# Patient Record
Sex: FEMALE | Race: WHITE | NOT HISPANIC OR LATINO | Employment: UNEMPLOYED | URBAN - METROPOLITAN AREA
[De-identification: names, ages, dates, MRNs, and addresses within clinical notes are randomized per-mention and may not be internally consistent; named-entity substitution may affect disease eponyms.]

---

## 2017-02-08 ENCOUNTER — HOSPITAL ENCOUNTER (EMERGENCY)
Facility: CLINIC | Age: 32
Discharge: HOME | End: 2017-02-08
Attending: EMERGENCY MEDICINE | Admitting: EMERGENCY MEDICINE
Payer: MEDICAID

## 2017-02-08 VITALS
TEMPERATURE: 98.5 F | OXYGEN SATURATION: 100 % | BODY MASS INDEX: 21.34 KG/M2 | HEIGHT: 64 IN | WEIGHT: 125 LBS | RESPIRATION RATE: 18 BRPM | DIASTOLIC BLOOD PRESSURE: 110 MMHG | SYSTOLIC BLOOD PRESSURE: 124 MMHG

## 2017-02-08 DIAGNOSIS — K08.89 PAIN, DENTAL: Primary | ICD-10-CM

## 2017-02-08 DIAGNOSIS — K02.9 DENTAL CARIES: ICD-10-CM

## 2017-02-08 PROCEDURE — 64400 NJX AA&/STRD TRIGEMINAL NRV: CPT | Performed by: EMERGENCY MEDICINE

## 2017-02-08 PROCEDURE — 99284 EMERGENCY DEPT VISIT MOD MDM: CPT | Mod: 25 | Performed by: EMERGENCY MEDICINE

## 2017-02-08 PROCEDURE — 25000132 ZZH RX MED GY IP 250 OP 250 PS 637: Performed by: EMERGENCY MEDICINE

## 2017-02-08 RX ORDER — HYDROCODONE BITARTRATE AND ACETAMINOPHEN 5; 325 MG/1; MG/1
1-2 TABLET ORAL EVERY 4 HOURS PRN
Status: DISCONTINUED | OUTPATIENT
Start: 2017-02-08 | End: 2017-02-08 | Stop reason: HOSPADM

## 2017-02-08 RX ORDER — HYDROCODONE BITARTRATE AND ACETAMINOPHEN 5; 325 MG/1; MG/1
1-2 TABLET ORAL EVERY 4 HOURS PRN
Qty: 10 TABLET | Refills: 0 | Status: SHIPPED | OUTPATIENT
Start: 2017-02-08 | End: 2017-06-30

## 2017-02-08 RX ORDER — PENICILLIN V POTASSIUM 500 MG/1
500 TABLET, FILM COATED ORAL 4 TIMES DAILY
Qty: 28 TABLET | Refills: 0 | Status: SHIPPED | OUTPATIENT
Start: 2017-02-08 | End: 2017-02-15

## 2017-02-08 RX ORDER — PENICILLIN V POTASSIUM 250 MG/1
500 TABLET, FILM COATED ORAL ONCE
Status: COMPLETED | OUTPATIENT
Start: 2017-02-08 | End: 2017-02-08

## 2017-02-08 RX ADMIN — PENICILLIN V POTASSIUM 500 MG: 250 TABLET, FILM COATED ORAL at 01:53

## 2017-02-08 ASSESSMENT — ENCOUNTER SYMPTOMS
ABDOMINAL PAIN: 0
LIGHT-HEADEDNESS: 0
BACK PAIN: 0
NAUSEA: 0
FEVER: 0
HEADACHES: 1
VOMITING: 0
SORE THROAT: 0
TROUBLE SWALLOWING: 0
CHILLS: 0
FACIAL SWELLING: 0
SHORTNESS OF BREATH: 0
NECK PAIN: 0
DIARRHEA: 0

## 2017-02-08 NOTE — ED AVS SNAPSHOT
[facility] Emergency Department    [address] [address]    Phone:  [phone]    Fax:  [phone]                                       Lluvia Bergman   MRN: [id number]    Department:  [facility] Emergency Department   Date of Visit:  2/8/2017           After Visit Summary Signature Page     I have received my discharge instructions, and my questions have been answered. I have discussed any challenges I see with this plan with the nurse or doctor.    ..........................................................................................................................................  Patient/Patient Representative Signature      ..........................................................................................................................................  Patient Representative Print Name and Relationship to Patient    ..................................................               ................................................  Date                                            Time    ..........................................................................................................................................  Reviewed by Signature/Title    ...................................................              ..............................................  Date                                                            Time

## 2017-02-08 NOTE — ED NOTES
Pt presents to ED with complaints of dental pain for 2 days now. Pt reports the pain is so intense she is unable to eat, sleep or do anything. Pt has been trying ibuprofen without any relief. Pt attempted to go to the dentist yesterday, but was unable to get an appointment.

## 2017-02-08 NOTE — DISCHARGE INSTRUCTIONS
"  Dental Cavity    A dental cavity is a pit or crater in the surface of a tooth. This exposes the sensitive inner layer of the tooth and causes pain. If the cavity isn t treated, it will get bigger. It may cause an infection or abscess in the root of the tooth. An infection in the tooth is a much more serious problem than a cavity. You might need a root canal or the entire tooth taken out.  The pain in your tooth may be made worse by drinking hot or cold beverages. It may spread from the tooth to your ear or the area of your jaw on the same side.  Home care  Follow these tips when caring for yourself at home:    Avoid hot and cold foods and drinks. Your tooth may be sensitive to changes in temperature.    If your tooth is chipped or cracked, or if there is a large open cavity, put oil of cloves directly on the tooth to relieve pain. You can buy oil of cloves at drugstores. Some pharmacies carry an over-the-counter \"toothache kit.\" This contains a paste that you can put on the exposed tooth to make it less sensitive.    Put a cold pack on your jaw over the sore area to help reduce pain.    You may use acetaminophen or ibuprofen to ease pain, unless another medicine was prescribed. Note: If you have chronic liver or kidney disease, talk with your health care provider before using these medicines. Also talk with your provider if you ve had a stomach ulcer or GI bleeding.    If you have signs of an infection, you will be given an antibiotic. Take it as directed.  Follow-up care  Follow up with your dentist as advised. Your pain may go away with the treatment given today. But only a dentist can fully look at and treat this problem to prevent further tooth damage.  When to seek medical advice  Call your health care provider right away if any of these occur:    Redness or swelling of the face    Pain gets worse or spreads to your neck    Fever over 100.5  F (38 C)    Unusual drowsiness    Headache or stiff neck    Weakness " or fainting    Pus drains from the tooth or gum    Difficulty swallowing or breathing       8568-3660 [employer]. [address] [place] [address], [place] [address]. All rights reserved. This information is not intended as a substitute for professional medical care. Always follow your healthcare professional's instructions.          Many of these clinics offer a sliding fee option for patients that qualify, and see patients on a walk-in or same day basis. Please call each clinic directly. As services, hours, fees and policies vary greatly.          [facility], \[facility] [place] [facility]\""  [phone]  Sees no insurance  [facility], [place]  [phone]  Preventive services only  [facility] (mult loc) [phone]  [facility]    ([facility]), \[facility] [place] [facility]\""  [phone]  [facility]       [phone]  Preventive services only  [facility]  [phone]  Accepts MA & sees no ins  [facility],      Accepts [place] & sees no ins   [place]   [phone]; [phone]  [facility]   Accepts MA & sees no ins       [phone]  [facility], \[facility] [place] [facility]\""  [phone]   Accepts MA emergencies  [facility] [phone]  [facility],     Accepts MA   [place]   [phone]    [facility] [phone]  Accepts MA & sees no ins   [facility]   Dental Clinic    [phone]  [facility], \[facility] [place] [facility]\""  [phone]   [facility] [phone]  [facility]  Preventive services only   [place]   [phone]  [facility]) [phone]  [facility], [place]  [phone]  Same day [facility] [phone]  Same day [facility],      Same day [facility]   [phone]    [facility] [phone]  Free clinic, walk-in only  [facility] (multiple locations) [phone]      [facility] [phone]    [facility] [phone]  Free clinic, walk-in only  [facility]  [phone]  [facility] [phone] (adults)       [phone] (children)  [facility] [phone]    Also, referral service for low cost dental and healthcare: [phone]  And [phone]

## 2017-02-08 NOTE — ED PROVIDER NOTES
"  History     Chief Complaint   Patient presents with     Dental Pain     HPI  Lluvia Bergman is a 31 year old female with history of chronic dental pain and dental caries presents for evaluation of worsening left upper dental pain tonight.  Patient reports worsening pain over the past roughly 3 days.  No swelling of the face or cheek.  No reported drainage in the mouth.  Patient does report associated headache and difficulty chewing.  Denies fever or chills.  Patient reports self treating with Orajel as well as frequent ibuprofen without adequate pain relief prompting ED visit tonight.    I have reviewed the Medications, Allergies, Past Medical and Surgical History, and Social History in the Epic system.    Review of Systems   Constitutional: Negative for fever and chills.   HENT: Positive for dental problem. Negative for congestion, drooling, facial swelling, sore throat and trouble swallowing.    Respiratory: Negative for shortness of breath.    Cardiovascular: Negative for chest pain.   Gastrointestinal: Negative for nausea, vomiting, abdominal pain and diarrhea.   Musculoskeletal: Negative for back pain and neck pain.   Skin: Negative for rash.   Neurological: Positive for headaches. Negative for light-headedness.   All other systems reviewed and are negative.      Physical Exam   BP: (!) 167/113 mmHg  Heart Rate: 121  Temp: 98.5  F (36.9  C)  Resp: 18  Height: 162.6 cm (5' 4\")  Weight: 56.7 kg (125 lb)  SpO2: 100 %  Physical Exam   Constitutional: She is oriented to person, place, and time. She appears well-developed and well-nourished.   Appears uncomfortable, holding left side of face   HENT:   Head: Normocephalic and atraumatic.   Mouth/Throat: No trismus in the jaw. Dental caries present. No dental abscesses. No oropharyngeal exudate.       No facial swelling, warmth, or erythema   Neck: Normal range of motion. Neck supple.   Cardiovascular: Regular rhythm.    Pulmonary/Chest: Effort normal.   Neurological: " She is alert and oriented to person, place, and time.   Skin: Skin is warm and dry.   Psychiatric: She has a normal mood and affect.   Nursing note and vitals reviewed.      ED Course   Procedures                 Labs Ordered and Resulted from Time of ED Arrival Up to the Time of Departure from the ED - No data to display     Dental block performed.  Local apical nerve block performed above the affected tooth using bupivacaine.    1:46 AM: PT re-assessed after dental block. Pain resolved.  Now 0/10.  Patient resting much more comfortably at this time in no distress.  On repeat exam, no evidence of clear abscess however will initiate a course with antibiotics due to the possibility of small apical abscess and in anticipation of dental extraction    Assessments & Plan (with Medical Decision Making)  31-year-old female with history of chronic dental pain and dental caries presenting for evaluation of worsening left upper dental pain over the past few days.  No fever or chills.  No facial swelling.  No visible abscess.  Local nerve block performed with complete resolution of her pain.  Patient also started on a prophylactic course of penicillin for treatment of possible periapical abscess in anticipation of need for dental extraction.  Patient given 10 Vicodin for home pain relief as needed.  Patient cautioned against high-dose ibuprofen which she has been taking due to its risk for gastrointestinal bleeding as well as renal failure.  Patient advised to take no more than 600-800 mg every 8 hours of ibuprofen.  She'll suffice she can alternate with acetaminophen however needs to not take more than 3 g of acetaminophen every 24 and patient advised that her Vicodin prescription does contain acetaminophen. patient provided resources for reduced cost dental clinics and advised to call tomorrow morning to schedule an appointment as soon as possible      I have reviewed the nursing notes.    I have reviewed the findings,  diagnosis, plan and need for follow up with the patient.    New Prescriptions    HYDROCODONE-ACETAMINOPHEN (NORCO) 5-325 MG PER TABLET    Take 1-2 tablets by mouth every 4 hours as needed for moderate to severe pain    PENICILLIN V POTASSIUM (VEETID) 500 MG TABLET    Take 1 tablet (500 mg) by mouth 4 times daily for 7 days       Final diagnoses:   Dental caries   Pain, dental       2/8/2017   [facility] EMERGENCY DEPARTMENT      Berrios, Lei Winn MD  02/08/17 9653

## 2017-02-08 NOTE — ED AVS SNAPSHOT
" [facility] Emergency Department    [address] [address]    Phone:  [phone]    Fax:  [phone]                                       Lluvia Bergman   MRN: [id number]    Department:  [facility] Emergency Department   Date of Visit:  2/8/2017           Patient Information     Date Of Birth          1985        Your diagnoses for this visit were:     Dental caries     Pain, dental        You were seen by Lei Berrios MD.      Follow-up Information     Call Dental clinic.    Why:  For dental extraction        Discharge Instructions         Dental Cavity    A dental cavity is a pit or crater in the surface of a tooth. This exposes the sensitive inner layer of the tooth and causes pain. If the cavity isn t treated, it will get bigger. It may cause an infection or abscess in the root of the tooth. An infection in the tooth is a much more serious problem than a cavity. You might need a root canal or the entire tooth taken out.  The pain in your tooth may be made worse by drinking hot or cold beverages. It may spread from the tooth to your ear or the area of your jaw on the same side.  Home care  Follow these tips when caring for yourself at home:    Avoid hot and cold foods and drinks. Your tooth may be sensitive to changes in temperature.    If your tooth is chipped or cracked, or if there is a large open cavity, put oil of cloves directly on the tooth to relieve pain. You can buy oil of cloves at drugstores. Some pharmacies carry an over-the-counter \"toothache kit.\" This contains a paste that you can put on the exposed tooth to make it less sensitive.    Put a cold pack on your jaw over the sore area to help reduce pain.    You may use acetaminophen or ibuprofen to ease pain, unless another medicine was prescribed. Note: If you have chronic liver or kidney disease, talk with your health care provider before using these medicines. Also talk with your provider if " you ve had a stomach ulcer or GI bleeding.    If you have signs of an infection, you will be given an antibiotic. Take it as directed.  Follow-up care  Follow up with your dentist as advised. Your pain may go away with the treatment given today. But only a dentist can fully look at and treat this problem to prevent further tooth damage.  When to seek medical advice  Call your health care provider right away if any of these occur:    Redness or swelling of the face    Pain gets worse or spreads to your neck    Fever over 100.5  F (38 C)    Unusual drowsiness    Headache or stiff neck    Weakness or fainting    Pus drains from the tooth or gum    Difficulty swallowing or breathing       8655-1444 [employer]. [address] [place] [address], [place] [address]. All rights reserved. This information is not intended as a substitute for professional medical care. Always follow your healthcare professional's instructions.          Many of these clinics offer a sliding fee option for patients that qualify, and see patients on a walk-in or same day basis. Please call each clinic directly. As services, hours, fees and policies vary greatly.          [facility], [facility]  [phone]  Sees no insurance  [facility], [place]  [phone]  Preventive services only  [facility] (mult loc) [phone]  [facility]    ([facility]  [phone]  [facility]       [phone]  Preventive services only  [facility]  [phone]  Accepts MA & sees no ins  [facility] Care,      Accepts [place] & sees no ins   [place]   [phone]; [phone]  [facility]   Accepts [place] & sees no ins       [phone]  [facility], [facility]  [phone]   Accepts [place] emergencies  Emergency [facility] [phone]  [facility],     Accepts State mental health facility   [phone]    Helping  [facility] [phone]  Accepts MA & sees no ins   [facility]   Dental Clinic    [phone]  [facility], [facility]  [phone]   [facility] [phone]  [facility]  Preventive services only   [place]   [phone]  [facility]) [phone]  [facility]  [phone]  Same day [facility] [phone]  Same day [facility],      Same day [facility]   [phone]    [facility], [facility] [phone]  Free clinic, walk-in only  [facility] (multiple locations) [phone]      [facility] [phone]    [facility] [phone]  Free [facility], walk-in only  [facility]  [phone]  [facility] [phone] (adults)       [phone] (children)  [place] [facility] [phone]    Also, referral service for low cost dental and healthcare: [phone]  And [phone]        24 Hour Appointment Hotline       To make an appointment at any [facility], call [phone]). If you don't have a family doctor or clinic, we will help you find one. [facility] are conveniently located to serve the needs of you and your family.             Review of your medicines      START taking        Dose / Directions Last dose taken    HYDROcodone-acetaminophen 5-325 MG per tablet   Commonly known as:  NORCO   Dose:  1-2 tablet   Quantity:  10 tablet        Take 1-2 tablets by mouth every 4 hours as needed for moderate to severe pain   Refills:  0        penicillin V potassium 500 MG tablet   Commonly known as:  VEETID   Dose:  500 mg   Quantity:  28 tablet        Take 1 tablet (500 mg) by mouth 4 times daily for 7 days   Refills:  0                Prescriptions were sent or printed at [facility]  "locations (2 Prescriptions)                   [facility] [place], [place] - [address]   [address] [address]    Telephone:  [phone]   Fax:  [phone]   Hours:                  E-Prescribed (1 of 2)         penicillin V potassium (VEETID) 500 MG tablet                 Printed at Department/Unit printer (1 of 2)         HYDROcodone-acetaminophen (NORCO) 5-325 MG per tablet                Orders Needing Specimen Collection     None      Pending Results     No orders found from 2017 to 2017.            Pending Culture Results     No orders found from 2017 to 2017.             Test Results from your hospital stay            Thank you for choosing [facility]       Thank you for choosing [facility] for your care. Our goal is always to provide you with excellent care. Hearing back from our patients is one way we can continue to improve our services. Please take a few minutes to complete the written survey that you may receive in the mail after you visit with us. Thank you!        [employer] Information     [employer] lets you send messages to your doctor, view your test results, renew your prescriptions, schedule appointments and more. To sign up, go to [email or web address] . Click on \"Log in\" on the left side of the screen, which will take you to the Welcome page. Then click on \"Sign up Now\" on the right side of the page.     You will be asked to enter the access code listed below, as well as some personal information. Please follow the directions to create your username and password.     Your access code is: [email or web address]  Expires: 3/28/2017  9:23 AM     Your access code will  in 90 days. If you need help or a new code, please call your [facility] clinic or [phone].        Care EveryWhere ID     This is your Care EveryWhere ID. This could be used by other organizations to access your [facility] medical records  [id number]        After Visit Summary       This " is your record. Keep this with you and show to your community pharmacist(s) and doctor(s) at your next visit.

## 2017-02-12 ENCOUNTER — HOSPITAL ENCOUNTER (EMERGENCY)
Facility: CLINIC | Age: 32
Discharge: LEFT BEFORE DISCHARGE | End: 2017-02-12
Attending: FAMILY MEDICINE | Admitting: FAMILY MEDICINE
Payer: MEDICAID

## 2017-02-12 VITALS — HEART RATE: 74 BPM | RESPIRATION RATE: 22 BRPM | OXYGEN SATURATION: 100 % | TEMPERATURE: 98.5 F

## 2017-02-12 DIAGNOSIS — K02.9 DENTAL CARIES: ICD-10-CM

## 2017-02-12 DIAGNOSIS — K08.89 PAIN, DENTAL: ICD-10-CM

## 2017-02-12 PROCEDURE — 99282 EMERGENCY DEPT VISIT SF MDM: CPT

## 2017-02-12 PROCEDURE — 99283 EMERGENCY DEPT VISIT LOW MDM: CPT | Performed by: FAMILY MEDICINE

## 2017-02-12 RX ORDER — BUPIVACAINE HYDROCHLORIDE AND EPINEPHRINE 5; 5 MG/ML; UG/ML
INJECTION, SOLUTION EPIDURAL; INTRACAUDAL; PERINEURAL
Status: DISCONTINUED
Start: 2017-02-12 | End: 2017-02-12 | Stop reason: HOSPADM

## 2017-02-12 NOTE — ED PROVIDER NOTES
History     Chief Complaint   Patient presents with     Dental Pain     Complaining of bilateral dental pain. States she has an appointment on Thursday.      HPI  Ms. Lluvia Bergman is a 31 year old female who presents to the ED today for evaluation of dental pain. The patient was recently seen in the ED on 2/8 for chronic dental pain secondary to methamphetamine abuse at which time she received a dental block and was discharged home with 10 tabs of Vicodin. She presents today with persisting dental pain now localized to her left lower jaw. She states the pain is severe limiting her ability to eat foods of any consistency. She reports she is out of Vicodin since 2/ visit and is now taking Tylenol for pain without relief. She was told last ED visit to limit her use of ibuprofen given she was using it 8-10x per day causing abdominal discomfort. She reports last use of methamphetamine was 2.5 months ago.     I have reviewed the Medications, Allergies, Past Medical and Surgical History, and Social History in the [employer] system.  Past Medical History   No past medical history on file.    Problem List  There is no problem list on file for this patient.      Past Surgical History  No past surgical history on file.    Social History  Social History     Social History     Marital status:      Spouse name: N/A     Number of children: N/A     Years of education: N/A     Occupational History     Not on file.     Social History Main Topics     Smoking status: Not on file     Smokeless tobacco: Not on file     Alcohol use Not on file     Drug use: Not on file     Sexual activity: Not on file     Other Topics Concern     Not on file     Social History Narrative       Review of Systems  Further problem focused review negative.    Physical Exam   Pulse: 74  Temp: 98.5  F (36.9  C)  Resp: 22  SpO2: 100 %  Physical Exam  Nursing note and vitals were reviewed.  Constitutional: Agitated 31-year-old female pacing the room twitching  and irritable.  However she cooperates with history taking and examination.  HEENT: Oropharynx shows loss of a previously filled cavity in the #29 tooth where she reports her pain.  There is no gingival inflammation or swelling.  Voice quality is normal.  Trismus is present.  Neck: Freely mobile    ED Course     ED Course     Procedures             Critical Care time:  none               Labs Ordered and Resulted from Time of ED Arrival Up to the Time of Departure from the ED - No data to display  No results found for this or any previous visit (from the past 24 hour(s)).    Medications   bupivacaine-EPINEPHrine (PF) 0.5% -[id number] injection (not administered)       11:27 AM Patient Assessed.   Assessments & Plan (with Medical Decision Making)     31-year-old female with history of methamphetamine abuse presents with dental pain.  She was seen to go with the same complaint.  She has not had follow-up with a dentist.  She returns for pain and the new location.  I recommended that since the pain seems to be coming from the cavity that she would get the best pain control by having the put a temporary filling.  She was resistant to this idea but wanted to have a supraperiosteal nerve block and then consider filling.  I dried the gum and placed 20% benzocaine gel on the gingiva in preparation for the nerve block.  After this she eloped from the department without explanation.    I have reviewed the nursing notes.    I have reviewed the findings, diagnosis, plan and need for follow up with the patient.    Discharge Medication List as of 2/12/2017 11:57 AM          Final diagnoses:   Pain, dental   Dental caries     This document serves as a record of the services and decisions personally performed and made by Ed Santiago MD. It was created on HIS/HER behalf by Rodney Emery, a trained medical scribe. The creation of this document is based the provider's statements to the medical scribe.  Rodney Emery 11:27 AM  2/12/2017    Provider:   The information in this document, created by the medical scribe for me, accurately reflects the services I personally performed and the decisions made by me. I have reviewed and approved this document for accuracy prior to leaving the patient care area.  Ed Santiago MD 11:27 AM 2/12/2017 2/12/2017   [facility] EMERGENCY DEPARTMENT     Ed Santiago MD  02/12/17 1217

## 2017-02-12 NOTE — ED NOTES
"Pt came out to desk, states the \"thing the doctor did is making the pain worse\"  I told her Dr Santiago will be back into check on here. Pt walked back to room then walked out of dept. Pt eloped without speaking to any staff.  "

## 2017-06-30 ENCOUNTER — OFFICE VISIT (OUTPATIENT)
Dept: URGENT CARE | Facility: URGENT CARE | Age: 32
End: 2017-06-30
Payer: MEDICAID

## 2017-06-30 ENCOUNTER — HOSPITAL ENCOUNTER (EMERGENCY)
Facility: CLINIC | Age: 32
Discharge: LEFT BEFORE DISCHARGE | End: 2017-06-30
Payer: MEDICAID

## 2017-06-30 VITALS
SYSTOLIC BLOOD PRESSURE: 120 MMHG | WEIGHT: 147.4 LBS | BODY MASS INDEX: 25.3 KG/M2 | TEMPERATURE: 96.7 F | DIASTOLIC BLOOD PRESSURE: 80 MMHG | HEART RATE: 70 BPM

## 2017-06-30 DIAGNOSIS — K04.7 DENTAL ABSCESS: ICD-10-CM

## 2017-06-30 DIAGNOSIS — L08.9 LOCAL INFECTION OF SKIN AND SUBCUTANEOUS TISSUE: Primary | ICD-10-CM

## 2017-06-30 PROCEDURE — 99213 OFFICE O/P EST LOW 20 MIN: CPT | Performed by: NURSE PRACTITIONER

## 2017-06-30 RX ORDER — CEPHALEXIN 500 MG/1
500 CAPSULE ORAL 3 TIMES DAILY
Qty: 30 CAPSULE | Refills: 0 | Status: SHIPPED | OUTPATIENT
Start: 2017-06-30 | End: 2017-07-10

## 2017-06-30 NOTE — LETTER
[facility]  [address] [address]  [phone]      6/30/2017    Re: Lluvia Bergman      TO WHOM IT MAY CONCERN:    Lluvia Bergman  was seen on 6-30-17.  Please excuse her tonight due to illness.    CordLENA Angela CNP  [facility]

## 2017-06-30 NOTE — MR AVS SNAPSHOT
After Visit Summary   6/30/2017    Lluvia Bergman    MRN: [id number]           Patient Information     Date Of Birth          1985        Visit Information        Provider Department      6/30/2017 7:05 PM Jody Kingston APRN [facility]        Today's Diagnoses     Local infection of skin and subcutaneous tissue    -  1    Dental abscess          Care Instructions    Take antibiotic as directed.    If symptoms worsen or do not resolve make sure you are seen again by primary care provider. If emergent go to the ER.      Indications for emergent return to emergency department discussed with patient, who verbalized good understanding and agreement.  Patient understands the limitations of today's evaluation.         Dental Abscess  An abscess is a sac of pus. A dental abscess forms when a tooth or the tissue around it becomes infected with bacteria. The bacteria can enter through a cavity or a crack in a tooth. It can also infect the gum tissue or bone around a tooth. An untreated abscess can cause the loss of the tooth. It can even spread to other parts of the body and become life-threatening.    Symptoms of a dental abscess   Signs of a dental abscess include:    Toothache, often severe    Tooth pain with hot, cold, or pressure    Pain in the gums, cheek, or jaw    Bad breath or bitter taste in the mouth    Trouble swallowing or opening the mouth    Fever    Swollen or enlarged glands in the neck  Diagnosing a dental abscess  An abscess is diagnosed by looking at your teeth and gums. You will be told if any tests, like dental X-rays, are needed.  Treating a dental abscess  Treatments for a dental abscess may include the following:    Antibiotic medicines to treat the underlying infection.    Pain relievers to help you feel more comfortable. Your health care provider may prescribe a medicine for you. Or, use over-the-counter pain relievers, like  acetaminophen or ibuprofen.    Warm saltwater rinses to soothe discomfort and help clear away pus.    Root canal surgery if needed to save the tooth. With a root canal, the infected part of the tooth is removed. A special substance is then used to fill the empty space in the tooth.    Drainage of the abscess if needed. Incisions are made to allow the infected material to drain from the tooth.    Removal of the tooth in cases of severe infection that can t be treated another way.  If the infection is severe, has spread, or doesn t respond to treatment, you may need to be admitted to a hospital.        When to call the dentist  Call your dentist right away if you have any of the following:    Fever of 100.4 F (38 C) or higher    Increased pain, redness, drainage, or swelling in the treated area    Swelling of the face or jawbone    Pain that cannot be controlled with medicines   Preventing dental abscess  To prevent another abscess in the future, keep your teeth clean and healthy. Brush twice a day and floss at least once daily. See your dentist for regular tooth cleanings. And avoid sugary foods and drinks that can lead to tooth decay.  Date Last Reviewed: 7/14/2015 2000-2017 [employer]. [address] [place] [address]. All rights reserved. This information is not intended as a substitute for professional medical care. Always follow your healthcare professional's instructions.        Cellulitis  Cellulitis is an infection of the deep layers of skin. A break in the skin, such as a cut or scratch, can let bacteria under the skin. If the bacteria get to deep layers of the skin, it can be serious. If not treated, cellulitis can get into the bloodstream and lymph nodes. The infection can then spread throughout the body. This causes serious illness.  Cellulitis causes the affected skin to become red, swollen, warm, and sore. The reddened areas have a visible border. An open sore may leak fluid  (pus). You may have a fever, chills, and pain.  Cellulitis is treated with antibiotics taken for 7 to 10 days. An open sore may be cleaned and covered with cool wet gauze. Symptoms should get better 1 to 2 days after treatment is started. Make sure to take all the antibiotics for the full number of days until they are gone. Keep taking the medicine even if your symptoms go away.  Home care  Follow these tips:    Limit the use of the part of your body with cellulitis.     If the infection is on your leg, keep your leg raised while sitting. This will help to reduce swelling.    Take all of the antibiotic medicine exactly as directed until it is gone. Do not miss any doses, especially during the first 7 days. Don t stop taking the medicine when your symptoms get better.    Keep the affected area clean and dry.    Wash your hands with soap and warm water before and after touching your skin. Anyone else who touches your skin should also wash his or her hands. Don't share towels.  Follow-up care  Follow up with your healthcare provider, or as advised. If your infection does not go away on the first antibiotic, your healthcare provider will prescribe a different one.  When to seek medical advice  Call your healthcare provider right away if any of these occur:    Red areas that spread    Swelling or pain that gets worse    Fluid leaking from the skin (pus)    Fever higher of 100.4  F (38.0  C) or higher after 2 days on antibiotics  Date Last Reviewed: 9/1/2016 2000-2017 [employer]. [address] [place] [address]. All rights reserved. This information is not intended as a substitute for professional medical care. Always follow your healthcare professional's instructions.                Follow-ups after your visit        Follow-up notes from your care team     See patient instructions section of the AVS Return if symptoms worsen or fail to improve, for Follow up with your primary care provider.     "  Who to contact     If you have questions or need follow up information about today's clinic visit or your schedule please contact [facility] directly at [phone].  Normal or non-critical lab and imaging results will be communicated to you by MyChart, letter or phone within 4 business days after the clinic has received the results. If you do not hear from us within 7 days, please contact the clinic through MyChart or phone. If you have a critical or abnormal lab result, we will notify you by phone as soon as possible.  Submit refill requests through [employer] or call your pharmacy and they will forward the refill request to us. Please allow 3 business days for your refill to be completed.          Additional Information About Your Visit        [facility] Information     [employer] lets you send messages to your doctor, view your test results, renew your prescriptions, schedule appointments and more. To sign up, go to [email or web address] . Click on \"Log in\" on the left side of the screen, which will take you to the Welcome page. Then click on \"Sign up Now\" on the right side of the page.     You will be asked to enter the access code listed below, as well as some personal information. Please follow the directions to create your username and password.     Your access code is: -  Expires: 2017  8:45 PM     Your access code will  in 90 days. If you need help or a new code, please call your [place] clinic or [phone].        Care EveryWhere ID     This is your Care EveryWhere ID. This could be used by other organizations to access your [place] medical records  [id number]        Your Vitals Were     Pulse Temperature BMI (Body Mass Index)             70 96.7  F (35.9  C) (Tympanic) 25.3 kg/m2          Blood Pressure from Last 3 Encounters:   17 120/80   17 (!) 124/110   16 (!) 144/91    Weight from Last 3 Encounters:   17 147 lb 6.4 oz " (66.9 kg)   02/08/17 125 lb (56.7 kg)   12/28/16 135 lb (61.2 kg)              Today, you had the following     No orders found for display         Today's Medication Changes          These changes are accurate as of: 6/30/17  8:45 PM.  If you have any questions, ask your nurse or doctor.               Start taking these medicines.        Dose/Directions    cephALEXin 500 MG capsule   Commonly known as:  KEFLEX   Used for:  Local infection of skin and subcutaneous tissue, Dental abscess   Started by:  Jody Kingston APRN CNP        Dose:  500 mg   Take 1 capsule (500 mg) by mouth 3 times daily for 10 days   Quantity:  30 capsule   Refills:  0            Where to get your medicines      These medications were sent to [id number] - [place], [place] - [address]  [address] [address]    Hours:  Closed 10-16-08 business to [facility] Phone:  [phone]     cephALEXin 500 MG capsule                Primary Care Provider    Fvl None       No address on file        Equal Access to Services     [facility] CampusYUNIOR AH: Hadii jaskaran monzon hadasho Soomaali, waaxda luqadaha, qaybta kaalmada adeegyada, waxay karie wilson . So [facility] [phone].    ATENCIÓN: Si habla español, tiene a blair disposición servicios gratuitos de asistencia lingüística. Llame al [phone].    We comply with applicable federal civil rights laws and [place] laws. We do not discriminate on the basis of race, color, national origin, age, disability sex, sexual orientation or gender identity.            Thank you!     Thank you for choosing [facility]  for your care. Our goal is always to provide you with excellent care. Hearing back from our patients is one way we can continue to improve our services. Please take a few minutes to complete the written survey that you may receive in the mail after your visit with us. Thank you!             Your Updated Medication List  - Protect others around you: Learn how to safely use, store and throw away your medicines at www.disposemymeds.org.          This list is accurate as of: 6/30/17  8:45 PM.  Always use your most recent med list.                   Brand Name Dispense Instructions for use Diagnosis    cephALEXin 500 MG capsule    KEFLEX    30 capsule    Take 1 capsule (500 mg) by mouth 3 times daily for 10 days    Local infection of skin and subcutaneous tissue, Dental abscess

## 2017-07-01 ENCOUNTER — OFFICE VISIT (OUTPATIENT)
Dept: URGENT CARE | Facility: URGENT CARE | Age: 32
End: 2017-07-01
Payer: MEDICAID

## 2017-07-01 VITALS
DIASTOLIC BLOOD PRESSURE: 72 MMHG | TEMPERATURE: 97.4 F | OXYGEN SATURATION: 100 % | HEART RATE: 94 BPM | SYSTOLIC BLOOD PRESSURE: 110 MMHG

## 2017-07-01 DIAGNOSIS — B02.9 HERPES ZOSTER WITHOUT COMPLICATION: Primary | ICD-10-CM

## 2017-07-01 PROCEDURE — 99213 OFFICE O/P EST LOW 20 MIN: CPT | Performed by: NURSE PRACTITIONER

## 2017-07-01 RX ORDER — VALACYCLOVIR HYDROCHLORIDE 1 G/1
1000 TABLET, FILM COATED ORAL 3 TIMES DAILY
Qty: 21 TABLET | Refills: 0 | Status: SHIPPED | OUTPATIENT
Start: 2017-07-01 | End: 2017-11-30

## 2017-07-01 RX ORDER — HYDROCODONE BITARTRATE AND ACETAMINOPHEN 5; 325 MG/1; MG/1
1 TABLET ORAL EVERY 4 HOURS PRN
Qty: 20 TABLET | Refills: 0 | Status: SHIPPED | OUTPATIENT
Start: 2017-07-01 | End: 2017-09-22

## 2017-07-01 NOTE — LETTER
[facility]  [address] [address]  [phone]      7/1/2017    Re: Lluvia Bergman      TO WHOM IT MAY CONCERN:    Lluvia Bergman  was seen on 7-1-17.  Please excuse her for the next 3-4 days due to illness.    CordLENA Angela CNP  [facility]

## 2017-07-01 NOTE — PATIENT INSTRUCTIONS
Take antibiotic as directed.    If symptoms worsen or do not resolve make sure you are seen again by primary care provider. If emergent go to the ER.      Indications for emergent return to emergency department discussed with patient, who verbalized good understanding and agreement.  Patient understands the limitations of today's evaluation.         Dental Abscess  An abscess is a sac of pus. A dental abscess forms when a tooth or the tissue around it becomes infected with bacteria. The bacteria can enter through a cavity or a crack in a tooth. It can also infect the gum tissue or bone around a tooth. An untreated abscess can cause the loss of the tooth. It can even spread to other parts of the body and become life-threatening.    Symptoms of a dental abscess   Signs of a dental abscess include:    Toothache, often severe    Tooth pain with hot, cold, or pressure    Pain in the gums, cheek, or jaw    Bad breath or bitter taste in the mouth    Trouble swallowing or opening the mouth    Fever    Swollen or enlarged glands in the neck  Diagnosing a dental abscess  An abscess is diagnosed by looking at your teeth and gums. You will be told if any tests, like dental X-rays, are needed.  Treating a dental abscess  Treatments for a dental abscess may include the following:    Antibiotic medicines to treat the underlying infection.    Pain relievers to help you feel more comfortable. Your health care provider may prescribe a medicine for you. Or, use over-the-counter pain relievers, like acetaminophen or ibuprofen.    Warm saltwater rinses to soothe discomfort and help clear away pus.    Root canal surgery if needed to save the tooth. With a root canal, the infected part of the tooth is removed. A special substance is then used to fill the empty space in the tooth.    Drainage of the abscess if needed. Incisions are made to allow the infected material to drain from the tooth.    Removal of the tooth in cases of severe  infection that can t be treated another way.  If the infection is severe, has spread, or doesn t respond to treatment, you may need to be admitted to a hospital.        When to call the dentist  Call your dentist right away if you have any of the following:    Fever of 100.4 F (38 C) or higher    Increased pain, redness, drainage, or swelling in the treated area    Swelling of the face or jawbone    Pain that cannot be controlled with medicines   Preventing dental abscess  To prevent another abscess in the future, keep your teeth clean and healthy. Brush twice a day and floss at least once daily. See your dentist for regular tooth cleanings. And avoid sugary foods and drinks that can lead to tooth decay.  Date Last Reviewed: 7/14/2015 2000-2017 [employer]. [address] [place] [address], [place] [address]. All rights reserved. This information is not intended as a substitute for professional medical care. Always follow your healthcare professional's instructions.        Cellulitis  Cellulitis is an infection of the deep layers of skin. A break in the skin, such as a cut or scratch, can let bacteria under the skin. If the bacteria get to deep layers of the skin, it can be serious. If not treated, cellulitis can get into the bloodstream and lymph nodes. The infection can then spread throughout the body. This causes serious illness.  Cellulitis causes the affected skin to become red, swollen, warm, and sore. The reddened areas have a visible border. An open sore may leak fluid (pus). You may have a fever, chills, and pain.  Cellulitis is treated with antibiotics taken for 7 to 10 days. An open sore may be cleaned and covered with cool wet gauze. Symptoms should get better 1 to 2 days after treatment is started. Make sure to take all the antibiotics for the full number of days until they are gone. Keep taking the medicine even if your symptoms go away.  Home care  Follow these tips:    Limit the use of the  part of your body with cellulitis.     If the infection is on your leg, keep your leg raised while sitting. This will help to reduce swelling.    Take all of the antibiotic medicine exactly as directed until it is gone. Do not miss any doses, especially during the first 7 days. Don t stop taking the medicine when your symptoms get better.    Keep the affected area clean and dry.    Wash your hands with soap and warm water before and after touching your skin. Anyone else who touches your skin should also wash his or her hands. Don't share towels.  Follow-up care  Follow up with your healthcare provider, or as advised. If your infection does not go away on the first antibiotic, your healthcare provider will prescribe a different one.  When to seek medical advice  Call your healthcare provider right away if any of these occur:    Red areas that spread    Swelling or pain that gets worse    Fluid leaking from the skin (pus)    Fever higher of 100.4  F (38.0  C) or higher after 2 days on antibiotics  Date Last Reviewed: 9/1/2016 2000-2017 [employer]. [address] [place] [address], [place] [address]. All rights reserved. This information is not intended as a substitute for professional medical care. Always follow your healthcare professional's instructions.

## 2017-07-01 NOTE — MR AVS SNAPSHOT
After Visit Summary   7/1/2017    Lluvia Bergman    MRN: [id number]           Patient Information     Date Of Birth          1985        Visit Information        Provider Department      7/1/2017 2:30 PM Jody Kingston APRN [facility]        Today's Diagnoses     Herpes zoster without complication    -  1      Care Instructions    Take medication as directed.    Follow up if symptoms worsen.    Continue antibiotic as well.    Follow up with primary care provider as needed.    Follow-up with your primary care provider next week and as needed.    Indications for emergent return to emergency department discussed with patient, who verbalized good understanding and agreement.  Patient understands the limitations of today's evaluation.         Shingles  Shingles is a viral infection caused by the same virus as chicken pox. Anyone who has had chicken pox may get shingles later in life. The virus stays in the body, but remains dormant (asleep). Shingles often occurs in older persons or persons with lowered immunity. But it can affect anyone at any age.  Shingles starts as a tingling patch of skin on one side of the body. Small, painful blisters may then appear. The rash does not spread to the rest of the body.  Exposure to shingles cannot cause shingles. However, it can cause chicken pox in anyone who has not had chicken pox or has not been vaccinated. The contagious period ends when all blisters have crusted over (generally about 2 weeks after the illness begins).  After the blisters heal, the affected skin may be sensitive or painful for months (neuralgia). This often gradually goes away.    Home care    Medicines may be prescribed to help relieve pain. Take these medicines as directed. Ask your healthcare provider or pharmacist before using over-the-counter medicines for helping treat pain and itching.    In certain cases, antiviral medicines may be  prescribed to reduce pain, shorten the illness, and prevent neuralgia. Take these medicines as directed.    Compresses made from a solution of cool water mixed with cornstarch or baking soda may help relieve pain and itching.     Gently wash skin daily with soap and water to help prevent infection.  Be certain to rinse off all of the soap, which can be irritating.    Trim fingernails and try not to scratch. Scratching the sores may leave scars.    Stay home from work or school until all blisters have formed a crust and you are no longer contagious.  Follow-up care  Follow up with your healthcare provider or as directed by our staff.  When to seek medical advice    Fever of 100.4 F (38 C) or higher, or as directed by your healthcare provider    Affected skin is on the face or neck and any of the following occur:    Headache    Eye pain    Changes in vision    Sores near the eye    Weakness of facial muscles    Pain, redness, or swelling of a joint    Signs of skin infection: colored drainage from the sores, warmth, increasing redness, or increasing pain  Date Last Reviewed: 9/25/2015 2000-2017 [employer]. [address] [place] [address]. All rights reserved. This information is not intended as a substitute for professional medical care. Always follow your healthcare professional's instructions.                Follow-ups after your visit        Follow-up notes from your care team     See patient instructions section of the AVS Return if symptoms worsen or fail to improve.      Who to contact     If you have questions or need follow up information about today's clinic visit or your schedule please contact [facility] directly at [phone].  Normal or non-critical lab and imaging results will be communicated to you by MyChart, letter or phone within 4 business days after the clinic has received the results. If you do not hear from us within 7 days, please  "contact the clinic through [facility] or phone. If you have a critical or abnormal lab result, we will notify you by phone as soon as possible.  Submit refill requests through [facility] or call your pharmacy and they will forward the refill request to us. Please allow 3 business days for your refill to be completed.          Additional Information About Your Visit        [employer] Information     [facility] lets you send messages to your doctor, view your test results, renew your prescriptions, schedule appointments and more. To sign up, go to [email or web address] . Click on \"Log in\" on the left side of the screen, which will take you to the Welcome page. Then click on \"Sign up Now\" on the right side of the page.     You will be asked to enter the access code listed below, as well as some personal information. Please follow the directions to create your username and password.     Your access code is: -  Expires: 2017  8:45 PM     Your access code will  in 90 days. If you need help or a new code, please call your [place] clinic or [phone].        Care EveryWhere ID     This is your Care EveryWhere ID. This could be used by other organizations to access your [place] medical records  [id number]        Your Vitals Were     Pulse Temperature Pulse Oximetry             94 97.4  F (36.3  C) (Tympanic) 100%          Blood Pressure from Last 3 Encounters:   17 110/72   17 120/80   17 (!) 124/110    Weight from Last 3 Encounters:   17 147 lb 6.4 oz (66.9 kg)   17 125 lb (56.7 kg)   16 135 lb (61.2 kg)              Today, you had the following     No orders found for display         Today's Medication Changes          These changes are accurate as of: 17  2:57 PM.  If you have any questions, ask your nurse or doctor.               Start taking these medicines.        Dose/Directions    HYDROcodone-acetaminophen 5-325 MG per tablet   Commonly known as:  NORCO "   Used for:  Herpes zoster without complication   Started by:  Jody Kingston APRN CNP        Dose:  1 tablet   Take 1 tablet by mouth every 4 hours as needed for pain maximum 6 tablet(s) per day   Quantity:  20 tablet   Refills:  0       valACYclovir 1000 mg tablet   Commonly known as:  VALTREX   Used for:  Herpes zoster without complication   Started by:  Jody Kingston APRN CNP        Dose:  1000 mg   Take 1 tablet (1,000 mg) by mouth 3 times daily for 7 days   Quantity:  21 tablet   Refills:  0            Where to get your medicines      These medications were sent to [id number] - [place], [place] - [address]  [address] [address]    Hours:  Closed 10-16-08 business to [facility] [place] [facility] Phone:  [phone]     valACYclovir 1000 mg tablet         Some of these will need a paper prescription and others can be bought over the counter.  Ask your nurse if you have questions.     Bring a paper prescription for each of these medications     HYDROcodone-acetaminophen 5-325 MG per tablet                Primary Care Provider    Fvl None       No address on file        Equal Access to Services     [facility] : Airam Asencio, waaxda luqjese, qaybta kaalmaelsa adeching, aníbal wilson . So [facility] [phone].    ATENCIÓN: Si habla español, tiene a blair disposición servicios gratuitos de asistencia lingüística. Llame al [phone].    We comply with applicable federal civil rights laws and [place] laws. We do not discriminate on the basis of race, color, national origin, age, disability sex, sexual orientation or gender identity.            Thank you!     Thank you for choosing [facility]  for your care. Our goal is always to provide you with excellent care. Hearing back from our patients is one way we can continue to improve our services. Please take a few minutes to complete the written survey  that you may receive in the mail after your visit with us. Thank you!             Your Updated Medication List - Protect others around you: Learn how to safely use, store and throw away your medicines at www.disposemymeds.org.          This list is accurate as of: 7/1/17  2:57 PM.  Always use your most recent med list.                   Brand Name Dispense Instructions for use Diagnosis    cephALEXin 500 MG capsule    KEFLEX    30 capsule    Take 1 capsule (500 mg) by mouth 3 times daily for 10 days    Local infection of skin and subcutaneous tissue, Dental abscess       HYDROcodone-acetaminophen 5-325 MG per tablet    NORCO    20 tablet    Take 1 tablet by mouth every 4 hours as needed for pain maximum 6 tablet(s) per day    Herpes zoster without complication       valACYclovir 1000 mg tablet    VALTREX    21 tablet    Take 1 tablet (1,000 mg) by mouth 3 times daily for 7 days    Herpes zoster without complication

## 2017-07-01 NOTE — PROGRESS NOTES
SUBJECTIVE:                                                    Lluvia Bergman is a 31 year old female who presents to clinic today for the following health issues:      Chief Complaint   Patient presents with     Mass     cant even touch the side of her head now, lots of pain so bad she was crying last night, body aches, skin is sweaty(clammy), fever, pale          Problem list and histories reviewed & adjusted, as indicated.  Additional history: as documented    There is no problem list on file for this patient.    History reviewed. No pertinent surgical history.    Social History   Substance Use Topics     Smoking status: Current Every Day Smoker     Smokeless tobacco: Not on file     Alcohol use Not on file     History reviewed. No pertinent family history.      Current Outpatient Prescriptions   Medication Sig Dispense Refill     valACYclovir (VALTREX) 1000 mg tablet Take 1 tablet (1,000 mg) by mouth 3 times daily for 7 days 21 tablet 0     HYDROcodone-acetaminophen (NORCO) 5-325 MG per tablet Take 1 tablet by mouth every 4 hours as needed for pain maximum 6 tablet(s) per day 20 tablet 0     cephALEXin (KEFLEX) 500 MG capsule Take 1 capsule (500 mg) by mouth 3 times daily for 10 days 30 capsule 0     Allergies   Allergen Reactions     Blue Dyes Hives     Demerol [Meperidine] Hives     Labs reviewed in EPIC    Reviewed and updated as needed this visit by clinical staff  Tobacco  Allergies  Meds  Problems  Med Hx  Surg Hx  Fam Hx  Soc Hx        Reviewed and updated as needed this visit by Provider  Allergies  Meds  Problems         ROS:  Constitutional, HEENT, cardiovascular, pulmonary, GI, , musculoskeletal, neuro, skin, endocrine and psych systems are negative, except as otherwise noted.    OBJECTIVE:     /72  Pulse 94  Temp 97.4  F (36.3  C) (Tympanic)  SpO2 100%  There is no height or weight on file to calculate BMI.   GENERAL: healthy, alert and no distress, nontoxic in appearance  EYES:  Eyes grossly normal to inspection, PERRL and conjunctivae and sclerae normal  HENT: ear canals and TM's normal, nose and mouth without ulcers or lesions  NECK: right posterior cervical chain enlarged and painful, supple with full ROM  RESP: lungs clear to auscultation - no rales, rhonchi or wheezes  CV: regular rate and rhythm, normal S1 S2, no S3 or S4, no murmur, click or rub, no peripheral edema   ABDOMEN: soft, nontender, no hepatosplenomegaly, no masses   MS: no gross musculoskeletal defects noted, no edema  Still has area on top of head that has one lesion and mildly swollen, nonerythemic but painful to touch. Right side of scalp is very tender and there is redness today just above nape of neck hairline. Suspicious of shingles.    Diagnostic Test Results:  No results found for this or any previous visit (from the past 24 hour(s)).    ASSESSMENT/PLAN:     Problem List Items Addressed This Visit     None      Visit Diagnoses     Herpes zoster without complication    -  Primary    Relevant Medications    valACYclovir (VALTREX) 1000 mg tablet    HYDROcodone-acetaminophen (NORCO) 5-325 MG per tablet               Patient Instructions   Take medication as directed.    Follow up if symptoms worsen.    Continue antibiotic as well.    Follow up with primary care provider as needed.    Follow-up with your primary care provider next week and as needed.    Indications for emergent return to emergency department discussed with patient, who verbalized good understanding and agreement.  Patient understands the limitations of today's evaluation.         Shingles  Shingles is a viral infection caused by the same virus as chicken pox. Anyone who has had chicken pox may get shingles later in life. The virus stays in the body, but remains dormant (asleep). Shingles often occurs in older persons or persons with lowered immunity. But it can affect anyone at any age.  Shingles starts as a tingling patch of skin on one side of the  body. Small, painful blisters may then appear. The rash does not spread to the rest of the body.  Exposure to shingles cannot cause shingles. However, it can cause chicken pox in anyone who has not had chicken pox or has not been vaccinated. The contagious period ends when all blisters have crusted over (generally about 2 weeks after the illness begins).  After the blisters heal, the affected skin may be sensitive or painful for months (neuralgia). This often gradually goes away.    Home care    Medicines may be prescribed to help relieve pain. Take these medicines as directed. Ask your healthcare provider or pharmacist before using over-the-counter medicines for helping treat pain and itching.    In certain cases, antiviral medicines may be prescribed to reduce pain, shorten the illness, and prevent neuralgia. Take these medicines as directed.    Compresses made from a solution of cool water mixed with cornstarch or baking soda may help relieve pain and itching.     Gently wash skin daily with soap and water to help prevent infection.  Be certain to rinse off all of the soap, which can be irritating.    Trim fingernails and try not to scratch. Scratching the sores may leave scars.    Stay home from work or school until all blisters have formed a crust and you are no longer contagious.  Follow-up care  Follow up with your healthcare provider or as directed by our staff.  When to seek medical advice    Fever of 100.4 F (38 C) or higher, or as directed by your healthcare provider    Affected skin is on the face or neck and any of the following occur:    Headache    Eye pain    Changes in vision    Sores near the eye    Weakness of facial muscles    Pain, redness, or swelling of a joint    Signs of skin infection: colored drainage from the sores, warmth, increasing redness, or increasing pain  Date Last Reviewed: 9/25/2015 2000-2017 [employer]. [address] [place] [address], [place] [address]. All rights  reserved. This information is not intended as a substitute for professional medical care. Always follow your healthcare professional's instructions.            LENA Naik [facility]

## 2017-07-01 NOTE — PROGRESS NOTES
SUBJECTIVE:                                                    Lluvia Bergman is a 31 year old female who presents to clinic today for the following health issues:    Concern - sore on head   Onset: 2 days ago     Description:   Top of head patient has a wound that is draining.  Also has 2 small nodules on bilateral sides of neck.     Intensity: moderate    Progression of Symptoms:  same    Accompanying Signs & Symptoms:  Painful head. Hurts to move and even touch hair    Previous history of similar problem:   na    Precipitating factors:   Worsened by: touching hair/ head     Alleviating factors:  Improved by: none    Therapies Tried and outcome: none     Tooth Abscess      Duration: yesterday    Description (location/character/radiation): tooth abscess    Intensity:  mild, moderate    Accompanying signs and symptoms: NA     History (similar episodes/previous evaluation): None    Precipitating or alleviating factors: has had for 5 months, though didn't act up    Therapies tried and outcome: None       Problem list and histories reviewed & adjusted, as indicated.  Additional history: as documented    There is no problem list on file for this patient.    History reviewed. No pertinent surgical history.    Social History   Substance Use Topics     Smoking status: Current Every Day Smoker     Smokeless tobacco: Not on file     Alcohol use Not on file     History reviewed. No pertinent family history.      Current Outpatient Prescriptions   Medication Sig Dispense Refill     cephALEXin (KEFLEX) 500 MG capsule Take 1 capsule (500 mg) by mouth 3 times daily for 10 days 30 capsule 0     valACYclovir (VALTREX) 1000 mg tablet Take 1 tablet (1,000 mg) by mouth 3 times daily for 7 days 21 tablet 0     HYDROcodone-acetaminophen (NORCO) 5-325 MG per tablet Take 1 tablet by mouth every 4 hours as needed for pain maximum 6 tablet(s) per day 20 tablet 0     Allergies   Allergen Reactions     Blue Dyes Hives     Demerol  [Meperidine] Hives     Labs reviewed in EPIC    Reviewed and updated as needed this visit by clinical staff  Tobacco  Allergies  Meds  Problems  Med Hx  Surg Hx  Fam Hx  Soc Hx        Reviewed and updated as needed this visit by Provider  Allergies  Meds  Problems         ROS:  Constitutional, HEENT, cardiovascular, pulmonary, GI, , musculoskeletal, neuro, skin, endocrine and psych systems are negative, except as otherwise noted.    OBJECTIVE:     /80 (BP Location: Right arm, Cuff Size: Adult Regular)  Pulse 70  Temp 96.7  F (35.9  C) (Tympanic)  Wt 147 lb 6.4 oz (66.9 kg)  BMI 25.3 kg/m2  Body mass index is 25.3 kg/(m^2).   GENERAL: healthy, alert and no distress, nontoxic in appearance  EYES: Eyes grossly normal to inspection, PERRL and conjunctivae and sclerae normal  HENT: ear canals and TM's normal, nose and mouth without ulcers or lesions, has a small scab on top of head just to the right that is tender around it. No other scalp issues.   NECK: right posterior cervical enlarged and sore lymph nodes, no evidence of head lice. supple with full ROM  RESP: lungs clear to auscultation - no rales, rhonchi or wheezes  CV: regular rate and rhythm, normal S1 S2, no S3 or S4, no murmur, click or rub, no peripheral edema   ABDOMEN: soft, nontender, no hepatosplenomegaly, no masses   MS: no gross musculoskeletal defects noted, no edema  No rash    Diagnostic Test Results:  No results found for this or any previous visit (from the past 24 hour(s)).    ASSESSMENT/PLAN:     Problem List Items Addressed This Visit     None      Visit Diagnoses     Local infection of skin and subcutaneous tissue    -  Primary    Relevant Medications    cephALEXin (KEFLEX) 500 MG capsule    Dental abscess        Relevant Medications    cephALEXin (KEFLEX) 500 MG capsule               Patient Instructions     Take antibiotic as directed.    If symptoms worsen or do not resolve make sure you are seen again by primary care  provider. If emergent go to the ER.      Indications for emergent return to emergency department discussed with patient, who verbalized good understanding and agreement.  Patient understands the limitations of today's evaluation.         Dental Abscess  An abscess is a sac of pus. A dental abscess forms when a tooth or the tissue around it becomes infected with bacteria. The bacteria can enter through a cavity or a crack in a tooth. It can also infect the gum tissue or bone around a tooth. An untreated abscess can cause the loss of the tooth. It can even spread to other parts of the body and become life-threatening.    Symptoms of a dental abscess   Signs of a dental abscess include:    Toothache, often severe    Tooth pain with hot, cold, or pressure    Pain in the gums, cheek, or jaw    Bad breath or bitter taste in the mouth    Trouble swallowing or opening the mouth    Fever    Swollen or enlarged glands in the neck  Diagnosing a dental abscess  An abscess is diagnosed by looking at your teeth and gums. You will be told if any tests, like dental X-rays, are needed.  Treating a dental abscess  Treatments for a dental abscess may include the following:    Antibiotic medicines to treat the underlying infection.    Pain relievers to help you feel more comfortable. Your health care provider may prescribe a medicine for you. Or, use over-the-counter pain relievers, like acetaminophen or ibuprofen.    Warm saltwater rinses to soothe discomfort and help clear away pus.    Root canal surgery if needed to save the tooth. With a root canal, the infected part of the tooth is removed. A special substance is then used to fill the empty space in the tooth.    Drainage of the abscess if needed. Incisions are made to allow the infected material to drain from the tooth.    Removal of the tooth in cases of severe infection that can t be treated another way.  If the infection is severe, has spread, or doesn t respond to treatment,  you may need to be admitted to a hospital.        When to call the dentist  Call your dentist right away if you have any of the following:    Fever of 100.4 F (38 C) or higher    Increased pain, redness, drainage, or swelling in the treated area    Swelling of the face or jawbone    Pain that cannot be controlled with medicines   Preventing dental abscess  To prevent another abscess in the future, keep your teeth clean and healthy. Brush twice a day and floss at least once daily. See your dentist for regular tooth cleanings. And avoid sugary foods and drinks that can lead to tooth decay.  Date Last Reviewed: 7/14/2015 2000-2017 [employer]. [address] [place] [address], [place] [address]. All rights reserved. This information is not intended as a substitute for professional medical care. Always follow your healthcare professional's instructions.        Cellulitis  Cellulitis is an infection of the deep layers of skin. A break in the skin, such as a cut or scratch, can let bacteria under the skin. If the bacteria get to deep layers of the skin, it can be serious. If not treated, cellulitis can get into the bloodstream and lymph nodes. The infection can then spread throughout the body. This causes serious illness.  Cellulitis causes the affected skin to become red, swollen, warm, and sore. The reddened areas have a visible border. An open sore may leak fluid (pus). You may have a fever, chills, and pain.  Cellulitis is treated with antibiotics taken for 7 to 10 days. An open sore may be cleaned and covered with cool wet gauze. Symptoms should get better 1 to 2 days after treatment is started. Make sure to take all the antibiotics for the full number of days until they are gone. Keep taking the medicine even if your symptoms go away.  Home care  Follow these tips:    Limit the use of the part of your body with cellulitis.     If the infection is on your leg, keep your leg raised while sitting. This will help  to reduce swelling.    Take all of the antibiotic medicine exactly as directed until it is gone. Do not miss any doses, especially during the first 7 days. Don t stop taking the medicine when your symptoms get better.    Keep the affected area clean and dry.    Wash your hands with soap and warm water before and after touching your skin. Anyone else who touches your skin should also wash his or her hands. Don't share towels.  Follow-up care  Follow up with your healthcare provider, or as advised. If your infection does not go away on the first antibiotic, your healthcare provider will prescribe a different one.  When to seek medical advice  Call your healthcare provider right away if any of these occur:    Red areas that spread    Swelling or pain that gets worse    Fluid leaking from the skin (pus)    Fever higher of 100.4  F (38.0  C) or higher after 2 days on antibiotics  Date Last Reviewed: 9/1/2016 2000-2017 [employer]. [address] [place] [address], [place] [address]. All rights reserved. This information is not intended as a substitute for professional medical care. Always follow your healthcare professional's instructions.            LENA Naik [facility]

## 2017-07-01 NOTE — NURSING NOTE
"Chief Complaint   Patient presents with     Rash     Dental Problem     abscess tooth       Initial /80 (BP Location: Right arm, Cuff Size: Adult Regular)  Pulse 70  Temp 96.7  F (35.9  C) (Tympanic)  Wt 147 lb 6.4 oz (66.9 kg)  BMI 25.3 kg/m2 Estimated body mass index is 25.3 kg/(m^2) as calculated from the following:    Height as of 2/8/17: 5' 4\" (1.626 m).    Weight as of this encounter: 147 lb 6.4 oz (66.9 kg).  Medication Reconciliation: complete    Health Maintenance that is potentially due pending provider review:  NONE    N/a  Cory Bailey M.A.        "

## 2017-07-01 NOTE — PATIENT INSTRUCTIONS
Take medication as directed.    Follow up if symptoms worsen.    Continue antibiotic as well.    Follow up with primary care provider as needed.    Follow-up with your primary care provider next week and as needed.    Indications for emergent return to emergency department discussed with patient, who verbalized good understanding and agreement.  Patient understands the limitations of today's evaluation.         Shingles  Shingles is a viral infection caused by the same virus as chicken pox. Anyone who has had chicken pox may get shingles later in life. The virus stays in the body, but remains dormant (asleep). Shingles often occurs in older persons or persons with lowered immunity. But it can affect anyone at any age.  Shingles starts as a tingling patch of skin on one side of the body. Small, painful blisters may then appear. The rash does not spread to the rest of the body.  Exposure to shingles cannot cause shingles. However, it can cause chicken pox in anyone who has not had chicken pox or has not been vaccinated. The contagious period ends when all blisters have crusted over (generally about 2 weeks after the illness begins).  After the blisters heal, the affected skin may be sensitive or painful for months (neuralgia). This often gradually goes away.    Home care    Medicines may be prescribed to help relieve pain. Take these medicines as directed. Ask your healthcare provider or pharmacist before using over-the-counter medicines for helping treat pain and itching.    In certain cases, antiviral medicines may be prescribed to reduce pain, shorten the illness, and prevent neuralgia. Take these medicines as directed.    Compresses made from a solution of cool water mixed with cornstarch or baking soda may help relieve pain and itching.     Gently wash skin daily with soap and water to help prevent infection.  Be certain to rinse off all of the soap, which can be irritating.    Trim fingernails and try not to  scratch. Scratching the sores may leave scars.    Stay home from work or school until all blisters have formed a crust and you are no longer contagious.  Follow-up care  Follow up with your healthcare provider or as directed by our staff.  When to seek medical advice    Fever of 100.4 F (38 C) or higher, or as directed by your healthcare provider    Affected skin is on the face or neck and any of the following occur:    Headache    Eye pain    Changes in vision    Sores near the eye    Weakness of facial muscles    Pain, redness, or swelling of a joint    Signs of skin infection: colored drainage from the sores, warmth, increasing redness, or increasing pain  Date Last Reviewed: 9/25/2015 2000-2017 [employer]. [address] [place] [address], [place] [address]. All rights reserved. This information is not intended as a substitute for professional medical care. Always follow your healthcare professional's instructions.

## 2017-07-05 ENCOUNTER — TELEPHONE (OUTPATIENT)
Dept: FAMILY MEDICINE | Facility: CLINIC | Age: 32
End: 2017-07-05

## 2017-07-05 NOTE — LETTER
[facility]  [address] [place] [address]  Phone: [phone]  Fax: [phone]      July 5, 2017      Lluvia Espinozacharlee  [address]  [facility] [address]              To whom it may concern:    Please excuse Lluvia Bergman from work on 7-5-17 due to illness.          Sincerely,          Jody Kingston, JUVENTINO/leo

## 2017-07-05 NOTE — TELEPHONE ENCOUNTER
I called Lluvia back and asked her what I could helop her with and she apologized for hanging up on me.  She wanted letter to excuse from work today.  Letter was written, signed and placed at the  for .  Ann Epstein RN

## 2017-07-05 NOTE — TELEPHONE ENCOUNTER
"Voice message was left.12\"06 PM   Lluvia asking for the same day provider to call her back about the bumps on her body.  (Pt was seen by Jody Kingston on 7/1)    "

## 2017-08-08 ENCOUNTER — OFFICE VISIT (OUTPATIENT)
Dept: URGENT CARE | Facility: URGENT CARE | Age: 32
End: 2017-08-08
Payer: MEDICAID

## 2017-08-08 VITALS
HEART RATE: 87 BPM | BODY MASS INDEX: 25.75 KG/M2 | WEIGHT: 150 LBS | TEMPERATURE: 98.5 F | SYSTOLIC BLOOD PRESSURE: 121 MMHG | DIASTOLIC BLOOD PRESSURE: 83 MMHG

## 2017-08-08 DIAGNOSIS — G44.219 EPISODIC TENSION-TYPE HEADACHE, NOT INTRACTABLE: Primary | ICD-10-CM

## 2017-08-08 PROCEDURE — 99213 OFFICE O/P EST LOW 20 MIN: CPT | Performed by: NURSE PRACTITIONER

## 2017-08-08 RX ORDER — CYCLOBENZAPRINE HCL 10 MG
10 TABLET ORAL 3 TIMES DAILY PRN
Qty: 30 TABLET | Refills: 1 | Status: SHIPPED | OUTPATIENT
Start: 2017-08-08 | End: 2017-11-30

## 2017-08-08 NOTE — MR AVS SNAPSHOT
"              After Visit Summary   8/8/2017    Lluvia Bergman    MRN: [id number]           Patient Information     Date Of Birth          1985        Visit Information        Provider Department      8/8/2017 6:55 PM Jody Kingston APRN [facility]        Today's Diagnoses     Episodic tension-type headache, not intractable    -  1      Care Instructions    Take flexeril as directed. Do not exceed Aleve, Ibuprofen or tylenol 24 hour doses.  Stay hydrated.    If headache worsens it would be best to go to the ER for fluids and meds.    Follow-up with your primary care provider next week and as needed.    Indications for emergent return to emergency department discussed with patient, who verbalized good understanding and agreement.  Patient understands the limitations of today's evaluation.         * Tension Headache    Muscle Tension Headache (also called \"stress headache\") is a very common cause of head pain. Under stress, some people tense the muscles of their shoulder, neck and scalp without knowing it. If this lasts long enough, a headache can occur. These headaches can be very painful and last for hours or even days.  Home Care:    If you were given pain medicine for this headache, do not drive yourself home. Arrange for a ride, instead. When you get home, try to sleep. You should feel much better when you wake up.    Heat to the back of your neck may relieve neck spasm.    Drink only clear liquids or eat a very light diet to avoid nausea/vomiting until symptoms improve.  Preventing Future Headaches    Identify the sources of stress in your life. These may not be obvious! Learn new ways to handle your stress, such as regular exercise, biofeedback, self-hypnosis and meditation. For more information about this, consult your doctor or go to a local bookstore and review the many books and tapes on this subject.    At the first sign of a tension headache, take time out " if possible. Remove yourself from the stressful situation, find a quiet comfortable place to sit or lie down and let yourself relax. Heat and deep massage of the tight areas in the neck and shoulders may help reduce muscle spasm. Medicine, such as ibuprofen (Advil or Motrin) or a prescribed muscle relaxant may be helpful at this point.  Follow Up with your doctor if the headache is not better within the next 24 hours. If you have frequent headaches you should discuss a treatment plan with your primary care doctor. Ask if you can have medicine to take at home the next time you get a bad headache. This may avoid the need for a visit to the emergency department in the future. Poorly controlled chronic headaches may require a referral to a neurologist (headache specialist).  Call your Doctor Or Get Prompt Medical Attention if any of the following occur:    Worsening of your head pain or no improvement within 24 hours    Repeated vomiting (unable to keep liquids down)    Fever of 100.4 F (38.0 C) or higher, or as directed by your healthcare provider    Stiff neck    Extreme drowsiness, confusion or fainting    Dizziness, vertigo (dizziness with spinning sensation)    Weakness of an arm or leg or one side of the face    Difficulty with speech or vision    [id number] [address] [address]. All rights reserved. This information is not intended as a substitute for professional medical care. Always follow your healthcare professional's instructions.        Managing Tension-type Headache Symptoms  A tension-type headache can develop slowly. Being aware of the symptoms helps you recognize a headache early. Then you can act to reduce pain and relieve tension. Methods for relieving your symptoms include self-care and medicine.    Tension-type symptoms  The earlier you recognize the symptoms of a tension-type headache, the easier it is to treat. Tension-type headaches may:    Begin with fatigue,  tension, or pain in the neck and shoulders    Feel like a band around the head    Be concentrated in the temple, the back of the head, behind the eyes, or in the face    Come and go, or last for days, weeks, or even longer    Involve referred pain -- this means that the area that hurts may not be where the problem begins  Self-care during a tension-type headache  When you have a tension-type headache, there are things you can do to relax, loosen muscles, and reduce the pain:    Brush your scalp lightly with a soft hairbrush.    Give yourself a massage. Knead the muscles running from your shoulders up the back of your skull. Or ask a friend to gently massage your neck and shoulders.    Use an ice pack. Wrap a thin cloth around a cold pack, a cold can of soda, or a bag of frozen vegetables. Apply this directly to the place where you feel pain (such as your neck or temples).    Use moist heat to relax your muscles. Take a warm shower or bath. Or drape a warm, moist towel around your neck and shoulders.  Relieving pain and tension  Over-the-counter or prescription medicine can help relieve pain. Another way to reduce your pain is to use relaxation techniques to loosen tight muscles.  Medicine  Medicine used for tension-type headaches include the following:    NSAIDs (nonsteroidal anti-inflammatories), such as aspirin and ibuprofen, relieve inflammation and help block pain signals.    Acetaminophen treats pain, and some formulations contain caffeine.     Muscle relaxants can reduce painful muscle contractions.  Taking medicine safely  Be aware that:    Taking analgesics (pain relievers) or drinking too much coffee may lead to rebound headaches (frequent or severe headaches that can happen if you miss a dose of medication), so take pain medications only as needed. If you think you have these headaches, contact your health care provider.    Taking too much medication can cause sleep problems or stomach upset. Some  over-the-counter headache medications may contain caffeine. These may disrupt sleep and worsen pain.  Relaxation techniques  A , class, book, or tape may help you learn these techniques. One or more of these methods may work for you:    Deep breathing. Slow, calm, deep breathing can help you relax. Breathe in for a count of 5 or more. Then slowly let the breath out.    Visualization. Imagining a peaceful, secure scene can give you a sense of control over your body and surroundings.    Progressive relaxation. This is done by tightening and then releasing muscle groups. Start at the top of your head and work your way down your body. Tighten each muscle group for 5 to 10 seconds. Then release the muscle group for the same amount of time.    Biofeedback. This is a type of training in which you learn to control certain physical functions and responses. This helps you learn to reduce muscle tension.     Date Last Reviewed: 11/9/2015 2000-2017 [employer]. [address] [place] [address]. All rights reserved. This information is not intended as a substitute for professional medical care. Always follow your healthcare professional's instructions.                Follow-ups after your visit        Follow-up notes from your care team     See patient instructions section of the AVS Return in about 2 days (around 8/10/2017) for Follow up with your primary care provider.      Who to contact     If you have questions or need follow up information about today's clinic visit or your schedule please contact [facility] directly at [phone].  Normal or non-critical lab and imaging results will be communicated to you by MyChart, letter or phone within 4 business days after the clinic has received the results. If you do not hear from us within 7 days, please contact the clinic through MyChart or phone. If you have a critical or abnormal lab result, we will notify you  "by phone as soon as possible.  Submit refill requests through [employer] or call your pharmacy and they will forward the refill request to us. Please allow 3 business days for your refill to be completed.          Additional Information About Your Visit        [employer] Information     [employer] lets you send messages to your doctor, view your test results, renew your prescriptions, schedule appointments and more. To sign up, go to [email or web address] . Click on \"Log in\" on the left side of the screen, which will take you to the Welcome page. Then click on \"Sign up Now\" on the right side of the page.     You will be asked to enter the access code listed below, as well as some personal information. Please follow the directions to create your username and password.     Your access code is: -  Expires: 2017  8:45 PM     Your access code will  in 90 days. If you need help or a new code, please call your [facility] clinic or [phone].        Care EveryWhere ID     This is your Care EveryWhere ID. This could be used by other organizations to access your [facility] medical records  [id number]        Your Vitals Were     Pulse Temperature BMI (Body Mass Index)             87 98.5  F (36.9  C) (Tympanic) 25.75 kg/m2          Blood Pressure from Last 3 Encounters:   17 121/83   17 110/72   17 120/80    Weight from Last 3 Encounters:   17 150 lb (68 kg)   17 147 lb 6.4 oz (66.9 kg)   17 125 lb (56.7 kg)              Today, you had the following     No orders found for display         Today's Medication Changes          These changes are accurate as of: 17  7:19 PM.  If you have any questions, ask your nurse or doctor.               Start taking these medicines.        Dose/Directions    cyclobenzaprine 10 MG tablet   Commonly known as:  FLEXERIL   Used for:  Episodic tension-type headache, not intractable   Started by:  Jody Kingston APRN CNP        Dose:  " 10 mg   Take 1 tablet (10 mg) by mouth 3 times daily as needed for muscle spasms   Quantity:  30 tablet   Refills:  1            Where to get your medicines      These medications were sent to [id number] - [place], [place] - [address]  [address] [facility] [address]    Hours:  Closed 10-16-08 business to [facility] Phone:  [phone]     cyclobenzaprine 10 MG tablet                Primary Care Provider    Fvl None       No address on file        Equal Access to Services     [facility] Hospital[facility] : Hadii aad ku hadasho Soomaali, waaxda luqadaha, qaybta kaalmada adeegyada, waxay imeldain haybettyen gen wilson . So [facility] [phone].    ATENCIÓN: Si habla español, tiene a blair disposición servicios gratuitos de asistencia lingüística. [facility] [phone].    We comply with applicable federal civil rights laws and [place] laws. We do not discriminate on the basis of race, color, national origin, age, disability sex, sexual orientation or gender identity.            Thank you!     Thank you for choosing [facility]  for your care. Our goal is always to provide you with excellent care. Hearing back from our patients is one way we can continue to improve our services. Please take a few minutes to complete the written survey that you may receive in the mail after your visit with us. Thank you!             Your Updated Medication List - Protect others around you: Learn how to safely use, store and throw away your medicines at www.disposemymeds.org.          This list is accurate as of: 8/8/17  7:19 PM.  Always use your most recent med list.                   Brand Name Dispense Instructions for use Diagnosis    cyclobenzaprine 10 MG tablet    FLEXERIL    30 tablet    Take 1 tablet (10 mg) by mouth 3 times daily as needed for muscle spasms    Episodic tension-type headache, not intractable       HYDROcodone-acetaminophen 5-325 MG per tablet    NORCO    20 tablet     Take 1 tablet by mouth every 4 hours as needed for pain maximum 6 tablet(s) per day    Herpes zoster without complication       valACYclovir 1000 mg tablet    VALTREX    21 tablet    Take 1 tablet (1,000 mg) by mouth 3 times daily for 7 days    Herpes zoster without complication

## 2017-08-09 NOTE — PROGRESS NOTES
SUBJECTIVE:                                                    Lluvia Bergman is a 31 year old female who presents to clinic today for the following health issues:    Headache  Onset: 3 days     Description:   Location: bilateral in the frontal area   Character: throbbing pain, echoing   Frequency:  2-3 months   Duration:  1-3/4 days     Intensity: moderate    Progression of Symptoms:  worsening    Accompanying Signs & Symptoms:  Stiff neck: no  Neck or upper back pain: YES- neck   Fever: no  Sinus pressure: no  Nausea or vomiting: YES- nausea   Dizziness: no  Numbness: no  Weakness: no  Visual changes: no    History:   Head trauma: no  Family history of migraines: YES  Previous tests for headaches: no  Neurologist evaluations: no  Able to do daily activities: no  Wake with a headaches: YES  Do headaches wake you up: no  Daily pain medication use:no  Work/school stressors/changes: no    Precipitating factors:   Does light make it worse: YES  Does sound make it worse: YES    Alleviating factors:  Does sleep help: YES- with cool rag over head    Therapies Tried and outcome: Ibuprofen (Advil, Motrin), Naproxyn (Aleve) and Tylenol          Problem list and histories reviewed & adjusted, as indicated.  Additional history: as documented    There is no problem list on file for this patient.    History reviewed. No pertinent surgical history.    Social History   Substance Use Topics     Smoking status: Current Every Day Smoker     Smokeless tobacco: Never Used     Alcohol use Not on file     History reviewed. No pertinent family history.      Current Outpatient Prescriptions   Medication Sig Dispense Refill     cyclobenzaprine (FLEXERIL) 10 MG tablet Take 1 tablet (10 mg) by mouth 3 times daily as needed for muscle spasms 30 tablet 1     valACYclovir (VALTREX) 1000 mg tablet Take 1 tablet (1,000 mg) by mouth 3 times daily for 7 days 21 tablet 0     HYDROcodone-acetaminophen (NORCO) 5-325 MG per tablet Take 1 tablet by  mouth every 4 hours as needed for pain maximum 6 tablet(s) per day (Patient not taking: Reported on 8/8/2017) 20 tablet 0     Allergies   Allergen Reactions     Blue Dyes Hives     Demerol [Meperidine] Hives     Labs reviewed in EPIC      Reviewed and updated as needed this visit by clinical staffTobacco  Allergies  Meds  Problems  Med Hx  Surg Hx  Fam Hx  Soc Hx        Reviewed and updated as needed this visit by Provider  Allergies  Meds  Problems         ROS:  Constitutional, HEENT, cardiovascular, pulmonary, GI, , musculoskeletal, neuro, skin, endocrine and psych systems are negative, except as otherwise noted.      OBJECTIVE:   /83 (BP Location: Right arm, Cuff Size: Adult Regular)  Pulse 87  Temp 98.5  F (36.9  C) (Tympanic)  Wt 150 lb (68 kg)  BMI 25.75 kg/m2  Body mass index is 25.75 kg/(m^2).   GENERAL: healthy, alert and no distress, nontoxic in appearance  EYES: Eyes grossly normal to inspection, PERRL and conjunctivae and sclerae normal  HENT: ear canals and TM's normal, nose and mouth without ulcers or lesions  NECK: no adenopathy, supple with full ROM  RESP: lungs clear to auscultation - no rales, rhonchi or wheezes  CV: regular rate and rhythm, normal S1 S2, no S3 or S4, no murmur, click or rub, no peripheral edema   ABDOMEN: soft, nontender, no hepatosplenomegaly, no masses   MS: no gross musculoskeletal defects noted, no edema  CN 2-12 intact  Appears to be tension headache from back of neck over top of head into eyes.    Diagnostic Test Results:  No results found for this or any previous visit (from the past 24 hour(s)).    ASSESSMENT/PLAN:   Need to hydrate well.  Problem List Items Addressed This Visit     None      Visit Diagnoses     Episodic tension-type headache, not intractable    -  Primary    Relevant Medications    cyclobenzaprine (FLEXERIL) 10 MG tablet               Patient Instructions   Take flexeril as directed. Do not exceed Aleve, Ibuprofen or tylenol 24 hour  "doses.  Stay hydrated.    If headache worsens it would be best to go to the ER for fluids and meds.    Follow-up with your primary care provider next week and as needed.    Indications for emergent return to emergency department discussed with patient, who verbalized good understanding and agreement.  Patient understands the limitations of today's evaluation.         * Tension Headache    Muscle Tension Headache (also called \"stress headache\") is a very common cause of head pain. Under stress, some people tense the muscles of their shoulder, neck and scalp without knowing it. If this lasts long enough, a headache can occur. These headaches can be very painful and last for hours or even days.  Home Care:    If you were given pain medicine for this headache, do not drive yourself home. Arrange for a ride, instead. When you get home, try to sleep. You should feel much better when you wake up.    Heat to the back of your neck may relieve neck spasm.    Drink only clear liquids or eat a very light diet to avoid nausea/vomiting until symptoms improve.  Preventing Future Headaches    Identify the sources of stress in your life. These may not be obvious! Learn new ways to handle your stress, such as regular exercise, biofeedback, self-hypnosis and meditation. For more information about this, consult your doctor or go to a local bookstore and review the many books and tapes on this subject.    At the first sign of a tension headache, take time out if possible. Remove yourself from the stressful situation, find a quiet comfortable place to sit or lie down and let yourself relax. Heat and deep massage of the tight areas in the neck and shoulders may help reduce muscle spasm. Medicine, such as ibuprofen (Advil or Motrin) or a prescribed muscle relaxant may be helpful at this point.  Follow Up with your doctor if the headache is not better within the next 24 hours. If you have frequent headaches you should discuss a treatment " plan with your primary care doctor. Ask if you can have medicine to take at home the next time you get a bad headache. This may avoid the need for a visit to the emergency department in the future. Poorly controlled chronic headaches may require a referral to a neurologist (headache specialist).  Call your Doctor Or Get Prompt Medical Attention if any of the following occur:    Worsening of your head pain or no improvement within 24 hours    Repeated vomiting (unable to keep liquids down)    Fever of 100.4 F (38.0 C) or higher, or as directed by your healthcare provider    Stiff neck    Extreme drowsiness, confusion or fainting    Dizziness, vertigo (dizziness with spinning sensation)    Weakness of an arm or leg or one side of the face    Difficulty with speech or vision    [id number] [facility], [address] [place] [address], [place] [address]. All rights reserved. This information is not intended as a substitute for professional medical care. Always follow your healthcare professional's instructions.        Managing Tension-type Headache Symptoms  A tension-type headache can develop slowly. Being aware of the symptoms helps you recognize a headache early. Then you can act to reduce pain and relieve tension. Methods for relieving your symptoms include self-care and medicine.    Tension-type symptoms  The earlier you recognize the symptoms of a tension-type headache, the easier it is to treat. Tension-type headaches may:    Begin with fatigue, tension, or pain in the neck and shoulders    Feel like a band around the head    Be concentrated in the temple, the back of the head, behind the eyes, or in the face    Come and go, or last for days, weeks, or even longer    Involve referred pain -- this means that the area that hurts may not be where the problem begins  Self-care during a tension-type headache  When you have a tension-type headache, there are things you can do to relax, loosen muscles, and reduce the  pain:    Brush your scalp lightly with a soft hairbrush.    Give yourself a massage. Knead the muscles running from your shoulders up the back of your skull. Or ask a friend to gently massage your neck and shoulders.    Use an ice pack. Wrap a thin cloth around a cold pack, a cold can of soda, or a bag of frozen vegetables. Apply this directly to the place where you feel pain (such as your neck or temples).    Use moist heat to relax your muscles. Take a warm shower or bath. Or drape a warm, moist towel around your neck and shoulders.  Relieving pain and tension  Over-the-counter or prescription medicine can help relieve pain. Another way to reduce your pain is to use relaxation techniques to loosen tight muscles.  Medicine  Medicine used for tension-type headaches include the following:    NSAIDs (nonsteroidal anti-inflammatories), such as aspirin and ibuprofen, relieve inflammation and help block pain signals.    Acetaminophen treats pain, and some formulations contain caffeine.     Muscle relaxants can reduce painful muscle contractions.  Taking medicine safely  Be aware that:    Taking analgesics (pain relievers) or drinking too much coffee may lead to rebound headaches (frequent or severe headaches that can happen if you miss a dose of medication), so take pain medications only as needed. If you think you have these headaches, contact your health care provider.    Taking too much medication can cause sleep problems or stomach upset. Some over-the-counter headache medications may contain caffeine. These may disrupt sleep and worsen pain.  Relaxation techniques  A , class, book, or tape may help you learn these techniques. One or more of these methods may work for you:    Deep breathing. Slow, calm, deep breathing can help you relax. Breathe in for a count of 5 or more. Then slowly let the breath out.    Visualization. Imagining a peaceful, secure scene can give you a sense of control over your body and  surroundings.    Progressive relaxation. This is done by tightening and then releasing muscle groups. Start at the top of your head and work your way down your body. Tighten each muscle group for 5 to 10 seconds. Then release the muscle group for the same amount of time.    Biofeedback. This is a type of training in which you learn to control certain physical functions and responses. This helps you learn to reduce muscle tension.     Date Last Reviewed: 11/9/2015 2000-2017 [employer]. [address] [place] [address], [place] [address]. All rights reserved. This information is not intended as a substitute for professional medical care. Always follow your healthcare professional's instructions.            LENA Naik [facility]

## 2017-08-09 NOTE — PATIENT INSTRUCTIONS
"Take flexeril as directed. Do not exceed Aleve, Ibuprofen or tylenol 24 hour doses.  Stay hydrated.    If headache worsens it would be best to go to the ER for fluids and meds.    Follow-up with your primary care provider next week and as needed.    Indications for emergent return to emergency department discussed with patient, who verbalized good understanding and agreement.  Patient understands the limitations of today's evaluation.         * Tension Headache    Muscle Tension Headache (also called \"stress headache\") is a very common cause of head pain. Under stress, some people tense the muscles of their shoulder, neck and scalp without knowing it. If this lasts long enough, a headache can occur. These headaches can be very painful and last for hours or even days.  Home Care:    If you were given pain medicine for this headache, do not drive yourself home. Arrange for a ride, instead. When you get home, try to sleep. You should feel much better when you wake up.    Heat to the back of your neck may relieve neck spasm.    Drink only clear liquids or eat a very light diet to avoid nausea/vomiting until symptoms improve.  Preventing Future Headaches    Identify the sources of stress in your life. These may not be obvious! Learn new ways to handle your stress, such as regular exercise, biofeedback, self-hypnosis and meditation. For more information about this, consult your doctor or go to a local bookstore and review the many books and tapes on this subject.    At the first sign of a tension headache, take time out if possible. Remove yourself from the stressful situation, find a quiet comfortable place to sit or lie down and let yourself relax. Heat and deep massage of the tight areas in the neck and shoulders may help reduce muscle spasm. Medicine, such as ibuprofen (Advil or Motrin) or a prescribed muscle relaxant may be helpful at this point.  Follow Up with your doctor if the headache is not better within the " next 24 hours. If you have frequent headaches you should discuss a treatment plan with your primary care doctor. Ask if you can have medicine to take at home the next time you get a bad headache. This may avoid the need for a visit to the emergency department in the future. Poorly controlled chronic headaches may require a referral to a neurologist (headache specialist).  Call your Doctor Or Get Prompt Medical Attention if any of the following occur:    Worsening of your head pain or no improvement within 24 hours    Repeated vomiting (unable to keep liquids down)    Fever of 100.4 F (38.0 C) or higher, or as directed by your healthcare provider    Stiff neck    Extreme drowsiness, confusion or fainting    Dizziness, vertigo (dizziness with spinning sensation)    Weakness of an arm or leg or one side of the face    Difficulty with speech or vision    [id number] [facility], [address] [place] [address], [place] [id number]. All rights reserved. This information is not intended as a substitute for professional medical care. Always follow your healthcare professional's instructions.        Managing Tension-type Headache Symptoms  A tension-type headache can develop slowly. Being aware of the symptoms helps you recognize a headache early. Then you can act to reduce pain and relieve tension. Methods for relieving your symptoms include self-care and medicine.    Tension-type symptoms  The earlier you recognize the symptoms of a tension-type headache, the easier it is to treat. Tension-type headaches may:    Begin with fatigue, tension, or pain in the neck and shoulders    Feel like a band around the head    Be concentrated in the temple, the back of the head, behind the eyes, or in the face    Come and go, or last for days, weeks, or even longer    Involve referred pain -- this means that the area that hurts may not be where the problem begins  Self-care during a tension-type headache  When you have a tension-type headache,  there are things you can do to relax, loosen muscles, and reduce the pain:    Brush your scalp lightly with a soft hairbrush.    Give yourself a massage. Knead the muscles running from your shoulders up the back of your skull. Or ask a friend to gently massage your neck and shoulders.    Use an ice pack. Wrap a thin cloth around a cold pack, a cold can of soda, or a bag of frozen vegetables. Apply this directly to the place where you feel pain (such as your neck or temples).    Use moist heat to relax your muscles. Take a warm shower or bath. Or drape a warm, moist towel around your neck and shoulders.  Relieving pain and tension  Over-the-counter or prescription medicine can help relieve pain. Another way to reduce your pain is to use relaxation techniques to loosen tight muscles.  Medicine  Medicine used for tension-type headaches include the following:    NSAIDs (nonsteroidal anti-inflammatories), such as aspirin and ibuprofen, relieve inflammation and help block pain signals.    Acetaminophen treats pain, and some formulations contain caffeine.     Muscle relaxants can reduce painful muscle contractions.  Taking medicine safely  Be aware that:    Taking analgesics (pain relievers) or drinking too much coffee may lead to rebound headaches (frequent or severe headaches that can happen if you miss a dose of medication), so take pain medications only as needed. If you think you have these headaches, contact your health care provider.    Taking too much medication can cause sleep problems or stomach upset. Some over-the-counter headache medications may contain caffeine. These may disrupt sleep and worsen pain.  Relaxation techniques  A , class, book, or tape may help you learn these techniques. One or more of these methods may work for you:    Deep breathing. Slow, calm, deep breathing can help you relax. Breathe in for a count of 5 or more. Then slowly let the breath out.    Visualization. Imagining a  peaceful, secure scene can give you a sense of control over your body and surroundings.    Progressive relaxation. This is done by tightening and then releasing muscle groups. Start at the top of your head and work your way down your body. Tighten each muscle group for 5 to 10 seconds. Then release the muscle group for the same amount of time.    Biofeedback. This is a type of training in which you learn to control certain physical functions and responses. This helps you learn to reduce muscle tension.     Date Last Reviewed: 11/9/2015 2000-2017 [employer]. [address] [place] [id number]. All rights reserved. This information is not intended as a substitute for professional medical care. Always follow your healthcare professional's instructions.

## 2017-08-09 NOTE — NURSING NOTE
"Chief Complaint   Patient presents with     Headache       Initial /83 (BP Location: Right arm, Cuff Size: Adult Regular)  Pulse 87  Temp 98.5  F (36.9  C) (Tympanic)  Wt 150 lb (68 kg)  BMI 25.75 kg/m2 Estimated body mass index is 25.75 kg/(m^2) as calculated from the following:    Height as of 2/8/17: 5' 4\" (1.626 m).    Weight as of this encounter: 150 lb (68 kg).  Medication Reconciliation: complete      Health Maintenance that is potentially due pending provider review:  NONE    n/a    Is there anyone who you would like to be able to receive your results? Not Applicable  If yes have patient fill out LACEY  Cory Bailey M.A.      "

## 2017-09-22 ENCOUNTER — OFFICE VISIT (OUTPATIENT)
Dept: URGENT CARE | Facility: URGENT CARE | Age: 32
End: 2017-09-22
Payer: MEDICAID

## 2017-09-22 VITALS
BODY MASS INDEX: 26.78 KG/M2 | RESPIRATION RATE: 20 BRPM | HEART RATE: 104 BPM | SYSTOLIC BLOOD PRESSURE: 134 MMHG | TEMPERATURE: 97.2 F | WEIGHT: 156 LBS | DIASTOLIC BLOOD PRESSURE: 84 MMHG

## 2017-09-22 DIAGNOSIS — M54.12 CERVICAL RADICULOPATHY: Primary | ICD-10-CM

## 2017-09-22 DIAGNOSIS — G44.209 TENSION HEADACHE: ICD-10-CM

## 2017-09-22 DIAGNOSIS — M62.838 MUSCLE SPASM: ICD-10-CM

## 2017-09-22 PROCEDURE — 99214 OFFICE O/P EST MOD 30 MIN: CPT | Performed by: NURSE PRACTITIONER

## 2017-09-22 RX ORDER — CYCLOBENZAPRINE HCL 10 MG
5-10 TABLET ORAL 3 TIMES DAILY PRN
Qty: 30 TABLET | Refills: 1 | Status: SHIPPED | OUTPATIENT
Start: 2017-09-22 | End: 2017-11-30

## 2017-09-22 RX ORDER — HYDROCODONE BITARTRATE AND ACETAMINOPHEN 5; 325 MG/1; MG/1
1 TABLET ORAL EVERY 6 HOURS PRN
Qty: 20 TABLET | Refills: 0 | Status: SHIPPED | OUTPATIENT
Start: 2017-09-22 | End: 2017-11-30

## 2017-09-22 RX ORDER — PREDNISONE 20 MG/1
TABLET ORAL
Qty: 10 TABLET | Refills: 0 | Status: SHIPPED | OUTPATIENT
Start: 2017-09-22 | End: 2017-11-30

## 2017-09-22 NOTE — MR AVS SNAPSHOT
After Visit Summary   9/22/2017    Lluvia Bergman    MRN: [id number]           Patient Information     Date Of Birth          1985        Visit Information        Provider Department      9/22/2017 7:55 PM Katheryn Cowart APRN [facility]        Today's Diagnoses     Cervical radiculopathy    -  1    Muscle spasm        Tension headache          Care Instructions    Heat or ice may help.   Ibuprofen up to 800 mg four times daily, aleve 2 pills twice daily or acetaminophen 2 pills four times daily may help.   Stronger pain pills can be used if needed. Take muscle relaxant, Flexeril (cyclobenzaprine) up to 3 times a day as needed for pain.   OK to take the Take hydrocodone/acetaminophen 1 or 2 pills every 6 hours as needed for pain.   Recommend physical therapy if not improving.  Will need to follow-up with your primary care provider for a referral if not improving,   Recheck in 2-3 weeks if not improving or other problems.   Staying active will help. Those who continue work and daily activities get better faster. Avoid painful activities.     If not improving follow-up with your primary care provider may need physical therapy.     Follow-up with your primary care provider next week and as needed.    Indications for emergent return to emergency department discussed with patient, who verbalized good understanding and agreement.  Patient understands the limitations of today's evaluation.             Understanding Cervical Radiculopathy    Cervical radiculopathy is irritation or inflammation of a nerve root in the neck. It causes neck pain and other symptoms that may spread into the chest or down the arm. To understand this condition, it helps to understand the parts of the spine:    Vertebrae. These are bones that stack to form the spine. The cervical spine contains the 7 vertebrae in the neck.    Disks. These are soft pads of tissue between the vertebrae. They  act as shock absorbers for the spine.    The spinal canal. This is a tunnel formed within the stacked vertebrae. The spinal cord runs through this canal.    Nerves. These branch off the spinal cord. As they leave the spinal canal, nerves pass through openings between the vertebrae. The nerve root is the part of the nerve that is closest to the spinal cord.   With cervical radiculopathy, nerve roots in the neck become irritated. This leads to pain and symptoms that can travel to the nerves that go from the spinal cord down the arms and into the torso.  What causes cervical radiculopathy?  Aging, injury, poor posture, and other issues can lead to problems in the neck. These problems may then irritate nerve roots. These include:    Damage to a disk in the cervical spine. The damaged disk may then press on nearby nerve roots.    Degeneration from wear and tear, and aging. This can lead to narrowing (stenosis) of the openings between the vertebrae. The narrowed openings press on nerve roots as they leave the spinal canal.    An unstable spine. This is when a vertebra slips forward. It can then press on a nerve root.  There are other, less common causes of pressure on nerves in the neck. These include infection, cysts, and tumors.  Symptoms of cervical radiculopathy  These include:    Neck pain    Pain, numbness, tingling, or weakness that travels down the arm    Loss of neck movement    Muscle spasms  Treatment for cervical radiculopathy  In most cases, your healthcare provider will first try treatments that help relieve symptoms. These may include:    Prescription or over-the-counter pain medicines. These help relieve pain and swelling.    Cold packs. These help reduce pain.    Resting. This involves avoiding positions and activities that increase pain.    Neck brace (cervical collar). This can help relieve inflammation and pain.    Physical therapy, including exercises and stretches. This can help decrease pain and  increase movement and function.    Shots of medicinesaround the nerve roots. This is done to help relieve symptoms for a time.  In some cases, your healthcare provider may advise surgery to fix the underlying problem. This depends on the cause, the symptoms, and how long the pain has lasted.  Possible complications  Over time, an irritated and inflamed nerve may become damaged. This may lead to long-lasting (permanent) numbness or weakness. If symptoms change suddenly or get worse, be sure to let your healthcare provider know.     When to call your healthcare provider  Call your healthcare provider right away if you have any of these:    New pain or pain that gets worse    New or increasing weakness, numbness, or tingling in your arm or hand    Bowel or bladder changes   Date Last Reviewed: 3/10/2016    5645-2329 [employer]. [address] [place] [address]. All rights reserved. This information is not intended as a substitute for professional medical care. Always follow your healthcare professional's instructions.        Tension Headaches     Massaging your neck muscles can help relieve tension headache pain.     Tension headaches cause a dull, steady pain on both sides of the head and in the neck and the back of the head. The eyes may also feel tired. Tension headaches can be triggered by muscle tension and clenching, lack of sleep, poor posture, eyestrain, stress,depression, anxiety, arthritis of the neck, and other factors.  To help prevent tension headaches  Take the following steps:    Make sure your work area is properly set up to help you avoid neck strain and eyestrain.    Make sure that your eyeglass prescription is current and is appropriate for the work you do.    Learn techniques for relaxing and reducing emotional stress. These include deep breathing, progressive relaxation, yoga, meditation, and biofeedback.    Maintain a regular exercise regimen under the guidance of a doctor.  This can help keep your neck and back flexible, strong, and relaxed.  To relieve the pain  Take the following steps:    Use moist heat to relax the muscles. Soak in a hot bath or wrap a warm, moist towel around your neck.    Brush your scalp lightly with a soft hairbrush.    Give yourself a massage. Knead the muscles running from your shoulders up the back of your skull.    Use an ice pack. Apply this directly to the place where you feel pain.    Rest. Sleeping often helps relieve headache pain.    Drink plenty of fluids. Dehydration is another trigger for headaches.    Use pain medicine sparingly for moderate to severe pain.   Date Last Reviewed: 10/19/2015    4154-7588 [employer]. [address] [place] [address], [place] [address]. All rights reserved. This information is not intended as a substitute for professional medical care. Always follow your healthcare professional's instructions.        Neck Spasm     A spasm of the neck muscles can happen after a sudden awkward neck movement. Sleeping with your neck in a crooked position can also cause spasm. Some people respond to emotional stress by tensing the muscles of their neck, shoulders, and upper back. If neck spasm lasts long enough, it can cause headache.  The treatment described below will usually help the pain to go away in 5 to 7 days. Pain that continues may need further evaluation or other types of treatment such as physical therapy.  Home care    Rest and relax the muscles. Use a comfortable pillow that supports the head and keeps the spine in a neutral position. The position of the head should not be tilted forward or backward. A rolled up towel may help for a custom fit.    Some people find relief with heat. Heat can be applied with either a warm shower or bath or a moist towel heated in the microwave and massage. Others prefer cold packs. You can make an ice pack by filling a plastic bag that seals at the top with ice cubes or crushed ice and  then wrapping it with a thin towel. Try both and use the method that feels best for 15 to 20 minutes, several times a day.    Whether using ice or heat, be careful that you do not injure your skin. Never put ice directly on the skin. Always wrap the ice in a towel or other type of cloth. This is very important, especially in people with poor skin sensations.    Try to reduce your stress level. Emotional stress can lead to neck muscle tension and get in the way of or delay the healing process.    You may use over-the-counter pain medicine to control pain, unless another medicine was prescribed.If you have chronic liver or kidney disease or ever had a stomach ulcer or GI bleeding, talk with your healthcare provider before using these medicines.  Follow-up care  Follow up with your healthcare provider if your symptoms do not show signs of improvement after one week. Physical therapy or further tests may be needed.  If X-rays, CT scans, or MRI scans were taken, you will be told of any new findings that may affect your care.  Call 911  Call 911 if you have:    Sudden weakness or numbness in one or both arms    Neck swelling, difficulty or painful swallowing    Difficulty breathing    Chest pain  When to seek medical advice  Call your healthcare provider right away if any of these occur:    Pain becomes worse or spreads into one or both arms    Increasing headache with nausea or vomiting    Fever of 100.4 F (38 C) or above lasting for 24 to 48 hours  Date Last Reviewed: 11/21/2015 2000-2017 [employer]. [address] [place] [address], [place] [address]. All rights reserved. This information is not intended as a substitute for professional medical care. Always follow your healthcare professional's instructions.                Follow-ups after your visit        Who to contact     If you have questions or need follow up information about today's clinic visit or your schedule please contact [facility] directly at [phone].  Normal or non-critical lab and imaging results will be communicated to you by [employer], letter or phone within 4 business days after the clinic has received the results. If you do not hear from us within 7 days, please contact the clinic through [employer] or phone. If you have a critical or abnormal lab result, we will notify you by phone as soon as possible.  Submit refill requests through [employer] or call your pharmacy and they will forward the refill request to us. Please allow 3 business days for your refill to be completed.          Additional Information About Your Visit        [employer] Information     [employer] lets you send messages to your doctor, view your test results, renew your prescriptions, schedule appointments and more. To sign up, go to [email or web address] . Click on \"Log in\" on the left side of the screen, which will take you to the Welcome page. Then click on \"Sign up Now\" on the right side of the page.     You will be asked to enter the access code listed below, as well as some personal information. Please follow the directions to create your username and password.     Your access code is: -  Expires: 2017  8:45 PM     Your access code will  in 90 days. If you need help or a new code, please call your [place] clinic or [phone].        Care EveryWhere ID     This is your Care EveryWhere ID. This could be used by other organizations to access your [place] medical records  [id number]        Your Vitals Were     Pulse Temperature Respirations BMI (Body Mass Index)          104 97.2  F (36.2  C) (Tympanic) 20 26.78 kg/m2         Blood Pressure from Last 3 Encounters:   17 134/84   17 121/83   17 110/72    Weight from Last 3 Encounters:   17 156 lb (70.8 kg)   17 150 lb (68 kg)   17 147 lb 6.4 oz (66.9 kg)              Today, you had the following     No orders found for display       "   Today's Medication Changes          These changes are accurate as of: 9/22/17  8:16 PM.  If you have any questions, ask your nurse or doctor.               Start taking these medicines.        Dose/Directions    HYDROcodone-acetaminophen 5-325 MG per tablet   Commonly known as:  NORCO   Used for:  Cervical radiculopathy, Muscle spasm   Started by:  Katheryn Cowart APRN CNP        Dose:  1 tablet   Take 1 tablet by mouth every 6 hours as needed for moderate to severe pain maximum 4tablet(s) per day   Quantity:  20 tablet   Refills:  0       predniSONE 20 MG tablet   Commonly known as:  DELTASONE   Used for:  Muscle spasm, Cervical radiculopathy   Started by:  Katheryn Cowart APRN CNP        Take one tablet twice a day for 5 days.   Quantity:  10 tablet   Refills:  0         These medicines have changed or have updated prescriptions.        Dose/Directions    * cyclobenzaprine 10 MG tablet   Commonly known as:  FLEXERIL   This may have changed:  Another medication with the same name was added. Make sure you understand how and when to take each.   Used for:  Episodic tension-type headache, not intractable   Changed by:  Jody Kingston APRN CNP        Dose:  10 mg   Take 1 tablet (10 mg) by mouth 3 times daily as needed for muscle spasms   Quantity:  30 tablet   Refills:  1       * cyclobenzaprine 10 MG tablet   Commonly known as:  FLEXERIL   This may have changed:  You were already taking a medication with the same name, and this prescription was added. Make sure you understand how and when to take each.   Used for:  Tension headache, Muscle spasm   Changed by:  Katheryn Cowart APRN CNP        Dose:  5-10 mg   Take 0.5-1 tablets (5-10 mg) by mouth 3 times daily as needed for muscle spasms   Quantity:  30 tablet   Refills:  1       * Notice:  This list has 2 medication(s) that are the same as other medications prescribed for you. Read the directions carefully, and ask your doctor or other care provider  to review them with you.         Where to get your medicines      These medications were sent to [id number] - [place], [place] - [address]  [address] [facility] [address]    Hours:  Closed 10-16-08 business to [facility] Phone:  [phone]     cyclobenzaprine 10 MG tablet    predniSONE 20 MG tablet         Some of these will need a paper prescription and others can be bought over the counter.  Ask your nurse if you have questions.     Bring a paper prescription for each of these medications     HYDROcodone-acetaminophen 5-325 MG per tablet                Primary Care Provider    None Specified       No primary provider on file.        Equal Access to Services     [facility]: Hadjeff Asencio, narciso hackett, clau cordero, aníbal wilson . So [facility] [phone].    ATENCIÓN: Si habla español, tiene a blair disposición servicios gratuitos de asistencia lingüística. [facility] [phone].    We comply with applicable federal civil rights laws and [place] laws. We do not discriminate on the basis of race, color, national origin, age, disability sex, sexual orientation or gender identity.            Thank you!     Thank you for choosing [facility]  for your care. Our goal is always to provide you with excellent care. Hearing back from our patients is one way we can continue to improve our services. Please take a few minutes to complete the written survey that you may receive in the mail after your visit with us. Thank you!             Your Updated Medication List - Protect others around you: Learn how to safely use, store and throw away your medicines at www.disposemymeds.org.          This list is accurate as of: 9/22/17  8:16 PM.  Always use your most recent med list.                   Brand Name Dispense Instructions for use Diagnosis    * cyclobenzaprine 10 MG tablet    FLEXERIL    30 tablet     Take 1 tablet (10 mg) by mouth 3 times daily as needed for muscle spasms    Episodic tension-type headache, not intractable       * cyclobenzaprine 10 MG tablet    FLEXERIL    30 tablet    Take 0.5-1 tablets (5-10 mg) by mouth 3 times daily as needed for muscle spasms    Tension headache, Muscle spasm       HYDROcodone-acetaminophen 5-325 MG per tablet    NORCO    20 tablet    Take 1 tablet by mouth every 6 hours as needed for moderate to severe pain maximum 4tablet(s) per day    Cervical radiculopathy, Muscle spasm       predniSONE 20 MG tablet    DELTASONE    10 tablet    Take one tablet twice a day for 5 days.    Muscle spasm, Cervical radiculopathy       valACYclovir 1000 mg tablet    VALTREX    21 tablet    Take 1 tablet (1,000 mg) by mouth 3 times daily for 7 days    Herpes zoster without complication       * Notice:  This list has 2 medication(s) that are the same as other medications prescribed for you. Read the directions carefully, and ask your doctor or other care provider to review them with you.

## 2017-09-23 NOTE — PATIENT INSTRUCTIONS
Heat or ice may help.   Ibuprofen up to 800 mg four times daily, aleve 2 pills twice daily or acetaminophen 2 pills four times daily may help.   Stronger pain pills can be used if needed. Take muscle relaxant, Flexeril (cyclobenzaprine) up to 3 times a day as needed for pain.   OK to take the Take hydrocodone/acetaminophen 1 or 2 pills every 6 hours as needed for pain.   Recommend physical therapy if not improving.  Will need to follow-up with your primary care provider for a referral if not improving,   Recheck in 2-3 weeks if not improving or other problems.   Staying active will help. Those who continue work and daily activities get better faster. Avoid painful activities.     If not improving follow-up with your primary care provider may need physical therapy.     Follow-up with your primary care provider next week and as needed.    Indications for emergent return to emergency department discussed with patient, who verbalized good understanding and agreement.  Patient understands the limitations of today's evaluation.             Understanding Cervical Radiculopathy    Cervical radiculopathy is irritation or inflammation of a nerve root in the neck. It causes neck pain and other symptoms that may spread into the chest or down the arm. To understand this condition, it helps to understand the parts of the spine:    Vertebrae. These are bones that stack to form the spine. The cervical spine contains the 7 vertebrae in the neck.    Disks. These are soft pads of tissue between the vertebrae. They act as shock absorbers for the spine.    The spinal canal. This is a tunnel formed within the stacked vertebrae. The spinal cord runs through this canal.    Nerves. These branch off the spinal cord. As they leave the spinal canal, nerves pass through openings between the vertebrae. The nerve root is the part of the nerve that is closest to the spinal cord.   With cervical radiculopathy, nerve roots in the neck become  irritated. This leads to pain and symptoms that can travel to the nerves that go from the spinal cord down the arms and into the torso.  What causes cervical radiculopathy?  Aging, injury, poor posture, and other issues can lead to problems in the neck. These problems may then irritate nerve roots. These include:    Damage to a disk in the cervical spine. The damaged disk may then press on nearby nerve roots.    Degeneration from wear and tear, and aging. This can lead to narrowing (stenosis) of the openings between the vertebrae. The narrowed openings press on nerve roots as they leave the spinal canal.    An unstable spine. This is when a vertebra slips forward. It can then press on a nerve root.  There are other, less common causes of pressure on nerves in the neck. These include infection, cysts, and tumors.  Symptoms of cervical radiculopathy  These include:    Neck pain    Pain, numbness, tingling, or weakness that travels down the arm    Loss of neck movement    Muscle spasms  Treatment for cervical radiculopathy  In most cases, your healthcare provider will first try treatments that help relieve symptoms. These may include:    Prescription or over-the-counter pain medicines. These help relieve pain and swelling.    Cold packs. These help reduce pain.    Resting. This involves avoiding positions and activities that increase pain.    Neck brace (cervical collar). This can help relieve inflammation and pain.    Physical therapy, including exercises and stretches. This can help decrease pain and increase movement and function.    Shots of medicinesaround the nerve roots. This is done to help relieve symptoms for a time.  In some cases, your healthcare provider may advise surgery to fix the underlying problem. This depends on the cause, the symptoms, and how long the pain has lasted.  Possible complications  Over time, an irritated and inflamed nerve may become damaged. This may lead to long-lasting (permanent)  numbness or weakness. If symptoms change suddenly or get worse, be sure to let your healthcare provider know.     When to call your healthcare provider  Call your healthcare provider right away if you have any of these:    New pain or pain that gets worse    New or increasing weakness, numbness, or tingling in your arm or hand    Bowel or bladder changes   Date Last Reviewed: 3/10/2016    [id number] [employer]. [address] [place] [address], [place] [address]. All rights reserved. This information is not intended as a substitute for professional medical care. Always follow your healthcare professional's instructions.        Tension Headaches     Massaging your neck muscles can help relieve tension headache pain.     Tension headaches cause a dull, steady pain on both sides of the head and in the neck and the back of the head. The eyes may also feel tired. Tension headaches can be triggered by muscle tension and clenching, lack of sleep, poor posture, eyestrain, stress,depression, anxiety, arthritis of the neck, and other factors.  To help prevent tension headaches  Take the following steps:    Make sure your work area is properly set up to help you avoid neck strain and eyestrain.    Make sure that your eyeglass prescription is current and is appropriate for the work you do.    Learn techniques for relaxing and reducing emotional stress. These include deep breathing, progressive relaxation, yoga, meditation, and biofeedback.    Maintain a regular exercise regimen under the guidance of a doctor. This can help keep your neck and back flexible, strong, and relaxed.  To relieve the pain  Take the following steps:    Use moist heat to relax the muscles. Soak in a hot bath or wrap a warm, moist towel around your neck.    Brush your scalp lightly with a soft hairbrush.    Give yourself a massage. Knead the muscles running from your shoulders up the back of your skull.    Use an ice pack. Apply this directly to the  place where you feel pain.    Rest. Sleeping often helps relieve headache pain.    Drink plenty of fluids. Dehydration is another trigger for headaches.    Use pain medicine sparingly for moderate to severe pain.   Date Last Reviewed: 10/19/2015    [id number] [employer]. [address] [place] [address], [place] [address]. All rights reserved. This information is not intended as a substitute for professional medical care. Always follow your healthcare professional's instructions.        Neck Spasm     A spasm of the neck muscles can happen after a sudden awkward neck movement. Sleeping with your neck in a crooked position can also cause spasm. Some people respond to emotional stress by tensing the muscles of their neck, shoulders, and upper back. If neck spasm lasts long enough, it can cause headache.  The treatment described below will usually help the pain to go away in 5 to 7 days. Pain that continues may need further evaluation or other types of treatment such as physical therapy.  Home care    Rest and relax the muscles. Use a comfortable pillow that supports the head and keeps the spine in a neutral position. The position of the head should not be tilted forward or backward. A rolled up towel may help for a custom fit.    Some people find relief with heat. Heat can be applied with either a warm shower or bath or a moist towel heated in the microwave and massage. Others prefer cold packs. You can make an ice pack by filling a plastic bag that seals at the top with ice cubes or crushed ice and then wrapping it with a thin towel. Try both and use the method that feels best for 15 to 20 minutes, several times a day.    Whether using ice or heat, be careful that you do not injure your skin. Never put ice directly on the skin. Always wrap the ice in a towel or other type of cloth. This is very important, especially in people with poor skin sensations.    Try to reduce your stress level. Emotional stress can lead  to neck muscle tension and get in the way of or delay the healing process.    You may use over-the-counter pain medicine to control pain, unless another medicine was prescribed.If you have chronic liver or kidney disease or ever had a stomach ulcer or GI bleeding, talk with your healthcare provider before using these medicines.  Follow-up care  Follow up with your healthcare provider if your symptoms do not show signs of improvement after one week. Physical therapy or further tests may be needed.  If X-rays, CT scans, or MRI scans were taken, you will be told of any new findings that may affect your care.  Call 911  Call 911 if you have:    Sudden weakness or numbness in one or both arms    Neck swelling, difficulty or painful swallowing    Difficulty breathing    Chest pain  When to seek medical advice  Call your healthcare provider right away if any of these occur:    Pain becomes worse or spreads into one or both arms    Increasing headache with nausea or vomiting    Fever of 100.4 F (38 C) or above lasting for 24 to 48 hours  Date Last Reviewed: 11/21/2015 2000-2017 [employer]. [address] [place] [address], [place] [address]. All rights reserved. This information is not intended as a substitute for professional medical care. Always follow your healthcare professional's instructions.

## 2017-09-23 NOTE — NURSING NOTE
"Chief Complaint   Patient presents with     Neck Pain     Started this morning.  Took a muscle relaxer and no help.  If raised arm shooting pain goes up her arm. Unable to lay down.        Initial /84 (BP Location: Right arm, Cuff Size: Adult Regular)  Pulse 104  Temp 97.2  F (36.2  C) (Tympanic)  Resp 20  Wt 156 lb (70.8 kg)  BMI 26.78 kg/m2 Estimated body mass index is 26.78 kg/(m^2) as calculated from the following:    Height as of 2/8/17: 5' 4\" (1.626 m).    Weight as of this encounter: 156 lb (70.8 kg).  Medication Reconciliation: complete    Health Maintenance that is potentially due pending provider review:  NONE    n/a    Is there anyone who you would like to be able to receive your results? Not Applicable  If yes have patient fill out LACEY  Cory Bailey M.A.          "

## 2017-09-23 NOTE — PROGRESS NOTES
SUBJECTIVE:  Lluvia Bergman is a 31 year old female who is seen for neck pain that started 1 days ago.   Mechanism of injury: unknow.   8/10  Immediate symptoms: delayed pain.   Mitigating Factors:  Nothing  Symptoms have been gradual since that time.  Prior history of related problems: no prior problems with this area in the past.    History reviewed. No pertinent past medical history.     Social History   Substance Use Topics     Smoking status: Current Every Day Smoker     Smokeless tobacco: Never Used     Alcohol use Not on file         ROS:  CONSTITUTIONAL:NEGATIVE for fever, chills, change in weight  INTEGUMENTARY/SKIN: NEGATIVE for worrisome rashes, moles or lesions  EYES: NEGATIVE for vision changes or irritation  ENT/MOUTH: NEGATIVE for ear, mouth and throat problems  RESP:NEGATIVE for significant cough or SOB  CV: NEGATIVE for chest pain, palpitations or peripheral edema  MUSCULOSKELETAL: See above   NEURO: NEGATIVE for weakness, dizziness or paresthesias      EXAM:  /84 (BP Location: Right arm, Cuff Size: Adult Regular)  Pulse 104  Temp 97.2  F (36.2  C) (Tympanic)  Resp 20  Wt 156 lb (70.8 kg)  BMI 26.78 kg/m2  GENERAL APPEARANCE: healthy, alert and no distress   SKIN: no suspicious lesions or rashes  NEURO: normal strength and tone, sentation intact, reflexes normal, DTR symmetrically normal in upper extremities and DTR symmetrically normal in lower extremities  Cardiovascular: negative, PMI normal. No lifts, heaves, or thrills. RRR. No murmurs, clicks gallops or rub  Respiratory: negative, Percussion normal. Good diaphragmatic excursion. Lungs clear    MUSCULOSKELETAL:  Inspection: normal cervical lordosis  Tender:  left paracervical muscles, left trapezius muscles  Non-tender:  occipital nerves, spinous processes, scapula, medial border of scapula, superior angle of scapula, normal musculature, right paracervical muscles, right trapezius muscles  Range of Motion:  flexion:  painful,  extension: painful, left lateral bending: painful, right lateral bending: painful, left lateral rotation:  painful, right lateral rotation:  painful  Strength: Full strength of all neck muscles  Special tests:  Reproduction of radicular pattern    ASSESSMENT:     ICD-10-CM    1. Cervical radiculopathy M54.12 HYDROcodone-acetaminophen (NORCO) 5-325 MG per tablet     predniSONE (DELTASONE) 20 MG tablet   2. Muscle spasm M62.838 cyclobenzaprine (FLEXERIL) 10 MG tablet     HYDROcodone-acetaminophen (NORCO) 5-325 MG per tablet     predniSONE (DELTASONE) 20 MG tablet   3. Tension headache G44.209 cyclobenzaprine (FLEXERIL) 10 MG tablet         PLAN:  Patient Instructions     Heat or ice may help.   Ibuprofen up to 800 mg four times daily, aleve 2 pills twice daily or acetaminophen 2 pills four times daily may help.   Stronger pain pills can be used if needed. Take muscle relaxant, Flexeril (cyclobenzaprine) up to 3 times a day as needed for pain.   OK to take the Take hydrocodone/acetaminophen 1 or 2 pills every 6 hours as needed for pain.   Recommend physical therapy if not improving.  Will need to follow-up with your primary care provider for a referral if not improving,   Recheck in 2-3 weeks if not improving or other problems.   Staying active will help. Those who continue work and daily activities get better faster. Avoid painful activities.     If not improving follow-up with your primary care provider may need physical therapy.     Follow-up with your primary care provider next week and as needed.    Indications for emergent return to emergency department discussed with patient, who verbalized good understanding and agreement.  Patient understands the limitations of today's evaluation.             Understanding Cervical Radiculopathy    Cervical radiculopathy is irritation or inflammation of a nerve root in the neck. It causes neck pain and other symptoms that may spread into the chest or down the arm. To  understand this condition, it helps to understand the parts of the spine:    Vertebrae. These are bones that stack to form the spine. The cervical spine contains the 7 vertebrae in the neck.    Disks. These are soft pads of tissue between the vertebrae. They act as shock absorbers for the spine.    The spinal canal. This is a tunnel formed within the stacked vertebrae. The spinal cord runs through this canal.    Nerves. These branch off the spinal cord. As they leave the spinal canal, nerves pass through openings between the vertebrae. The nerve root is the part of the nerve that is closest to the spinal cord.   With cervical radiculopathy, nerve roots in the neck become irritated. This leads to pain and symptoms that can travel to the nerves that go from the spinal cord down the arms and into the torso.  What causes cervical radiculopathy?  Aging, injury, poor posture, and other issues can lead to problems in the neck. These problems may then irritate nerve roots. These include:    Damage to a disk in the cervical spine. The damaged disk may then press on nearby nerve roots.    Degeneration from wear and tear, and aging. This can lead to narrowing (stenosis) of the openings between the vertebrae. The narrowed openings press on nerve roots as they leave the spinal canal.    An unstable spine. This is when a vertebra slips forward. It can then press on a nerve root.  There are other, less common causes of pressure on nerves in the neck. These include infection, cysts, and tumors.  Symptoms of cervical radiculopathy  These include:    Neck pain    Pain, numbness, tingling, or weakness that travels down the arm    Loss of neck movement    Muscle spasms  Treatment for cervical radiculopathy  In most cases, your healthcare provider will first try treatments that help relieve symptoms. These may include:    Prescription or over-the-counter pain medicines. These help relieve pain and swelling.    Cold packs. These help  reduce pain.    Resting. This involves avoiding positions and activities that increase pain.    Neck brace (cervical collar). This can help relieve inflammation and pain.    Physical therapy, including exercises and stretches. This can help decrease pain and increase movement and function.    Shots of medicinesaround the nerve roots. This is done to help relieve symptoms for a time.  In some cases, your healthcare provider may advise surgery to fix the underlying problem. This depends on the cause, the symptoms, and how long the pain has lasted.  Possible complications  Over time, an irritated and inflamed nerve may become damaged. This may lead to long-lasting (permanent) numbness or weakness. If symptoms change suddenly or get worse, be sure to let your healthcare provider know.     When to call your healthcare provider  Call your healthcare provider right away if you have any of these:    New pain or pain that gets worse    New or increasing weakness, numbness, or tingling in your arm or hand    Bowel or bladder changes   Date Last Reviewed: 3/10/2016    [id number] [employer]. [address] [place] [address]. All rights reserved. This information is not intended as a substitute for professional medical care. Always follow your healthcare professional's instructions.        Tension Headaches     Massaging your neck muscles can help relieve tension headache pain.     Tension headaches cause a dull, steady pain on both sides of the head and in the neck and the back of the head. The eyes may also feel tired. Tension headaches can be triggered by muscle tension and clenching, lack of sleep, poor posture, eyestrain, stress,depression, anxiety, arthritis of the neck, and other factors.  To help prevent tension headaches  Take the following steps:    Make sure your work area is properly set up to help you avoid neck strain and eyestrain.    Make sure that your eyeglass prescription is current and  is appropriate for the work you do.    Learn techniques for relaxing and reducing emotional stress. These include deep breathing, progressive relaxation, yoga, meditation, and biofeedback.    Maintain a regular exercise regimen under the guidance of a doctor. This can help keep your neck and back flexible, strong, and relaxed.  To relieve the pain  Take the following steps:    Use moist heat to relax the muscles. Soak in a hot bath or wrap a warm, moist towel around your neck.    Brush your scalp lightly with a soft hairbrush.    Give yourself a massage. Knead the muscles running from your shoulders up the back of your skull.    Use an ice pack. Apply this directly to the place where you feel pain.    Rest. Sleeping often helps relieve headache pain.    Drink plenty of fluids. Dehydration is another trigger for headaches.    Use pain medicine sparingly for moderate to severe pain.   Date Last Reviewed: 10/19/2015    [id number] [employer]. [address] [place] [address]. All rights reserved. This information is not intended as a substitute for professional medical care. Always follow your healthcare professional's instructions.        Neck Spasm     A spasm of the neck muscles can happen after a sudden awkward neck movement. Sleeping with your neck in a crooked position can also cause spasm. Some people respond to emotional stress by tensing the muscles of their neck, shoulders, and upper back. If neck spasm lasts long enough, it can cause headache.  The treatment described below will usually help the pain to go away in 5 to 7 days. Pain that continues may need further evaluation or other types of treatment such as physical therapy.  Home care    Rest and relax the muscles. Use a comfortable pillow that supports the head and keeps the spine in a neutral position. The position of the head should not be tilted forward or backward. A rolled up towel may help for a custom fit.    Some people find  relief with heat. Heat can be applied with either a warm shower or bath or a moist towel heated in the microwave and massage. Others prefer cold packs. You can make an ice pack by filling a plastic bag that seals at the top with ice cubes or crushed ice and then wrapping it with a thin towel. Try both and use the method that feels best for 15 to 20 minutes, several times a day.    Whether using ice or heat, be careful that you do not injure your skin. Never put ice directly on the skin. Always wrap the ice in a towel or other type of cloth. This is very important, especially in people with poor skin sensations.    Try to reduce your stress level. Emotional stress can lead to neck muscle tension and get in the way of or delay the healing process.    You may use over-the-counter pain medicine to control pain, unless another medicine was prescribed.If you have chronic liver or kidney disease or ever had a stomach ulcer or GI bleeding, talk with your healthcare provider before using these medicines.  Follow-up care  Follow up with your healthcare provider if your symptoms do not show signs of improvement after one week. Physical therapy or further tests may be needed.  If X-rays, CT scans, or MRI scans were taken, you will be told of any new findings that may affect your care.  Call 911  Call 911 if you have:    Sudden weakness or numbness in one or both arms    Neck swelling, difficulty or painful swallowing    Difficulty breathing    Chest pain  When to seek medical advice  Call your healthcare provider right away if any of these occur:    Pain becomes worse or spreads into one or both arms    Increasing headache with nausea or vomiting    Fever of 100.4 F (38 C) or above lasting for 24 to 48 hours  Date Last Reviewed: 11/21/2015 2000-2017 [employer]. [address] [place] [address], [place] [address]. All rights reserved. This information is not intended as a substitute for professional medical care. Always  follow your healthcare professional's instructions.              LENA Chirinos CNP

## 2017-09-26 ENCOUNTER — OFFICE VISIT (OUTPATIENT)
Dept: FAMILY MEDICINE | Facility: CLINIC | Age: 32
End: 2017-09-26
Payer: MEDICAID

## 2017-09-26 VITALS
TEMPERATURE: 98.6 F | HEIGHT: 65 IN | SYSTOLIC BLOOD PRESSURE: 122 MMHG | BODY MASS INDEX: 24.89 KG/M2 | WEIGHT: 149.4 LBS | HEART RATE: 119 BPM | OXYGEN SATURATION: 99 % | DIASTOLIC BLOOD PRESSURE: 76 MMHG

## 2017-09-26 DIAGNOSIS — M54.2 CERVICALGIA: Primary | ICD-10-CM

## 2017-09-26 PROCEDURE — 99213 OFFICE O/P EST LOW 20 MIN: CPT | Performed by: FAMILY MEDICINE

## 2017-09-26 RX ORDER — TRAMADOL HYDROCHLORIDE 50 MG/1
50 TABLET ORAL EVERY 6 HOURS PRN
Qty: 20 TABLET | Refills: 0 | Status: SHIPPED | OUTPATIENT
Start: 2017-09-26 | End: 2017-11-30

## 2017-09-26 NOTE — NURSING NOTE
"Chief Complaint   Patient presents with     Neck Pain       Initial /76  Pulse 119  Temp 98.6  F (37  C) (Tympanic)  Ht 5' 4.5\" (1.638 m)  Wt 149 lb 6.4 oz (67.8 kg)  SpO2 99%  BMI 25.25 kg/m2 Estimated body mass index is 25.25 kg/(m^2) as calculated from the following:    Height as of this encounter: 5' 4.5\" (1.638 m).    Weight as of this encounter: 149 lb 6.4 oz (67.8 kg).  Medication Reconciliation: complete    Health Maintenance that is potentially due pending provider review:  Tetanus and Pap Smear     Pt declines to have these done today as in extreme pain. Sydney Ward cma    Is there anyone who you would like to be able to receive your results? No  If yes have patient fill out LACEY      "

## 2017-09-26 NOTE — PROGRESS NOTES
SUBJECTIVE:   Lluvia Bergman is a 31 year old female who presents to clinic today for the following health issues: This lady comes in today for the follow up of her neck pain. She was seen here recently felt to have acute cervical neck sprain and was placed on some pain medication should do some steroids in a month muscle relaxant. She claims that these haven't helped and she's gone through all her medication in the last 4 days. I reviewed her past medical history and she has been in the emergency room looking for pain medication for dental caries in the past. She describes the pain as being both in her neck and her low back. She moves about the exam room today without much difficulty, has fairly good range of motion of her neck in [facility] exam room carrying a very heavy bag.      Neck Pain      Duration: Friday     Description:  Location: middle of neck   Radiation: bottom of the back and the upper neck area. Upper neck area is the worst.     Intensity:  moderate, severe    Accompanying signs and symptoms: just pain. Started out that she just moved her head wrong. Medications do not help.    History (similar episodes/previous evaluation): None    Precipitating or alleviating factors: None    Therapies tried and outcome: flexeril, Norco, Ibuprofen- none of this helps patient states, also has used hot rice pack.             Problem list and histories reviewed & adjusted, as indicated.  Additional history: as documented    There is no problem list on file for this patient.    History reviewed. No pertinent surgical history.    Social History   Substance Use Topics     Smoking status: Current Every Day Smoker     Smokeless tobacco: Never Used     Alcohol use Not on file     History reviewed. No pertinent family history.      Current Outpatient Prescriptions   Medication Sig Dispense Refill     traMADol (ULTRAM) 50 MG tablet Take 1 tablet (50 mg) by mouth every 6 hours as needed for pain maximum 4 tablet(s) per day3 20  "tablet 0     cyclobenzaprine (FLEXERIL) 10 MG tablet Take 0.5-1 tablets (5-10 mg) by mouth 3 times daily as needed for muscle spasms 30 tablet 1     HYDROcodone-acetaminophen (NORCO) 5-325 MG per tablet Take 1 tablet by mouth every 6 hours as needed for moderate to severe pain maximum 4tablet(s) per day 20 tablet 0     predniSONE (DELTASONE) 20 MG tablet Take one tablet twice a day for 5 days. 10 tablet 0     cyclobenzaprine (FLEXERIL) 10 MG tablet Take 1 tablet (10 mg) by mouth 3 times daily as needed for muscle spasms 30 tablet 1     valACYclovir (VALTREX) 1000 mg tablet Take 1 tablet (1,000 mg) by mouth 3 times daily for 7 days 21 tablet 0         Reviewed and updated as needed this visit by clinical staffTobacco  Allergies  Meds  Med Hx  Surg Hx  Fam Hx  Soc Hx      Reviewed and updated as needed this visit by Provider         ROS:  C: NEGATIVE for fever, chills, change in weight  E/M: NEGATIVE for ear, mouth and throat problems  R: NEGATIVE for significant cough or SOB  CV: NEGATIVE for chest pain, palpitations or peripheral edema    OBJECTIVE:     /76  Pulse 119  Temp 98.6  F (37  C) (Tympanic)  Ht 5' 4.5\" (1.638 m)  Wt 149 lb 6.4 oz (67.8 kg)  SpO2 99%  BMI 25.25 kg/m2  Body mass index is 25.25 kg/(m^2).  GENERAL: healthy, alert and no distress  NECK: no adenopathy, no asymmetry, masses, or scars and thyroid normal to palpation  RESP: lungs clear to auscultation - no rales, rhonchi or wheezes  CV: regular rate and rhythm, normal S1 S2, no S3 or S4, no murmur, click or rub, no peripheral edema and peripheral pulses strong  ABDOMEN: soft, nontender, no hepatosplenomegaly, no masses and bowel sounds normal  MS: no gross musculoskeletal defects noted, no edema        ASSESSMENT/PLAN:             1. Cervicalgia  I think there may well be some significant drug-seeking behavior in this lady today. I told her were not can do narcotics any further and that we would only do tramadol one time around. " She seems to understand matted and is requesting physical therapy which we've arranged for.  - traMADol (ULTRAM) 50 MG tablet; Take 1 tablet (50 mg) by mouth every 6 hours as needed for pain maximum 4 tablet(s) per day3  Dispense: 20 tablet; Refill: 0  - PHYSICAL THERAPY REFERRAL    ASSESSMENT/PLAN:  NO MORE PAIN MEDS AFTER THIS ROUND.  PT OK BUT HOLD ALL FUTURE PAIN MEDS.       ICD-10-CM    1. Cervicalgia M54.2 traMADol (ULTRAM) 50 MG tablet     PHYSICAL THERAPY REFERRAL       There are no Patient Instructions on file for this visit.      Preston Tapia MD  [facility]

## 2017-09-26 NOTE — MR AVS SNAPSHOT
"              After Visit Summary   9/26/2017    Lluvia Bergman    MRN: [id number]           Patient Information     Date Of Birth          1985        Visit Information        Provider Department      9/26/2017 1:40 PM Preston Tapia MD [facility]        Today's Diagnoses     Cervicalgia    -  1       Follow-ups after your visit        Additional Services     PHYSICAL THERAPY REFERRAL       *This therapy referral will be filtered to a centralized scheduling office at [facility] and the patient will receive a call to schedule an appointment at a [place] location most convenient for them. *     [facility] provides Physical Therapy evaluation and treatment and many specialty services across the [place] system.  If requesting a specialty program, please choose from the list below.    If you have not heard from the scheduling office within 2 business days, please call [phone] for all locations, with the exception of [place], please call [phone].  Treatment: Evaluation & Treatment  Special Instructions/Modalities: neck pain   Special Programs: None    Please be aware that coverage of these services is subject to the terms and limitations of your health insurance plan.  Call member services at your health plan with any benefit or coverage questions.      **Note to Provider:  If you are referring outside of [place] for the therapy appointment, please list the name of the location in the \"special instructions\" above, print the referral and give to the patient to schedule the appointment.                  Who to contact     If you have questions or need follow up information about today's clinic visit or your schedule please contact [facility] directly at [phone].  Normal or non-critical lab and imaging results will be communicated to you by MyChart, letter or phone within 4 business days after the clinic has " "received the results. If you do not hear from us within 7 days, please contact the clinic through [employer] or phone. If you have a critical or abnormal lab result, we will notify you by phone as soon as possible.  Submit refill requests through [employer] or call your pharmacy and they will forward the refill request to us. Please allow 3 business days for your refill to be completed.          Additional Information About Your Visit        [employer] Information     [employer] lets you send messages to your doctor, view your test results, renew your prescriptions, schedule appointments and more. To sign up, go to [email or web address] . Click on \"Log in\" on the left side of the screen, which will take you to the Welcome page. Then click on \"Sign up Now\" on the right side of the page.     You will be asked to enter the access code listed below, as well as some personal information. Please follow the directions to create your username and password.     Your access code is: -  Expires: 2017  8:45 PM     Your access code will  in 90 days. If you need help or a new code, please call your [facility] clinic or [phone].        Care EveryWhere ID     This is your Care EveryWhere ID. This could be used by other organizations to access your [facility] medical records  [id number]        Your Vitals Were     Pulse Temperature Height Pulse Oximetry BMI (Body Mass Index)       119 98.6  F (37  C) (Tympanic) 5' 4.5\" (1.638 m) 99% 25.25 kg/m2        Blood Pressure from Last 3 Encounters:   17 122/76   17 134/84   17 121/83    Weight from Last 3 Encounters:   17 149 lb 6.4 oz (67.8 kg)   17 156 lb (70.8 kg)   17 150 lb (68 kg)              We Performed the Following     PHYSICAL THERAPY REFERRAL          Today's Medication Changes          These changes are accurate as of: 17  2:07 PM.  If you have any questions, ask your nurse or doctor.               Start taking these " medicines.        Dose/Directions    traMADol 50 MG tablet   Commonly known as:  ULTRAM   Used for:  Cervicalgia   Started by:  Preston Tapia MD        Dose:  50 mg   Take 1 tablet (50 mg) by mouth every 6 hours as needed for pain maximum 4 tablet(s) per day3   Quantity:  20 tablet   Refills:  0            Where to get your medicines      Some of these will need a paper prescription and others can be bought over the counter.  Ask your nurse if you have questions.     Bring a paper prescription for each of these medications     traMADol 50 MG tablet                Primary Care Provider    None Specified       No primary provider on file.        Equal Access to Services     [facility]: Hadjeff Asencio, narciso hackett, clau cordero, aníbal wilson . So [facility] [phone].    ATENCIÓN: Si habla español, tiene a blair disposición servicios gratuitos de asistencia lingüística. Llame al [phone].    We comply with applicable federal civil rights laws and [place] laws. We do not discriminate on the basis of race, color, national origin, age, disability sex, sexual orientation or gender identity.            Thank you!     Thank you for choosing [facility]  for your care. Our goal is always to provide you with excellent care. Hearing back from our patients is one way we can continue to improve our services. Please take a few minutes to complete the written survey that you may receive in the mail after your visit with us. Thank you!             Your Updated Medication List - Protect others around you: Learn how to safely use, store and throw away your medicines at www.disposemymeds.org.          This list is accurate as of: 9/26/17  2:07 PM.  Always use your most recent med list.                   Brand Name Dispense Instructions for use Diagnosis    * cyclobenzaprine 10 MG tablet    FLEXERIL    30 tablet    Take 1 tablet (10 mg) by  mouth 3 times daily as needed for muscle spasms    Episodic tension-type headache, not intractable       * cyclobenzaprine 10 MG tablet    FLEXERIL    30 tablet    Take 0.5-1 tablets (5-10 mg) by mouth 3 times daily as needed for muscle spasms    Tension headache, Muscle spasm       HYDROcodone-acetaminophen 5-325 MG per tablet    NORCO    20 tablet    Take 1 tablet by mouth every 6 hours as needed for moderate to severe pain maximum 4tablet(s) per day    Cervical radiculopathy, Muscle spasm       predniSONE 20 MG tablet    DELTASONE    10 tablet    Take one tablet twice a day for 5 days.    Muscle spasm, Cervical radiculopathy       traMADol 50 MG tablet    ULTRAM    20 tablet    Take 1 tablet (50 mg) by mouth every 6 hours as needed for pain maximum 4 tablet(s) per day3    Cervicalgia       valACYclovir 1000 mg tablet    VALTREX    21 tablet    Take 1 tablet (1,000 mg) by mouth 3 times daily for 7 days    Herpes zoster without complication       * Notice:  This list has 2 medication(s) that are the same as other medications prescribed for you. Read the directions carefully, and ask your doctor or other care provider to review them with you.

## 2017-11-30 ENCOUNTER — OFFICE VISIT (OUTPATIENT)
Dept: FAMILY MEDICINE | Facility: CLINIC | Age: 32
End: 2017-11-30
Payer: MEDICAID

## 2017-11-30 VITALS
RESPIRATION RATE: 18 BRPM | WEIGHT: 147 LBS | HEIGHT: 65 IN | TEMPERATURE: 98.7 F | HEART RATE: 92 BPM | OXYGEN SATURATION: 97 % | SYSTOLIC BLOOD PRESSURE: 124 MMHG | DIASTOLIC BLOOD PRESSURE: 72 MMHG | BODY MASS INDEX: 24.49 KG/M2

## 2017-11-30 DIAGNOSIS — J02.9 ACUTE PHARYNGITIS, UNSPECIFIED ETIOLOGY: Primary | ICD-10-CM

## 2017-11-30 DIAGNOSIS — L02.92 BOIL: ICD-10-CM

## 2017-11-30 DIAGNOSIS — R07.0 THROAT PAIN: ICD-10-CM

## 2017-11-30 LAB
DEPRECATED S PYO AG THROAT QL EIA: NORMAL
FLUAV+FLUBV AG SPEC QL: NEGATIVE
FLUAV+FLUBV AG SPEC QL: NEGATIVE
HETEROPH AB SER QL: NEGATIVE
SPECIMEN SOURCE: NORMAL
SPECIMEN SOURCE: NORMAL

## 2017-11-30 PROCEDURE — 87081 CULTURE SCREEN ONLY: CPT | Performed by: FAMILY MEDICINE

## 2017-11-30 PROCEDURE — 87804 INFLUENZA ASSAY W/OPTIC: CPT | Performed by: FAMILY MEDICINE

## 2017-11-30 PROCEDURE — 99213 OFFICE O/P EST LOW 20 MIN: CPT | Performed by: FAMILY MEDICINE

## 2017-11-30 PROCEDURE — 86308 HETEROPHILE ANTIBODY SCREEN: CPT | Performed by: FAMILY MEDICINE

## 2017-11-30 PROCEDURE — 36416 COLLJ CAPILLARY BLOOD SPEC: CPT | Performed by: FAMILY MEDICINE

## 2017-11-30 PROCEDURE — 87880 STREP A ASSAY W/OPTIC: CPT | Performed by: FAMILY MEDICINE

## 2017-11-30 RX ORDER — NAPROXEN 500 MG/1
500 TABLET ORAL 2 TIMES DAILY PRN
Qty: 30 TABLET | Refills: 0 | Status: SHIPPED | OUTPATIENT
Start: 2017-11-30 | End: 2018-03-29

## 2017-11-30 NOTE — NURSING NOTE
"Chief Complaint   Patient presents with     URI       Initial /72 (Cuff Size: Adult Regular)  Pulse 92  Temp 98.7  F (37.1  C) (Tympanic)  Resp 18  Ht 5' 4.5\" (1.638 m)  Wt 147 lb (66.7 kg)  SpO2 97%  Breastfeeding? No  BMI 24.84 kg/m2 Estimated body mass index is 24.84 kg/(m^2) as calculated from the following:    Height as of this encounter: 5' 4.5\" (1.638 m).    Weight as of this encounter: 147 lb (66.7 kg).  Medication Reconciliation: complete    Health Maintenance that is potentially due pending provider review:  Pap Smear    No insurance- should be active soon.     Is there anyone who you would like to be able to receive your results? Not Applicable  If yes have patient fill out LACEY    "

## 2017-11-30 NOTE — PATIENT INSTRUCTIONS

## 2017-11-30 NOTE — LETTER
December 1, 2017      Lluvia Bergman  [address]  [place] [address]        Dear Lluvia,       We have been unable to contact you by phone.  The results of your recent throat culture were negative.  If you have any further questions or concerns please contact the clinic.      Sincerely,        Parker Montoya MD

## 2017-11-30 NOTE — MR AVS SNAPSHOT
After Visit Summary   11/30/2017    Lluvia Bergman    MRN: [id number]           Patient Information     Date Of Birth          1985        Visit Information        Provider Department      11/30/2017 9:20 AM Parker Montoya MD [facility]        Today's Diagnoses     Acute pharyngitis, unspecified etiology    -  1    Throat pain          Care Instructions      Viral Upper Respiratory Illness (Adult)  You have a viral upper respiratory illness (URI), which is another term for the common cold. This illness is contagious during the first few days. It is spread through the air by coughing and sneezing. It may also be spread by direct contact (touching the sick person and then touching your own eyes, nose, or mouth). Frequent handwashing will decrease risk of spread. Most viral illnesses go away within 7 to 10 days with rest and simple home remedies. Sometimes the illness may last for several weeks. Antibiotics will not kill a virus, and they are generally not prescribed for this condition.    Home care    If symptoms are severe, rest at home for the first 2 to 3 days. When you resume activity, don't let yourself get too tired.    Avoid being exposed to cigarette smoke (yours or others ).    You may use acetaminophen or ibuprofen to control pain and fever, unless another medicine was prescribed. (Note: If you have chronic liver or kidney disease, have ever had a stomach ulcer or gastrointestinal bleeding, or are taking blood-thinning medicines, talk with your healthcare provider before using these medicines.) Aspirin should never be given to anyone under 18 years of age who is ill with a viral infection or fever. It may cause severe liver or brain damage.    Your appetite may be poor, so a light diet is fine. Avoid dehydration by drinking 6 to 8 glasses of fluids per day (water, soft drinks, juices, tea, or soup). Extra fluids will help loosen secretions in the nose and  lungs.    Over-the-counter cold medicines will not shorten the length of time you re sick, but they may be helpful for the following symptoms: cough, sore throat, and nasal and sinus congestion. (Note: Do not use decongestants if you have high blood pressure.)  Follow-up care  Follow up with your healthcare provider, or as advised.  When to seek medical advice  Call your healthcare provider right away if any of these occur:    Cough with lots of colored sputum (mucus)    Severe headache; face, neck, or ear pain    Difficulty swallowing due to throat pain    Fever of 100.4 F (38 C)  Call 911, or get immediate medical care  Call emergency services right away if any of these occur:    Chest pain, shortness of breath, wheezing, or difficulty breathing    Coughing up blood    Inability to swallow due to throat pain  Date Last Reviewed: 9/13/2015 2000-2017 [employer]. [address] [place] [address]. All rights reserved. This information is not intended as a substitute for professional medical care. Always follow your healthcare professional's instructions.                Follow-ups after your visit        Who to contact     If you have questions or need follow up information about today's clinic visit or your schedule please contact [facility] directly at [phone].  Normal or non-critical lab and imaging results will be communicated to you by manetchhart, letter or phone within 4 business days after the clinic has received the results. If you do not hear from us within 7 days, please contact the clinic through [employer]etchhart or phone. If you have a critical or abnormal lab result, we will notify you by phone as soon as possible.  Submit refill requests through [employer] or call your pharmacy and they will forward the refill request to us. Please allow 3 business days for your refill to be completed.          Additional Information About Your Visit        MyChart Information     [employer]  "lets you send messages to your doctor, view your test results, renew your prescriptions, schedule appointments and more. To sign up, go to [email or web address] . Click on \"Log in\" on the left side of the screen, which will take you to the Welcome page. Then click on \"Sign up Now\" on the right side of the page.     You will be asked to enter the access code listed below, as well as some personal information. Please follow the directions to create your username and password.     Your access code is: [email or web address]  Expires: 2018 10:32 AM     Your access code will  in 90 days. If you need help or a new code, please call your [facility] clinic or [phone].        Care EveryWhere ID     This is your Care EveryWhere ID. This could be used by other organizations to access your [facility] medical records  [id number]        Your Vitals Were     Pulse Temperature Respirations Height Pulse Oximetry Breastfeeding?    92 98.7  F (37.1  C) (Tympanic) 18 5' 4.5\" (1.638 m) 97% No    BMI (Body Mass Index)                   24.84 kg/m2            Blood Pressure from Last 3 Encounters:   17 124/72   17 122/76   17 134/84    Weight from Last 3 Encounters:   17 147 lb (66.7 kg)   17 149 lb 6.4 oz (67.8 kg)   17 156 lb (70.8 kg)              We Performed the Following     Beta strep group A culture     Influenza A/B antigen     Mononucleosis screen     Strep, Rapid Screen          Today's Medication Changes          These changes are accurate as of: 17 10:32 AM.  If you have any questions, ask your nurse or doctor.               Start taking these medicines.        Dose/Directions    naproxen 500 MG tablet   Commonly known as:  NAPROSYN   Used for:  Acute pharyngitis, unspecified etiology   Started by:  Parker Montoya MD        Dose:  500 mg   Take 1 tablet (500 mg) by mouth 2 times daily as needed for moderate pain   Quantity:  30 tablet   Refills:  0         Stop taking these " medicines if you haven't already. Please contact your care team if you have questions.     cyclobenzaprine 10 MG tablet   Commonly known as:  FLEXERIL   Stopped by:  Parker Montoya MD           HYDROcodone-acetaminophen 5-325 MG per tablet   Commonly known as:  NORCO   Stopped by:  Parker Montoya MD           predniSONE 20 MG tablet   Commonly known as:  DELTASONE   Stopped by:  Parker Montoya MD           traMADol 50 MG tablet   Commonly known as:  ULTRAM   Stopped by:  Parker Montoya MD           valACYclovir 1000 mg tablet   Commonly known as:  VALTREX   Stopped by:  Parker Montoya MD                Where to get your medicines      These medications were sent to [facility] [address] [facility]  [address] [facility] [address]     Phone:  [phone]     naproxen 500 MG tablet                Primary Care Provider Office Phone # Fax #    [facility] [phone]       [address] [facility] [address]        Equal Access to Services     JAZMIN CHOUDHARY : Hadii aad ku hadasho Soomaali, waaxda luqadaha, qaybta kaalmada adeegyada, waxay idiin hayaan gen wilson . So [facility] [phone].    ATENCIÓN: Si habla español, tiene a blair disposición servicios gratuitos de asistencia lingüística. Llame al [phone].    We comply with applicable federal civil rights laws and [place] laws. We do not discriminate on the basis of race, color, national origin, age, disability, sex, sexual orientation, or gender identity.            Thank you!     Thank you for choosing [facility]  for your care. Our goal is always to provide you with excellent care. Hearing back from our patients is one way we can continue to improve our services. Please take a few minutes to complete the written survey that you may receive in the mail after your visit with us. Thank you!             Your Updated Medication List - Protect others around you:  Learn how to safely use, store and throw away your medicines at www.disposemymeds.org.          This list is accurate as of: 11/30/17 10:32 AM.  Always use your most recent med list.                   Brand Name Dispense Instructions for use Diagnosis    naproxen 500 MG tablet    NAPROSYN    30 tablet    Take 1 tablet (500 mg) by mouth 2 times daily as needed for moderate pain    Acute pharyngitis, unspecified etiology

## 2017-12-01 LAB
BACTERIA SPEC CULT: NORMAL
SPECIMEN SOURCE: NORMAL

## 2018-01-15 ENCOUNTER — OFFICE VISIT (OUTPATIENT)
Dept: FAMILY MEDICINE | Facility: CLINIC | Age: 33
End: 2018-01-15
Payer: MEDICAID

## 2018-01-15 VITALS
SYSTOLIC BLOOD PRESSURE: 114 MMHG | RESPIRATION RATE: 18 BRPM | WEIGHT: 145.6 LBS | BODY MASS INDEX: 24.61 KG/M2 | TEMPERATURE: 97.3 F | DIASTOLIC BLOOD PRESSURE: 80 MMHG | HEART RATE: 88 BPM

## 2018-01-15 DIAGNOSIS — M27.3 INFECTION OF TOOTH SOCKET: Primary | ICD-10-CM

## 2018-01-15 PROCEDURE — 99213 OFFICE O/P EST LOW 20 MIN: CPT | Performed by: NURSE PRACTITIONER

## 2018-01-15 RX ORDER — OXYCODONE AND ACETAMINOPHEN 5; 325 MG/1; MG/1
1 TABLET ORAL EVERY 8 HOURS PRN
Qty: 7 TABLET | Refills: 0 | Status: SHIPPED | OUTPATIENT
Start: 2018-01-15 | End: 2018-03-29

## 2018-01-15 ASSESSMENT — PAIN SCALES - GENERAL: PAINLEVEL: EXTREME PAIN (8)

## 2018-01-15 NOTE — MR AVS SNAPSHOT
"              After Visit Summary   1/15/2018    Lluvia Bergman    MRN: [id number]           Patient Information     Date Of Birth          1985        Visit Information        Provider Department      1/15/2018 10:40 AM Lotus Washington APRN CNP [facility]        Today's Diagnoses     Infection of tooth socket    -  1      Care Instructions    Antibiotics two times daily for 10 days     Gargle with warm salt water, ice to area     Percocet very sparingly as needed, no refills on this     Return to dentist if symptoms not improving or worsening            Follow-ups after your visit        Who to contact     If you have questions or need follow up information about today's clinic visit or your schedule please contact [facility] directly at [phone].  Normal or non-critical lab and imaging results will be communicated to you by MyChart, letter or phone within 4 business days after the clinic has received the results. If you do not hear from us within 7 days, please contact the clinic through MyChart or phone. If you have a critical or abnormal lab result, we will notify you by phone as soon as possible.  Submit refill requests through Whim or call your pharmacy and they will forward the refill request to us. Please allow 3 business days for your refill to be completed.          Additional Information About Your Visit        MyCharSED Web Information     Whim lets you send messages to your doctor, view your test results, renew your prescriptions, schedule appointments and more. To sign up, go to [email or web address] . Click on \"Log in\" on the left side of the screen, which will take you to the Welcome page. Then click on \"Sign up Now\" on the right side of the page.     You will be asked to enter the access code listed below, as well as some personal information. Please follow the directions to create your username and password.     Your access code is: " [email or web address]  Expires: 2018 10:32 AM     Your access code will  in 90 days. If you need help or a new code, please call your [facility] clinic or [phone].        Care EveryWhere ID     This is your Care EveryWhere ID. This could be used by other organizations to access your [facility] medical records  [id number]        Your Vitals Were     Pulse Temperature Respirations BMI (Body Mass Index)          88 97.3  F (36.3  C) (Tympanic) 18 24.61 kg/m2         Blood Pressure from Last 3 Encounters:   01/15/18 114/80   17 124/72   17 122/76    Weight from Last 3 Encounters:   01/15/18 145 lb 9.6 oz (66 kg)   17 147 lb (66.7 kg)   17 149 lb 6.4 oz (67.8 kg)              Today, you had the following     No orders found for display         Today's Medication Changes          These changes are accurate as of: 1/15/18 11:04 AM.  If you have any questions, ask your nurse or doctor.               Start taking these medicines.        Dose/Directions    amoxicillin-clavulanate 875-125 MG per tablet   Commonly known as:  AUGMENTIN   Used for:  Infection of tooth socket   Started by:  Lotus Washington APRN CNP        Dose:  1 tablet   Take 1 tablet by mouth 2 times daily for 10 days   Quantity:  20 tablet   Refills:  0       oxyCODONE-acetaminophen 5-325 MG per tablet   Commonly known as:  PERCOCET   Used for:  Infection of tooth socket   Started by:  Lotus Washington APRN CNP        Dose:  1 tablet   Take 1 tablet by mouth every 8 hours as needed for pain maximum 2 tablet(s) per day   Quantity:  7 tablet   Refills:  0            Where to get your medicines      These medications were sent to [id number] - [place], [place] - [address]  [address] [facility] [address]    Hours:  Closed 10-16-08 business to [facility] Phone:  [phone]     amoxicillin-clavulanate 875-125 MG per tablet         Some of these will need a paper prescription and others can be bought  over the counter.  Ask your nurse if you have questions.     Bring a paper prescription for each of these medications     oxyCODONE-acetaminophen 5-325 MG per tablet                Primary Care Provider Office Phone # Fax #    [facility] [phone]       [address] [facility] [address]        Equal Access to Services     JAZMIN CHOUDHARY : Hadii aad ku hadasho Soomaali, waaxda luqadaha, qaybta kaalmada adeegyada, waxay imeldain hayaan adedora agudelojosesuni waters. So [facility] [phone].    ATENCIÓN: Si habla español, tiene a blair disposición servicios gratuitos de asistencia lingüística. Llame al [phone].    We comply with applicable federal civil rights laws and [place] laws. We do not discriminate on the basis of race, color, national origin, age, disability, sex, sexual orientation, or gender identity.            Thank you!     Thank you for choosing [facility]  for your care. Our goal is always to provide you with excellent care. Hearing back from our patients is one way we can continue to improve our services. Please take a few minutes to complete the written survey that you may receive in the mail after your visit with us. Thank you!             Your Updated Medication List - Protect others around you: Learn how to safely use, store and throw away your medicines at www.disposemymeds.org.          This list is accurate as of: 1/15/18 11:04 AM.  Always use your most recent med list.                   Brand Name Dispense Instructions for use Diagnosis    amoxicillin-clavulanate 875-125 MG per tablet    AUGMENTIN    20 tablet    Take 1 tablet by mouth 2 times daily for 10 days    Infection of tooth socket       naproxen 500 MG tablet    NAPROSYN    30 tablet    Take 1 tablet (500 mg) by mouth 2 times daily as needed for moderate pain    Acute pharyngitis, unspecified etiology       oxyCODONE-acetaminophen 5-325 MG per tablet    PERCOCET    7 tablet    Take 1 tablet  by mouth every 8 hours as needed for pain maximum 2 tablet(s) per day    Infection of tooth socket

## 2018-01-15 NOTE — NURSING NOTE
"Chief Complaint   Patient presents with     Dental Pain       Initial /80 (BP Location: Right arm, Patient Position: Chair, Cuff Size: Adult Regular)  Pulse 88  Temp 97.3  F (36.3  C) (Tympanic)  Resp 18  Wt 145 lb 9.6 oz (66 kg)  BMI 24.61 kg/m2 Estimated body mass index is 24.61 kg/(m^2) as calculated from the following:    Height as of 11/30/17: 5' 4.5\" (1.638 m).    Weight as of this encounter: 145 lb 9.6 oz (66 kg).  Medication Reconciliation: complete    Health Maintenance that is potentially due pending provider review:  larry Glynn MA  10:51 AM 1/15/2018  .      "

## 2018-01-15 NOTE — PROGRESS NOTES
SUBJECTIVE:   Lluvia Bergman is a 32 year old female who presents to clinic today for the following health issues:      Concern - Possible Tooth Infection  Onset: x 2 days ago 1/12/18, got tooth pulled this day    Description:   Tooth infection, was not given any antibiotic at dental clinic    Intensity: severe    Progression of Symptoms:  worsening    Accompanying Signs & Symptoms:  Pain in her right side of face and ear    Previous history of similar problem:   yes    Precipitating factors:   Worsened by: everything, have a hard time eating.    Alleviating factors:  Improved by: nothing    Therapies Tried and outcome: Was given Vicodin but makes her itch. Has been using Ibuprofen PRN    Had 2 teeth extracted on Friday, bottom right was infected tooth and upper right just needed extraction. Patient developed increased pain and swelling around tooth shortly after and called dentist office and was not prescribed antibiotics and wasn't seen.   No fevers  Some nausea/vomiting   A lot of pain down into jaw even    Using ice as well - not much of help       Problem list and histories reviewed & adjusted, as indicated.  Additional history: as documented    There is no problem list on file for this patient.    History reviewed. No pertinent surgical history.    Social History   Substance Use Topics     Smoking status: Current Every Day Smoker     Smokeless tobacco: Never Used     Alcohol use No     History reviewed. No pertinent family history.      Current Outpatient Prescriptions   Medication Sig Dispense Refill     amoxicillin-clavulanate (AUGMENTIN) 875-125 MG per tablet Take 1 tablet by mouth 2 times daily for 10 days 20 tablet 0     oxyCODONE-acetaminophen (PERCOCET) 5-325 MG per tablet Take 1 tablet by mouth every 8 hours as needed for pain maximum 2 tablet(s) per day 7 tablet 0     naproxen (NAPROSYN) 500 MG tablet Take 1 tablet (500 mg) by mouth 2 times daily as needed for moderate pain (Patient not taking:  Reported on 1/15/2018) 30 tablet 0     Allergies   Allergen Reactions     Blue Dyes Hives     Demerol [Meperidine] Hives         Reviewed and updated as needed this visit by clinical staffTobacco  Allergies  Meds  Problems  Med Hx  Surg Hx  Fam Hx  Soc Hx        Reviewed and updated as needed this visit by Provider  Tobacco  Allergies  Meds  Problems  Med Hx  Surg Hx  Fam Hx  Soc Hx          ROS:  Constitutional, HEENT, cardiovascular, pulmonary, gi and gu systems are negative, except as otherwise noted.      OBJECTIVE:   /80 (BP Location: Right arm, Patient Position: Chair, Cuff Size: Adult Regular)  Pulse 88  Temp 97.3  F (36.3  C) (Tympanic)  Resp 18  Wt 145 lb 9.6 oz (66 kg)  BMI 24.61 kg/m2  Body mass index is 24.61 kg/(m^2).  GENERAL APPEARANCE: healthy, alert and no distress  HENT: ear canals and TM's normal, nose without ulcers or lesions and right bottom molar socket with scant amount of green/yellow discharge with surrounding gum swelling and mild erythema    Diagnostic Test Results:  none     ASSESSMENT/PLAN:     1. Infection of tooth socket  Patient with recent tooth extraction with increased pain and swelling of the gum and socket without fevers. Exam consistent with tooth socket infection will treat and patient to return to dentist if further concerns. A few percocet provided as patient not tolerating Norco or Tylenol #3. No refills for this will be provided.   - amoxicillin-clavulanate (AUGMENTIN) 875-125 MG per tablet; Take 1 tablet by mouth 2 times daily for 10 days  Dispense: 20 tablet; Refill: 0  - oxyCODONE-acetaminophen (PERCOCET) 5-325 MG per tablet; Take 1 tablet by mouth every 8 hours as needed for pain maximum 2 tablet(s) per day  Dispense: 7 tablet; Refill: 0    Patient Instructions   Antibiotics two times daily for 10 days     Gargle with warm salt water, ice to area     Percocet very sparingly as needed, no refills on this     Return to dentist if symptoms not  improving or worsening        LENA Vo [facility]

## 2018-01-15 NOTE — PATIENT INSTRUCTIONS
Antibiotics two times daily for 10 days     Gargle with warm salt water, ice to area     Percocet very sparingly as needed, no refills on this     Return to dentist if symptoms not improving or worsening

## 2018-03-29 ENCOUNTER — OFFICE VISIT (OUTPATIENT)
Dept: FAMILY MEDICINE | Facility: CLINIC | Age: 33
End: 2018-03-29
Payer: COMMERCIAL

## 2018-03-29 VITALS
TEMPERATURE: 97.8 F | HEART RATE: 84 BPM | WEIGHT: 152 LBS | DIASTOLIC BLOOD PRESSURE: 78 MMHG | HEIGHT: 65 IN | BODY MASS INDEX: 25.33 KG/M2 | RESPIRATION RATE: 20 BRPM | SYSTOLIC BLOOD PRESSURE: 114 MMHG

## 2018-03-29 DIAGNOSIS — G56.03 BILATERAL CARPAL TUNNEL SYNDROME: Primary | ICD-10-CM

## 2018-03-29 PROCEDURE — 99214 OFFICE O/P EST MOD 30 MIN: CPT | Performed by: PHYSICIAN ASSISTANT

## 2018-03-29 ASSESSMENT — PAIN SCALES - GENERAL: PAINLEVEL: EXTREME PAIN (8)

## 2018-03-29 NOTE — NURSING NOTE
"Chief Complaint   Patient presents with     Elbow Pain     Musculoskeletal Problem       Initial /78 (BP Location: Right arm, Patient Position: Sitting, Cuff Size: Adult Regular)  Pulse 84  Temp 97.8  F (36.6  C) (Tympanic)  Resp 20  Ht 5' 4.5\" (1.638 m)  Wt 152 lb (68.9 kg)  BMI 25.69 kg/m2 Estimated body mass index is 25.69 kg/(m^2) as calculated from the following:    Height as of this encounter: 5' 4.5\" (1.638 m).    Weight as of this encounter: 152 lb (68.9 kg).      Health Maintenance that is potentially due pending provider review:  Pap Smear    Pt will schedule physical  appt.    Is there anyone who you would like to be able to receive your results? No  If yes have patient fill out LACEY    Maryan HUTCHINSON CMA    "

## 2018-03-29 NOTE — PROGRESS NOTES
HPI    SUBJECTIVE:   Lluvia Bergman is a 32 year old female who presents to clinic today for bilateral numbness and tingling with some pain in both arms.  She states that the tingling and numbness is in her hands but does radiate up to her elbows.  This has been mild for the past year but over the past couple months symptoms have increased.  She has not done anything to cause this.    Musculoskeletal problem/pain      Duration: Little over a year     Description  Location: Patient states that any time she is doing something with her hands she gets a shooting pain/numbness/tingling feeling that goes down her arm to her hand. It has been worse the last 3-4 months. Worse in the right hand/ arm than the left     Intensity:  moderate, severe    Accompanying signs and symptoms: none    History  Previous similar problem: no   Previous evaluation:  none    Precipitating or alleviating factors:  Trauma or overuse: YES  Aggravating factors include: overuse    Therapies tried and outcome: nothing    Problem list and histories reviewed & adjusted, as indicated.  Additional history: as documented    There is no problem list on file for this patient.    History reviewed. No pertinent surgical history.    Social History   Substance Use Topics     Smoking status: Current Every Day Smoker     Smokeless tobacco: Never Used     Alcohol use No     History reviewed. No pertinent family history.      No current outpatient prescriptions on file.     Allergies   Allergen Reactions     Blue Dyes Hives     Demerol [Meperidine] Hives     Labs reviewed in EPIC    Reviewed and updated as needed this visit by clinical staff       Reviewed and updated as needed this visit by Provider       Review of Systems   Constitutional: Negative for diaphoresis, fever, malaise/fatigue and weight loss.   HENT: Negative for congestion, ear discharge, ear pain, hearing loss, nosebleeds and sore throat.    Eyes: Negative for blurred vision, double vision,  photophobia, pain, discharge and redness.   Respiratory: Negative for cough, hemoptysis, sputum production, shortness of breath and wheezing.    Cardiovascular: Negative for chest pain, palpitations, orthopnea and leg swelling.   Gastrointestinal: Negative for abdominal pain, blood in stool, constipation, diarrhea, heartburn, melena, nausea and vomiting.   Genitourinary: Negative.  Negative for dysuria, frequency and urgency.   Musculoskeletal: Positive for joint pain. Negative for back pain, myalgias and neck pain.   Skin: Negative for itching and rash.   Neurological: Positive for tingling. Negative for dizziness, sensory change, focal weakness, seizures, loss of consciousness, weakness and headaches.   Endo/Heme/Allergies: Negative.    Psychiatric/Behavioral: Negative for depression, hallucinations, substance abuse and suicidal ideas. The patient is not nervous/anxious and does not have insomnia.          Physical Exam   Constitutional: She is oriented to person, place, and time and well-developed, well-nourished, and in no distress. No distress.   HENT:   Head: Normocephalic and atraumatic.   Right Ear: External ear normal.   Left Ear: External ear normal.   Nose: Nose normal.   Eyes: Conjunctivae and EOM are normal. Pupils are equal, round, and reactive to light. Right eye exhibits no discharge. Left eye exhibits no discharge. No scleral icterus.   Neck: Normal range of motion. Neck supple. No JVD present. No tracheal deviation present. No thyromegaly present.   Cardiovascular: Normal rate, regular rhythm, normal heart sounds and intact distal pulses.  Exam reveals no gallop and no friction rub.    No murmur heard.  Pulmonary/Chest: Effort normal and breath sounds normal. No stridor. No respiratory distress. She has no wheezes. She has no rales. She exhibits no tenderness.   Abdominal: Soft. Bowel sounds are normal. She exhibits no distension and no mass. There is no tenderness. There is no rebound and no  guarding.   Musculoskeletal: Normal range of motion. She exhibits no edema or tenderness.   Lymphadenopathy:     She has no cervical adenopathy.   Neurological: She is alert and oriented to person, place, and time. She has normal reflexes. No cranial nerve deficit. She exhibits normal muscle tone. Gait normal.   She has a positive Phalen's of both wrists but negative Tinel's and good  strength   Skin: Skin is warm and dry. No rash noted. She is not diaphoretic. No erythema. No pallor.   Psychiatric: Mood, memory, affect and judgment normal.       (G56.03) Bilateral carpal tunnel syndrome  (primary encounter diagnosis)  Comment:   Plan: ORTHO  REFERRAL         Referral for EMG and we will discuss treatment options after EMG has been performed.

## 2018-03-29 NOTE — MR AVS SNAPSHOT
After Visit Summary   3/29/2018    Lluvia Bergman    MRN: [id number]           Patient Information     Date Of Birth          1985        Visit Information        Provider Department      3/29/2018 2:00 PM Ruddy Álvarez PA-C [facility]        Today's Diagnoses     Bilateral carpal tunnel syndrome    -  1       Follow-ups after your visit        Additional Services     ORTHO  REFERRAL       [facility] is referring you to the Orthopedic  Services at [facility].       The  Representative will assist you in the coordination of your Orthopedic and Musculoskeletal Care as prescribed by your physician.    The  Representative will call you within 1 business day to help schedule your appointment, or you may contact the  Representative at:    All areas ~ [phone]     Type of Referral : EMG       Timeframe requested: Routine    Coverage of these services is subject to the terms and limitations of your health insurance plan.  Please call member services at your health plan with any benefit or coverage questions.      If X-rays, CT or MRI's have been performed, please contact the facility where they were done to arrange for , prior to your scheduled appointment.  Please bring this referral request to your appointment and present it to your specialist.                  Follow-up notes from your care team     Return if symptoms worsen or fail to improve.      Who to contact     If you have questions or need follow up information about today's clinic visit or your schedule please contact [facility] directly at [phone].  Normal or non-critical lab and imaging results will be communicated to you by MyChart, letter or phone within 4 business days after the clinic has received the results. If you do not hear from us within 7 days, please contact the clinic through [facility] MDt or  "phone. If you have a critical or abnormal lab result, we will notify you by phone as soon as possible.  Submit refill requests through AtriCure or call your pharmacy and they will forward the refill request to us. Please allow 3 business days for your refill to be completed.          Additional Information About Your Visit        Songdrophart Information     AtriCure lets you send messages to your doctor, view your test results, renew your prescriptions, schedule appointments and more. To sign up, go to [email or web address] . Click on \"Log in\" on the left side of the screen, which will take you to the Welcome page. Then click on \"Sign up Now\" on the right side of the page.     You will be asked to enter the access code listed below, as well as some personal information. Please follow the directions to create your username and password.     Your access code is: [email or web address]  Expires: 2018  8:01 AM     Your access code will  in 90 days. If you need help or a new code, please call your [facility] clinic or [phone].        Care EveryWhere ID     This is your Care EveryWhere ID. This could be used by other organizations to access your [facility] medical records  [id number]        Your Vitals Were     Pulse Temperature Respirations Height BMI (Body Mass Index)       84 97.8  F (36.6  C) (Tympanic) 20 5' 4.5\" (1.638 m) 25.69 kg/m2        Blood Pressure from Last 3 Encounters:   18 114/78   01/15/18 114/80   17 124/72    Weight from Last 3 Encounters:   18 152 lb (68.9 kg)   01/15/18 145 lb 9.6 oz (66 kg)   17 147 lb (66.7 kg)              We Performed the Following     ORTHO  REFERRAL        Primary Care Provider Office Phone # Fax #    [facility] [phone]       [address] [facility] [address]        Equal Access to Services     JAZMIN CHOUDHARY AH: Airam Asencio, narciso hackett, aníbal alvarez " gen blockaafranklin ah. So [facility] [phone].    ATENCIÓN: Si habla español, tiene a blair disposición servicios gratuitos de asistencia lingüística. Pa al [phone].    We comply with applicable federal civil rights laws and [place] laws. We do not discriminate on the basis of race, color, national origin, age, disability, sex, sexual orientation, or gender identity.            Thank you!     Thank you for choosing [facility]  for your care. Our goal is always to provide you with excellent care. Hearing back from our patients is one way we can continue to improve our services. Please take a few minutes to complete the written survey that you may receive in the mail after your visit with us. Thank you!             Your Updated Medication List - Protect others around you: Learn how to safely use, store and throw away your medicines at www.disposemymeds.org.      Notice  As of 3/29/2018 11:59 PM    You have not been prescribed any medications.

## 2018-03-30 ASSESSMENT — ENCOUNTER SYMPTOMS
CONSTIPATION: 0
SEIZURES: 0
HEARTBURN: 0
NECK PAIN: 0
BACK PAIN: 0
DOUBLE VISION: 0
FOCAL WEAKNESS: 0
SENSORY CHANGE: 0
WHEEZING: 0
VOMITING: 0
MYALGIAS: 0
FREQUENCY: 0
DEPRESSION: 0
SORE THROAT: 0
PALPITATIONS: 0
EYE DISCHARGE: 0
HALLUCINATIONS: 0
SHORTNESS OF BREATH: 0
DYSURIA: 0
NERVOUS/ANXIOUS: 0
SPUTUM PRODUCTION: 0
EYE PAIN: 0
INSOMNIA: 0
LOSS OF CONSCIOUSNESS: 0
ABDOMINAL PAIN: 0
EYE REDNESS: 0
ORTHOPNEA: 0
COUGH: 0
BLURRED VISION: 0
HEMOPTYSIS: 0
HEADACHES: 0
PHOTOPHOBIA: 0
NAUSEA: 0
WEAKNESS: 0
TINGLING: 1
DIZZINESS: 0
WEIGHT LOSS: 0
DIAPHORESIS: 0
FEVER: 0
DIARRHEA: 0
BLOOD IN STOOL: 0

## 2018-03-30 ASSESSMENT — LIFESTYLE VARIABLES: SUBSTANCE_ABUSE: 0

## 2018-05-23 ENCOUNTER — RADIANT APPOINTMENT (OUTPATIENT)
Dept: GENERAL RADIOLOGY | Facility: CLINIC | Age: 33
End: 2018-05-23
Attending: NURSE PRACTITIONER
Payer: COMMERCIAL

## 2018-05-23 ENCOUNTER — OFFICE VISIT (OUTPATIENT)
Dept: FAMILY MEDICINE | Facility: CLINIC | Age: 33
End: 2018-05-23
Payer: COMMERCIAL

## 2018-05-23 VITALS
TEMPERATURE: 97.8 F | HEIGHT: 65 IN | BODY MASS INDEX: 29.99 KG/M2 | SYSTOLIC BLOOD PRESSURE: 131 MMHG | OXYGEN SATURATION: 98 % | RESPIRATION RATE: 12 BRPM | DIASTOLIC BLOOD PRESSURE: 81 MMHG | HEART RATE: 80 BPM | WEIGHT: 180 LBS

## 2018-05-23 DIAGNOSIS — M25.571 PAIN IN JOINT, ANKLE AND FOOT, RIGHT: Primary | ICD-10-CM

## 2018-05-23 DIAGNOSIS — Z71.6 ENCOUNTER FOR SMOKING CESSATION COUNSELING: ICD-10-CM

## 2018-05-23 DIAGNOSIS — M25.571 PAIN IN JOINT, ANKLE AND FOOT, RIGHT: ICD-10-CM

## 2018-05-23 PROCEDURE — 73610 X-RAY EXAM OF ANKLE: CPT | Mod: RT

## 2018-05-23 PROCEDURE — 99214 OFFICE O/P EST MOD 30 MIN: CPT | Performed by: NURSE PRACTITIONER

## 2018-05-23 RX ORDER — BUPROPION HYDROCHLORIDE 150 MG/1
TABLET, EXTENDED RELEASE ORAL
Qty: 60 TABLET | Refills: 2 | Status: SHIPPED | OUTPATIENT
Start: 2018-05-23 | End: 2018-10-17

## 2018-05-23 ASSESSMENT — PAIN SCALES - GENERAL: PAINLEVEL: EXTREME PAIN (8)

## 2018-05-23 NOTE — MR AVS SNAPSHOT
"              After Visit Summary   5/23/2018    Lluvia Bergman    MRN: [id number]           Patient Information     Date Of Birth          1985        Visit Information        Provider Department      5/23/2018 11:20 AM Elis Franco APRN CNP [facility]        Today's Diagnoses     Pain in joint, ankle and foot, right    -  1    Encounter for smoking cessation counseling          Care Instructions    We will call you with the results of your x rays    Start the Bupropion 150 mg daily for 4 days, then 150 mg twice daily          Follow-ups after your visit        Future tests that were ordered for you today     Open Future Orders        Priority Expected Expires Ordered    XR Ankle Right G/E 3 Views Routine 5/23/2018 5/23/2019 5/23/2018            Who to contact     If you have questions or need follow up information about today's clinic visit or your schedule please contact [facility] directly at [phone].  Normal or non-critical lab and imaging results will be communicated to you by MyChart, letter or phone within 4 business days after the clinic has received the results. If you do not hear from us within 7 days, please contact the clinic through MyChart or phone. If you have a critical or abnormal lab result, we will notify you by phone as soon as possible.  Submit refill requests through Qriously or call your pharmacy and they will forward the refill request to us. Please allow 3 business days for your refill to be completed.          Additional Information About Your Visit        Care EveryWhere ID     This is your Care EveryWhere ID. This could be used by other organizations to access your [place] medical records  [id number]        Your Vitals Were     Pulse Temperature Respirations Height Last Period Pulse Oximetry    80 97.8  F (36.6  C) (Tympanic) 12 5' 5\" (1.651 m) 05/02/2018 (Approximate) 98%    Breastfeeding? BMI (Body Mass Index)                No " 29.95 kg/m2           Blood Pressure from Last 3 Encounters:   05/23/18 131/81   03/29/18 114/78   01/15/18 114/80    Weight from Last 3 Encounters:   05/23/18 180 lb (81.6 kg)   03/29/18 152 lb (68.9 kg)   01/15/18 145 lb 9.6 oz (66 kg)                 Today's Medication Changes          These changes are accurate as of 5/23/18 12:07 PM.  If you have any questions, ask your nurse or doctor.               Start taking these medicines.        Dose/Directions    buPROPion 150 MG 12 hr tablet   Commonly known as:  WELLBUTRIN SR   Used for:  Encounter for smoking cessation counseling   Started by:  Elis Franco APRN CNP        Take 1 tablet once daily and increase to 1 tablet twice daily after 4 to 7 days   Quantity:  60 tablet   Refills:  2            Where to get your medicines      [facility] [address]  [address], [place] [address]    Hours:  [facility] Phone:  [phone]     buPROPion 150 MG 12 hr tablet                Primary Care Provider Office Phone # Fax #    [facility] [phone]       [address] [facility] [address]        Equal Access to Services     JAZMIN CHOUDHARY AH: Hadii jaskaran ku hadasho Soomaali, waaxda luqadaha, qaybta kaalmada adeegyada, waxay idiin haybettyen gen waters. So [facility] [phone].    ATENCIÓN: Si habla español, tiene a blair disposición servicios gratuitos de asistencia lingüística. Llame al [phone].    We comply with applicable federal civil rights laws and [place] laws. We do not discriminate on the basis of race, color, national origin, age, disability, sex, sexual orientation, or gender identity.            Thank you!     Thank you for choosing [facility]  for your care. Our goal is always to provide you with excellent care. Hearing back from our patients is one way we can continue to improve our services.  Please take a few minutes to complete the written survey that you may receive in the mail after your visit with us. Thank you!             Your Updated Medication List - Protect others around you: Learn how to safely use, store and throw away your medicines at www.disposemymeds.org.          This list is accurate as of 5/23/18 12:07 PM.  Always use your most recent med list.                   Brand Name Dispense Instructions for use Diagnosis    buPROPion 150 MG 12 hr tablet    WELLBUTRIN SR    60 tablet    Take 1 tablet once daily and increase to 1 tablet twice daily after 4 to 7 days    Encounter for smoking cessation counseling

## 2018-05-23 NOTE — PATIENT INSTRUCTIONS
We will call you with the results of your x rays    Start the Bupropion 150 mg daily for 4 days, then 150 mg twice daily

## 2018-05-23 NOTE — PROGRESS NOTES
"  SUBJECTIVE:   Lluvia Bergman is a 32 year old female who presents to clinic today for the following health issues:      Joint Pain    Onset: Yesterday    Description:   Location: right ankle  Character: Sharp and Stabbing    Intensity: moderate, severe    Progression of Symptoms: worse, keeps being rolled    Accompanying Signs & Symptoms:  Other symptoms: tingling side of foot, swelling    History:   Previous similar pain: YES- fractured same ankle when in teens      Precipitating factors:   Trauma or overuse: YES    Alleviating factors:  Improved by: ice and Ibuprofen    Therapies Tried and outcome: ice and ibuprofen      Rolled foot externally twice in the past 24 hours. When relaxed, ankle throbs. When ankle flexed, pain in heel to ankle and also radiates down to big toe.    Smoking cessation  Patient motivated to quit smoking.  We discussed Chantix versus Wellbutrin.  She is interested in Wellbutrin.        Problem list and histories reviewed & adjusted, as indicated.  Additional history: as documented    There is no problem list on file for this patient.    History reviewed. No pertinent surgical history.    Social History   Substance Use Topics     Smoking status: Current Every Day Smoker     Smokeless tobacco: Never Used     Alcohol use No     History reviewed. No pertinent family history.        Reviewed and updated as needed this visit by clinical staff  Tobacco  Allergies  Meds  Problems  Med Hx  Surg Hx  Fam Hx  Soc Hx        Reviewed and updated as needed this visit by Provider  Allergies  Meds  Problems         ROS:  Constitutional, HEENT, cardiovascular, pulmonary, gi and gu systems are negative, except as otherwise noted.    OBJECTIVE:     /81 (BP Location: Right arm, Patient Position: Chair, Cuff Size: Adult Regular)  Pulse 80  Temp 97.8  F (36.6  C) (Tympanic)  Resp 12  Ht 5' 5\" (1.651 m)  Wt 180 lb (81.6 kg)  LMP 05/02/2018 (Approximate)  SpO2 98%  Breastfeeding? No  BMI " 29.95 kg/m2  Body mass index is 29.95 kg/(m^2).  GENERAL: healthy, alert and no distress  RESP: Normal work of breathing  MS: decreased range of motion right ankle due to pain, mild swelling of left ankle and pain to ankle on medial and lateral malleolus with palpation PSYCH: mentation appears normal, affect normal/bright    Diagnostic Test Results:  Xray -XR ANKLE RT G/E 3 VW 5/23/2018 12:18 PM     HISTORY: Pain.     COMPARISON: None.         IMPRESSION: No evidence of acute fracture or malalignment. Ankle  mortise is intact.     TOAN BECERRA MD    ASSESSMENT/PLAN:     ASSESSMENT:  1. Pain in joint, ankle and foot, right.  Most likely sprain or strain but will get x-ray to rule out other abnormalities.  Ankle brace provided   2. Encounter for smoking cessation counseling        PLAN:  Orders Placed This Encounter     XR Ankle Right G/E 3 Views     buPROPion (WELLBUTRIN SR) 150 MG 12 hr tablet     order for DME       Patient Instructions   We will call you with the results of your x rays    Start the Bupropion 150 mg daily for 4 days, then 150 mg twice daily  Patient agrees with plan of care as outlined. Call or return to the clinic with any worsening of symptoms or no resolution. Medication side effects reviewed.  Chart documentation with Dragon Voice recognition Software. Although reviewed after completion, some words and grammatical errors may remain.        Elis Franco, DEANDRE, APRN CNP  [facility]

## 2018-05-29 ENCOUNTER — TELEPHONE (OUTPATIENT)
Dept: FAMILY MEDICINE | Facility: CLINIC | Age: 33
End: 2018-05-29

## 2018-05-29 RX ORDER — IBUPROFEN 800 MG/1
800 TABLET, FILM COATED ORAL EVERY 8 HOURS PRN
Qty: 30 TABLET | Refills: 0 | Status: SHIPPED | OUTPATIENT
Start: 2018-05-29 | End: 2018-10-07

## 2018-05-29 NOTE — TELEPHONE ENCOUNTER
Reason for call:  Patient reporting a symptom    Symptom or request: Pt was seen 5/23 by Mallika for ankle pain, now the swelling has moved up to knee, pt is wondering if she could get crutches to help her with walking and not straining as much, please advise.     Have you been treated for this before? Yes      Phone Number patient can be reached at:  Home number on file [phone] (home)    Best Time:  any    Can we leave a detailed message on this number:  YES    Call taken on 5/29/2018 at 8:03 AM by Diana Castillo

## 2018-05-29 NOTE — TELEPHONE ENCOUNTER
Patient is contacted and her right leg is now swollen up to her right thigh.  Advised to go to the  to be evaluated.  Jenna HUGO RN

## 2018-05-30 ENCOUNTER — HOSPITAL ENCOUNTER (OUTPATIENT)
Dept: ULTRASOUND IMAGING | Facility: CLINIC | Age: 33
Discharge: HOME | End: 2018-05-30
Attending: NURSE PRACTITIONER | Admitting: NURSE PRACTITIONER
Payer: COMMERCIAL

## 2018-05-30 ENCOUNTER — OFFICE VISIT (OUTPATIENT)
Dept: FAMILY MEDICINE | Facility: CLINIC | Age: 33
End: 2018-05-30
Payer: COMMERCIAL

## 2018-05-30 VITALS
OXYGEN SATURATION: 99 % | RESPIRATION RATE: 16 BRPM | SYSTOLIC BLOOD PRESSURE: 137 MMHG | HEIGHT: 65 IN | WEIGHT: 180 LBS | TEMPERATURE: 98.4 F | DIASTOLIC BLOOD PRESSURE: 78 MMHG | HEART RATE: 78 BPM | BODY MASS INDEX: 29.99 KG/M2

## 2018-05-30 DIAGNOSIS — M79.651 PAIN OF RIGHT THIGH: ICD-10-CM

## 2018-05-30 DIAGNOSIS — M79.651 PAIN OF RIGHT THIGH: Primary | ICD-10-CM

## 2018-05-30 DIAGNOSIS — M25.561 ACUTE PAIN OF RIGHT KNEE: ICD-10-CM

## 2018-05-30 PROCEDURE — 99213 OFFICE O/P EST LOW 20 MIN: CPT | Performed by: NURSE PRACTITIONER

## 2018-05-30 PROCEDURE — 93971 EXTREMITY STUDY: CPT | Mod: RT

## 2018-05-30 ASSESSMENT — PAIN SCALES - GENERAL: PAINLEVEL: EXTREME PAIN (8)

## 2018-05-30 NOTE — PATIENT INSTRUCTIONS
Ultrasound scheduled for 11:30 today. Please arrive at 11:15 through the ER entrance and turn to right. Diagnostic Imaging.    We will call you with the results of your test.

## 2018-05-30 NOTE — MR AVS SNAPSHOT
After Visit Summary   5/30/2018    Lluvia Bergman    MRN: [id number]           Patient Information     Date Of Birth          1985        Visit Information        Provider Department      5/30/2018 9:20 AM Elis Franco APRN CNP [facility]        Today's Diagnoses     Pain of right thigh    -  1    Acute pain of right knee          Care Instructions    Ultrasound scheduled for 11:30 today. Please arrive at 11:15 through the ER entrance and turn to right. Diagnostic Imaging.    We will call you with the results of your test.          Follow-ups after your visit        Your next 10 appointments already scheduled     May 30, 2018 11:30 AM CDT   US LOWER EXTREMITY VENOUS DUPLEX RIGHT with [id number]   [facility] Ultrasound ([facility])    [address] [address] [phone]           Please bring a list of your medicines (including vitamins, minerals and over-the-counter drugs). Also, tell your doctor about any allergies you may have. Wear comfortable clothes and leave your valuables at home.  You do not need to do anything special to prepare for your exam.  Please call the Imaging Department at your exam site with any questions.              Future tests that were ordered for you today     Open Future Orders        Priority Expected Expires Ordered    US Lower Extremity Venous Duplex Right STAT  5/30/2019 5/30/2018            Who to contact     If you have questions or need follow up information about today's clinic visit or your schedule please contact [facility] directly at [phone].  Normal or non-critical lab and imaging results will be communicated to you by MyChart, letter or phone within 4 business days after the clinic has received the results. If you do not hear from us within 7 days, please contact the clinic through MyChart or phone. If you have a critical or abnormal lab result, we will notify you by  "phone as soon as possible.  Submit refill requests through TourPal or call your pharmacy and they will forward the refill request to us. Please allow 3 business days for your refill to be completed.          Additional Information About Your Visit        Care EveryWhere ID     This is your Care EveryWhere ID. This could be used by other organizations to access your [place] medical records  [id number]        Your Vitals Were     Pulse Temperature Respirations Height Last Period Pulse Oximetry    78 98.4  F (36.9  C) (Tympanic) 16 5' 5\" (1.651 m) 05/02/2018 (Approximate) 99%    Breastfeeding? BMI (Body Mass Index)                No 29.95 kg/m2           Blood Pressure from Last 3 Encounters:   05/30/18 137/78   05/23/18 131/81   03/29/18 114/78    Weight from Last 3 Encounters:   05/30/18 180 lb (81.6 kg)   05/23/18 180 lb (81.6 kg)   03/29/18 152 lb (68.9 kg)               Primary Care Provider Office Phone # Fax #    [facility] [phone]       [address] [facility] [address]        Equal Access to Services     JAZMIN CHOUDHARY : Hadii aad ku hadasho Soomaali, waaxda luqadaha, qaybta kaalmada adeegyada, aníbal wilson . So [facility] [phone].    ATENCIÓN: Si habla español, tiene a blair disposición servicios gratuitos de asistencia lingüística. Llame al [phone].    We comply with applicable federal civil rights laws and [place] laws. We do not discriminate on the basis of race, color, national origin, age, disability, sex, sexual orientation, or gender identity.            Thank you!     Thank you for choosing [facility]  for your care. Our goal is always to provide you with excellent care. Hearing back from our patients is one way we can continue to improve our services. Please take a few minutes to complete the written survey that you may receive in the mail after your visit with us. Thank you!             Your Updated Medication " List - Protect others around you: Learn how to safely use, store and throw away your medicines at www.disposemymeds.org.          This list is accurate as of 5/30/18 10:08 AM.  Always use your most recent med list.                   Brand Name Dispense Instructions for use Diagnosis    buPROPion 150 MG 12 hr tablet    WELLBUTRIN SR    60 tablet    Take 1 tablet once daily and increase to 1 tablet twice daily after 4 to 7 days    Encounter for smoking cessation counseling       ibuprofen 800 MG tablet    ADVIL/MOTRIN    30 tablet    Take 1 tablet (800 mg) by mouth every 8 hours as needed for moderate pain    Pain in joint, ankle and foot, right       order for DME     1 Units    Equipment being ordered: ankle brace    Pain in joint, ankle and foot, right

## 2018-06-04 NOTE — PROGRESS NOTES
"  SUBJECTIVE:   Lluvia Bergman is a 32 year old female who presents to clinic today for the following health issues:      Leg pain    Onset: 5/23/18    Description:   Location: right ankle and calf and thigh  Character: Sharp and Stabbing    Intensity: moderate, severe    Progression of Symptoms: worse    Accompanying Signs & Symptoms:  Other symptoms: swelling of right thigh and calf    History:   Previous similar pain: no       Precipitating factors:   Trauma or overuse: YES- rolled ankle    Alleviating factors:  Improved by: nothing    Therapies Tried and outcome: ibuprofen      Patient was seen here in clinic on 5/23/2018 due to right ankle pain.  She had rolled her foot externally twice result resulting in increased pain especially with flexion of ankle.  X-ray showed no evidence of fracture or malalignment.  She was provided with an ankle brace for likely sprain or strain.      Problem list and histories reviewed & adjusted, as indicated.  Additional history: as documented    There is no problem list on file for this patient.    History reviewed. No pertinent surgical history.    Social History   Substance Use Topics     Smoking status: Current Every Day Smoker     Smokeless tobacco: Never Used     Alcohol use No     History reviewed. No pertinent family history.        Reviewed and updated as needed this visit by clinical staff  Tobacco  Allergies  Meds  Problems  Med Hx  Surg Hx  Fam Hx  Soc Hx        Reviewed and updated as needed this visit by Provider  Allergies  Meds  Problems         ROS:  Constitutional, HEENT, cardiovascular, pulmonary, gi and gu systems are negative, except as otherwise noted.    OBJECTIVE:     /78 (BP Location: Right arm, Patient Position: Chair, Cuff Size: Adult Regular)  Pulse 78  Temp 98.4  F (36.9  C) (Tympanic)  Resp 16  Ht 5' 5\" (1.651 m)  Wt 180 lb (81.6 kg)  LMP 05/02/2018 (Approximate)  SpO2 99%  Breastfeeding? No  BMI 29.95 kg/m2  Body mass index is " 29.95 kg/(m^2).  GENERAL: healthy, alert and no distress  NECK: no adenopathy, no asymmetry, masses, or scars and thyroid normal to palpation  RESP: lungs clear to auscultation - no rales, rhonchi or wheezes  CV: regular rate and rhythm, normal S1 S2, no S3 or S4, no murmur, click or rub, no peripheral edema and peripheral pulses strong  MS: tenderness to palpation of right thigh and calf, RLE exam shows normal strength and muscle mass, no erythema, induration, or nodules, no evidence of joint effusion and right ankle joint mobility is normal.  Homans sign positive  PSYCH: mentation appears normal, affect normal/bright    Diagnostic Test Results:  US LOWER EXTREMITY VENOUS DUPLEX RIGHT 5/30/2018 11:53 AM     HISTORY: Pain.     TECHNIQUE: Color flow and doppler spectral waveform analysis of deep  venous structures is performed.  Imaged deep venous structures of the  right lower extremity include the right common femoral vein, femoral  vein, popliteal vein, and visualized posterior deep calf veins.     COMPARISON: None.     FINDINGS: No DVT is demonstrated.         IMPRESSION: Negative.      TAL BARBA MD    ASSESSMENT/PLAN:     ASSESSMENT:  1. Pain of right thigh, calf.  Doppler ultrasound negative for DVT.  Tylenol or ibuprofen for pain.       PLAN:  Orders Placed This Encounter     US Lower Extremity Venous Duplex Right       Patient Instructions   Ultrasound scheduled for 11:30 today. Please arrive at 11:15 through the ER entrance and turn to right. Diagnostic Imaging.    We will call you with the results of your test.  Patient agrees with plan of care as outlined. Call or return to the clinic with any worsening of symptoms or no resolution. Medication side effects reviewed.  Chart documentation with Dragon Voice recognition Software. Although reviewed after completion, some words and grammatical errors may remain.        Elis Franco, NP, APRN [facility]

## 2018-06-06 ENCOUNTER — TELEPHONE (OUTPATIENT)
Dept: FAMILY MEDICINE | Facility: CLINIC | Age: 33
End: 2018-06-06

## 2018-06-06 NOTE — TELEPHONE ENCOUNTER
Message  Received: Yesterday       Elis Franco, APRN CNP  P Cl Provider Careteam Pool                   Please call patient to check if leg is better.  If not, she should come back in.

## 2018-06-08 ENCOUNTER — OFFICE VISIT (OUTPATIENT)
Dept: FAMILY MEDICINE | Facility: CLINIC | Age: 33
End: 2018-06-08
Payer: COMMERCIAL

## 2018-06-08 VITALS
TEMPERATURE: 98.2 F | BODY MASS INDEX: 27.32 KG/M2 | HEART RATE: 85 BPM | WEIGHT: 164 LBS | SYSTOLIC BLOOD PRESSURE: 107 MMHG | DIASTOLIC BLOOD PRESSURE: 69 MMHG | HEIGHT: 65 IN | RESPIRATION RATE: 12 BRPM | OXYGEN SATURATION: 99 %

## 2018-06-08 DIAGNOSIS — J01.10 ACUTE NON-RECURRENT FRONTAL SINUSITIS: Primary | ICD-10-CM

## 2018-06-08 PROCEDURE — 99213 OFFICE O/P EST LOW 20 MIN: CPT | Performed by: NURSE PRACTITIONER

## 2018-06-08 ASSESSMENT — PAIN SCALES - GENERAL: PAINLEVEL: MODERATE PAIN (4)

## 2018-06-08 NOTE — TELEPHONE ENCOUNTER
Mallika,    Patient has an appt today with you for sinus infection.  She will discuss this with you also.  She says she still has pain and swelling in the leg. Jenna HUGO RN

## 2018-06-08 NOTE — PROGRESS NOTES
"  SUBJECTIVE:   Lluvia Bergman is a 32 year old female who presents to clinic today for the following health issues:      Acute Illness   Acute illness concerns: sinus pain and pressure, dental pain  Onset: 3 days    Fever: no    Chills/Sweats: YES    Headache (location?): YES    Sinus Pressure:YES    Conjunctivitis:  no    Ear Pain: YES: both    Rhinorrhea: YES    Congestion: YES    Sore Throat: no     Cough: YES-non-productive    Wheeze: no    Decreased Appetite: YES    Nausea: no    Vomiting: no    Diarrhea:  no    Dysuria/Freq.: no    Fatigue/Achiness: YES    Sick/Strep Exposure: no     Therapies Tried and outcome: musinex, sudafed     pain in teeth, sinuses,   Copious amounts of thick yellow nasal discharge.          Problem list and histories reviewed & adjusted, as indicated.  Additional history: as documented    There is no problem list on file for this patient.    History reviewed. No pertinent surgical history.    Social History   Substance Use Topics     Smoking status: Current Every Day Smoker     Smokeless tobacco: Never Used     Alcohol use No     History reviewed. No pertinent family history.        Reviewed and updated as needed this visit by clinical staff  Tobacco  Allergies  Meds  Problems  Med Hx  Surg Hx  Fam Hx  Soc Hx        Reviewed and updated as needed this visit by Provider  Allergies  Meds  Problems         ROS:  Constitutional, HEENT, cardiovascular, pulmonary, gi and gu systems are negative, except as otherwise noted.    OBJECTIVE:     /69 (BP Location: Right arm, Patient Position: Chair, Cuff Size: Adult Large)  Pulse 85  Temp 98.2  F (36.8  C) (Tympanic)  Resp 12  Ht 5' 5\" (1.651 m)  Wt 164 lb (74.4 kg)  SpO2 99%  Breastfeeding? No  BMI 27.29 kg/m2  Body mass index is 27.29 kg/(m^2).  GENERAL: healthy, alert and no distress  HENT: normal cephalic/atraumatic, ear canals and TM's normal, nose and mouth without ulcers or lesions, oropharynx clear, oral mucous " membranes moist and sinuses: maxillary, frontal tenderness and swelling  bilaterally  NECK: no adenopathy, no asymmetry, masses, or scars and thyroid normal to palpation  RESP: lungs clear to auscultation - no rales, rhonchi or wheezes  CV: regular rate and rhythm, normal S1 S2, no S3 or S4, no murmur, click or rub, no peripheral edema and peripheral pulses strong    Diagnostic Test Results:  none     ASSESSMENT/PLAN:     ASSESSMENT:  1. Acute non-recurrent frontal sinusitis        PLAN:  Orders Placed This Encounter     amoxicillin-clavulanate (AUGMENTIN) 875-125 MG per tablet       Patient Instructions   Complete full course of antibiotics even if you start to feel better.    Increase your fluids and rest and eat a well balanced diet.    Would be good to humidify the air in your home, especially in the bedroom.     Tylenol or Ibuprofen if able to take for fevers and discomfort. Do not exceed 4 grams of Tylenol in a 24 hour period. Take Ibuprofen with food.   Follow up in 3-5 days if not improving or return sooner if worsening or fail to improve as anticipated.      Patient agrees with plan of care as outlined. Call or return to the clinic with any worsening of symptoms or no resolution. Medication side effects reviewed.  Chart documentation with Dragon Voice recognition Software. Although reviewed after completion, some words and grammatical errors may remain.      Elis Franco, NP, APRN [facility]

## 2018-06-08 NOTE — MR AVS SNAPSHOT
After Visit Summary   6/8/2018    Lluvia Bergman    MRN: [id number]           Patient Information     Date Of Birth          1985        Visit Information        Provider Department      6/8/2018 3:00 PM Elis Franco APRN CNP [facility]        Today's Diagnoses     Acute non-recurrent frontal sinusitis    -  1      Care Instructions    Complete full course of antibiotics even if you start to feel better.    Increase your fluids and rest and eat a well balanced diet.    Would be good to humidify the air in your home, especially in the bedroom.     Tylenol or Ibuprofen if able to take for fevers and discomfort. Do not exceed 4 grams of Tylenol in a 24 hour period. Take Ibuprofen with food.   Follow up in 3-5 days if not improving or return sooner if worsening or fail to improve as anticipated.              Follow-ups after your visit        Who to contact     If you have questions or need follow up information about today's clinic visit or your schedule please contact [facility] directly at [phone].  Normal or non-critical lab and imaging results will be communicated to you by MyChart, letter or phone within 4 business days after the clinic has received the results. If you do not hear from us within 7 days, please contact the clinic through [employer] or phone. If you have a critical or abnormal lab result, we will notify you by phone as soon as possible.  Submit refill requests through InTo[employer] or call your pharmacy and they will forward the refill request to us. Please allow 3 business days for your refill to be completed.          Additional Information About Your Visit        Care EveryWhere ID     This is your Care EveryWhere ID. This could be used by other organizations to access your [place] medical records  [id number]        Your Vitals Were     Pulse Temperature Respirations Height Pulse Oximetry Breastfeeding?    85 98.2  F (36.8  C)  "(Tympanic) 12 5' 5\" (1.651 m) 99% No    BMI (Body Mass Index)                   27.29 kg/m2            Blood Pressure from Last 3 Encounters:   06/08/18 107/69   05/30/18 137/78   05/23/18 131/81    Weight from Last 3 Encounters:   06/08/18 164 lb (74.4 kg)   05/30/18 180 lb (81.6 kg)   05/23/18 180 lb (81.6 kg)              Today, you had the following     No orders found for display         Today's Medication Changes          These changes are accurate as of 6/8/18  3:38 PM.  If you have any questions, ask your nurse or doctor.               Start taking these medicines.        Dose/Directions    amoxicillin-clavulanate 875-125 MG per tablet   Commonly known as:  AUGMENTIN   Used for:  Acute non-recurrent frontal sinusitis   Started by:  Elis Franco APRN CNP        Dose:  1 tablet   Take 1 tablet by mouth 2 times daily   Quantity:  20 tablet   Refills:  0            Where to get your medicines      These medications were sent to [facility] - [place] - [address]  [address], [place] [address]    Hours:  [place] [facility] Phone:  [phone]     amoxicillin-clavulanate 875-125 MG per tablet                Primary Care Provider Office Phone # Fax #    [facility] [phone]       [address] [facility] [address]        Equal Access to Services     [facility] AH: Hadii jaskaran ku hadasho Somilyali, waaxda luqadaha, qaybta kaalmada adeegyada, waxay karie waters. So [facility] [phone].    ATENCIÓN: Si habla español, tiene a blair disposición servicios gratuitos de asistencia lingüística. Llame al [phone].    We comply with applicable federal civil rights laws and [place] laws. We do not discriminate on the basis of race, color, national origin, age, disability, sex, sexual orientation, or gender identity.            Thank you!     Thank you for choosing [facility]  for your care. " Our goal is always to provide you with excellent care. Hearing back from our patients is one way we can continue to improve our services. Please take a few minutes to complete the written survey that you may receive in the mail after your visit with us. Thank you!             Your Updated Medication List - Protect others around you: Learn how to safely use, store and throw away your medicines at www.disposemymeds.org.          This list is accurate as of 6/8/18  3:38 PM.  Always use your most recent med list.                   Brand Name Dispense Instructions for use Diagnosis    amoxicillin-clavulanate 875-125 MG per tablet    AUGMENTIN    20 tablet    Take 1 tablet by mouth 2 times daily    Acute non-recurrent frontal sinusitis       buPROPion 150 MG 12 hr tablet    WELLBUTRIN SR    60 tablet    Take 1 tablet once daily and increase to 1 tablet twice daily after 4 to 7 days    Encounter for smoking cessation counseling       ibuprofen 800 MG tablet    ADVIL/MOTRIN    30 tablet    Take 1 tablet (800 mg) by mouth every 8 hours as needed for moderate pain    Pain in joint, ankle and foot, right       order for DME     1 Units    Equipment being ordered: ankle brace    Pain in joint, ankle and foot, right

## 2018-06-23 ENCOUNTER — HOSPITAL ENCOUNTER (EMERGENCY)
Facility: CLINIC | Age: 33
Discharge: HOME | End: 2018-06-23
Attending: EMERGENCY MEDICINE | Admitting: EMERGENCY MEDICINE
Payer: COMMERCIAL

## 2018-06-23 ENCOUNTER — APPOINTMENT (OUTPATIENT)
Dept: GENERAL RADIOLOGY | Facility: CLINIC | Age: 33
End: 2018-06-23
Attending: EMERGENCY MEDICINE
Payer: COMMERCIAL

## 2018-06-23 VITALS
WEIGHT: 162 LBS | DIASTOLIC BLOOD PRESSURE: 66 MMHG | SYSTOLIC BLOOD PRESSURE: 114 MMHG | OXYGEN SATURATION: 100 % | RESPIRATION RATE: 16 BRPM | TEMPERATURE: 97.9 F | BODY MASS INDEX: 27.66 KG/M2 | HEIGHT: 64 IN

## 2018-06-23 DIAGNOSIS — S93.412A SPRAIN OF CALCANEOFIBULAR LIGAMENT OF LEFT ANKLE, INITIAL ENCOUNTER: ICD-10-CM

## 2018-06-23 DIAGNOSIS — S99.912A ANKLE INJURY, LEFT, INITIAL ENCOUNTER: ICD-10-CM

## 2018-06-23 PROCEDURE — 73610 X-RAY EXAM OF ANKLE: CPT | Mod: LT

## 2018-06-23 PROCEDURE — 99283 EMERGENCY DEPT VISIT LOW MDM: CPT | Performed by: EMERGENCY MEDICINE

## 2018-06-23 PROCEDURE — 99283 EMERGENCY DEPT VISIT LOW MDM: CPT | Mod: Z6 | Performed by: EMERGENCY MEDICINE

## 2018-06-23 PROCEDURE — 73630 X-RAY EXAM OF FOOT: CPT | Mod: LT

## 2018-06-23 PROCEDURE — 25000132 ZZH RX MED GY IP 250 OP 250 PS 637: Performed by: EMERGENCY MEDICINE

## 2018-06-23 RX ORDER — IBUPROFEN 400 MG/1
800 TABLET, FILM COATED ORAL ONCE
Status: COMPLETED | OUTPATIENT
Start: 2018-06-23 | End: 2018-06-23

## 2018-06-23 RX ORDER — HYDROCODONE BITARTRATE AND ACETAMINOPHEN 5; 325 MG/1; MG/1
1 TABLET ORAL EVERY 6 HOURS PRN
Qty: 10 TABLET | Refills: 0 | Status: SHIPPED | OUTPATIENT
Start: 2018-06-23 | End: 2018-10-17

## 2018-06-23 RX ADMIN — IBUPROFEN 800 MG: 400 TABLET ORAL at 20:56

## 2018-06-23 ASSESSMENT — ENCOUNTER SYMPTOMS
ALLERGIC/IMMUNOLOGIC NEGATIVE: 1
CONSTITUTIONAL NEGATIVE: 1
PSYCHIATRIC NEGATIVE: 1
GASTROINTESTINAL NEGATIVE: 1
HEMATOLOGIC/LYMPHATIC NEGATIVE: 1
NEUROLOGICAL NEGATIVE: 1
CARDIOVASCULAR NEGATIVE: 1
EYES NEGATIVE: 1
ENDOCRINE NEGATIVE: 1
RESPIRATORY NEGATIVE: 1

## 2018-06-23 NOTE — ED AVS SNAPSHOT
[facility]    [address] [address]    Phone:  [phone]    Fax:  [phone]                                       Lluvia Glaser   MRN: [id number]    Department:  [facility]   Date of Visit:  6/23/2018           Patient Information     Date Of Birth          1985        Your diagnoses for this visit were:     Sprain of calcaneofibular ligament of left ankle, initial encounter     Ankle injury, left, initial encounter        You were seen by Pancho Mulligan MD.      Follow-up Information     Follow up with [facility].    Why:  For medication refill if needed or worsening symptoms. Consider repeat XR imaging in 1-2 weeks if persistent pain or discomfort or MRI imaging.    Contact information:    [address] [place] [address]          Discharge Instructions         Understanding Ankle Sprain    The ankle is the joint where the leg and foot meet. Bones are held in place by connective tissue called ligaments. When ankle ligaments are stretched to the point of pain and injury, it is called an ankle sprain. A sprain can tear the ligaments. These tears can be very small but still cause pain. Ankle sprains can be mild or severe.  What causes an ankle sprain?  A sprain may occur when you twist your ankle or bend it too far. This can happen when you stumble or fall. Things that can make an ankle sprain more likely include:    Having had an ankle sprain before    Playing sports that involve running and jumping. Or playing contact sports such as football or hockey.    Wearing shoes that don t support your feet and ankles well    Having ankles with poor strength and flexibility  Symptoms of an ankle sprain  Symptoms may include:    Pain or soreness in the ankle    Swelling    Redness or bruising    Not being able to walk or put weight on the affected foot    Reduced range of motion in the  ankle    A popping or tearing feeling at the time the sprain occurs    An abnormal or dislocated look to the ankle    Instability or too much range of motion in the ankle  Treatment for an ankle sprain  Treatment focuses on reducing pain and swelling, and avoiding further injury. Treatments may include:    Resting the ankle. Avoid putting weight on it. This may mean using crutches until the sprain heals.    Prescription or over-the-counter pain medicines. These help reduce swelling and pain.    Cold packs. These help reduce pain and swelling.    Raising your ankle above your heart. This helps reduce swelling.    Wrapping the ankle with an elastic bandage or ankle brace. This helps reduce swelling and gives some support to the ankle. In rare cases, you may need a cast or boot.    Stretching and other exercises. These improve flexibility and strength.    Heat packs. These may be recommended before doing ankle exercises.  Possible complications of an ankle sprain  An ankle that has been weakened by a sprain can be more likely to have repeated sprains afterward. Doing exercises to strengthen your ankle and improve balance can reduce your risk for repeated sprains. Other possible complications are long-term (chronic) pain or an ankle that remains unstable.  When to call your healthcare provider  Call your healthcare provider right away if you have any of these:    Fever of 100.4 F (38 C) or higher, or as directed    Pain, numbness, discoloration, or coldness in the foot or toes    Pain that gets worse    Symptoms that don t get better, or get worse    New symptoms   Date Last Reviewed: 3/10/2016    8736-7994 [employer]. [address] [place] [address], [place] [address]. All rights reserved. This information is not intended as a substitute for professional medical care. Always follow your healthcare professional's instructions.          Discharge References/Attachments     HYDROCODONE BITARTRATE, ACETAMINOPHEN ORAL  TABLET (ENGLISH)      24 Hour Appointment Hotline       To make an appointment at any [facility] call [phone] ([phone]). If you don't have a family doctor or clinic, we will help you find one. [facility] are conveniently located to serve the needs of you and your family.          ED Discharge Orders     Cam Walker Adj Ankle                    Review of your medicines      START taking        Dose / Directions Last dose taken    HYDROcodone-acetaminophen 5-325 MG per tablet   Commonly known as:  NORCO   Dose:  1 tablet   Quantity:  10 tablet        Take 1 tablet by mouth every 6 hours as needed for moderate to severe pain or severe pain   Refills:  0          Our records show that you are taking the medicines listed below. If these are incorrect, please call your family doctor or clinic.        Dose / Directions Last dose taken    amoxicillin-clavulanate 875-125 MG per tablet   Commonly known as:  AUGMENTIN   Dose:  1 tablet   Quantity:  20 tablet        Take 1 tablet by mouth 2 times daily   Refills:  0        buPROPion 150 MG 12 hr tablet   Commonly known as:  WELLBUTRIN SR   Quantity:  60 tablet        Take 1 tablet once daily and increase to 1 tablet twice daily after 4 to 7 days   Refills:  2        ibuprofen 800 MG tablet   Commonly known as:  ADVIL/MOTRIN   Dose:  800 mg   Quantity:  30 tablet        Take 1 tablet (800 mg) by mouth every 8 hours as needed for moderate pain   Refills:  0        order for DME   Quantity:  1 Units        Equipment being ordered: ankle brace   Refills:  0                Information about OPIOIDS     PRESCRIPTION OPIOIDS: WHAT YOU NEED TO KNOW   We gave you an opioid (narcotic) pain medicine. It is important to manage your pain, but opioids are not always the best choice. You should first try all the other options your care team gave you. Take this medicine for as short a time (and as few doses) as possible.     These medicines have risks:    DO NOT drive  when on new or higher doses of pain medicine. These medicines can affect your alertness and reaction times, and you could be arrested for driving under the influence (DUI). If you need to use opioids long-term, talk to your care team about driving.    DO NOT operate heave machinery    DO NOT do any other dangerous activities while taking these medicines.     DO NOT drink any alcohol while taking these medicines.      If the opioid prescribed includes acetaminophen, DO NOT take with any other medicines that contain acetaminophen. Read all labels carefully. Look for the word  acetaminophen  or  Tylenol.  Ask your pharmacist if you have questions or are unsure.    You can get addicted to pain medicines, especially if you have a history of addiction (chemical, alcohol or substance dependence). Talk to your care team about ways to reduce this risk.    Store your pills in a secure place, locked if possible. We will not replace any lost or stolen medicine. If you don t finish your medicine, please throw away (dispose) as directed by your pharmacist. The [place] Pollution Control Agency has more information about safe disposal: https://www.pca.[facility].us/living-green/managing-unwanted-medications.     All opioids tend to cause constipation. Drink plenty of water and eat foods that have a lot of fiber, such as fruits, vegetables, prune juice, apple juice and high-fiber cereal. Take a laxative (Miralax, milk of magnesia, Colace, Senna) if you don t move your bowels at least every other day.         Prescriptions were sent or printed at these locations (1 Prescription)                   Other Prescriptions                Printed at Department/Unit printer (1 of 1)         HYDROcodone-acetaminophen (NORCO) 5-325 MG per tablet                Procedures and tests performed during your visit     Foot XR, G/E 3 views, left    XR Ankle Left G/E 3 Views      Orders Needing Specimen Collection     None      Pending Results     No  orders found from 6/21/2018 to 6/24/2018.            Pending Culture Results     No orders found from 6/21/2018 to 6/24/2018.            Pending Results Instructions     If you had any lab results that were not finalized at the time of your Discharge, you can call the ED Lab Result RN at [phone]. You will be contacted by this team for any positive Lab results or changes in treatment. The nurses are available 7 days a week from 10A to 6:30P.  You can leave a message 24 hours per day and they will return your call.        Test Results From Your Hospital Stay        6/23/2018  8:48 PM      Narrative     XR ANKLE LT G/E 3 VW 6/23/2018 8:43 PM    HISTORY: Fall.    COMPARISON: None.        Impression     IMPRESSION: No evidence of acute fracture or malalignment. Ankle  mortise is intact.    TOAN BECERRA MD         6/23/2018  8:49 PM      Narrative     XR FOOT LT G/E 3 VW 6/23/2018 8:44 PM    HISTORY: Fall.    COMPARISON: None.        Impression     IMPRESSION: No evidence of acute fracture or malalignment.    TOAN BECERRA MD                Thank you for choosing [facility]       Thank you for choosing [facility] for your care. Our goal is always to provide you with excellent care. Hearing back from our patients is one way we can continue to improve our services. Please take a few minutes to complete the written survey that you may receive in the mail after you visit with us. Thank you!        Care EveryWhere ID     This is your Care EveryWhere ID. This could be used by other organizations to access your [facility] medical records  [id number]        Equal Access to Services     JAZMIN CHOUDHARY AH: Airam lyn Sojahaira, waaxda luqadaha, qaybta kaalmada aníbal cordero. So [facility] [phone].    ATENCIÓN: Si habla español, tiene a blair disposición servicios gratuitos de asistencia lingüística. Llame al [phone].    We comply with applicable federal civil rights laws and [place]  laws. We do not discriminate on the basis of race, color, national origin, age, disability, sex, sexual orientation, or gender identity.            After Visit Summary       This is your record. Keep this with you and show to your community pharmacist(s) and doctor(s) at your next visit.

## 2018-06-23 NOTE — ED AVS SNAPSHOT
[facility]    [address] [address]    Phone:  [phone]    Fax:  [phone]                                       Lluvia Glaser   MRN: [id number]    Department:  [facility]   Date of Visit:  6/23/2018           After Visit Summary Signature Page     I have received my discharge instructions, and my questions have been answered. I have discussed any challenges I see with this plan with the nurse or doctor.    ..........................................................................................................................................  Patient/Patient Representative Signature      ..........................................................................................................................................  Patient Representative Print Name and Relationship to Patient    ..................................................               ................................................  Date                                            Time    ..........................................................................................................................................  Reviewed by Signature/Title    ...................................................              ..............................................  Date                                                            Time

## 2018-06-24 NOTE — ED NOTES
Left ankle pain, pt was floating down the [place] and went to get out and stepped onto uneven ground.  Pt rolled left ankle.  CMS intact.  Pedal pulses present.  Swelling present.

## 2018-06-24 NOTE — ED NOTES
Discharge instructions reviewed in detail.  Pt verbalized understanding.  RN sent rx to pharmacy.  No further questions or concerns.

## 2018-06-24 NOTE — DISCHARGE INSTRUCTIONS
Understanding Ankle Sprain    The ankle is the joint where the leg and foot meet. Bones are held in place by connective tissue called ligaments. When ankle ligaments are stretched to the point of pain and injury, it is called an ankle sprain. A sprain can tear the ligaments. These tears can be very small but still cause pain. Ankle sprains can be mild or severe.  What causes an ankle sprain?  A sprain may occur when you twist your ankle or bend it too far. This can happen when you stumble or fall. Things that can make an ankle sprain more likely include:    Having had an ankle sprain before    Playing sports that involve running and jumping. Or playing contact sports such as football or hockey.    Wearing shoes that don t support your feet and ankles well    Having ankles with poor strength and flexibility  Symptoms of an ankle sprain  Symptoms may include:    Pain or soreness in the ankle    Swelling    Redness or bruising    Not being able to walk or put weight on the affected foot    Reduced range of motion in the ankle    A popping or tearing feeling at the time the sprain occurs    An abnormal or dislocated look to the ankle    Instability or too much range of motion in the ankle  Treatment for an ankle sprain  Treatment focuses on reducing pain and swelling, and avoiding further injury. Treatments may include:    Resting the ankle. Avoid putting weight on it. This may mean using crutches until the sprain heals.    Prescription or over-the-counter pain medicines. These help reduce swelling and pain.    Cold packs. These help reduce pain and swelling.    Raising your ankle above your heart. This helps reduce swelling.    Wrapping the ankle with an elastic bandage or ankle brace. This helps reduce swelling and gives some support to the ankle. In rare cases, you may need a cast or boot.    Stretching and other exercises. These improve flexibility and strength.    Heat packs. These may be recommended before doing  ankle exercises.  Possible complications of an ankle sprain  An ankle that has been weakened by a sprain can be more likely to have repeated sprains afterward. Doing exercises to strengthen your ankle and improve balance can reduce your risk for repeated sprains. Other possible complications are long-term (chronic) pain or an ankle that remains unstable.  When to call your healthcare provider  Call your healthcare provider right away if you have any of these:    Fever of 100.4 F (38 C) or higher, or as directed    Pain, numbness, discoloration, or coldness in the foot or toes    Pain that gets worse    Symptoms that don t get better, or get worse    New symptoms   Date Last Reviewed: 3/10/2016    3738-2050 [employer]. [address] [place] [address], [place] [address]. All rights reserved. This information is not intended as a substitute for professional medical care. Always follow your healthcare professional's instructions.

## 2018-06-24 NOTE — ED PROVIDER NOTES
"  History     Chief Complaint   Patient presents with     Ankle Pain     left ankle pain, fell walking to the bus; swelling; pt admits to ETOH today     HPI  Lluvia Glaser is a 32 year old female with no significant medical history who presents to the ED for evaluation of left ankle pain. The patient states that about twenty minutes ago, she was walking to the bus and fell, twisting her left ankle. She reports that she immediately experienced \"excruciating pain\" and was unable to bear weight or walk on her left ankle after the fall. Her ankle has since begun swelling. The patient also admits to [place]. She states that she drank four beers and two shots and after experiencing ankle pain from her fall, she vomited three times. The patient does not take any medications regularly. She has an allergy to blue dyes as well as Demerol.    Social History: The patient lives in [place], [place]. The patient presents to the ED alone but was driven by her uncle.      Problem List:    There are no active problems to display for this patient.       Past Medical History:    No past medical history on file.    Past Surgical History:    No past surgical history on file.    Family History:    No family history on file.    Social History:  Marital Status:   [2]  Social History   Substance Use Topics     Smoking status: Current Every Day Smoker     Smokeless tobacco: Never Used     Alcohol use No        Medications:      HYDROcodone-acetaminophen (NORCO) 5-325 MG per tablet   amoxicillin-clavulanate (AUGMENTIN) 875-125 MG per tablet   buPROPion (WELLBUTRIN SR) 150 MG 12 hr tablet   ibuprofen (ADVIL/MOTRIN) 800 MG tablet   order for DME         Review of Systems   Constitutional: Negative.    HENT: Negative.    Eyes: Negative.    Respiratory: Negative.    Cardiovascular: Negative.    Gastrointestinal: Negative.    Endocrine: Negative.    Genitourinary: Negative.  " "  Musculoskeletal:        Left ankle pain and swelling. Left foot pain and discomfort.   Skin: Negative.    Allergic/Immunologic: Negative.    Neurological: Negative.    Hematological: Negative.    Psychiatric/Behavioral: Negative.        Physical Exam   BP: 114/66  Heart Rate: 94  Temp: 97.9  F (36.6  C)  Resp: 16  Height: 162.6 cm (5' 4\")  Weight: 73.5 kg (162 lb)  SpO2: 100 %      Physical Exam   Constitutional: She is oriented to person, place, and time. She appears well-developed and well-nourished. No distress.   HENT:   Head: Normocephalic and atraumatic.   Eyes: Conjunctivae and EOM are normal. Pupils are equal, round, and reactive to light. Right eye exhibits no discharge. Left eye exhibits no discharge. No scleral icterus.   Neck: Normal range of motion. Neck supple. No JVD present. No tracheal deviation present. No thyromegaly present.   Cardiovascular: Normal rate and regular rhythm.    Pulmonary/Chest: Effort normal and breath sounds normal. No stridor. No respiratory distress. She has no wheezes. She has no rales. She exhibits no tenderness.   Musculoskeletal: She exhibits tenderness.        Left ankle: She exhibits swelling and ecchymosis. She exhibits no deformity and no laceration.        Feet:    Lymphadenopathy:     She has no cervical adenopathy.   Neurological: She is alert and oriented to person, place, and time.   Skin: No rash noted. She is not diaphoretic. No erythema. No pallor.   Psychiatric: She has a normal mood and affect. Her behavior is normal. Judgment and thought content normal.       ED Course     ED Course     Procedures             Critical Care time:  none          ED Medications:  Medications   ibuprofen (ADVIL/MOTRIN) tablet 800 mg (800 mg Oral Given 6/23/18 2056)       ED Vitals:  Vitals:    06/23/18 1942   BP: 114/66   Resp: 16   Temp: 97.9  F (36.6  C)   TempSrc: Oral   SpO2: 100%   Weight: 73.5 kg (162 lb)   Height: 1.626 m (5' 4\")       ED Labs and Imaging:  Results for " orders placed or performed during the hospital encounter of 06/23/18 (from the past 24 hour(s))   XR Ankle Left G/E 3 Views    Narrative    XR ANKLE LT G/E 3 VW 6/23/2018 8:43 PM    HISTORY: Fall.    COMPARISON: None.      Impression    IMPRESSION: No evidence of acute fracture or malalignment. Ankle  mortise is intact.    TOAN BECERRA MD   Foot XR, G/E 3 views, left    Narrative    XR FOOT LT G/E 3 VW 6/23/2018 8:44 PM    HISTORY: Fall.    COMPARISON: None.      Impression    IMPRESSION: No evidence of acute fracture or malalignment.    TOAN BECERRA MD       7:52 PM Patient assessed.    Assessments & Plan (with Medical Decision Making)   Clinical Impression: 32-year-old female who arrived by private car for left ankle injury.  She has an ankle sprain.  Patient reports she [place] twisted her left ankle which resulted in swelling and discomfort over the lateral malleolus and pain at the base of her left foot.  She reports a history of a right ankle sprain.  She reports no active prescription medications and she reports no knee pain and no hip pain.  On exam she has bruising about the lateral ankle and left foot no gross ankle deformity.  She reports no tingling or numbness about her foot.  She has pain to palpation about the lateral malleolus.  GCS is 15 on ED arrival.  She does appear intoxicated.      ED Course and Plan:   She was given ibuprofen for pain control.  X-ray of her left ankle and foot was obtained.  Ice was applied to the area of swelling and bruising about the lateral malleolus.  X-ray of the foot and ankle today does not show any acute bony process due to trauma on my independent review.  Please see the interpreting radiologist report above.  Patient is discharged home with a cam boot for comfort.  Ice and rice therapy was reviewed with the patient.  She may take Norco as needed for pain control.  Consider repeat x-ray imaging if persistent pain or  discomfort.  Follow-up with your primary care provider for medication refills if needed.      Disclaimer: This note consists of symbols derived from keyboarding, dictation and/or voice recognition software. As a result, there may be errors in the script that have gone undetected. Please consider this when interpreting information found in this chart.      I have reviewed the nursing notes.    I have reviewed the findings, diagnosis, plan and need for follow up with the patient.     New Prescriptions    HYDROCODONE-ACETAMINOPHEN (NORCO) 5-325 MG PER TABLET    Take 1 tablet by mouth every 6 hours as needed for moderate to severe pain or severe pain       Final diagnoses:   Sprain of calcaneofibular ligament of left ankle, initial encounter   Ankle injury, left, initial encounter       This document serves as a record of the services and decisions personally performed and made by Pancho Mulligan. The HPI was created on his behalf by Mami Kimball, a trained medical scribe. The creation of this document is based the provider's statements to the medical scribe.  Mami Kimball 7:52 PM 6/23/2018    Provider:   The information in this document, created by the medical scribe for me, accurately reflects the services I personally performed and the decisions made by me. I have reviewed and approved this document for accuracy prior to leaving the patient care area.  Pancho Mulligan, 7:52 PM 6/23/2018 6/23/2018   [facility] EMERGENCY DEPARTMENT     Pancho Mulligan MD  06/24/18 0000

## 2018-09-05 ENCOUNTER — OFFICE VISIT (OUTPATIENT)
Dept: FAMILY MEDICINE | Facility: CLINIC | Age: 33
End: 2018-09-05

## 2018-09-05 VITALS
TEMPERATURE: 97.5 F | HEART RATE: 83 BPM | OXYGEN SATURATION: 99 % | BODY MASS INDEX: 25.27 KG/M2 | DIASTOLIC BLOOD PRESSURE: 87 MMHG | SYSTOLIC BLOOD PRESSURE: 126 MMHG | WEIGHT: 148 LBS | HEIGHT: 64 IN | RESPIRATION RATE: 20 BRPM

## 2018-09-05 DIAGNOSIS — Z53.9 ERRONEOUS ENCOUNTER--DISREGARD: Primary | ICD-10-CM

## 2018-09-05 NOTE — MR AVS SNAPSHOT
"              After Visit Summary   2018    Lluvia Glaser    MRN: [id number]           Patient Information     Date Of Birth          1985        Visit Information        Provider Department      2018 11:20 AM Jody Kingston APRN CNP [facility]        Today's Diagnoses     ERRONEOUS ENCOUNTER--DISREGARD    -  1       Follow-ups after your visit        Who to contact     If you have questions or need follow up information about today's clinic visit or your schedule please contact [facility] directly at [phone].  Normal or non-critical lab and imaging results will be communicated to you by [employer], letter or phone within 4 business days after the clinic has received the results. If you do not hear from us within 7 days, please contact the clinic through [employer] or phone. If you have a critical or abnormal lab result, we will notify you by phone as soon as possible.  Submit refill requests through [employer] or call your pharmacy and they will forward the refill request to us. Please allow 3 business days for your refill to be completed.          Additional Information About Your Visit        MyChart Information     [employer] lets you send messages to your doctor, view your test results, renew your prescriptions, schedule appointments and more. To sign up, go to [email or web address] . Click on \"Log in\" on the left side of the screen, which will take you to the Welcome page. Then click on \"Sign up Now\" on the right side of the page.     You will be asked to enter the access code listed below, as well as some personal information. Please follow the directions to create your username and password.     Your access code is: [email or web address]  Expires: 2018  7:31 PM     Your access code will  in 90 days. If you need help or a new code, please call your [facility] or [phone].        Care EveryWhere ID     This is your Care EveryWhere ID. " "This could be used by other organizations to access your [place] medical records  [id number]        Your Vitals Were     Pulse Temperature Respirations Height Last Period Pulse Oximetry    83 97.5  F (36.4  C) (Tympanic) 20 5' 4\" (1.626 m) 08/22/2018 (Approximate) 99%    BMI (Body Mass Index)                   25.4 kg/m2            Blood Pressure from Last 3 Encounters:   09/05/18 126/87   06/23/18 114/66   06/08/18 107/69    Weight from Last 3 Encounters:   09/05/18 148 lb (67.1 kg)   06/23/18 162 lb (73.5 kg)   06/08/18 164 lb (74.4 kg)              Today, you had the following     No orders found for display       Primary Care Provider Fax #    [facility] [phone]       [address] [place] [address]        Equal Access to Services     [facility]: Hadii jaskaran Asencio, waaxda lumackenzie, qaybta kaalmada gil, aníbal wilson . So [facility] [phone].    ATENCIÓN: Si habla español, tiene a blair disposición servicios gratuitos de asistencia lingüística. Llame al [phone].    We comply with applicable federal civil rights laws and [place] laws. We do not discriminate on the basis of race, color, national origin, age, disability, sex, sexual orientation, or gender identity.            Thank you!     Thank you for choosing [facility]  for your care. Our goal is always to provide you with excellent care. Hearing back from our patients is one way we can continue to improve our services. Please take a few minutes to complete the written survey that you may receive in the mail after your visit with us. Thank you!             Your Updated Medication List - Protect others around you: Learn how to safely use, store and throw away your medicines at www.disposemymeds.org.          This list is accurate as of 9/5/18 11:59 PM.  Always use your most recent med list.                   Brand Name Dispense Instructions for use " Diagnosis    amoxicillin-clavulanate 875-125 MG per tablet    AUGMENTIN    20 tablet    Take 1 tablet by mouth 2 times daily    Acute non-recurrent frontal sinusitis       buPROPion 150 MG 12 hr tablet    WELLBUTRIN SR    60 tablet    Take 1 tablet once daily and increase to 1 tablet twice daily after 4 to 7 days    Encounter for smoking cessation counseling       HYDROcodone-acetaminophen 5-325 MG per tablet    NORCO    10 tablet    Take 1 tablet by mouth every 6 hours as needed for moderate to severe pain or severe pain        ibuprofen 800 MG tablet    ADVIL/MOTRIN    30 tablet    Take 1 tablet (800 mg) by mouth every 8 hours as needed for moderate pain    Pain in joint, ankle and foot, right       order for DME     1 Units    Equipment being ordered: ankle brace    Pain in joint, ankle and foot, right

## 2018-09-05 NOTE — PROGRESS NOTES
PATIENT LEFT WITHOUT BEING SEEN.        SUBJECTIVE:   Lluvia Glaser is a 32 year old female who presents to clinic today for the following health issues:      Musculoskeletal problem/pain      Duration: over 1 month      Description  Location: left wrist     Intensity:  Mild to moderate     Accompanying signs and symptoms: swelling and pain     History  Previous similar problem: no   Previous evaluation:  none    Precipitating or alleviating factors:  Trauma or overuse: YES- caught a heavy trailer door with her wrist- both wrists bent backwards.   Aggravating factors include: any movement- has to keep it still     Therapies tried and outcome: Ibuprofen and ace bandage

## 2018-09-05 NOTE — NURSING NOTE
"Chief Complaint   Patient presents with     Musculoskeletal Problem     wrist pain        Initial /87  Pulse 83  Temp 97.5  F (36.4  C) (Tympanic)  Resp 20  Ht 5' 4\" (1.626 m)  Wt 148 lb (67.1 kg)  LMP 08/22/2018 (Approximate)  SpO2 99%  BMI 25.4 kg/m2 Estimated body mass index is 25.4 kg/(m^2) as calculated from the following:    Height as of this encounter: 5' 4\" (1.626 m).    Weight as of this encounter: 148 lb (67.1 kg).      Health Maintenance that is potentially due pending provider review:  NONE    n/a    Is there anyone who you would like to be able to receive your results? No  If yes have patient fill out LACEY      "

## 2018-10-07 ENCOUNTER — HOSPITAL ENCOUNTER (EMERGENCY)
Facility: CLINIC | Age: 33
Discharge: HOME | End: 2018-10-07
Attending: STUDENT IN AN ORGANIZED HEALTH CARE EDUCATION/TRAINING PROGRAM | Admitting: STUDENT IN AN ORGANIZED HEALTH CARE EDUCATION/TRAINING PROGRAM
Payer: COMMERCIAL

## 2018-10-07 VITALS
OXYGEN SATURATION: 99 % | BODY MASS INDEX: 23.9 KG/M2 | TEMPERATURE: 98.6 F | SYSTOLIC BLOOD PRESSURE: 126 MMHG | WEIGHT: 140 LBS | DIASTOLIC BLOOD PRESSURE: 76 MMHG | HEIGHT: 64 IN | RESPIRATION RATE: 16 BRPM | HEART RATE: 94 BPM

## 2018-10-07 DIAGNOSIS — M25.532 PAIN IN BOTH WRISTS: ICD-10-CM

## 2018-10-07 DIAGNOSIS — M25.531 PAIN IN BOTH WRISTS: ICD-10-CM

## 2018-10-07 PROCEDURE — 99282 EMERGENCY DEPT VISIT SF MDM: CPT

## 2018-10-07 PROCEDURE — 99282 EMERGENCY DEPT VISIT SF MDM: CPT | Mod: Z6 | Performed by: STUDENT IN AN ORGANIZED HEALTH CARE EDUCATION/TRAINING PROGRAM

## 2018-10-07 NOTE — ED AVS SNAPSHOT
[facility] Emergency Department    [address] [address]    Phone:  [phone]    Fax:  [phone]                                       Lluvia Glaser   MRN: [id number]    Department:  [facility] Emergency Department   Date of Visit:  10/7/2018           After Visit Summary Signature Page     I have received my discharge instructions, and my questions have been answered. I have discussed any challenges I see with this plan with the nurse or doctor.    ..........................................................................................................................................  Patient/Patient Representative Signature      ..........................................................................................................................................  Patient Representative Print Name and Relationship to Patient    ..................................................               ................................................  Date                                   Time    ..........................................................................................................................................  Reviewed by Signature/Title    ...................................................              ..............................................  Date                                               Time          22EPIC Rev 08/18

## 2018-10-07 NOTE — ED NOTES
Bilateral wrist pain x 6 mos-left worse than right-carries heavy trays at work-dropped a bowl of food yesterday due to wrist pain-normal ROM is painful-has had no specific injury to wrists-tingling in hands at times

## 2018-10-07 NOTE — LETTER
October 7, 2018      To Whom It May Concern:      Lluvia Glaser was seen in our Emergency Department today, 10/07/18.  I expect her condition to improve over the next 3-5 days.  She may return to work/school when improved.    Sincerely,        Preston Yuan, DO

## 2018-10-07 NOTE — ED AVS SNAPSHOT
[facility] Emergency Department    [address]    [facility] [address]    Phone:  [phone]    Fax:  [phone]                                       Lluvia Glaser   MRN: [id number]    Department:  [facility] Emergency Department   Date of Visit:  10/7/2018           Patient Information     Date Of Birth          1985        Your diagnoses for this visit were:     Pain in both wrists        You were seen by Preston Yuan DO.      Follow-up Information     Follow up with [facility]. Schedule an appointment as soon as possible for a visit in 3 days.    Why:  Followup for reevaluation and managment plan.    Contact information:    [address] [place] [address]  [facility] [address] [phone]      Discharge References/Attachments     CARPAL TUNNEL SYNDROME (ENGLISH)    WRIST SPRAIN (ENGLISH)      24 Hour Appointment Hotline       To make an appointment at any [place] clinic, call [phone] ([phone]). If you don't have a family doctor or clinic, we will help you find one. [place] clinics are conveniently located to serve the needs of you and your family.          ED Discharge Orders     ORTHO  REFERRAL       [facility] is referring you to the Orthopedic  Services at [facility].       The  Representative will assist you in the coordination of your Orthopedic and Musculoskeletal Care as prescribed by your physician.    The  Representative will call you within 1 business day to help schedule your appointment, or you may contact the  Representative at:    All areas ~ [phone]    Type of Referral : Non Surgical / Sport Medicine       Timeframe requested: 3 - 5 days    Coverage of these services is subject to the terms and limitations of your health insurance plan.  Please call member services at your health plan with any benefit or  coverage questions.      If X-rays, CT or MRI's have been performed, please contact the facility where they were done to arrange for , prior to your scheduled appointment.  Please bring this referral request to your appointment and present it to your specialist.                     Review of your medicines      Our records show that you are taking the medicines listed below. If these are incorrect, please call your family doctor or clinic.        Dose / Directions Last dose taken    buPROPion 150 MG 12 hr tablet   Commonly known as:  WELLBUTRIN SR   Quantity:  60 tablet        Take 1 tablet once daily and increase to 1 tablet twice daily after 4 to 7 days   Refills:  2        HYDROcodone-acetaminophen 5-325 MG per tablet   Commonly known as:  NORCO   Dose:  1 tablet   Quantity:  10 tablet        Take 1 tablet by mouth every 6 hours as needed for moderate to severe pain or severe pain   Refills:  0          STOP taking        Dose Reason for stopping Comments    order for DME                      Orders Needing Specimen Collection     None      Pending Results     No orders found from 10/5/2018 to 10/8/2018.            Pending Culture Results     No orders found from 10/5/2018 to 10/8/2018.            Pending Results Instructions     If you had any lab results that were not finalized at the time of your Discharge, you can call the ED Lab Result RN at [phone]. You will be contacted by this team for any positive Lab results or changes in treatment. The nurses are available 7 days a week from 10A to 6:30P.  You can leave a message 24 hours per day and they will return your call.        Test Results From Your Hospital Stay               Thank you for choosing [facility]       Thank you for choosing [facility] for your care. Our goal is always to provide you with excellent care. Hearing back from our patients is one way we can continue to improve our services. Please take a few minutes to complete the written  "survey that you may receive in the mail after you visit with us. Thank you!        [employer] Information     [employer] lets you send messages to your doctor, view your test results, renew your prescriptions, schedule appointments and more. To sign up, go to [email or web address] . Click on \"Log in\" on the left side of the screen, which will take you to the Welcome page. Then click on \"Sign up Now\" on the right side of the page.     You will be asked to enter the access code listed below, as well as some personal information. Please follow the directions to create your username and password.     Your access code is: [email or web address]  Expires: 2018  7:31 PM     Your access code will  in 90 days. If you need help or a new code, please call your [facility] clinic or [phone].        Care EveryWhere ID     This is your Care EveryWhere ID. This could be used by other organizations to access your [facility] medical records  [id number]        Equal Access to Services     SID CHOUDHARY : Hadjeff lyn Sojahaira, waaxda luqadaha, qaybta kaalglen cordero, aníbal wilson . So [facility] [phone].    ATENCIÓN: Si habla español, tiene a blair disposición servicios gratuitos de asistencia lingüística. Llame al [phone].    We comply with applicable federal civil rights laws and [place] laws. We do not discriminate on the basis of race, color, national origin, age, disability, sex, sexual orientation, or gender identity.            After Visit Summary       This is your record. Keep this with you and show to your community pharmacist(s) and doctor(s) at your next visit.                  "

## 2018-10-07 NOTE — ED PROVIDER NOTES
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  Patient explains that she works as a  as well as  at a local restaurant and performs repetitive activities with her hands.  She believes over the past couple of months that she has been suffering from increasing bilateral wrist pain but more so on the left.  The pain is exacerbated with extension of her wrists but also feels as though she has tenderness along the volar aspect of her wrist.  Occasionally she feels a tingling sensation along digits 4 and 5 of the left hand while working.  She denies recent traumatic impact or injury.  No fever or infectious symptoms.  No history of orthopedic procedures.    Problem List:    There are no active problems to display for this patient.       Past Medical History:    No past medical history on file.    Past Surgical History:    No past surgical history on file.    Family History:    No family history on file.    Social History:  Marital Status:   [2]  Social History   Substance Use Topics     Smoking status: Current Every Day Smoker     Smokeless tobacco: Never Used     Alcohol use No        Medications:      buPROPion (WELLBUTRIN SR) 150 MG 12 hr tablet   HYDROcodone-acetaminophen (NORCO) 5-325 MG per tablet         Review of Systems  Constitutional:  Negative for fever or illness.  Musculoskeletal: Positive for bilateral wrist pain.  Neurological: Positive for bilateral tingling sensation of digits 4 and 5 left hand.  Skin: Negative for rash.    All others reviewed and are negative.      Physical Exam   BP: 126/76  Pulse: 94  Temp: 98.6  F (37  C)  Resp: 16  Height: 162.6 cm  "(5' 4\")  Weight: 63.5 kg (140 lb)  SpO2: 99 %      Physical Exam  Constitutional:  Well developed, well nourished.  Appears nontoxic and in no acute distress.   HENT:  Normocephalic and atraumatic.  Eyes:  Conjunctivae are normal.  Cardiovascular:  No cyanosis.   Respiratory:  Effort normal, no respiratory distress.   Musculoskeletal:  No obvious hand or wrist deformities.  Tenderness of bilateral wrist to palpation but no warmth or inflammation.  Pain with wrist extension bilaterally.  Negative Tinel and Phalen signs.  Sensation intact of all digits along median, radial, and ulnar nerve distributions.  No cyanosis and capillary refill less than 2 seconds in each digit.  2/4 palpable radial and ulnar pulses.  Neurological:  Patient is alert.  Skin:  Skin is warm and dry.  Psychiatric:  Normal mood and affect.      ED Course     ED Course     Procedures               Critical Care time:  none               No results found for this or any previous visit (from the past 24 hour(s)).    Medications - No data to display    Assessments & Plan (with Medical Decision Making)   Lluvia Glaser is a 32 year old female who presents to the department for evaluation of several months of bilateral wrist pain, left greater than right.  She notes an increase in pain with repetitive work activities but denies single traumatic injury.  Differential diagnosis included septic arthritis, systemic lupus erythematosus, gout/pseudogout, Lyme disease, rheumatoid arthritis or osteoarthritis.  Patient occasionally tingling sensation of digits 4 and 5 of the left hand which would be consistent with ulnar compression.  She reports that she had an MRI ordered several months ago but was unable to follow through with the appointment.  MRI not currently available, radiographs likely to be of low utility.  I feel she would benefit from orthopedic consultation for what seems to be pain due to repetitive injury.  Consultation order placed and " patient given contact information for local orthopedic clinic.  She seems comfortable with discharge plan including follow-up.      Disclaimer:  This note consists of symbols derived from keyboarding, dictation, and/or voice recognition software.  As a result, there may be errors in the script that have gone undetected.  Please consider this when interpreting information found in the chart.        I have reviewed the nursing notes.    I have reviewed the findings, diagnosis, plan and need for follow up with the patient.       Discharge Medication List as of 10/7/2018 10:26 AM          Final diagnoses:   Pain in both wrists       10/7/2018   [facility] EMERGENCY DEPARTMENT     Preston Yuan DO  10/07/18 1010

## 2018-10-17 ENCOUNTER — OFFICE VISIT (OUTPATIENT)
Dept: FAMILY MEDICINE | Facility: CLINIC | Age: 33
End: 2018-10-17
Payer: COMMERCIAL

## 2018-10-17 VITALS
TEMPERATURE: 97.5 F | HEART RATE: 95 BPM | BODY MASS INDEX: 25.27 KG/M2 | HEIGHT: 64 IN | SYSTOLIC BLOOD PRESSURE: 120 MMHG | RESPIRATION RATE: 20 BRPM | OXYGEN SATURATION: 98 % | DIASTOLIC BLOOD PRESSURE: 80 MMHG | WEIGHT: 148 LBS

## 2018-10-17 DIAGNOSIS — L30.9 ECZEMA, UNSPECIFIED TYPE: Primary | ICD-10-CM

## 2018-10-17 PROCEDURE — 99213 OFFICE O/P EST LOW 20 MIN: CPT | Performed by: NURSE PRACTITIONER

## 2018-10-17 RX ORDER — TRIAMCINOLONE ACETONIDE 1 MG/G
CREAM TOPICAL
Qty: 30 G | Refills: 3 | Status: SHIPPED | OUTPATIENT
Start: 2018-10-17 | End: 2020-02-03

## 2018-10-17 NOTE — PROGRESS NOTES
SUBJECTIVE:   Lluvia Glaser is a 32 year old female who presents to clinic today for the following health issues:      Chief Complaint   Patient presents with     Derm Problem     Pt was dx with Exczema last year and states that she started getting a reoccurence of it on her face and on the back of her neck. She used the last of her Rx and is looking for a refill. She cannot recall the name of the cream, but states that it was a steroid and has been helping.  She reports that its been happening x 1 month.     Flu Shot     Declined.     Imm/Inj     Due for TDAP. Declined.     Feels that  steam at work caused breakout on face.  On tip of nose, lower jaw on left side, and upper back of her neck which is resolving.  Symptoms are getting better since she was exposed to this and quit that job.  She has used steroid creams that have been helpful when this occurs in the past but is uncertain what was used.  She states that this is usually worse in the fall and winter.  Always occurring on the face in the same place.    Problem list and histories reviewed & adjusted, as indicated.  Additional history: as documented    There is no problem list on file for this patient.    History reviewed. No pertinent surgical history.    Social History   Substance Use Topics     Smoking status: Current Every Day Smoker     Smokeless tobacco: Never Used     Alcohol use No     History reviewed. No pertinent family history.      Current Outpatient Prescriptions   Medication Sig Dispense Refill     triamcinolone (KENALOG) 0.1 % cream Apply sparingly to affected area three times daily for 14 days. 30 g 3     Allergies   Allergen Reactions     Blue Dyes Hives     Demerol [Meperidine] Hives       Reviewed and updated as needed this visit by clinical staff  Tobacco  Allergies  Meds  Problems  Med Hx  Surg Hx  Fam Hx  Soc Hx        Reviewed and updated as needed this visit by Provider  Allergies  Meds  Problems      "    ROS:  CONSTITUTIONAL: NEGATIVE for fever, chills, change in weight  INTEGUMENTARY/SKIN: POSITIVE for rash on left lower jaw, tip of nose and resolving rash in upper mid back of the neck  RESP: NEGATIVE for significant cough or SOB  CV: NEGATIVE for chest pain, palpitations or peripheral edema  PSYCHIATRIC: NEGATIVE for changes in mood or affect  ROS otherwise negative    OBJECTIVE:     /80  Pulse 95  Temp 97.5  F (36.4  C) (Tympanic)  Resp 20  Ht 5' 4\" (1.626 m)  Wt 148 lb (67.1 kg)  SpO2 98%  BMI 25.4 kg/m2  Body mass index is 25.4 kg/(m^2).  GENERAL: healthy, alert and no distress  RESP: lungs clear to auscultation - no rales, rhonchi or wheezes  CV: regular rate and rhythm, normal S1 S2, no S3 or S4, no murmur, click or rub, no peripheral edema and peripheral pulses strong  SKIN: Patient has raised scaly red area approximately 1 cm in diameter on her tip of nose, she has similar rash spread out along the lower left chin and back along the lower jaw line, and there is some redness with no raised lesions on the upper posterior mid neck at and a little above hairline.  This is pruritic but not tender to the touch.  No signs or symptoms of infection.  PSYCH: mentation appears normal, affect normal/bright    Diagnostic Test Results:  none     ASSESSMENT/PLAN:     1. Eczema, unspecified type  Ordered medium potency kenolog cream to be used as directed for 2 weeks on and 1 week off and then can be resumed for no more than 2 weeks at a time with 1 week vacation.  Patient was given information and I reviewed this with her on eczema and how to prevent or reduce breakouts.  Patient should f/u if any worsening symptoms or persistent symptoms despite treatment.  - triamcinolone (KENALOG) 0.1 % cream; Apply sparingly to affected area three times daily for 14 days.  Dispense: 30 g; Refill: 3    See Patient Instructions    Nel Vega NP  [facility]  "

## 2018-10-17 NOTE — MR AVS SNAPSHOT
After Visit Summary   10/17/2018    Lluvia Glaser    MRN: [id number]           Patient Information     Date Of Birth          1985        Visit Information        Provider Department      10/17/2018 9:40 AM Nel Vega NP [facility]        Today's Diagnoses     Eczema, unspecified type    -  1      Care Instructions    1. Use Triamcinolone cream 3 times daily for 2 weeks on and 1 week off.  Then you can restart again for 2 weeks if needed.  2.  If this is not helping, follow-up in clinic for more potency topical steroid treatment.  3.  Information below discussed on self management.  Managing Atopic Dermatitis (Eczema)     After bathing, gently pat your skin dry (don t rub). Apply moisturizer while your skin is still damp.   To manage your symptoms and help reduce the severity and frequency, try these self-care tips:  Caring for your skin    Use a gentle, fragrance-free cleanser (or nonsoap cleanser) for bathing. Rinse well. Pat skin dry.    Take warm, not hot, baths or showers. Try to limit them to no more that 10 to 15 minutes.     Use moisturizer liberally right after you bathe, while your skin is still damp.    Avoid scratching because it will cause more damage to your skin.     Topical, over-the-counter hydrocortisone cream may help control mild symptoms.   Controlling your environment    Avoid extreme heat or cold.    Avoid very humid or very dry air.    If your home or office air is very dry, use a humidifier.    Avoid allergens, such as dust, that may be present in bedding, carpets, plush toys, or rugs.    Know that pet hair and dander can cause flare-ups.  Seeking medical treatment  Another way to keep symptoms under control is to seek medical treatment. Talk with your healthcare provider about the type of treatment that may work best for you. Your provider may prescribe treatments such as the following:    Topical treatments to put on the skin  daily    Medicines taken by mouth (oral medicines), such as antihistamines, antibiotics, or corticosteroids    In severe cases shots (injections) may be needed to control the symptoms. You may even need antibiotics if skin infections occur.  Treatments don t work the same way for every person. So if your symptoms continue or get worse, ask your healthcare provider about other treatments.  Making lifestyle choices    Manage the stress in your life.    Wear loose-fitting cotton clothing that does not bind or rub your skin.    Avoid contact with wool or other scratchy fabrics.    Use fragrance-free products.  Getting good results  Now that you know more about atopic dermatitis, the next step is up to you. Follow your healthcare provider s treatment plan and your self-care routine. This will help bring atopic dermatitis under control. If your symptoms persist, be sure to let your health care provider know.   Date Last Reviewed: 2/1/2017 2000-2017 [employer]. [address] [place] [address]. All rights reserved. This information is not intended as a substitute for professional medical care. Always follow your healthcare professional's instructions.                Follow-ups after your visit        Follow-up notes from your care team     Return if symptoms worsen or fail to improve.      Who to contact     If you have questions or need follow up information about today's clinic visit or your schedule please contact [facility] directly at [phone].  Normal or non-critical lab and imaging results will be communicated to you by MyChart, letter or phone within 4 business days after the clinic has received the results. If you do not hear from us within 7 days, please contact the clinic through MyChart or phone. If you have a critical or abnormal lab result, we will notify you by phone as soon as possible.  Submit refill requests through [employer] or call your pharmacy and they  "will forward the refill request to us. Please allow 3 business days for your refill to be completed.          Additional Information About Your Visit        Care EveryWhere ID     This is your Care EveryWhere ID. This could be used by other organizations to access your [place] medical records  [id number]        Your Vitals Were     Pulse Temperature Respirations Height Pulse Oximetry BMI (Body Mass Index)    95 97.5  F (36.4  C) (Tympanic) 20 5' 4\" (1.626 m) 98% 25.4 kg/m2       Blood Pressure from Last 3 Encounters:   10/17/18 120/80   10/07/18 126/76   09/05/18 126/87    Weight from Last 3 Encounters:   10/17/18 148 lb (67.1 kg)   10/07/18 140 lb (63.5 kg)   09/05/18 148 lb (67.1 kg)              Today, you had the following     No orders found for display         Today's Medication Changes          These changes are accurate as of 10/17/18 10:02 AM.  If you have any questions, ask your nurse or doctor.               Start taking these medicines.        Dose/Directions    triamcinolone 0.1 % cream   Commonly known as:  KENALOG   Used for:  Eczema, unspecified type   Started by:  Nel Vega, NP        Apply sparingly to affected area three times daily for 14 days.   Quantity:  30 g   Refills:  3            Where to get your medicines      These medications were [place] [facility] [address]  [address] [facility] [address]     Phone:  [phone]     triamcinolone 0.1 % cream                Primary Care Provider Fax #    [facility] [phone]       [address] [place] [address]        Equal Access to Services     [facility] KENRICK AH: Airam Asencio, wajose lumackenzie, clau kaalmada gil, aníbal waters. So [facility] [phone].    ATENCIÓN: Si habla español, tiene a blair disposición servicios gratuitos de asistencia lingüística. Llame al [phone].    We " comply with applicable federal civil rights laws and [place] laws. We do not discriminate on the basis of race, color, national origin, age, disability, sex, sexual orientation, or gender identity.            Thank you!     Thank you for choosing [facility]  for your care. Our goal is always to provide you with excellent care. Hearing back from our patients is one way we can continue to improve our services. Please take a few minutes to complete the written survey that you may receive in the mail after your visit with us. Thank you!             Your Updated Medication List - Protect others around you: Learn how to safely use, store and throw away your medicines at www.disposemymeds.org.          This list is accurate as of 10/17/18 10:02 AM.  Always use your most recent med list.                   Brand Name Dispense Instructions for use Diagnosis    triamcinolone 0.1 % cream    KENALOG    30 g    Apply sparingly to affected area three times daily for 14 days.    Eczema, unspecified type

## 2018-10-17 NOTE — PATIENT INSTRUCTIONS
1. Use Triamcinolone cream 3 times daily for 2 weeks on and 1 week off.  Then you can restart again for 2 weeks if needed.  2.  If this is not helping, follow-up in clinic for more potency topical steroid treatment.  3.  Information below discussed on self management.  Managing Atopic Dermatitis (Eczema)     After bathing, gently pat your skin dry (don t rub). Apply moisturizer while your skin is still damp.   To manage your symptoms and help reduce the severity and frequency, try these self-care tips:  Caring for your skin    Use a gentle, fragrance-free cleanser (or nonsoap cleanser) for bathing. Rinse well. Pat skin dry.    Take warm, not hot, baths or showers. Try to limit them to no more that 10 to 15 minutes.     Use moisturizer liberally right after you bathe, while your skin is still damp.    Avoid scratching because it will cause more damage to your skin.     Topical, over-the-counter hydrocortisone cream may help control mild symptoms.   Controlling your environment    Avoid extreme heat or cold.    Avoid very humid or very dry air.    If your home or office air is very dry, use a humidifier.    Avoid allergens, such as dust, that may be present in bedding, carpets, plush toys, or rugs.    Know that pet hair and dander can cause flare-ups.  Seeking medical treatment  Another way to keep symptoms under control is to seek medical treatment. Talk with your healthcare provider about the type of treatment that may work best for you. Your provider may prescribe treatments such as the following:    Topical treatments to put on the skin daily    Medicines taken by mouth (oral medicines), such as antihistamines, antibiotics, or corticosteroids    In severe cases shots (injections) may be needed to control the symptoms. You may even need antibiotics if skin infections occur.  Treatments don t work the same way for every person. So if your symptoms continue or get worse, ask your healthcare provider about other  treatments.  Making lifestyle choices    Manage the stress in your life.    Wear loose-fitting cotton clothing that does not bind or rub your skin.    Avoid contact with wool or other scratchy fabrics.    Use fragrance-free products.  Getting good results  Now that you know more about atopic dermatitis, the next step is up to you. Follow your healthcare provider s treatment plan and your self-care routine. This will help bring atopic dermatitis under control. If your symptoms persist, be sure to let your health care provider know.   Date Last Reviewed: 2/1/2017 2000-2017 [employer]. [address] [place] [address]. All rights reserved. This information is not intended as a substitute for professional medical care. Always follow your healthcare professional's instructions.

## 2018-10-31 ENCOUNTER — TELEPHONE (OUTPATIENT)
Dept: FAMILY MEDICINE | Facility: CLINIC | Age: 33
End: 2018-10-31

## 2018-10-31 NOTE — TELEPHONE ENCOUNTER
Panel Management Review      Patient has the following on her problem list: None      Composite cancer screening  Chart review shows that this patient is due/due soon for the following Pap Smear  Summary:    Patient is due/failing the following:   PAP    Action needed:   Patient needs office visit for physical and pap.    Type of outreach:    Sent letter.    Questions for provider review:    None                                                                                                                                    Liberty Dooley CMA

## 2018-10-31 NOTE — LETTER
[facility]  [address]  [facility] [address]  [phone]  October 31, 2018    Lluvia Glaser  [address]  [place] [address]    Dear Lluvia,    We care about your health and have reviewed your health plan including your medical conditions, medication list, and lab results.  Based on this review, it is recommended that you follow up regarding the following health topic(s):     -Cervical Cancer Screening    We recommend you take the following action(s):  -schedule a PAP SMEAR EXAM which is due.  Please disregard this reminder if you have had this exam elsewhere within the last year.  It would be helpful for us to have a copy of your recent pap smear report to update your records.    Please call us at [phone] (or use [employer]) to address the above recommendations.     Thank you for trusting [facility] and we appreciate the opportunity to serve you.  We look forward to supporting your healthcare needs in the future.    Healthy Regards,      Your Health Care Team  [facility]

## 2018-11-10 ENCOUNTER — RADIANT APPOINTMENT (OUTPATIENT)
Dept: GENERAL RADIOLOGY | Facility: CLINIC | Age: 33
End: 2018-11-10
Attending: PHYSICIAN ASSISTANT
Payer: COMMERCIAL

## 2018-11-10 ENCOUNTER — OFFICE VISIT (OUTPATIENT)
Dept: URGENT CARE | Facility: URGENT CARE | Age: 33
End: 2018-11-10
Payer: COMMERCIAL

## 2018-11-10 VITALS
WEIGHT: 151 LBS | SYSTOLIC BLOOD PRESSURE: 110 MMHG | HEART RATE: 88 BPM | DIASTOLIC BLOOD PRESSURE: 64 MMHG | TEMPERATURE: 98.4 F | BODY MASS INDEX: 25.92 KG/M2 | RESPIRATION RATE: 14 BRPM

## 2018-11-10 DIAGNOSIS — M25.532 LEFT WRIST PAIN: ICD-10-CM

## 2018-11-10 DIAGNOSIS — M25.532 LEFT WRIST PAIN: Primary | ICD-10-CM

## 2018-11-10 PROCEDURE — 73110 X-RAY EXAM OF WRIST: CPT | Mod: LT

## 2018-11-10 PROCEDURE — 99213 OFFICE O/P EST LOW 20 MIN: CPT | Performed by: PHYSICIAN ASSISTANT

## 2018-11-10 RX ORDER — KETOROLAC TROMETHAMINE 10 MG/1
10 TABLET, FILM COATED ORAL EVERY 6 HOURS PRN
Qty: 20 TABLET | Refills: 0 | Status: SHIPPED | OUTPATIENT
Start: 2018-11-10 | End: 2020-02-03

## 2018-11-10 ASSESSMENT — ENCOUNTER SYMPTOMS
SINUS PRESSURE: 0
CHEST TIGHTNESS: 0
BACK PAIN: 0
COUGH: 0
FATIGUE: 0
TROUBLE SWALLOWING: 0
SHORTNESS OF BREATH: 0
EYE PAIN: 0
NAUSEA: 0
DIARRHEA: 0
ARTHRALGIAS: 0
UNEXPECTED WEIGHT CHANGE: 0
SORE THROAT: 0
VOMITING: 0
WHEEZING: 0
FEVER: 0
RHINORRHEA: 0
CHILLS: 0
ABDOMINAL PAIN: 0
PALPITATIONS: 0
EYE REDNESS: 0
MYALGIAS: 0

## 2018-11-10 ASSESSMENT — PAIN SCALES - GENERAL: PAINLEVEL: EXTREME PAIN (8)

## 2018-11-10 NOTE — MR AVS SNAPSHOT
After Visit Summary   11/10/2018    Lluvia Glaser    MRN: [id number]           Patient Information     Date Of Birth          1985        Visit Information        Provider Department      11/10/2018 1:30 PM Lidia Nieves PA-C [facility]        Today's Diagnoses     Left wrist pain    -  1       Follow-ups after your visit        Follow-up notes from your care team     Return if symptoms worsen or fail to improve.      Who to contact     If you have questions or need follow up information about today's clinic visit or your schedule please contact [facility] directly at [phone].  Normal or non-critical lab and imaging results will be communicated to you by MyChart, letter or phone within 4 business days after the clinic has received the results. If you do not hear from us within 7 days, please contact the clinic through MyChart or phone. If you have a critical or abnormal lab result, we will notify you by phone as soon as possible.  Submit refill requests through [employer] or call your pharmacy and they will forward the refill request to us. Please allow 3 business days for your refill to be completed.          Additional Information About Your Visit        Care EveryWhere ID     This is your Care EveryWhere ID. This could be used by other organizations to access your [facility] medical records  [id number]        Your Vitals Were     Pulse Temperature Respirations BMI (Body Mass Index)          88 98.4  F (36.9  C) (Tympanic) 14 25.92 kg/m2         Blood Pressure from Last 3 Encounters:   11/10/18 110/64   10/17/18 120/80   10/07/18 126/76    Weight from Last 3 Encounters:   11/10/18 151 lb (68.5 kg)   10/17/18 148 lb (67.1 kg)   10/07/18 140 lb (63.5 kg)                 Today's Medication Changes          These changes are accurate as of 11/10/18  4:21 PM.  If you have any questions, ask your nurse or doctor.                Start taking these medicines.        Dose/Directions    ketorolac 10 MG tablet   Commonly known as:  TORADOL   Used for:  Left wrist pain   Started by:  Lidia Nieves PA-C        Dose:  10 mg   Take 1 tablet (10 mg) by mouth every 6 hours as needed for moderate pain   Quantity:  20 tablet   Refills:  0       order for DME   Used for:  Left wrist pain   Started by:  Lidia Nieves PA-C        Equipment being ordered: Splint left wrist   Quantity:  1 Device   Refills:  0            Where to get your medicines      These medications were sent to [id number] - [place], [place] - [address] [facility]  [address] [place] [address]    Hours:  Closed 10-16-08 business to [facility] Phone:  [phone]     ketorolac 10 MG tablet         Some of these will need a paper prescription and others can be bought over the counter.  Ask your nurse if you have questions.     Bring a paper prescription for each of these medications     order for DME                Primary Care Provider Fax #    [facility] [phone]       [address] [place] [address]        Equal Access to Services     [facility]: Hadjeff Asencio, waguillermoda lumackenzie, qaybta jasonalglen cordero, aníbal waters. So [facility] [phone].    ATENCIÓN: Si habla español, tiene a blair disposición servicios gratuitos de asistencia lingüística. Pa al [phone].    We comply with applicable federal civil rights laws and [place] laws. We do not discriminate on the basis of race, color, national origin, age, disability, sex, sexual orientation, or gender identity.            Thank you!     Thank you for choosing [facility]  for your care. Our goal is always to provide you with excellent care. Hearing back from our patients is one way we can continue to improve our services. Please take a few minutes to complete the written survey  that you may receive in the mail after your visit with us. Thank you!             Your Updated Medication List - Protect others around you: Learn how to safely use, store and throw away your medicines at www.disposemymeds.org.          This list is accurate as of 11/10/18  4:21 PM.  Always use your most recent med list.                   Brand Name Dispense Instructions for use Diagnosis    ketorolac 10 MG tablet    TORADOL    20 tablet    Take 1 tablet (10 mg) by mouth every 6 hours as needed for moderate pain    Left wrist pain       order for DME     1 Device    Equipment being ordered: Splint left wrist    Left wrist pain       triamcinolone 0.1 % cream    KENALOG    30 g    Apply sparingly to affected area three times daily for 14 days.    Eczema, unspecified type

## 2018-11-10 NOTE — PROGRESS NOTES
SUBJECTIVE:   Lluvia Glaser is a 32 year old female presenting with a chief complaint of   Chief Complaint   Patient presents with     Musculoskeletal Problem     Left wrist. Is a  and was pulling herself up last night and heard 3 pops in wrist.  Some bruising and discoloration, weakness of hand.  Has tried wrist brace and no help.         She is an established patient of [place].    MS Injury/Pain    Onset of symptoms was 1 day(s) ago.  Location: left wrist  Context:       The injury happened while at home      Mechanism: pulling herself onto trailer last night      Patient experienced immediate pain  Course of symptoms is same.    Severity moderate  Current and Associated symptoms: Pain, Swelling and Bruising  Denies  Warmth and Redness  Aggravating Factors: movement, twisting and flexion/extension  Therapies to improve symptoms include: ice and ibuprofen  This is not the first time this type of problem has occurred for this patient.       Review of Systems   Constitutional: Negative for chills, fatigue, fever and unexpected weight change.   HENT: Negative for congestion, ear pain, postnasal drip, rhinorrhea, sinus pressure, sore throat and trouble swallowing.    Eyes: Negative for pain, redness and visual disturbance.   Respiratory: Negative for cough, chest tightness, shortness of breath and wheezing.    Cardiovascular: Negative for chest pain and palpitations.   Gastrointestinal: Negative for abdominal pain, diarrhea, nausea and vomiting.   Musculoskeletal: Negative for arthralgias, back pain and myalgias.        Left wrist pain   Skin: Negative for rash.       History reviewed. No pertinent past medical history.  History reviewed. No pertinent family history.  Current Outpatient Prescriptions   Medication Sig Dispense Refill     ketorolac (TORADOL) 10 MG tablet Take 1 tablet (10 mg) by mouth every 6 hours as needed for moderate pain 20 tablet 0     order for DME Equipment being ordered:  Splint left wrist 1 Device 0     triamcinolone (KENALOG) 0.1 % cream Apply sparingly to affected area three times daily for 14 days. (Patient not taking: Reported on 11/10/2018) 30 g 3     Social History   Substance Use Topics     Smoking status: Current Every Day Smoker     Smokeless tobacco: Never Used     Alcohol use No       OBJECTIVE  /64 (BP Location: Right arm, Cuff Size: Adult Regular)  Pulse 88  Temp 98.4  F (36.9  C) (Tympanic)  Resp 14  Wt 151 lb (68.5 kg)  BMI 25.92 kg/m2    Physical Exam   Musculoskeletal:   No obvious deformity, erythema, edema, or ecchymosis of the left wrist. There is diffuse tenderness with palpation which is worse around the radial aspect of the wrist. Decreased ROM due to pain. Good capillary refill. Upper extremity CMS intact.     Skin: Skin is warm and dry.   Psychiatric: She has a normal mood and affect. Her speech is normal and behavior is normal.       Labs:  No results found for this or any previous visit (from the past 24 hour(s)).    X-Ray was done, my findings are: no acute findings     ASSESSMENT:      ICD-10-CM    1. Left wrist pain M25.532 XR Wrist Left G/E 3 Views     order for DME     ketorolac (TORADOL) 10 MG tablet        Medical Decision Making:    Differential Diagnosis:  MS Injury Pain: sprain, fracture, tendonitis, muscle strain and contusion    Serious Comorbid Conditions:  Adult:  None    PLAN:    MS Injury/Pain  ice, elevate, rest, stretching, splint: left wrist, Tylenol and Rx: toradol #20 every 6hrs as needed     Followup:    If not improving or if condition worsens, follow up with your Primary Care Provider    There are no Patient Instructions on file for this visit.

## 2018-11-19 ENCOUNTER — OFFICE VISIT (OUTPATIENT)
Dept: ORTHOPEDICS | Facility: CLINIC | Age: 33
End: 2018-11-19
Payer: COMMERCIAL

## 2018-11-19 VITALS
BODY MASS INDEX: 25.78 KG/M2 | HEIGHT: 64 IN | WEIGHT: 151 LBS | DIASTOLIC BLOOD PRESSURE: 81 MMHG | SYSTOLIC BLOOD PRESSURE: 128 MMHG

## 2018-11-19 DIAGNOSIS — M25.532 LEFT WRIST PAIN: Primary | ICD-10-CM

## 2018-11-19 PROCEDURE — 99243 OFF/OP CNSLTJ NEW/EST LOW 30: CPT | Performed by: FAMILY MEDICINE

## 2018-11-19 NOTE — PROGRESS NOTES
ASSESSMENT & PLAN  Lluvia was seen today for pain and pain.    Diagnoses and all orders for this visit:    Left wrist pain      Patient is a 32-year-old female presenting for left wrist pain following initial injury 9 months ago and most recently 2 weeks ago when she was pulling herself up with a audible pop on the dorsal side of her wrist.  Patient does have intact strength and sensation in her wrist/hand with negative x-rays.  Etiology possibly extensor retinacular injury with no significant deficits and her flexor/extensor tendons.  Patient is having improved pain with wrist brace but does have some pain with pressure points.  An Ace wrap was applied underneath the wrist brace with significant improvement of set pain.  Given this we will have her continue using this, recommend home exercise plan for  and wrist strengthening and follow-up if symptoms do not continue to improve.  Patient understands and agrees with plan.  -----    SUBJECTIVE  Lluvia KASIA Glaser is a/an 32 year old Right handed female who is seen in consultation at the request of  Preston WELDON D.O. for evaluation of bilateral hand pain. The patient is seen by themselves.    Onset: 9 month(s) ago. 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 Previously was a . Recent injury 11/9/18 patient was pulling herself up and heard 3 pop in the wrist.   Location of Pain: bilateral wrist pain, left worse than right. Left dorsal side of wrist.  Pt works as a .  Rating of Pain at worst: 10/10  Rating of Pain Currently: 5/10  Worsened by: wrist extension, twisting, lifting  Better with: brace  Treatments tried:  wrist brace, ice, ibuprofen, norco, toradol   Associated symptoms: tingling  Orthopedic history: NO  Relevant surgical history: NO  History reviewed. No pertinent past medical history.  Social History     Social History     Marital status:      Spouse name: N/A     Number of children: N/A      "Years of education: N/A     Social History Main Topics     Smoking status: Current Every Day Smoker     Smokeless tobacco: Never Used     Alcohol use No     Drug use: No     Sexual activity: Yes     Partners: Male     Birth control/ protection: Female Surgical     Other Topics Concern     Not on file     Social History Narrative     Patient's past medical, surgical, social, and family histories were reviewed today and no changes are noted.    REVIEW OF SYSTEMS:  10 point ROS is negative other than symptoms noted above in HPI, Past Medical History or as stated below  Constitutional: NEGATIVE for fever, chills, change in weight  Skin: NEGATIVE for worrisome rashes, moles or lesions  GI/: NEGATIVE for bowel or bladder changes  Neuro: NEGATIVE for weakness, dizziness or paresthesias    OBJECTIVE:  /81  Ht 5' 4\" (1.626 m)  Wt 151 lb (68.5 kg)  BMI 25.92 kg/m2   General: healthy, alert and in no distress  HEENT: no scleral icterus or conjunctival erythema  Skin: no suspicious lesions or rash. No jaundice.  CV: regular rhythm by palpation  Resp: normal respiratory effort without conversational dyspnea   Psych: normal mood and affect  Gait: normal steady gait with appropriate coordination and balance  Neuro: normal light touch sensory exam of the bilateral hands.    MSK:  LEFT HAND  Inspection:    No swelling or obvious deformity or asymmetry  Palpation:   Carpals: normal   Metacarpals: normal   Thumb: normal   Fingers: normal  TTP over dorsum of wrist  Range of Motion:    Full active flexion and extension at MCP, PIP, and DIP joints; normal finger cascade without malrotation.  Wrist pronation, supination, and ulnar/radial deviation normal.  Strength:     limited slightly by pain, extension limited slightly by pain, flexion show full range, abduction show full range, adduction show full range, opposition show full range  Pain with wrist extension  Special Tests:    Positive: none    Negative: Tinel's, " Phalen's, Finkelstein's    Independent visualization of the below image:  No results found for this or any previous visit (from the past 24 hour(s)).  WRIST LEFT THREE OR MORE VIEWS   11/10/2018 2:30 PM      HISTORY:  Left wrist pain.     COMPARISON: None.     FINDINGS: No fracture or dislocation is identified. Ossific density  within the volar wrist soft tissue likely represents sequela of  chronic injury. Soft tissues are otherwise unremarkable.         IMPRESSION: No acute osseous abnormality.     TOMASA GUAN MD    Patient's conditions were thoroughly discussed during today's visit with greater than 50% of the visit spent counseling the patient with total time spent face-to-face with the patient being 25 minutes.    Pascual Rodríguez MD [facility]

## 2018-11-19 NOTE — LETTER
11/19/2018         RE: Lluvia Glaser  [address]  [place] [address]        Dear Colleague,    Thank you for referring your patient, Lluvia Glaser, to the [facility] [place]. Please see a copy of my visit note below.    ASSESSMENT & PLAN  Lluvia was seen today for pain and pain.    Diagnoses and all orders for this visit:    Left wrist pain      Patient is a 32-year-old female presenting for left wrist pain following initial injury 9 months ago and most recently 2 weeks ago when she was pulling herself up with a audible pop on the dorsal side of her wrist.  Patient does have intact strength and sensation in her wrist/hand with negative x-rays.  Etiology possibly extensor retinacular injury with no significant deficits and her flexor/extensor tendons.  Patient is having improved pain with wrist brace but does have some pain with pressure points.  An Ace wrap was applied underneath the wrist brace with significant improvement of set pain.  Given this we will have her continue using this, recommend home exercise plan for  and wrist strengthening and follow-up if symptoms do not continue to improve.  Patient understands and agrees with plan.  -----    SUBJECTIVE  Lluvia Glaser is a/an 32 year old Right handed female who is seen in consultation at the request of  Preston WELDON D.O. for evaluation of bilateral hand pain. The patient is seen by themselves.    Onset: 9 month(s) ago. 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 Previously was a . Recent injury 11/9/18 patient was pulling herself up and heard 3 pop in the wrist.   Location of Pain: bilateral wrist pain, left worse than right. Left dorsal side of wrist.  Pt works as a .  Rating of Pain at worst: 10/10  Rating of Pain Currently: 5/10  Worsened by: wrist extension, twisting, lifting  Better with: brace  Treatments tried:  wrist brace, ice, ibuprofen,  "norco, toradol   Associated symptoms: tingling  Orthopedic history: NO  Relevant surgical history: NO  History reviewed. No pertinent past medical history.  Social History     Social History     Marital status:      Spouse name: N/A     Number of children: N/A     Years of education: N/A     Social History Main Topics     Smoking status: Current Every Day Smoker     Smokeless tobacco: Never Used     Alcohol use No     Drug use: No     Sexual activity: Yes     Partners: Male     Birth control/ protection: Female Surgical     Other Topics Concern     Not on file     Social History Narrative     Patient's past medical, surgical, social, and family histories were reviewed today and no changes are noted.    REVIEW OF SYSTEMS:  10 point ROS is negative other than symptoms noted above in HPI, Past Medical History or as stated below  Constitutional: NEGATIVE for fever, chills, change in weight  Skin: NEGATIVE for worrisome rashes, moles or lesions  GI/: NEGATIVE for bowel or bladder changes  Neuro: NEGATIVE for weakness, dizziness or paresthesias    OBJECTIVE:  /81  Ht 5' 4\" (1.626 m)  Wt 151 lb (68.5 kg)  BMI 25.92 kg/m2   General: healthy, alert and in no distress  HEENT: no scleral icterus or conjunctival erythema  Skin: no suspicious lesions or rash. No jaundice.  CV: regular rhythm by palpation  Resp: normal respiratory effort without conversational dyspnea   Psych: normal mood and affect  Gait: normal steady gait with appropriate coordination and balance  Neuro: normal light touch sensory exam of the bilateral hands.    MSK:  LEFT HAND  Inspection:    No swelling or obvious deformity or asymmetry  Palpation:   Carpals: normal   Metacarpals: normal   Thumb: normal   Fingers: normal  TTP over dorsum of wrist  Range of Motion:    Full active flexion and extension at MCP, PIP, and DIP joints; normal finger cascade without malrotation.  Wrist pronation, supination, and ulnar/radial deviation " normal.  Strength:     limited slightly by pain, extension limited slightly by pain, flexion show full range, abduction show full range, adduction show full range, opposition show full range  Pain with wrist extension  Special Tests:    Positive: none    Negative: Tinel's, Phalen's, Finkelstein's    Independent visualization of the below image:  No results found for this or any previous visit (from the past 24 hour(s)).  WRIST LEFT THREE OR MORE VIEWS   11/10/2018 2:30 PM      HISTORY:  Left wrist pain.     COMPARISON: None.     FINDINGS: No fracture or dislocation is identified. Ossific density  within the volar wrist soft tissue likely represents sequela of  chronic injury. Soft tissues are otherwise unremarkable.         IMPRESSION: No acute osseous abnormality.     TOMASA GUAN MD    Patient's conditions were thoroughly discussed during today's visit with greater than 50% of the visit spent counseling the patient with total time spent face-to-face with the patient being 25 minutes.    Pascual Rodríguez MD [facility]        Again, thank you for allowing me to participate in the care of your patient.        Sincerely,        Pascual Rodríguez MD

## 2018-11-19 NOTE — MR AVS SNAPSHOT
After Visit Summary   11/19/2018    Lluvia Glaser    MRN: [id number]           Patient Information     Date Of Birth          1985        Visit Information        Provider Department      11/19/2018 9:40 AM Pascual Rodríguez MD [facility]        Today's Diagnoses     Left wrist pain    -  1      Care Instructions    Patient to follow up with Primary Care provider regarding elevated blood pressure.    Stress ball, wrap fingers with rubber bands    Wrist Extension (Strength)     This exercise is written for your right elbow. Switch sides for your left elbow.  1. Sit in a chair. Hold a hand weight in your right hand. Your healthcare provider will tell you what size of hand weight to use.  2. Lean your forearm on your thigh or a table. Hang your wrist off the end of your knee or edge of the table, palm down.  3. Keep your forearm in place and bend your wrist upward. Lift the hand weight as high as you can.  4. Slowly lower the hand weight back down.  5. Repeat 5 times, or as instructed.  Date Last Reviewed: 3/10/2016    8691-5662 [employer]. [address] [place] [address]. All rights reserved. This information is not intended as a substitute for professional medical care. Always follow your healthcare professional's instructions.                Follow-ups after your visit        Who to contact     If you have questions or need follow up information about today's clinic visit or your schedule please contact [facility] directly at [phone].  Normal or non-critical lab and imaging results will be communicated to you by MyChart, letter or phone within 4 business days after the clinic has received the results. If you do not hear from us within 7 days, please contact the clinic through MyChart or phone. If you have a critical or abnormal lab result, we will notify you by phone as soon as  "possible.  Submit refill requests through [employer] or call your pharmacy and they will forward the refill request to us. Please allow 3 business days for your refill to be completed.          Additional Information About Your Visit        Care EveryWhere ID     This is your Care EveryWhere ID. This could be used by other organizations to access your [place] medical records  [id number]        Your Vitals Were     Height BMI (Body Mass Index)                5' 4\" (1.626 m) 25.92 kg/m2           Blood Pressure from Last 3 Encounters:   11/19/18 128/81   11/10/18 110/64   10/17/18 120/80    Weight from Last 3 Encounters:   11/19/18 151 lb (68.5 kg)   11/10/18 151 lb (68.5 kg)   10/17/18 148 lb (67.1 kg)              Today, you had the following     No orders found for display       Primary Care Provider Fax #    [facility] [phone]       [address] [place] [address]        Equal Access to Services     JAZMIN CHOUDHARY : Hadii aad ku hadasho Soomaali, waaxda luqadaha, qaybta kaalmada adeegyada, waxay imeldain ashley wilson . So [facility] [phone].    ATENCIÓN: Si habla español, tiene a blair disposición servicios gratuitos de asistencia lingüística. Pa al [phone].    We comply with applicable federal civil rights laws and [place] laws. We do not discriminate on the basis of race, color, national origin, age, disability, sex, sexual orientation, or gender identity.            Thank you!     Thank you for choosing [place] [facility]  for your care. Our goal is always to provide you with excellent care. Hearing back from our patients is one way we can continue to improve our services. Please take a few minutes to complete the written survey that you may receive in the mail after your visit with us. Thank you!             Your Updated Medication List - Protect others around you: Learn how to safely use, store and throw away your medicines at " www.disposemymeds.org.          This list is accurate as of 11/19/18 10:16 AM.  Always use your most recent med list.                   Brand Name Dispense Instructions for use Diagnosis    ketorolac 10 MG tablet    TORADOL    20 tablet    Take 1 tablet (10 mg) by mouth every 6 hours as needed for moderate pain    Left wrist pain       order for DME     1 Device    Equipment being ordered: Splint left wrist    Left wrist pain       triamcinolone 0.1 % cream    KENALOG    30 g    Apply sparingly to affected area three times daily for 14 days.    Eczema, unspecified type

## 2018-11-19 NOTE — PATIENT INSTRUCTIONS
Patient to follow up with Primary Care provider regarding elevated blood pressure.    Stress ball, wrap fingers with rubber bands    Wrist Extension (Strength)     This exercise is written for your right elbow. Switch sides for your left elbow.  1. Sit in a chair. Hold a hand weight in your right hand. Your healthcare provider will tell you what size of hand weight to use.  2. Lean your forearm on your thigh or a table. Hang your wrist off the end of your knee or edge of the table, palm down.  3. Keep your forearm in place and bend your wrist upward. Lift the hand weight as high as you can.  4. Slowly lower the hand weight back down.  5. Repeat 5 times, or as instructed.  Date Last Reviewed: 3/10/2016    8806-3865 [employer]. [address] [place] [address], [place] [address]. All rights reserved. This information is not intended as a substitute for professional medical care. Always follow your healthcare professional's instructions.

## 2019-01-09 ENCOUNTER — TELEPHONE (OUTPATIENT)
Dept: FAMILY MEDICINE | Facility: CLINIC | Age: 34
End: 2019-01-09

## 2019-01-09 NOTE — LETTER
[facility]  [address] [facility] [address]  [phone]  January 9, 2019    Lluvia Glaser  [address]  [facility] [address]    Dear Lluvia,    We care about your health and have reviewed your health plan including your medical conditions, medication list, and lab results.  Based on this review, it is recommended that you follow up regarding the following health topic(s):     -Cervical Cancer Screening    We recommend you take the following action(s):  -schedule a PAP SMEAR EXAM which is due.  Please disregard this reminder if you have had this exam elsewhere within the last year.  It would be helpful for us to have a copy of your recent pap smear report to update your records.    Please call us at [phone] (or use [facility]) to address the above recommendations.     Thank you for trusting [facility] and we appreciate the opportunity to serve you.  We look forward to supporting your healthcare needs in the future.    Healthy Regards,      Your Health Care Team  [facility]

## 2019-01-09 NOTE — TELEPHONE ENCOUNTER
Panel Management Review      Patient has the following on her problem list: None      Composite cancer screening  Chart review shows that this patient is due/due soon for the following Pap Smear  Summary:    Patient is due/failing the following:   PAP    Action needed:   Patient needs office visit for pap.    Type of outreach:    Sent letter.    Questions for provider review:    None                                                                                                                                    Liberty Dooley CMA, Ch

## 2019-04-19 ENCOUNTER — HEALTH MAINTENANCE LETTER (OUTPATIENT)
Age: 34
End: 2019-04-19

## 2019-07-05 ENCOUNTER — ALLIED HEALTH/NURSE VISIT (OUTPATIENT)
Dept: FAMILY MEDICINE | Facility: CLINIC | Age: 34
End: 2019-07-05
Payer: COMMERCIAL

## 2019-07-05 ENCOUNTER — OFFICE VISIT (OUTPATIENT)
Dept: FAMILY MEDICINE | Facility: CLINIC | Age: 34
End: 2019-07-05
Payer: COMMERCIAL

## 2019-07-05 VITALS
WEIGHT: 152.2 LBS | SYSTOLIC BLOOD PRESSURE: 126 MMHG | RESPIRATION RATE: 18 BRPM | HEART RATE: 88 BPM | BODY MASS INDEX: 25.36 KG/M2 | HEIGHT: 65 IN | DIASTOLIC BLOOD PRESSURE: 62 MMHG | TEMPERATURE: 97.4 F | OXYGEN SATURATION: 99 %

## 2019-07-05 DIAGNOSIS — R21 RASH: Primary | ICD-10-CM

## 2019-07-05 DIAGNOSIS — L23.7 CONTACT DERMATITIS DUE TO POISON IVY: Primary | ICD-10-CM

## 2019-07-05 PROCEDURE — 99207 ZZC NO CHARGE NURSE ONLY: CPT

## 2019-07-05 PROCEDURE — 99213 OFFICE O/P EST LOW 20 MIN: CPT | Performed by: FAMILY MEDICINE

## 2019-07-05 RX ORDER — TRIAMCINOLONE ACETONIDE 1 MG/G
CREAM TOPICAL 2 TIMES DAILY
Qty: 60 G | Refills: 0 | Status: SHIPPED | OUTPATIENT
Start: 2019-07-05 | End: 2020-05-05

## 2019-07-05 ASSESSMENT — MIFFLIN-ST. JEOR: SCORE: 1388.31

## 2019-07-05 NOTE — PROGRESS NOTES
S-(situation): Pt walked in to clinic to report a, itching, burning rash that developed last night.    B-(background): Was walking on a dirt road in [place], thinks she came in to contact with poison ivy/sumac or something similar.     A-(assessment):   Red raised linear lines/rash primary on upper part of lower back, near shoulders. Right side is worse than left, with some areas starting to blister but are not oozing or draining. Small dots on lower arms. One small spot under right breast that resembles a mosquito bite. Redness on neck but not a noticeable raised rash or linear rash lines. Face, legs, torso unaffected.  Says she does not have any Benadryl at home so has not taken any.  She tried some topical anti-itch medication but it made it worse.  She has not had any trouble breathing since this developed. Allergies reviewed with pt.   Describes it as itching and burning.  Discussed briefly with pt she will need to wash her bedding.    R-(recommendations): Discussed with Dr. Anaya that since it is not widespread, pt can do at home treatment. When reviewed this with pt she says she needs to have anything recommended (incling OTC) prescribed as she is on MA and she has no income and no money. Appt made for this pt with Dr. Anaya's approval.    Afsaneh CABELLO RN

## 2019-07-05 NOTE — PROGRESS NOTES
"Subjective     Lluvia Glaser is a 33 year old female who presents to clinic today for the following health issues:    HPI   Rash      Duration: yesterday    Description  Location: torso  Itching: severe     Intensity:  severe    Accompanying signs and symptoms: None    History (similar episodes/previous evaluation): None    Precipitating or alleviating factors:  New exposures:  None  Recent travel: no      Therapies tried and outcome: over the counter itch cream for insect bites  from [employer].    Was hiking in the woods.     Reviewed and updated as needed this visit by Provider         Review of Systems   ROS COMP: Constitutional, HEENT, cardiovascular, pulmonary, gi and gu systems are negative, except as otherwise noted.      Objective    /62 (BP Location: Right arm, Patient Position: Sitting, Cuff Size: Adult Regular)   Pulse 88   Temp 97.4  F (36.3  C) (Tympanic)   Resp 18   Ht 1.638 m (5' 4.5\")   Wt 69 kg (152 lb 3.2 oz)   SpO2 99%   BMI 25.72 kg/m    Body mass index is 25.72 kg/m .  Physical Exam   GENERAL: healthy, alert and no distress, appears older than stated age  SKIN:   SKIN: Diffuse, scattered raised dermatitis in various phases with vessicles an Koebnerization  Bilateral shoulders/scapula, neck, abdomen.              Assessment & Plan     ASSESSMENT/PLAN:      ICD-10-CM    1. Contact dermatitis due to poison ivy L23.7 triamcinolone (KENALOG) 0.1 % external cream     Discussed, handout given.    no indication for prednisone at this point.       No follow-ups on file.    Susan Anaya MD  [facility]    "

## 2019-07-08 ENCOUNTER — TELEPHONE (OUTPATIENT)
Dept: FAMILY MEDICINE | Facility: CLINIC | Age: 34
End: 2019-07-08

## 2019-07-08 NOTE — TELEPHONE ENCOUNTER
Panel Management Review      Patient has the following on her problem list: None      Composite cancer screening  Chart review shows that this patient is due/due soon for the following Pap Smear  Summary:    Patient is due/failing the following:   PAP and PHYSICAL    Action needed:   Patient needs office visit for physical and pap.    Type of outreach:    Sent letter.    Questions for provider review:    None                                                                                                                                    Suzan Stanley MA

## 2019-07-08 NOTE — LETTER
[facility]  [address] [facility] [address]  [phone]        July 8, 2019  Lluvia Glaser  [address]  [facility] [address]    Dear Lluvia,    I care about your health and have reviewed your health plan. I have reviewed your medical conditions, medication list, and lab results and am making recommendations based on this review, to better manage your health.    You are in particular need of attention regarding:  -Cervical Cancer Screening    I am recommending that you:  -schedule a PAP SMEAR EXAM which is due.  Please disregard this reminder if you have had this exam elsewhere within the last year.  It would be helpful for us to have a copy of your recent pap smear report in our file so that we can best coordinate your care.    Here is a list of Health Maintenance topics that are due now or due soon:  Health Maintenance Due   Topic Date Due     PREVENTIVE CARE VISIT  1985     PAP  1985     HIV SCREENING  12/15/2000     DTAP/TDAP/TD IMMUNIZATION (1 - Tdap) 12/15/2010     PHQ-2  01/01/2019       Please call us at [phone] (or use [employer]) to address the above recommendations.     Thank you for trusting [facility] and we appreciate the opportunity to serve you.  We look forward to supporting your healthcare needs in the future.    Healthy Regards,    [facility]

## 2020-01-15 ENCOUNTER — APPOINTMENT (OUTPATIENT)
Dept: GENERAL RADIOLOGY | Facility: CLINIC | Age: 35
End: 2020-01-15
Attending: EMERGENCY MEDICINE
Payer: MEDICAID

## 2020-01-15 ENCOUNTER — HOSPITAL ENCOUNTER (EMERGENCY)
Facility: CLINIC | Age: 35
Discharge: HOME | End: 2020-01-15
Attending: EMERGENCY MEDICINE | Admitting: EMERGENCY MEDICINE
Payer: MEDICAID

## 2020-01-15 VITALS
HEIGHT: 66 IN | DIASTOLIC BLOOD PRESSURE: 85 MMHG | WEIGHT: 170 LBS | OXYGEN SATURATION: 100 % | HEART RATE: 81 BPM | TEMPERATURE: 98.1 F | BODY MASS INDEX: 27.32 KG/M2 | SYSTOLIC BLOOD PRESSURE: 145 MMHG | RESPIRATION RATE: 18 BRPM

## 2020-01-15 DIAGNOSIS — N30.00 ACUTE CYSTITIS WITHOUT HEMATURIA: ICD-10-CM

## 2020-01-15 DIAGNOSIS — J06.9 VIRAL URI WITH COUGH: ICD-10-CM

## 2020-01-15 LAB
ALBUMIN UR-MCNC: NEGATIVE MG/DL
APPEARANCE UR: CLEAR
BACTERIA #/AREA URNS HPF: ABNORMAL /HPF
BILIRUB UR QL STRIP: NEGATIVE
COLOR UR AUTO: ABNORMAL
GLUCOSE UR STRIP-MCNC: NEGATIVE MG/DL
HCG UR QL: NEGATIVE
HGB UR QL STRIP: ABNORMAL
KETONES UR STRIP-MCNC: NEGATIVE MG/DL
LEUKOCYTE ESTERASE UR QL STRIP: NEGATIVE
MUCOUS THREADS #/AREA URNS LPF: PRESENT /LPF
NITRATE UR QL: NEGATIVE
PH UR STRIP: 7 PH (ref 5–7)
RBC #/AREA URNS AUTO: 1 /HPF (ref 0–2)
SOURCE: ABNORMAL
SP GR UR STRIP: 1 (ref 1–1.03)
SQUAMOUS #/AREA URNS AUTO: 1 /HPF (ref 0–1)
UROBILINOGEN UR STRIP-MCNC: 0 MG/DL (ref 0–2)
WBC #/AREA URNS AUTO: 1 /HPF (ref 0–5)

## 2020-01-15 PROCEDURE — 99284 EMERGENCY DEPT VISIT MOD MDM: CPT | Mod: Z6 | Performed by: EMERGENCY MEDICINE

## 2020-01-15 PROCEDURE — 99284 EMERGENCY DEPT VISIT MOD MDM: CPT | Mod: 25 | Performed by: EMERGENCY MEDICINE

## 2020-01-15 PROCEDURE — 71046 X-RAY EXAM CHEST 2 VIEWS: CPT

## 2020-01-15 PROCEDURE — 81001 URINALYSIS AUTO W/SCOPE: CPT | Performed by: EMERGENCY MEDICINE

## 2020-01-15 PROCEDURE — 81025 URINE PREGNANCY TEST: CPT | Performed by: EMERGENCY MEDICINE

## 2020-01-15 RX ORDER — CEPHALEXIN 500 MG/1
500 CAPSULE ORAL 3 TIMES DAILY
Qty: 9 CAPSULE | Refills: 0 | Status: SHIPPED | OUTPATIENT
Start: 2020-01-15 | End: 2020-02-03

## 2020-01-15 ASSESSMENT — MIFFLIN-ST. JEOR: SCORE: 1479.92

## 2020-01-15 NOTE — ED PROVIDER NOTES
"  History     Chief Complaint   Patient presents with     URI     since december, hurts to take a deep breath, not using her inhaler because it makes her feel jittery     Abdominal Pain     2 days ago she had a lot of pain, now she has a \"warm sensation\" in her lower abdomen     HPI  Lluvia Glaser is a 34 year old female who presents for abdominal discomfort.  History is obtained from the patient and review of her chart.  Symptoms ongoing over the past 2-1/2 hours (not days, this is different than what is stated in the nursing notes, and it is independently verified by myself multiple times and found to be the correct history).  She describes it as a warmth in the right lower quadrant, lasts for 2 to 3 minutes at a time and then goes away and is happened 6 or 7 times since this started.  No nausea or vomiting.  No diarrhea.  She denies any dysuria, urinary frequency, hematuria, vaginal bleeding, vaginal discharge, or rash.  No prior abdominal surgeries.    Also complaining of runny nose, congestion, left ear pain, and cough.  The cough is been getting worse over the past month.  Hurts to take a deep breath.  No hemoptysis.  She is a smoker.  She was prescribed albuterol but she says this made her anxiety worse and she has not been able to use it.  She has been taking acetaminophen and ibuprofen intermittently for this, last was 2 days ago.  She reports that she was prescribed medicine for her symptoms, took a course of amoxicillin-clavulanate and finished this on 12/30.  Also was prescribed Cortisporin otic suspension but she thinks this made her ear hurt worse.    Allergies:  Allergies   Allergen Reactions     Blue Dyes Hives     Demerol [Meperidine] Hives       Problem List:    There are no active problems to display for this patient.       Past Medical History:    No past medical history on file.    Past Surgical History:    No past surgical history on file.    Family History:    No family history on " "file.    Social History:  Marital Status:   [2]  Social History     Tobacco Use     Smoking status: Current Every Day Smoker     Smokeless tobacco: Never Used     Tobacco comment: Recommended patch and lozenge   Substance Use Topics     Alcohol use: No     Drug use: No        Medications:    cephALEXin (KEFLEX) 500 MG capsule  ketorolac (TORADOL) 10 MG tablet  order for DME  triamcinolone (KENALOG) 0.1 % cream  triamcinolone (KENALOG) 0.1 % external cream          Review of Systems  Pertinent positives and negatives listed in the HPI, all other systems reviewed and are negative.    Physical Exam   BP: (!) 145/85  Pulse: 81  Temp: 98.1  F (36.7  C)  Resp: 18  Height: 166.4 cm (5' 5.5\")  Weight: 77.1 kg (170 lb)(stated)  SpO2: 100 %      Physical Exam  Vitals signs and nursing note reviewed.   Constitutional:       General: She is in acute distress.      Appearance: She is well-developed. She is not diaphoretic.   HENT:      Head: Normocephalic and atraumatic.      Right Ear: Tympanic membrane, ear canal and external ear normal. No mastoid tenderness.      Left Ear: Tympanic membrane and external ear normal. No mastoid tenderness.      Nose: Nose normal.      Mouth/Throat:      Mouth: Mucous membranes are moist.      Pharynx: No oropharyngeal exudate or posterior oropharyngeal erythema.   Eyes:      General: No scleral icterus.     Conjunctiva/sclera: Conjunctivae normal.   Neck:      Musculoskeletal: Normal range of motion.   Cardiovascular:      Rate and Rhythm: Normal rate and regular rhythm.      Heart sounds: No murmur.   Pulmonary:      Effort: Pulmonary effort is normal. No respiratory distress.      Breath sounds: No stridor.   Abdominal:      General: There is no distension.      Palpations: Abdomen is soft.      Tenderness: There is no abdominal tenderness. There is no right CVA tenderness, left CVA tenderness, guarding or rebound.   Musculoskeletal:      Right lower leg: No edema.      Left lower " leg: No edema.   Skin:     General: Skin is warm and dry.   Neurological:      Mental Status: She is alert and oriented to person, place, and time.   Psychiatric:         Behavior: Behavior normal.         ED Course        Procedures               Critical Care time:  none               Results for orders placed or performed during the hospital encounter of 01/15/20 (from the past 24 hour(s))   HCG qualitative urine   Result Value Ref Range    HCG Qual Urine Negative NEG^Negative   UA reflex to Microscopic   Result Value Ref Range    Color Urine Straw     Appearance Urine Clear     Glucose Urine Negative NEG^Negative mg/dL    Bilirubin Urine Negative NEG^Negative    Ketones Urine Negative NEG^Negative mg/dL    Specific Gravity Urine 1.004 1.003 - 1.035    Blood Urine Small (A) NEG^Negative    pH Urine 7.0 5.0 - 7.0 pH    Protein Albumin Urine Negative NEG^Negative mg/dL    Urobilinogen mg/dL 0.0 0.0 - 2.0 mg/dL    Nitrite Urine Negative NEG^Negative    Leukocyte Esterase Urine Negative NEG^Negative    Source Midstream Urine     RBC Urine 1 0 - 2 /HPF    WBC Urine 1 0 - 5 /HPF    Bacteria Urine Few (A) NEG^Negative /HPF    Squamous Epithelial /HPF Urine 1 0 - 1 /HPF    Mucous Urine Present (A) NEG^Negative /LPF   Chest XR,  PA & LAT    Narrative    CHEST TWO VIEWS 1/15/2020 6:05 PM     HISTORY: One month of cough, getting worse.    COMPARISON: None.       Impression    IMPRESSION: There are no acute infiltrates. The cardiac silhouette is  not enlarged. Pulmonary vasculature is unremarkable.    JUSTICE STERLING MD       Medications - No data to display    Assessments & Plan (with Medical Decision Making)   34-year-old female who presents with congestion, runny nose, ear pain, cough, as well as new abdominal pain over the last several hours.  Temperature is 98.1  F, heart rate 81, SPO2 is 100% on room air. Patient reports no personal history of DVT or PE, no recent immobilization, hemoptysis, hormone use, or leg swelling.  Therefore there is no further indication for work up for pulmonary embolism.  Abdominal exam is benign and not concerning for an acute surgical process such as appendicitis or small bowel obstruction.  No signs of otitis media on exam.  Chest x-ray obtained, images reviewed independently as well as radiology read reviewed, no signs of infiltrate to suggest pneumonia, no pneumothorax urinalysis is negative for signs of infection.  Urine pregnancy test is negative, unlikely ectopic pregnancy.  On recheck the patient does admit to having some urinary frequency over the last 2 days without the dysuria.  Therefore I think it is reasonable to treat her for acute cystitis with cephalexin and instructions to return if worse, otherwise follow-up in clinic.  The patient is in agreement with this plan.    I have reviewed the nursing notes.    I have reviewed the findings, diagnosis, plan and need for follow up with the patient.       Discharge Medication List as of 1/15/2020  6:37 PM      START taking these medications    Details   cephALEXin (KEFLEX) 500 MG capsule Take 1 capsule (500 mg) by mouth 3 times daily for 3 days, Disp-9 capsule, R-0, E-Prescribe             Final diagnoses:   Viral URI with cough   Acute cystitis without hematuria       1/15/2020   [facility] EMERGENCY DEPARTMENT     Carlos Cullen MD  01/16/20 0115

## 2020-01-15 NOTE — ED AVS SNAPSHOT
[facility] Emergency Department  [address] [facility] [address]  Phone:  [phone]  Fax:  [phone]                                    Lluvia Glaser   MRN: [id number]    Department:  [facility] Emergency Department   Date of Visit:  1/15/2020           After Visit Summary Signature Page    I have received my discharge instructions, and my questions have been answered. I have discussed any challenges I see with this plan with the nurse or doctor.    ..........................................................................................................................................  Patient/Patient Representative Signature      ..........................................................................................................................................  Patient Representative Print Name and Relationship to Patient    ..................................................               ................................................  Date                                   Time    ..........................................................................................................................................  Reviewed by Signature/Title    ...................................................              ..............................................  Date                                               Time          22EPIC Rev 08/18

## 2020-01-15 NOTE — ED NOTES
Pt here with URI complaints, including cough and hot sweats. The patient is a smoker. Today, started with RLQ discomfort.

## 2020-01-16 NOTE — DISCHARGE INSTRUCTIONS
Take antibiotics as prescribed.  Return to the emergency department for repeated vomiting, worsening symptoms, increased pain, or other concerns.  Otherwise follow-up in clinic for a recheck if not feeling better over the next 3 to 4 days.

## 2020-02-03 ENCOUNTER — HOSPITAL ENCOUNTER (EMERGENCY)
Facility: CLINIC | Age: 35
Discharge: HOME | End: 2020-02-03
Attending: EMERGENCY MEDICINE | Admitting: EMERGENCY MEDICINE
Payer: MEDICAID

## 2020-02-03 ENCOUNTER — APPOINTMENT (OUTPATIENT)
Dept: GENERAL RADIOLOGY | Facility: CLINIC | Age: 35
End: 2020-02-03
Attending: EMERGENCY MEDICINE
Payer: MEDICAID

## 2020-02-03 VITALS
BODY MASS INDEX: 28.32 KG/M2 | WEIGHT: 170 LBS | TEMPERATURE: 97.8 F | DIASTOLIC BLOOD PRESSURE: 80 MMHG | OXYGEN SATURATION: 99 % | HEIGHT: 65 IN | RESPIRATION RATE: 16 BRPM | SYSTOLIC BLOOD PRESSURE: 128 MMHG

## 2020-02-03 DIAGNOSIS — R10.13 ABDOMINAL PAIN, EPIGASTRIC: ICD-10-CM

## 2020-02-03 LAB
ALBUMIN SERPL-MCNC: 3.5 G/DL (ref 3.4–5)
ALBUMIN UR-MCNC: NEGATIVE MG/DL
ALP SERPL-CCNC: 67 U/L (ref 40–150)
ALT SERPL W P-5'-P-CCNC: 27 U/L (ref 0–50)
AMORPH CRY #/AREA URNS HPF: ABNORMAL /HPF
ANION GAP SERPL CALCULATED.3IONS-SCNC: 3 MMOL/L (ref 3–14)
APPEARANCE UR: ABNORMAL
AST SERPL W P-5'-P-CCNC: 27 U/L (ref 0–45)
BASOPHILS # BLD AUTO: 0 10E9/L (ref 0–0.2)
BASOPHILS NFR BLD AUTO: 0.4 %
BILIRUB SERPL-MCNC: 0.3 MG/DL (ref 0.2–1.3)
BILIRUB UR QL STRIP: NEGATIVE
BUN SERPL-MCNC: 12 MG/DL (ref 7–30)
CALCIUM SERPL-MCNC: 8.9 MG/DL (ref 8.5–10.1)
CHLORIDE SERPL-SCNC: 109 MMOL/L (ref 94–109)
CO2 SERPL-SCNC: 27 MMOL/L (ref 20–32)
COLOR UR AUTO: YELLOW
CREAT SERPL-MCNC: 0.8 MG/DL (ref 0.52–1.04)
D DIMER PPP FEU-MCNC: 0.3 UG/ML FEU (ref 0–0.5)
DIFFERENTIAL METHOD BLD: NORMAL
EOSINOPHIL # BLD AUTO: 0.1 10E9/L (ref 0–0.7)
EOSINOPHIL NFR BLD AUTO: 1.5 %
ERYTHROCYTE [DISTWIDTH] IN BLOOD BY AUTOMATED COUNT: 12.5 % (ref 10–15)
GFR SERPL CREATININE-BSD FRML MDRD: >90 ML/MIN/{1.73_M2}
GLUCOSE SERPL-MCNC: 85 MG/DL (ref 70–99)
GLUCOSE UR STRIP-MCNC: NEGATIVE MG/DL
HCT VFR BLD AUTO: 38.1 % (ref 35–47)
HGB BLD-MCNC: 13.2 G/DL (ref 11.7–15.7)
HGB UR QL STRIP: NEGATIVE
IMM GRANULOCYTES # BLD: 0 10E9/L (ref 0–0.4)
IMM GRANULOCYTES NFR BLD: 0.2 %
KETONES UR STRIP-MCNC: NEGATIVE MG/DL
LEUKOCYTE ESTERASE UR QL STRIP: NEGATIVE
LIPASE SERPL-CCNC: 167 U/L (ref 73–393)
LYMPHOCYTES # BLD AUTO: 1.7 10E9/L (ref 0.8–5.3)
LYMPHOCYTES NFR BLD AUTO: 32.3 %
MCH RBC QN AUTO: 30.9 PG (ref 26.5–33)
MCHC RBC AUTO-ENTMCNC: 34.6 G/DL (ref 31.5–36.5)
MCV RBC AUTO: 89 FL (ref 78–100)
MONOCYTES # BLD AUTO: 0.5 10E9/L (ref 0–1.3)
MONOCYTES NFR BLD AUTO: 9.1 %
NEUTROPHILS # BLD AUTO: 2.9 10E9/L (ref 1.6–8.3)
NEUTROPHILS NFR BLD AUTO: 56.5 %
NITRATE UR QL: NEGATIVE
NRBC # BLD AUTO: 0 10*3/UL
NRBC BLD AUTO-RTO: 0 /100
PH UR STRIP: 8 PH (ref 5–7)
PLATELET # BLD AUTO: 187 10E9/L (ref 150–450)
POTASSIUM SERPL-SCNC: 4.1 MMOL/L (ref 3.4–5.3)
PROT SERPL-MCNC: 6.8 G/DL (ref 6.8–8.8)
RBC # BLD AUTO: 4.27 10E12/L (ref 3.8–5.2)
RBC #/AREA URNS AUTO: 1 /HPF (ref 0–2)
SODIUM SERPL-SCNC: 139 MMOL/L (ref 133–144)
SOURCE: ABNORMAL
SP GR UR STRIP: 1.01 (ref 1–1.03)
SQUAMOUS #/AREA URNS AUTO: 6 /HPF (ref 0–1)
UROBILINOGEN UR STRIP-MCNC: 0 MG/DL (ref 0–2)
WBC # BLD AUTO: 5.2 10E9/L (ref 4–11)
WBC #/AREA URNS AUTO: 3 /HPF (ref 0–5)

## 2020-02-03 PROCEDURE — 85025 COMPLETE CBC W/AUTO DIFF WBC: CPT | Performed by: EMERGENCY MEDICINE

## 2020-02-03 PROCEDURE — 81001 URINALYSIS AUTO W/SCOPE: CPT | Performed by: EMERGENCY MEDICINE

## 2020-02-03 PROCEDURE — 80053 COMPREHEN METABOLIC PANEL: CPT | Performed by: EMERGENCY MEDICINE

## 2020-02-03 PROCEDURE — 83690 ASSAY OF LIPASE: CPT | Performed by: EMERGENCY MEDICINE

## 2020-02-03 PROCEDURE — 25000128 H RX IP 250 OP 636: Performed by: EMERGENCY MEDICINE

## 2020-02-03 PROCEDURE — 96374 THER/PROPH/DIAG INJ IV PUSH: CPT | Performed by: EMERGENCY MEDICINE

## 2020-02-03 PROCEDURE — 85379 FIBRIN DEGRADATION QUANT: CPT | Performed by: EMERGENCY MEDICINE

## 2020-02-03 PROCEDURE — 99284 EMERGENCY DEPT VISIT MOD MDM: CPT | Mod: 25 | Performed by: EMERGENCY MEDICINE

## 2020-02-03 PROCEDURE — 99284 EMERGENCY DEPT VISIT MOD MDM: CPT | Mod: Z6 | Performed by: EMERGENCY MEDICINE

## 2020-02-03 PROCEDURE — 71046 X-RAY EXAM CHEST 2 VIEWS: CPT

## 2020-02-03 RX ORDER — KETOROLAC TROMETHAMINE 15 MG/ML
15 INJECTION, SOLUTION INTRAMUSCULAR; INTRAVENOUS ONCE
Status: COMPLETED | OUTPATIENT
Start: 2020-02-03 | End: 2020-02-03

## 2020-02-03 RX ADMIN — KETOROLAC TROMETHAMINE 15 MG: 15 INJECTION, SOLUTION INTRAMUSCULAR; INTRAVENOUS at 10:14

## 2020-02-03 ASSESSMENT — MIFFLIN-ST. JEOR: SCORE: 1471.99

## 2020-02-03 NOTE — ED AVS SNAPSHOT
[facility] Emergency Department  [address] [facility] [address]  Phone:  [phone]  Fax:  [phone]                                    Lluvia Glaser   MRN: [id number]    Department:  [facility] Emergency Department   Date of Visit:  2/3/2020           After Visit Summary Signature Page    I have received my discharge instructions, and my questions have been answered. I have discussed any challenges I see with this plan with the nurse or doctor.    ..........................................................................................................................................  Patient/Patient Representative Signature      ..........................................................................................................................................  Patient Representative Print Name and Relationship to Patient    ..................................................               ................................................  Date                                   Time    ..........................................................................................................................................  Reviewed by Signature/Title    ...................................................              ..............................................  Date                                               Time          22EPIC Rev 08/18

## 2020-02-03 NOTE — DISCHARGE INSTRUCTIONS
Avoid ibuprofen and other NSAIDs    Take Tylenol for discomfort    Prilosec over-the-counter 2 tablets daily for the next 2 to 3 weeks    Call [phone] to schedule right upper quadrant ultrasound    Return here for worsening pain, fever, vomiting or other concern

## 2020-02-03 NOTE — ED PROVIDER NOTES
"  History     Chief Complaint   Patient presents with     Abdominal Pain     RUQ pain worse with deep breath     HPI  Lluvia Glaser is a 34 year old female who presents with 5 days of ongoing right upper quadrant pain described as moderate/severe, sharp, worse with deep breath.  No inciting trauma or strain.  She is tried ibuprofen with minimal relief.  No prior history of similar discomfort.  Describes associated nausea.  Discomfort not significantly worse with foods.  Denies change in bowel or bladder.  No prior abdominal surgery.  Medications Seroquel.  Smokes cigarettes.  Denies alcohol or illicit drug use.  Unclear family history with respect to gallbladder disease.  Chronic daily NSAID use    Allergies:  Allergies   Allergen Reactions     Blue Dyes Hives     Demerol [Meperidine] Hives       Problem List:    There are no active problems to display for this patient.       Past Medical History:    No past medical history on file.    Past Surgical History:    No past surgical history on file.    Family History:    No family history on file.    Social History:  Marital Status:   [2]  Social History     Tobacco Use     Smoking status: Current Every Day Smoker     Smokeless tobacco: Never Used     Tobacco comment: Recommended patch and lozenge   Substance Use Topics     Alcohol use: No     Drug use: No        Medications:    ketorolac (TORADOL) 10 MG tablet  order for DME  triamcinolone (KENALOG) 0.1 % cream  triamcinolone (KENALOG) 0.1 % external cream          Review of Systems  All other systems reviewed and are negative.    Physical Exam   BP: 137/82  Heart Rate: 75  Temp: 97.8  F (36.6  C)  Height: 165.1 cm (5' 5\")  Weight: 77.1 kg (170 lb)  SpO2: 99 %      Physical Exam  Nontoxic appearing no respiratory distress alert and oriented ×3  Head atraumatic normocephalic  Neck supple full active painless range of motion  Lungs clear to auscultation  Heart regular no murmur  Abdomen soft nontender " bowel sounds positive no masses or HSM  Strength and sensation grossly intact throughout the extremities, gait and station normal  Speech is fluent, good eye contact, thought processes are rational    ED Course        Procedures               Critical Care time:  none               Results for orders placed or performed during the hospital encounter of 02/03/20 (from the past 24 hour(s))   UA reflex to Microscopic and Culture   Result Value Ref Range    Color Urine Yellow     Appearance Urine Cloudy     Glucose Urine Negative NEG^Negative mg/dL    Bilirubin Urine Negative NEG^Negative    Ketones Urine Negative NEG^Negative mg/dL    Specific Gravity Urine 1.014 1.003 - 1.035    Blood Urine Negative NEG^Negative    pH Urine 8.0 (H) 5.0 - 7.0 pH    Protein Albumin Urine Negative NEG^Negative mg/dL    Urobilinogen mg/dL 0.0 0.0 - 2.0 mg/dL    Nitrite Urine Negative NEG^Negative    Leukocyte Esterase Urine Negative NEG^Negative    Source Midstream Urine     RBC Urine 1 0 - 2 /HPF    WBC Urine 3 0 - 5 /HPF    Squamous Epithelial /HPF Urine 6 (H) 0 - 1 /HPF    Amorphous Crystals Few (A) NEG^Negative /HPF   CBC with platelets differential   Result Value Ref Range    WBC 5.2 4.0 - 11.0 10e9/L    RBC Count 4.27 3.8 - 5.2 10e12/L    Hemoglobin 13.2 11.7 - 15.7 g/dL    Hematocrit 38.1 35.0 - 47.0 %    MCV 89 78 - 100 fl    MCH 30.9 26.5 - 33.0 pg    MCHC 34.6 31.5 - 36.5 g/dL    RDW 12.5 10.0 - 15.0 %    Platelet Count 187 150 - 450 10e9/L    Diff Method Automated Method     % Neutrophils 56.5 %    % Lymphocytes 32.3 %    % Monocytes 9.1 %    % Eosinophils 1.5 %    % Basophils 0.4 %    % Immature Granulocytes 0.2 %    Nucleated RBCs 0 0 /100    Absolute Neutrophil 2.9 1.6 - 8.3 10e9/L    Absolute Lymphocytes 1.7 0.8 - 5.3 10e9/L    Absolute Monocytes 0.5 0.0 - 1.3 10e9/L    Absolute Eosinophils 0.1 0.0 - 0.7 10e9/L    Absolute Basophils 0.0 0.0 - 0.2 10e9/L    Abs Immature Granulocytes 0.0 0 - 0.4 10e9/L    Absolute Nucleated  RBC 0.0    Comprehensive metabolic panel   Result Value Ref Range    Sodium 139 133 - 144 mmol/L    Potassium 4.1 3.4 - 5.3 mmol/L    Chloride 109 94 - 109 mmol/L    Carbon Dioxide 27 20 - 32 mmol/L    Anion Gap 3 3 - 14 mmol/L    Glucose 85 70 - 99 mg/dL    Urea Nitrogen 12 7 - 30 mg/dL    Creatinine 0.80 0.52 - 1.04 mg/dL    GFR Estimate >90 >60 mL/min/[1.73_m2]    GFR Estimate If Black >90 >60 mL/min/[1.73_m2]    Calcium 8.9 8.5 - 10.1 mg/dL    Bilirubin Total 0.3 0.2 - 1.3 mg/dL    Albumin 3.5 3.4 - 5.0 g/dL    Protein Total 6.8 6.8 - 8.8 g/dL    Alkaline Phosphatase 67 40 - 150 U/L    ALT 27 0 - 50 U/L    AST 27 0 - 45 U/L   Lipase   Result Value Ref Range    Lipase 167 73 - 393 U/L   D dimer quantitative   Result Value Ref Range    D Dimer 0.3 0.0 - 0.50 ug/ml FEU       Medications   ketorolac (TORADOL) injection 15 mg (15 mg Intravenous Given 2/3/20 1014)       Assessments & Plan (with Medical Decision Making)  5 days epigastric right upper quadrant pain, no significant tenderness.  Usual differential considered.  Chronic NSAID use.  Work-up is unrevealing including chest x-ray.  Peptic ulcer disease most likely, may represent gallbladder disease.  Afebrile.  No indication for further evaluation.  Outpatient right upper quadrant ultrasound.  Discontinue NSAIDs.  Omeprazole 40 mg daily for the next couple weeks.  Follow-up primary care return criteria reviewed     I have reviewed the nursing notes.    I have reviewed the findings, diagnosis, plan and need for follow up with the patient.          New Prescriptions    No medications on file       Final diagnoses:   Abdominal pain, epigastric       2/3/2020   [facility] EMERGENCY DEPARTMENT     Ronald Jaramillo MD  02/03/20 4221

## 2020-02-04 ENCOUNTER — TELEPHONE (OUTPATIENT)
Dept: FAMILY MEDICINE | Facility: CLINIC | Age: 35
End: 2020-02-04

## 2020-02-04 ENCOUNTER — ANCILLARY PROCEDURE (OUTPATIENT)
Dept: ULTRASOUND IMAGING | Facility: CLINIC | Age: 35
End: 2020-02-04
Attending: EMERGENCY MEDICINE
Payer: MEDICAID

## 2020-02-04 DIAGNOSIS — R10.13 ABDOMINAL PAIN, EPIGASTRIC: ICD-10-CM

## 2020-02-04 PROCEDURE — 76705 ECHO EXAM OF ABDOMEN: CPT

## 2020-02-04 NOTE — TELEPHONE ENCOUNTER
Reason for Call:  Request for results:    Name of test or procedure: Pt calling for US results from abd US that ED MD ordered.  Please call patient and advise.      Date of test of procedure: 2/3/20    Location of the test or procedure: [facility] to leave the result message on voice mail or with a family member? YES    Phone number Patient can be reached at:  Home number on file [phone] (home)    Additional comments:     Call taken on 2/4/2020 at 2:37 PM by Elisa Biggs

## 2020-02-04 NOTE — TELEPHONE ENCOUNTER
Advised patient that we cannot comment on US results from ED as a provider has not read them and she doesn't have a PCP here.  She will need to contact ordering provider for results.  Patient verbalizes understanding.    Virgie Turcios RN

## 2020-02-06 ENCOUNTER — OFFICE VISIT (OUTPATIENT)
Dept: FAMILY MEDICINE | Facility: CLINIC | Age: 35
End: 2020-02-06
Payer: MEDICAID

## 2020-02-06 VITALS
HEIGHT: 65 IN | WEIGHT: 176.6 LBS | SYSTOLIC BLOOD PRESSURE: 136 MMHG | BODY MASS INDEX: 29.42 KG/M2 | HEART RATE: 80 BPM | RESPIRATION RATE: 12 BRPM | OXYGEN SATURATION: 98 % | TEMPERATURE: 99.2 F | DIASTOLIC BLOOD PRESSURE: 76 MMHG

## 2020-02-06 DIAGNOSIS — F41.9 ANXIETY: ICD-10-CM

## 2020-02-06 DIAGNOSIS — N76.0 BV (BACTERIAL VAGINOSIS): Primary | ICD-10-CM

## 2020-02-06 DIAGNOSIS — B96.89 BV (BACTERIAL VAGINOSIS): Primary | ICD-10-CM

## 2020-02-06 LAB
ALBUMIN UR-MCNC: NEGATIVE MG/DL
APPEARANCE UR: CLEAR
BILIRUB UR QL STRIP: NEGATIVE
COLOR UR AUTO: YELLOW
GLUCOSE UR STRIP-MCNC: NEGATIVE MG/DL
HCG UR QL: NEGATIVE
HGB UR QL STRIP: NEGATIVE
KETONES UR STRIP-MCNC: NEGATIVE MG/DL
LEUKOCYTE ESTERASE UR QL STRIP: NEGATIVE
NITRATE UR QL: NEGATIVE
PH UR STRIP: 7 PH (ref 5–7)
SOURCE: NORMAL
SP GR UR STRIP: 1.02 (ref 1–1.03)
SPECIMEN SOURCE: ABNORMAL
UROBILINOGEN UR STRIP-ACNC: 0.2 EU/DL (ref 0.2–1)
WET PREP SPEC: ABNORMAL

## 2020-02-06 PROCEDURE — 81003 URINALYSIS AUTO W/O SCOPE: CPT | Performed by: NURSE PRACTITIONER

## 2020-02-06 PROCEDURE — 81025 URINE PREGNANCY TEST: CPT | Performed by: NURSE PRACTITIONER

## 2020-02-06 PROCEDURE — 87210 SMEAR WET MOUNT SALINE/INK: CPT | Performed by: NURSE PRACTITIONER

## 2020-02-06 PROCEDURE — 99214 OFFICE O/P EST MOD 30 MIN: CPT | Performed by: NURSE PRACTITIONER

## 2020-02-06 RX ORDER — QUETIAPINE FUMARATE 100 MG/1
100-200 TABLET, FILM COATED ORAL DAILY
Qty: 20 TABLET | Refills: 0 | Status: SHIPPED | OUTPATIENT
Start: 2020-02-06 | End: 2020-02-17

## 2020-02-06 RX ORDER — METRONIDAZOLE 7.5 MG/G
1 GEL VAGINAL DAILY
Qty: 25 G | Refills: 0 | Status: SHIPPED | OUTPATIENT
Start: 2020-02-06 | End: 2020-05-05

## 2020-02-06 RX ORDER — ATOMOXETINE 80 MG/1
80 CAPSULE ORAL DAILY
COMMUNITY
Start: 2019-12-16 | End: 2020-02-17

## 2020-02-06 RX ORDER — CLONAZEPAM 2 MG/1
2 TABLET ORAL 2 TIMES DAILY PRN
COMMUNITY
End: 2020-02-06

## 2020-02-06 RX ORDER — QUETIAPINE FUMARATE 100 MG/1
1-2 TABLET, FILM COATED ORAL DAILY
COMMUNITY
Start: 2019-11-24 | End: 2020-02-06

## 2020-02-06 RX ORDER — CLONAZEPAM 2 MG/1
2 TABLET ORAL 2 TIMES DAILY PRN
Qty: 20 TABLET | Refills: 0 | Status: SHIPPED | OUTPATIENT
Start: 2020-02-06 | End: 2020-02-17

## 2020-02-06 ASSESSMENT — ANXIETY QUESTIONNAIRES
GAD7 TOTAL SCORE: 7
3. WORRYING TOO MUCH ABOUT DIFFERENT THINGS: NOT AT ALL
6. BECOMING EASILY ANNOYED OR IRRITABLE: NOT AT ALL
1. FEELING NERVOUS, ANXIOUS, OR ON EDGE: SEVERAL DAYS
2. NOT BEING ABLE TO STOP OR CONTROL WORRYING: NOT AT ALL
5. BEING SO RESTLESS THAT IT IS HARD TO SIT STILL: NEARLY EVERY DAY
7. FEELING AFRAID AS IF SOMETHING AWFUL MIGHT HAPPEN: NOT AT ALL

## 2020-02-06 ASSESSMENT — MIFFLIN-ST. JEOR: SCORE: 1493.99

## 2020-02-06 ASSESSMENT — PATIENT HEALTH QUESTIONNAIRE - PHQ9
SUM OF ALL RESPONSES TO PHQ QUESTIONS 1-9: 12
5. POOR APPETITE OR OVEREATING: NEARLY EVERY DAY

## 2020-02-06 NOTE — NURSING NOTE
"Initial /76 (BP Location: Right arm, Patient Position: Sitting, Cuff Size: Adult Regular)   Pulse 80   Temp 99.2  F (37.3  C) (Tympanic)   Resp 12   Ht 1.638 m (5' 4.5\")   Wt 80.1 kg (176 lb 9.6 oz)   SpO2 98%   BMI 29.85 kg/m   Estimated body mass index is 29.85 kg/m  as calculated from the following:    Height as of this encounter: 1.638 m (5' 4.5\").    Weight as of this encounter: 80.1 kg (176 lb 9.6 oz). .      "

## 2020-02-06 NOTE — PATIENT INSTRUCTIONS
Refilled seroquel and klonopin until your appt. On the 17th. Would recommend that you discuss this when you establish care.    Will call with results of UA and wet prep

## 2020-02-06 NOTE — PROGRESS NOTES
Subjective     Lluvia Glaser is a 34 year old female who presents to clinic today for the following health issues:  Medication refill until she is able to establish care with her new PCP on 2/17/2020. Patient has been without insurance until recently. She is requesting refills of Strattera (for ADHD), Clonazepam (for anxiety), and Quetiapine (for a sleep aide). She has been off Strattera and clonazepam for the past 2 weeks and quetiapine for the past 2 days.    Additionally, she is reporting copious amount of white vaginal discharge with a foul odor. Denies dysuria, abdominal pain or cramping, hematuria, frequency or urgency symptoms. States that she was told she had a bacterial infection following her Pap smear in Dec 2019.      WAYNE 7; PHQ9-12    HPI   There is no problem list on file for this patient.    History reviewed. No pertinent surgical history.    Social History     Tobacco Use     Smoking status: Current Every Day Smoker     Smokeless tobacco: Never Used     Tobacco comment: Recommended patch and lozenge   Substance Use Topics     Alcohol use: No     History reviewed. No pertinent family history.      Current Outpatient Medications   Medication Sig Dispense Refill     clonazePAM (KLONOPIN) 2 MG tablet Take 1 tablet (2 mg) by mouth 2 times daily as needed for anxiety 20 tablet 0     metroNIDAZOLE (METROGEL) 0.75 % vaginal gel Place 1 applicator (5 g) vaginally daily for 5 days 25 g 0     QUEtiapine (SEROQUEL) 100 MG tablet Take 1-2 tablets (100-200 mg) by mouth daily 20 tablet 0     atomoxetine (STRATTERA) 80 MG capsule Take 80 mg by mouth daily       order for DME Equipment being ordered: Splint left wrist (Patient not taking: Reported on 7/5/2019) 1 Device 0     triamcinolone (KENALOG) 0.1 % external cream Apply topically 2 times daily (Patient not taking: Reported on 2/6/2020) 60 g 0     Allergies   Allergen Reactions     Blue Dyes Hives     Demerol [Meperidine] Hives       Reviewed and updated  "as needed this visit by Provider         Review of Systems   ROS COMP: Constitutional, HEENT, cardiovascular, pulmonary, gi and gu systems are negative, except as otherwise noted.      Objective    /76 (BP Location: Right arm, Patient Position: Sitting, Cuff Size: Adult Regular)   Pulse 80   Temp 99.2  F (37.3  C) (Tympanic)   Resp 12   Ht 1.638 m (5' 4.5\")   Wt 80.1 kg (176 lb 9.6 oz)   SpO2 98%   BMI 29.85 kg/m    Body mass index is 29.85 kg/m .  Physical Exam  Vitals signs and nursing note reviewed.   Constitutional:       Appearance: Normal appearance. She is not toxic-appearing.   HENT:      Head: Normocephalic and atraumatic.      Right Ear: Tympanic membrane, ear canal and external ear normal.      Left Ear: Tympanic membrane, ear canal and external ear normal.      Mouth/Throat:      Mouth: Mucous membranes are moist.      Pharynx: Oropharynx is clear. Uvula midline.   Eyes:      General: No scleral icterus.     Extraocular Movements: Extraocular movements intact.      Conjunctiva/sclera: Conjunctivae normal.      Pupils: Pupils are equal, round, and reactive to light.   Neck:      Musculoskeletal: Normal range of motion and neck supple. No muscular tenderness.   Cardiovascular:      Rate and Rhythm: Normal rate and regular rhythm.      Pulses: Normal pulses.      Heart sounds: Normal heart sounds, S1 normal and S2 normal. No murmur. No S3 or S4 sounds.    Pulmonary:      Effort: Pulmonary effort is normal.      Breath sounds: Normal breath sounds. No wheezing.   Abdominal:      General: Abdomen is flat. Bowel sounds are normal.      Palpations: There is no hepatomegaly or splenomegaly.      Tenderness: There is abdominal tenderness (TTP) in the suprapubic area.   Lymphadenopathy:      Cervical: No cervical adenopathy.   Skin:     General: Skin is warm and dry.   Neurological:      Mental Status: She is alert and oriented to person, place, and time.   Psychiatric:         Mood and Affect: Mood " is anxious.         Speech: Speech is rapid and pressured.         Behavior: Behavior is agitated.         Thought Content: Thought content does not include homicidal or suicidal ideation.         Judgment: Judgment is impulsive.          Diagnostic Test Results:  Labs reviewed in Epic  Results for orders placed or performed in visit on 02/06/20 (from the past 24 hour(s))   Wet prep   Result Value Ref Range    Specimen Description Vagina     Wet Prep No yeast seen     Wet Prep No Trichomonas seen     Wet Prep Few  Clue cells seen   (A)     Wet Prep Few  WBC'S seen      UA reflex to Microscopic and Culture   Result Value Ref Range    Color Urine Yellow     Appearance Urine Clear     Glucose Urine Negative NEG^Negative mg/dL    Bilirubin Urine Negative NEG^Negative    Ketones Urine Negative NEG^Negative mg/dL    Specific Gravity Urine 1.020 1.003 - 1.035    Blood Urine Negative NEG^Negative    pH Urine 7.0 5.0 - 7.0 pH    Protein Albumin Urine Negative NEG^Negative mg/dL    Urobilinogen Urine 0.2 0.2 - 1.0 EU/dL    Nitrite Urine Negative NEG^Negative    Leukocyte Esterase Urine Negative NEG^Negative    Source Midstream Urine    HCG Qual, Urine ([id number])   Result Value Ref Range    HCG Qual Urine Negative NEG^Negative           Assessment & Plan     1. BV (bacterial vaginosis)    - Wet prep  - UA reflex to Microscopic and Culture  - HCG Qual, Urine ([id number])  - metroNIDAZOLE (METROGEL) 0.75 % vaginal gel; Place 1 applicator (5 g) vaginally daily for 5 days  Dispense: 25 g; Refill: 0    2. Anxiety  Plan to provide medication coverage until PCP visit on 2/17/2020. Patient is amenable to this plan.   - QUEtiapine (SEROQUEL) 100 MG tablet; Take 1-2 tablets (100-200 mg) by mouth daily  Dispense: 20 tablet; Refill: 0  - clonazePAM (KLONOPIN) 2 MG tablet; Take 1 tablet (2 mg) by mouth 2 times daily as needed for anxiety  Dispense: 20 tablet; Refill: 0     3. ADHD  Patient to planning to address with PCP on 2/17/2020, as she  "is requesting a change in medication from Strattera, due to abdominal discomfort when taking this medication. No prescription provided at today's visit. Patient was amenable to this plan    Tobacco Cessation:   reports that she has been smoking. She has never used smokeless tobacco.  Tobacco Cessation Action Plan: Information offered: Patient not interested at this time      BMI:   Estimated body mass index is 29.85 kg/m  as calculated from the following:    Height as of this encounter: 1.638 m (5' 4.5\").    Weight as of this encounter: 80.1 kg (176 lb 9.6 oz).           Patient Instructions   Refilled seroquel and klonopin until your appt. On the 17th. Would recommend that you discuss this when you establish care.    Will call with results of UA and wet prep      Return in about 2 weeks (around 2/20/2020).    Alda Brewster DNP Student  [facility]    "

## 2020-02-06 NOTE — PROGRESS NOTES
Subjective     Lluvia KASIA Glaser is a 34 year old female who presents to clinic today for the following health issues:    HPI   Abnormal Mood Symptoms  Onset: Years    Description:   Depression: YES  Anxiety: YES    Accompanying Signs & Symptoms:  Still participating in activities that you used to enjoy: YES  Fatigue: no   Irritability: no   Difficulty concentrating: YES  Changes in appetite: no   Problems with sleep: YES  Heart racing/beating fast : no   Thoughts of hurting yourself or others: none    History:   Recent stress: YES-   Prior depression hospitalization: None  Family history of depression: YES  Family history of anxiety: YES    Precipitating factors:   Alcohol/drug use: no     Alleviating factors:  Dealing with life right now    Therapies Tried and outcome: Seroquel , Strattera Clonazepam     Above HPI reviewed. Additionally, these medications were most recently prescribed by a provider in [place] in 11/2019, I cannot find record of this. Jose is making her sick to her stomach. Anxiety symptoms increasing. Life stressors including financial concerns and divorce. Has appointment to establish care on 2/17.  Out of meds for 2 weeks.    Vaginal Symptoms      Duration: several weeks - seen in ED in December for yeast. Believes she has BV    Description  vaginal discharge - white and itching    Intensity:  moderate    Accompanying signs and symptoms (fever/dysuria/abdominal or back pain): None    History  Sexually active: yes, single partner, contraception - none  Possibility of pregnancy: No  Recent antibiotic use: no     Precipitating or alleviating factors: None    Therapies tried and outcome: none       Declines STI testing.       See student NP note dated today for further HPI details.      There is no problem list on file for this patient.    History reviewed. No pertinent surgical history.    Social History     Tobacco Use     Smoking status: Current Every Day Smoker     Smokeless tobacco: Never  "Used     Tobacco comment: Recommended patch and lozenge   Substance Use Topics     Alcohol use: No     History reviewed. No pertinent family history.      Current Outpatient Medications   Medication Sig Dispense Refill     clonazePAM (KLONOPIN) 2 MG tablet Take 1 tablet (2 mg) by mouth 2 times daily as needed for anxiety 20 tablet 0     metroNIDAZOLE (METROGEL) 0.75 % vaginal gel Place 1 applicator (5 g) vaginally daily for 5 days 25 g 0     QUEtiapine (SEROQUEL) 100 MG tablet Take 1-2 tablets (100-200 mg) by mouth daily 20 tablet 0     atomoxetine (STRATTERA) 80 MG capsule Take 80 mg by mouth daily       order for DME Equipment being ordered: Splint left wrist (Patient not taking: Reported on 7/5/2019) 1 Device 0     triamcinolone (KENALOG) 0.1 % external cream Apply topically 2 times daily (Patient not taking: Reported on 2/6/2020) 60 g 0       Reviewed and updated as needed this visit by Provider  Tobacco  Allergies  Meds  Problems  Med Hx  Surg Hx  Fam Hx         Review of Systems   ROS COMP: Constitutional, HEENT, cardiovascular, pulmonary, gi and gu systems are negative, except as otherwise noted.      Objective    /76 (BP Location: Right arm, Patient Position: Sitting, Cuff Size: Adult Regular)   Pulse 80   Temp 99.2  F (37.3  C) (Tympanic)   Resp 12   Ht 1.638 m (5' 4.5\")   Wt 80.1 kg (176 lb 9.6 oz)   SpO2 98%   BMI 29.85 kg/m    Body mass index is 29.85 kg/m .  Physical Exam  Vitals signs and nursing note reviewed.   Constitutional:       Appearance: Normal appearance.   HENT:      Head: Normocephalic and atraumatic.      Mouth/Throat:      Mouth: Mucous membranes are moist.   Eyes:      Comments: Non-icteric   Neck:      Musculoskeletal: Neck supple.   Cardiovascular:      Rate and Rhythm: Normal rate and regular rhythm.      Pulses: Normal pulses.      Heart sounds: Normal heart sounds.   Pulmonary:      Effort: Pulmonary effort is normal.      Breath sounds: Normal breath sounds. "   Abdominal:      General: Abdomen is flat. Bowel sounds are normal.      Palpations: Abdomen is soft.   Neurological:      General: No focal deficit present.      Mental Status: She is alert and oriented to person, place, and time.   Psychiatric:         Mood and Affect: Mood is anxious.         Speech: Speech is rapid and pressured.         Behavior: Behavior normal.         Thought Content: Thought content normal.         Judgment: Judgment normal.        PHQ-9 score:    PHQ-9 SCORE 2/6/2020   PHQ-9 Total Score 12     WAYNE-7 SCORE 2/6/2020   Total Score 7       Diagnostic Test Results:  Labs reviewed in Epic  Results for orders placed or performed in visit on 02/06/20 (from the past 24 hour(s))   Wet prep   Result Value Ref Range    Specimen Description Vagina     Wet Prep No yeast seen     Wet Prep No Trichomonas seen     Wet Prep Few  Clue cells seen   (A)     Wet Prep Few  WBC'S seen      UA reflex to Microscopic and Culture   Result Value Ref Range    Color Urine Yellow     Appearance Urine Clear     Glucose Urine Negative NEG^Negative mg/dL    Bilirubin Urine Negative NEG^Negative    Ketones Urine Negative NEG^Negative mg/dL    Specific Gravity Urine 1.020 1.003 - 1.035    Blood Urine Negative NEG^Negative    pH Urine 7.0 5.0 - 7.0 pH    Protein Albumin Urine Negative NEG^Negative mg/dL    Urobilinogen Urine 0.2 0.2 - 1.0 EU/dL    Nitrite Urine Negative NEG^Negative    Leukocyte Esterase Urine Negative NEG^Negative    Source Midstream Urine    HCG Qual, Urine ([id number])   Result Value Ref Range    HCG Qual Urine Negative NEG^Negative           Assessment & Plan     1. BV (bacterial vaginosis)  Metrogel x 5 days. Declined STI testing.  - Wet prep  - UA reflex to Microscopic and Culture  - HCG Qual, Urine ([id number])  - metroNIDAZOLE (METROGEL) 0.75 % vaginal gel; Place 1 applicator (5 g) vaginally daily for 5 days  Dispense: 25 g; Refill: 0    2. Anxiety  Appointment initially made for anxiety and med check,  "however spent a great deal of time discussing vaginal symptoms and then stated she needed to leave for another appointment. Discussed with her that I would defer ADHD medication to a PCP as this medication will need to be followed, and I will not be seeing her in follow up. She is amenable with this.  For now, I will provide enough medication to get her to her establish care appointment on 2/17, likely needs to be seen by mental health but again, requested to leave.  - QUEtiapine (SEROQUEL) 100 MG tablet; Take 1-2 tablets (100-200 mg) by mouth daily  Dispense: 20 tablet; Refill: 0  - clonazePAM (KLONOPIN) 2 MG tablet; Take 1 tablet (2 mg) by mouth 2 times daily as needed for anxiety  Dispense: 20 tablet; Refill: 0     Tobacco Cessation:   reports that she has been smoking. She has never used smokeless tobacco.        BMI:   Estimated body mass index is 29.85 kg/m  as calculated from the following:    Height as of this encounter: 1.638 m (5' 4.5\").    Weight as of this encounter: 80.1 kg (176 lb 9.6 oz).       See Patient Instructions    Return in about 2 weeks (around 2/20/2020).    LENA Nair [facility]      "

## 2020-02-07 ASSESSMENT — ANXIETY QUESTIONNAIRES: GAD7 TOTAL SCORE: 7

## 2020-02-17 ENCOUNTER — OFFICE VISIT (OUTPATIENT)
Dept: FAMILY MEDICINE | Facility: CLINIC | Age: 35
End: 2020-02-17
Payer: MEDICAID

## 2020-02-17 VITALS
WEIGHT: 178 LBS | SYSTOLIC BLOOD PRESSURE: 120 MMHG | BODY MASS INDEX: 29.66 KG/M2 | TEMPERATURE: 97.6 F | OXYGEN SATURATION: 98 % | RESPIRATION RATE: 14 BRPM | HEART RATE: 60 BPM | DIASTOLIC BLOOD PRESSURE: 84 MMHG | HEIGHT: 65 IN

## 2020-02-17 DIAGNOSIS — R87.619 ABNORMAL CERVICAL PAPANICOLAOU SMEAR, UNSPECIFIED ABNORMAL PAP FINDING: ICD-10-CM

## 2020-02-17 DIAGNOSIS — F41.1 GENERALIZED ANXIETY DISORDER: ICD-10-CM

## 2020-02-17 DIAGNOSIS — F17.200 SMOKER: ICD-10-CM

## 2020-02-17 DIAGNOSIS — L30.9 CHRONIC ECZEMA: ICD-10-CM

## 2020-02-17 DIAGNOSIS — Z30.09 BIRTH CONTROL COUNSELING: ICD-10-CM

## 2020-02-17 DIAGNOSIS — L23.7 CONTACT DERMATITIS DUE TO POISON IVY: ICD-10-CM

## 2020-02-17 DIAGNOSIS — F41.9 ANXIETY: ICD-10-CM

## 2020-02-17 DIAGNOSIS — F19.10 SUBSTANCE ABUSE (H): ICD-10-CM

## 2020-02-17 DIAGNOSIS — F90.9 ATTENTION DEFICIT HYPERACTIVITY DISORDER (ADHD), UNSPECIFIED ADHD TYPE: Primary | ICD-10-CM

## 2020-02-17 DIAGNOSIS — Z23 NEED FOR VACCINATION: ICD-10-CM

## 2020-02-17 DIAGNOSIS — K25.9 GASTRIC ULCER WITHOUT HEMORRHAGE OR PERFORATION, UNSPECIFIED CHRONICITY: ICD-10-CM

## 2020-02-17 PROCEDURE — 99214 OFFICE O/P EST MOD 30 MIN: CPT | Mod: 25 | Performed by: NURSE PRACTITIONER

## 2020-02-17 PROCEDURE — 90715 TDAP VACCINE 7 YRS/> IM: CPT | Performed by: NURSE PRACTITIONER

## 2020-02-17 PROCEDURE — 90471 IMMUNIZATION ADMIN: CPT | Performed by: NURSE PRACTITIONER

## 2020-02-17 RX ORDER — ATOMOXETINE 80 MG/1
80 CAPSULE ORAL DAILY
Qty: 30 CAPSULE | Refills: 0 | Status: SHIPPED | OUTPATIENT
Start: 2020-02-17 | End: 2020-05-05 | Stop reason: DRUGHIGH

## 2020-02-17 RX ORDER — QUETIAPINE FUMARATE 100 MG/1
100-200 TABLET, FILM COATED ORAL DAILY
Qty: 60 TABLET | Refills: 1 | Status: SHIPPED | OUTPATIENT
Start: 2020-02-17 | End: 2020-05-05

## 2020-02-17 RX ORDER — ATOMOXETINE 80 MG/1
80 CAPSULE ORAL DAILY
Status: CANCELLED | OUTPATIENT
Start: 2020-02-17

## 2020-02-17 RX ORDER — TRIAMCINOLONE ACETONIDE 1 MG/G
CREAM TOPICAL 2 TIMES DAILY
Qty: 60 G | Refills: 0 | Status: CANCELLED | OUTPATIENT
Start: 2020-02-17

## 2020-02-17 RX ORDER — OMEPRAZOLE 20 MG/1
20 TABLET, DELAYED RELEASE ORAL DAILY
Qty: 90 TABLET | Refills: 1 | Status: SHIPPED | OUTPATIENT
Start: 2020-02-17 | End: 2020-05-05

## 2020-02-17 RX ORDER — CLONAZEPAM 2 MG/1
2 TABLET ORAL 2 TIMES DAILY PRN
Qty: 30 TABLET | Refills: 0 | Status: SHIPPED | OUTPATIENT
Start: 2020-02-17 | End: 2020-03-16

## 2020-02-17 ASSESSMENT — MIFFLIN-ST. JEOR: SCORE: 1500.34

## 2020-02-17 NOTE — PROGRESS NOTES
"Subjective     Lluvia PEDROZA Pacheco Glaser is a 34 year old female who presents to clinic today for the following health issues:    HPI   Anxiety Follow-Up    How are you doing with your anxiety since your last visit? No change, Patient reports its not helping as much as she hopes.     Are you having other symptoms that might be associated with anxiety? No    Have you had a significant life event? No     Are you feeling depressed? No    Do you have any concerns with your use of alcohol or other drugs? No    Social History     Tobacco Use     Smoking status: Current Every Day Smoker     Smokeless tobacco: Never Used     Tobacco comment: Recommended patch and lozenge   Substance Use Topics     Alcohol use: No     Drug use: No     WAYNE-7 SCORE 2/6/2020   Total Score 7     PHQ 2/6/2020   PHQ-9 Total Score 12   Q9: Thoughts of better off dead/self-harm past 2 weeks Not at all           How many servings of fruits and vegetables do you eat daily?  2-3    On average, how many sweetened beverages do you drink each day (Examples: soda, juice, sweet tea, etc.  Do NOT count diet or artificially sweetened beverages)?   0    How many days per week do you exercise enough to make your heart beat faster? 3 or less    How many minutes a day do you exercise enough to make your heart beat faster? 10 - 19    How many days per week do you miss taking your medication? 0    Patient is wanting to establish care with [facility]. She reports problems with her medication for ADHD. She says the Strattera is causing her to feel like she is sick to her stomach. She reports it feeling like heartburn or a burning sensation in her stomach. She would like to find an alternative.     States she's had ADHD for \"years\" and recently was re diagnosed with that from [employer] Strattera is upsetting her stomach. 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 She has been clean and sober for about 4 months. " "She is doing substance abuse counseling as well as mental health counseling at [facility] in [place].  She appears motivated to be off of drugs. She is getting a divorce as her  is still addicting and is hoping to get her children back; she has 3 kids. She works at local [employer].  She was seen in ED recently for upset stomach and was told it was an \"ulcer\" but she didn't do EGD. She has stopped NSAID's and feels better. Needs more Prilosec.  She also had abnormal pap in 12/2019. She would like something to stop her heavy periods; will see OB for follow up on that.  Labs recently were stable, still need lipids.  She is due for tetanus and will do that today.  No other concerns.      Patient Active Problem List   Diagnosis     Substance abuse (H)     Generalized anxiety disorder     Chronic eczema     Gastric ulcer     ADHD     Smoker     History reviewed. No pertinent surgical history.    Social History     Tobacco Use     Smoking status: Current Every Day Smoker     Smokeless tobacco: Never Used     Tobacco comment: Recommended patch and lozenge   Substance Use Topics     Alcohol use: No     History reviewed. No pertinent family history.      Current Outpatient Medications   Medication Sig Dispense Refill     atomoxetine (STRATTERA) 80 MG capsule Take 1 capsule (80 mg) by mouth daily 30 capsule 0     clonazePAM (KLONOPIN) 2 MG tablet Take 1 tablet (2 mg) by mouth 2 times daily as needed for anxiety 30 tablet 0     omeprazole (PRILOSEC OTC) 20 MG EC tablet Take 1 tablet (20 mg) by mouth daily 90 tablet 1     QUEtiapine (SEROQUEL) 100 MG tablet Take 1-2 tablets (100-200 mg) by mouth daily 60 tablet 1     order for DME Equipment being ordered: Splint left wrist (Patient not taking: Reported on 7/5/2019) 1 Device 0     triamcinolone (KENALOG) 0.1 % external cream Apply topically 2 times daily (Patient not taking: Reported on 2/6/2020) 60 g 0     Allergies   Allergen Reactions     Blue Dyes Hives " "    Demerol [Meperidine] Hives     Recent Labs   Lab Test 02/03/20  1013   ALT 27   CR 0.80   GFRESTIMATED >90   GFRESTBLACK >90   POTASSIUM 4.1      BP Readings from Last 3 Encounters:   02/17/20 120/84   02/06/20 136/76   02/03/20 128/80    Wt Readings from Last 3 Encounters:   02/17/20 80.7 kg (178 lb)   02/06/20 80.1 kg (176 lb 9.6 oz)   02/03/20 77.1 kg (170 lb)                      Reviewed and updated as needed this visit by Provider  Tobacco  Allergies  Meds  Problems  Med Hx  Surg Hx  Fam Hx         Review of Systems   ROS COMP: Constitutional, HEENT, cardiovascular, pulmonary, gi and gu systems are negative, except as otherwise noted.  Psych positive for ADHD, anxiety.  Negative skin.  Negative M/S.        Objective    /84   Pulse 60   Temp 97.6  F (36.4  C) (Tympanic)   Resp 14   Ht 1.638 m (5' 4.5\")   Wt 80.7 kg (178 lb)   SpO2 98%   BMI 30.08 kg/m    Body mass index is 30.08 kg/m .  Physical Exam   Vital signs:  Temp: 97.6  F (36.4  C) Temp src: Tympanic BP: 120/84 Pulse: 60   Resp: 14 SpO2: 98 %     Height: 163.8 cm (5' 4.5\") Weight: 80.7 kg (178 lb)  Estimated body mass index is 30.08 kg/m  as calculated from the following:    Height as of this encounter: 1.638 m (5' 4.5\").    Weight as of this encounter: 80.7 kg (178 lb).  GENERAL: healthy, alert and no distress  HENT: missing teeth  NECK: no adenopathy, no asymmetry  RESP: lungs clear to auscultation - no rales, rhonchi or wheezes  CV: regular rate and rhythm, normal S1 S2, no S3 or S4, no murmur  MS: no gross musculoskeletal defects noted  PSYCH: pleasant, somewhat anxious, fidgeting a lot    Diagnostic Test Results:  Labs reviewed in Epic  No results found for this or any previous visit (from the past 24 hour(s)).        Assessment & Plan     1. Attention deficit hyperactivity disorder (ADHD), unspecified ADHD type    - MENTAL HEALTH REFERRAL  - Adult; Psychiatry and Medication Management; Psychiatry; [facility]: [facility] " Psychiatry Service [phone].  Medication management & future refills will be returned to [facility] [place] upon completion of evaluation; We sander...  - atomoxetine (STRATTERA) 80 MG capsule; Take 1 capsule (80 mg) by mouth daily  Dispense: 30 capsule; Refill: 0  Will continue with Strattera for now and establish with psychiatry for further medication help.  2. Substance abuse (H)    - MENTAL HEALTH REFERRAL  - Adult; Psychiatry and Medication Management; Psychiatry; [facility]: [facility] Psych[facility]ry Service [phone].  Medication management & future refills will be returned to [facility] [place]CP upon completion of evaluation; We sander...    3. Anxiety    - clonazePAM (KLONOPIN) 2 MG tablet; Take 1 tablet (2 mg) by mouth 2 times daily as needed for anxiety  Dispense: 30 tablet; Refill: 0  - QUEtiapine (SEROQUEL) 100 MG tablet; Take 1-2 tablets (100-200 mg) by mouth daily  Dispense: 60 tablet; Refill: 1  Discussed long term use of Klonopin and will follow up with psychiatry for medication management, hopefully wean off of klonopin.  Will refill enough until she can be seen by psychiatry.      5. Generalized anxiety disorder    - MENTAL HEALTH REFERRAL  - Adult; Psychiatry and Medication Management; Psychiatry; [facility]: [facility] Psychiatry Service [phone].  Medication management & future refills will be returned to [facility] [place]CP upon completion of evaluation; We sander...    6. Chronic eczema    No present concerns.      7. Gastric ulcer without hemorrhage or perforation, unspecified chronicity    - omeprazole (PRILOSEC OTC) 20 MG EC tablet; Take 1 tablet (20 mg) by mouth daily  Dispense: 90 tablet; Refill: 1  Will continue with no NSAID's and PPI daily. Consider EGD.    8. Smoker      9. Birth control counseling    - OB/GYN REFERRAL    10. Abnormal cervical Papanicolaou smear, unspecified abnormal pap finding    - OB/GYN REFERRAL       See Patient Instructions  Patient Instructions   Tetanus booster given.  See OB for follow  up on abnormal pap and to discuss birth control options.  See psychiatry to establish care and discuss medications.  Return for fasting labs in 3 months.      Our Clinic hours are:  Mondays    7:20 am - 7 pm  Tues -  Fri  7:20 am - 5 pm    Clinic Phone: [phone]    The clinic lab opens at 7:30 am Mon - Fri and appointments are required.    [facility] [place]  Ph. [phone]  Monday  8 am - 7pm  Tues - Fri 8 am - 5:30 pm             Return in about 3 months (around 5/17/2020) for or sooner if symptoms persist or worsen, Routine Visit, Lab Work, Med Check.    LENA Martinez [facility]

## 2020-02-17 NOTE — PATIENT INSTRUCTIONS
Tetanus booster given.  See OB for follow up on abnormal pap and to discuss birth control options.  See psychiatry to establish care and discuss medications.  Return for fasting labs in 3 months.      Our Clinic hours are:  Mondays    7:20 am - 7 pm  Tues -  Fri  7:20 am - 5 pm    Clinic Phone: [phone]    The clinic lab opens at 7:30 am Mon - Fri and appointments are required.    [facility] [place]  Ph. [phone]  Monday  8 am - 7pm  Tues - Fri 8 am - 5:30 pm

## 2020-02-17 NOTE — NURSING NOTE
Prior to immunization administration, verified patients identity using patient s name and date of birth. Please see Immunization Activity for additional information.     Screening Questionnaire for Adult Immunization    Are you sick today?   No   Do you have allergies to medications, food, a vaccine component or latex?   No   Have you ever had a serious reaction after receiving a vaccination?   No   Do you have a long-term health problem with heart, lung, kidney, or metabolic disease (e.g., diabetes), asthma, a blood disorder, no spleen, complement component deficiency, a cochlear implant, or a spinal fluid leak?  Are you on long-term aspirin therapy?   No   Do you have cancer, leukemia, HIV/AIDS, or any other immune system problem?   No   Do you have a parent, brother, or sister with an immune system problem?   No   In the past 3 months, have you taken medications that affect  your immune system, such as prednisone, other steroids, or anticancer drugs; drugs for the treatment of rheumatoid arthritis, Crohn s disease, or psoriasis; or have you had radiation treatments?   No   Have you had a seizure, or a brain or other nervous system problem?   No   During the past year, have you received a transfusion of blood or blood    products, or been given immune (gamma) globulin or antiviral drug?   No   For women: Are you pregnant or is there a chance you could become       pregnant during the next month?   No   Have you received any vaccinations in the past 4 weeks?   No     Immunization questionnaire answers were all negative.        Per orders of LENA Sifuentes CNP, injection of Adacel given by Madelin Don CMA. Patient instructed to remain in clinic for 15 minutes afterwards, and to report any adverse reaction to me immediately.       Screening performed by Madelin Don CMA on 2/17/2020 at 5:23 PM.

## 2020-02-20 ENCOUNTER — TELEPHONE (OUTPATIENT)
Dept: FAMILY MEDICINE | Facility: CLINIC | Age: 35
End: 2020-02-20

## 2020-02-20 DIAGNOSIS — F41.9 ANXIETY: ICD-10-CM

## 2020-02-20 NOTE — TELEPHONE ENCOUNTER
We did not reach Lluvia Glaser, we left a message with our contact number [phone].  If we do not hear from them within the next 3 business days we will mail a letter offering our scheduling services.    If they do call to schedule, in the future, we will inform you of the outcome.    Thank you for your referral,  [facility]

## 2020-02-21 ENCOUNTER — HOSPITAL ENCOUNTER (EMERGENCY)
Facility: CLINIC | Age: 35
Discharge: HOME | End: 2020-02-21
Attending: PHYSICIAN ASSISTANT | Admitting: PHYSICIAN ASSISTANT
Payer: MEDICAID

## 2020-02-21 VITALS
RESPIRATION RATE: 18 BRPM | HEIGHT: 65 IN | WEIGHT: 178 LBS | SYSTOLIC BLOOD PRESSURE: 116 MMHG | BODY MASS INDEX: 29.66 KG/M2 | DIASTOLIC BLOOD PRESSURE: 81 MMHG | TEMPERATURE: 96.8 F | OXYGEN SATURATION: 98 %

## 2020-02-21 DIAGNOSIS — R10.13 ABDOMINAL PAIN, EPIGASTRIC: ICD-10-CM

## 2020-02-21 LAB
ALBUMIN SERPL-MCNC: 3.5 G/DL (ref 3.4–5)
ALP SERPL-CCNC: 89 U/L (ref 40–150)
ALT SERPL W P-5'-P-CCNC: 45 U/L (ref 0–50)
ANION GAP SERPL CALCULATED.3IONS-SCNC: 5 MMOL/L (ref 3–14)
AST SERPL W P-5'-P-CCNC: 42 U/L (ref 0–45)
BASOPHILS # BLD AUTO: 0 10E9/L (ref 0–0.2)
BASOPHILS NFR BLD AUTO: 0.2 %
BILIRUB SERPL-MCNC: 0.3 MG/DL (ref 0.2–1.3)
BUN SERPL-MCNC: 12 MG/DL (ref 7–30)
CALCIUM SERPL-MCNC: 8 MG/DL (ref 8.5–10.1)
CHLORIDE SERPL-SCNC: 110 MMOL/L (ref 94–109)
CO2 SERPL-SCNC: 26 MMOL/L (ref 20–32)
CREAT SERPL-MCNC: 0.76 MG/DL (ref 0.52–1.04)
DIFFERENTIAL METHOD BLD: NORMAL
EOSINOPHIL # BLD AUTO: 0.2 10E9/L (ref 0–0.7)
EOSINOPHIL NFR BLD AUTO: 4 %
ERYTHROCYTE [DISTWIDTH] IN BLOOD BY AUTOMATED COUNT: 12.5 % (ref 10–15)
GFR SERPL CREATININE-BSD FRML MDRD: >90 ML/MIN/{1.73_M2}
GLUCOSE SERPL-MCNC: 86 MG/DL (ref 70–99)
HCT VFR BLD AUTO: 39.7 % (ref 35–47)
HGB BLD-MCNC: 14.1 G/DL (ref 11.7–15.7)
IMM GRANULOCYTES # BLD: 0 10E9/L (ref 0–0.4)
IMM GRANULOCYTES NFR BLD: 0.2 %
LIPASE SERPL-CCNC: 156 U/L (ref 73–393)
LYMPHOCYTES # BLD AUTO: 1.6 10E9/L (ref 0.8–5.3)
LYMPHOCYTES NFR BLD AUTO: 27.3 %
MCH RBC QN AUTO: 31.4 PG (ref 26.5–33)
MCHC RBC AUTO-ENTMCNC: 35.5 G/DL (ref 31.5–36.5)
MCV RBC AUTO: 88 FL (ref 78–100)
MONOCYTES # BLD AUTO: 0.4 10E9/L (ref 0–1.3)
MONOCYTES NFR BLD AUTO: 7.2 %
NEUTROPHILS # BLD AUTO: 3.7 10E9/L (ref 1.6–8.3)
NEUTROPHILS NFR BLD AUTO: 61.1 %
NRBC # BLD AUTO: 0 10*3/UL
NRBC BLD AUTO-RTO: 0 /100
PLATELET # BLD AUTO: 176 10E9/L (ref 150–450)
POTASSIUM SERPL-SCNC: 4.1 MMOL/L (ref 3.4–5.3)
PROT SERPL-MCNC: 7.1 G/DL (ref 6.8–8.8)
RBC # BLD AUTO: 4.49 10E12/L (ref 3.8–5.2)
SODIUM SERPL-SCNC: 141 MMOL/L (ref 133–144)
WBC # BLD AUTO: 6 10E9/L (ref 4–11)

## 2020-02-21 PROCEDURE — 99284 EMERGENCY DEPT VISIT MOD MDM: CPT | Mod: Z6 | Performed by: PHYSICIAN ASSISTANT

## 2020-02-21 PROCEDURE — 99283 EMERGENCY DEPT VISIT LOW MDM: CPT | Performed by: PHYSICIAN ASSISTANT

## 2020-02-21 PROCEDURE — 83690 ASSAY OF LIPASE: CPT | Performed by: PHYSICIAN ASSISTANT

## 2020-02-21 PROCEDURE — 85025 COMPLETE CBC W/AUTO DIFF WBC: CPT | Performed by: PHYSICIAN ASSISTANT

## 2020-02-21 PROCEDURE — 80053 COMPREHEN METABOLIC PANEL: CPT | Performed by: PHYSICIAN ASSISTANT

## 2020-02-21 RX ORDER — PANTOPRAZOLE SODIUM 40 MG/1
40 TABLET, DELAYED RELEASE ORAL DAILY
Qty: 30 TABLET | Refills: 0 | Status: SHIPPED | OUTPATIENT
Start: 2020-02-21 | End: 2020-05-05

## 2020-02-21 RX ORDER — ACETAMINOPHEN 500 MG
500-1000 TABLET ORAL EVERY 6 HOURS PRN
Qty: 60 TABLET | Refills: 0 | Status: SHIPPED | OUTPATIENT
Start: 2020-02-21 | End: 2020-07-09

## 2020-02-21 ASSESSMENT — MIFFLIN-ST. JEOR: SCORE: 1500.34

## 2020-02-21 NOTE — ED AVS SNAPSHOT
[facility] Department  [address] [address]  Phone:  [phone]  Fax:  [phone]                                    Lluvia Glaser   MRN: [id number]    Department:  [facility] Department   Date of Visit:  2/21/2020           After Visit Summary Signature Page    I have received my discharge instructions, and my questions have been answered. I have discussed any challenges I see with this plan with the nurse or doctor.    ..........................................................................................................................................  Patient/Patient Representative Signature      ..........................................................................................................................................  Patient Representative Print Name and Relationship to Patient    ..................................................               ................................................  Date                                   Time    ..........................................................................................................................................  Reviewed by Signature/Title    ...................................................              ..............................................  Date                                               Time          22EPIC Rev 08/18

## 2020-02-21 NOTE — ED TRIAGE NOTES
needs return to work note; pt also reports 9/10 abdominal pain and rectal bleeding and may want a prescription; does not really want an eval though

## 2020-02-21 NOTE — LETTER
[facility] EMERGENCY DEPARTMENT  [address] [address]  Phone: [phone]  Fax: [phone]    February 21, 2020        Lluvia Glaser  [address]  [place] [address]          To whom it may concern:    RE: Lluvia Hooper was evaluated in the emergency department on 2/3/20 and again on 2/21/20.      Please contact me for questions or concerns.      Sincerely,        Safia Lujan PA-C

## 2020-02-21 NOTE — ED PROVIDER NOTES
History     Chief Complaint   Patient presents with     Patient Request for Note/Letter     needs return to work note; pt also reports 9/10 abdominal pain and rectal bleeding and may want a prescription; does not really want an eval though     HPI  Lluvia Glaser is a 34 year old female with past medical history significant for generalized anxiety disorder, substance abuse, gastric ulcer who presents to the emergency department with concern over persistent epigastric abdominal pain.  Patient reports she was evaluated in the emergency department on 2/3/2020 had evaluation including blood work including CBC, CMP, D-dimer, lipase and outpatient ultrasound and was told that she likely had a peptic ulcer was given prescription for Prilosec, however states that insurance would not cover it as it was considered an OTC medication.  She states possibility of EGD was discussed but she refused at that time.  She reports that since being unable to feel the med she has had worsening abdominal pain which is exacerbated by deep breathing, certain movements.  She additionally complains of a burning sensation in her lower abdomen.  She has had associated nausea and did have an episode of emesis while at work yesterday.  She also states concern for possible blood in her stool today.  She denies any fever, chills, myalgias, cough, chest pains, shortness of breath, wheezing, palpitations, diarrhea. She has not had any pertient surgical history.  She is a daily smoker.  She did use NSAIDS frequently, however has discontinued since prior ED visit.  She denies any ETOH or illicit drug use.      Allergies:  Allergies   Allergen Reactions     Blue Dyes Hives     Demerol [Meperidine] Hives     Problem List:    Patient Active Problem List    Diagnosis Date Noted     Substance abuse (H)      Priority: Medium     Generalized anxiety disorder      Priority: Medium     Chronic eczema      Priority: Medium     Gastric ulcer       "Priority: Medium     ADHD      Priority: Medium     Smoker      Priority: Medium      Past Medical History:    Past Medical History:   Diagnosis Date     ADHD      Chronic eczema      Gastric ulcer      Generalized anxiety disorder      Smoker      Substance abuse (H)      Past Surgical History:    No past surgical history on file.    Family History:    No family history on file.    Social History:  Marital Status:   [2]  Social History     Tobacco Use     Smoking status: Current Every Day Smoker     Smokeless tobacco: Never Used     Tobacco comment: Recommended patch and lozenge   Substance Use Topics     Alcohol use: No     Drug use: No      Medications:    acetaminophen 500 MG PO tablet  pantoprazole (PROTONIX) 40 MG PO EC tablet  atomoxetine (STRATTERA) 80 MG capsule  clonazePAM (KLONOPIN) 2 MG tablet  omeprazole (PRILOSEC OTC) 20 MG EC tablet  order for DME  QUEtiapine (SEROQUEL) 100 MG tablet  triamcinolone (KENALOG) 0.1 % external cream      Review of Systems   Constitutional: Positive for appetite change. Negative for activity change, chills and fever.   HENT: Negative for congestion, ear pain and sore throat.    Eyes: Negative for photophobia, pain, discharge, itching and visual disturbance.   Respiratory: Negative for cough, shortness of breath and wheezing.    Cardiovascular: Negative for chest pain.   Gastrointestinal: Positive for abdominal pain, blood in stool, nausea and vomiting. Negative for constipation and diarrhea.   Genitourinary: Negative for dysuria, flank pain and hematuria.   Musculoskeletal: Negative for back pain and myalgias.   Skin: Negative for color change, rash and wound.   Neurological: Negative for dizziness, light-headedness and headaches.     Physical Exam   BP: 116/81  Heart Rate: 100  Temp: 96.8  F (36  C)  Resp: 18  Height: 163.8 cm (5' 4.5\")  Weight: 80.7 kg (178 lb)  SpO2: 98 %  Physical Exam  GENERAL APPEARANCE:alert and no distress  EYES: EOMI,  PERRL, conjunctiva " clear  HENT: ear canals and TM's normal.  Nose and mouth without ulcers, erythema or lesions  RESP: lungs clear to auscultation - no rales, rhonchi or wheezes  CV: regular rates and rhythm, normal S1 S2, no murmur noted  ABDOMEN:  Soft, epigastric and right upper quadrant tenderness to palpation, no masses and bowel sounds normal  RECTAL exam deferred by patient   NEURO: Normal strength and tone, sensory exam grossly normal,  normal speech and mentation  SKIN: no suspicious lesions or rashes  ED Course        Procedures        Critical Care time:  none        Results for orders placed or performed during the hospital encounter of 02/21/20   CBC with platelets differential     Status: None   Result Value Ref Range    WBC 6.0 4.0 - 11.0 10e9/L    RBC Count 4.49 3.8 - 5.2 10e12/L    Hemoglobin 14.1 11.7 - 15.7 g/dL    Hematocrit 39.7 35.0 - 47.0 %    MCV 88 78 - 100 fl    MCH 31.4 26.5 - 33.0 pg    MCHC 35.5 31.5 - 36.5 g/dL    RDW 12.5 10.0 - 15.0 %    Platelet Count 176 150 - 450 10e9/L    Diff Method Automated Method     % Neutrophils 61.1 %    % Lymphocytes 27.3 %    % Monocytes 7.2 %    % Eosinophils 4.0 %    % Basophils 0.2 %    % Immature Granulocytes 0.2 %    Nucleated RBCs 0 0 /100    Absolute Neutrophil 3.7 1.6 - 8.3 10e9/L    Absolute Lymphocytes 1.6 0.8 - 5.3 10e9/L    Absolute Monocytes 0.4 0.0 - 1.3 10e9/L    Absolute Eosinophils 0.2 0.0 - 0.7 10e9/L    Absolute Basophils 0.0 0.0 - 0.2 10e9/L    Abs Immature Granulocytes 0.0 0 - 0.4 10e9/L    Absolute Nucleated RBC 0.0    Comprehensive metabolic panel     Status: Abnormal   Result Value Ref Range    Sodium 141 133 - 144 mmol/L    Potassium 4.1 3.4 - 5.3 mmol/L    Chloride 110 (H) 94 - 109 mmol/L    Carbon Dioxide 26 20 - 32 mmol/L    Anion Gap 5 3 - 14 mmol/L    Glucose 86 70 - 99 mg/dL    Urea Nitrogen 12 7 - 30 mg/dL    Creatinine 0.76 0.52 - 1.04 mg/dL    GFR Estimate >90 >60 mL/min/[1.73_m2]    GFR Estimate If Black >90 >60 mL/min/[1.73_m2]    Calcium  8.0 (L) 8.5 - 10.1 mg/dL    Bilirubin Total 0.3 0.2 - 1.3 mg/dL    Albumin 3.5 3.4 - 5.0 g/dL    Protein Total 7.1 6.8 - 8.8 g/dL    Alkaline Phosphatase 89 40 - 150 U/L    ALT 45 0 - 50 U/L    AST 42 0 - 45 U/L   Lipase     Status: None   Result Value Ref Range    Lipase 156 73 - 393 U/L     Medications - No data to display    Assessments & Plan (with Medical Decision Making)     I have reviewed the nursing notes.  I have reviewed the findings, diagnosis, plan and need for follow up with the patient.       Discharge Medication List as of 2/21/2020 11:17 AM      START taking these medications    Details   acetaminophen 500 MG PO tablet Take 1-2 tablets (500-1,000 mg) by mouth every 6 hours as needed for pain or fever, Disp-60 tablet, R-0, E-Prescribe      pantoprazole (PROTONIX) 40 MG PO EC tablet Take 1 tablet (40 mg) by mouth daily for 30 doses, Disp-30 tablet, R-0, E-Prescribe           Final diagnoses:   Abdominal pain, epigastric     34-year-old female presents to the emergency department requesting a note for her employer as well as worsening of her previously evaluated abdominal pain.  She had stable vital signs upon arrival.  Physical exam findings as described above were significant for tenderness palpation of the epigastric and right upper quadrant.  As part of evaluation she did have normal CBC, CMP, lipase.  She declined to give a urine sample due to time constraints.   I did discuss possibility of cholelithiasis and risk/benefits of obtaining ultrasound however as patient had ultrasound performed 2/4/2020 she declined this as well.  History and physical exam would favor peptic ulcer disease, also consider GERD/gastritis.  No evidence of pancreatitis and do not suspect pyelonephritis, diverticulitis, appendicitis at this patient was discharged home home at her request prior to results of blood work being available.  She was given prescriptions for Protonix, tylenol.  Did order outpatient EGD if symptoms  persist.  Worrisome reasons to return to ER discussed.    Disclaimer: This note consists of symbols derived from keyboarding, dictation, and/or voice recognition software. As a result, there may be errors in the script that have gone undetected.  Please consider this when interpreting information found in the chart.      2/21/2020   [facility] EMERGENCY DEPARTMENT     Safia Lujan PA-C  02/23/20 [id number]

## 2020-02-23 ASSESSMENT — ENCOUNTER SYMPTOMS
SORE THROAT: 0
BLOOD IN STOOL: 1
EYE DISCHARGE: 0
HEADACHES: 0
PHOTOPHOBIA: 0
EYE PAIN: 0
SHORTNESS OF BREATH: 0
CONSTIPATION: 0
WHEEZING: 0
WOUND: 0
HEMATURIA: 0
BACK PAIN: 0
ACTIVITY CHANGE: 0
NAUSEA: 1
LIGHT-HEADEDNESS: 0
DIARRHEA: 0
COLOR CHANGE: 0
MYALGIAS: 0
FLANK PAIN: 0
DIZZINESS: 0
COUGH: 0
CHILLS: 0
VOMITING: 1
EYE ITCHING: 0
DYSURIA: 0
ABDOMINAL PAIN: 1
FEVER: 0
APPETITE CHANGE: 1

## 2020-02-24 ENCOUNTER — HEALTH MAINTENANCE LETTER (OUTPATIENT)
Age: 35
End: 2020-02-24

## 2020-02-24 ENCOUNTER — ANESTHESIA EVENT (OUTPATIENT)
Dept: GASTROENTEROLOGY | Facility: CLINIC | Age: 35
End: 2020-02-24
Payer: MEDICAID

## 2020-02-24 ENCOUNTER — OFFICE VISIT (OUTPATIENT)
Dept: FAMILY MEDICINE | Facility: CLINIC | Age: 35
End: 2020-02-24
Payer: MEDICAID

## 2020-02-24 VITALS
RESPIRATION RATE: 18 BRPM | TEMPERATURE: 97.4 F | HEIGHT: 65 IN | SYSTOLIC BLOOD PRESSURE: 124 MMHG | HEART RATE: 96 BPM | DIASTOLIC BLOOD PRESSURE: 64 MMHG | BODY MASS INDEX: 29.96 KG/M2 | OXYGEN SATURATION: 100 % | WEIGHT: 179.8 LBS

## 2020-02-24 DIAGNOSIS — J98.01 BRONCHOSPASM: ICD-10-CM

## 2020-02-24 DIAGNOSIS — J02.9 SORE THROAT: Primary | ICD-10-CM

## 2020-02-24 LAB
DEPRECATED S PYO AG THROAT QL EIA: NEGATIVE
FLUAV+FLUBV AG SPEC QL: NEGATIVE
FLUAV+FLUBV AG SPEC QL: NEGATIVE
SPECIMEN SOURCE: NORMAL
STREP GROUP A PCR: NOT DETECTED

## 2020-02-24 PROCEDURE — 94640 AIRWAY INHALATION TREATMENT: CPT | Performed by: EMERGENCY MEDICINE

## 2020-02-24 PROCEDURE — 40001204 ZZHCL STATISTIC STREP A RAPID: Performed by: FAMILY MEDICINE

## 2020-02-24 PROCEDURE — 87651 STREP A DNA AMP PROBE: CPT | Performed by: FAMILY MEDICINE

## 2020-02-24 PROCEDURE — 99213 OFFICE O/P EST LOW 20 MIN: CPT | Mod: 25 | Performed by: EMERGENCY MEDICINE

## 2020-02-24 PROCEDURE — 87804 INFLUENZA ASSAY W/OPTIC: CPT | Performed by: FAMILY MEDICINE

## 2020-02-24 RX ORDER — ALBUTEROL SULFATE 0.83 MG/ML
2.5 SOLUTION RESPIRATORY (INHALATION) ONCE
Status: COMPLETED | OUTPATIENT
Start: 2020-02-24 | End: 2020-02-24

## 2020-02-24 RX ORDER — ALBUTEROL SULFATE 0.83 MG/ML
2.5 SOLUTION RESPIRATORY (INHALATION) EVERY 6 HOURS PRN
Status: CANCELLED | OUTPATIENT
Start: 2020-02-24

## 2020-02-24 RX ORDER — ALBUTEROL SULFATE 0.83 MG/ML
2.5 SOLUTION RESPIRATORY (INHALATION) EVERY 6 HOURS PRN
Qty: 1 BOX | Refills: 0 | Status: SHIPPED | OUTPATIENT
Start: 2020-02-24 | End: 2020-12-30

## 2020-02-24 RX ADMIN — ALBUTEROL SULFATE 2.5 MG: 0.83 SOLUTION RESPIRATORY (INHALATION) at 11:53

## 2020-02-24 ASSESSMENT — MIFFLIN-ST. JEOR: SCORE: 1508.51

## 2020-02-24 NOTE — LETTER
[facility]  [address] [place] [address]  Phone: [phone]  Fax: [phone]    February 24, 2020        Lluvia Glaser  [address]  [place] [address]          To whom it may concern:    RE: Lluvia Glaser    Patient was seen and treated today at our clinic.May return on Wednesday, 2/26, without restrictions.  Please contact me for questions or concerns.      Sincerely,        Jose A Parikh MD

## 2020-02-24 NOTE — NURSING NOTE
"Chief Complaint   Patient presents with     Pharyngitis     2 days sore throat, can't use albuterol inhaler makes her feel shaky, having hard time breathing.       Initial /64 (BP Location: Right arm, Patient Position: Sitting, Cuff Size: Adult Regular)   Pulse 96   Temp 97.4  F (36.3  C) (Tympanic)   Resp 18   Ht 1.638 m (5' 4.5\")   Wt 81.6 kg (179 lb 12.8 oz)   SpO2 100%   BMI 30.39 kg/m   Estimated body mass index is 30.39 kg/m  as calculated from the following:    Height as of this encounter: 1.638 m (5' 4.5\").    Weight as of this encounter: 81.6 kg (179 lb 12.8 oz).    Patient presents to the clinic using No DME    Health Maintenance that is potentially due pending provider review:  NONE    n/a    Is there anyone who you would like to be able to receive your results? No  If yes have patient fill out LACEY  Aicha Rivera CMA      "

## 2020-02-24 NOTE — PROGRESS NOTES
HPI: Patient is a 34-year-old female who presents with a 2-day history of sore throat.  She is also had coughing and wheezing as well as nasal congestion.  She was exposed to her daughter with strep throat but her daughter had been on antibiotics for 3 days.  She is no difficulty breathing.  Hoarse voice is noted.  No earaches.  No fever.  No rashes.  She does smoke.  She did receive a flu shot.  Nothing specifically taken for her symptoms.  Swallowing makes it worse.      ROS: See HPI otherwise negative    Allergies   Allergen Reactions     Blue Dyes Hives     Demerol [Meperidine] Hives      Current Outpatient Medications   Medication Sig Dispense Refill     albuterol (PROVENTIL) (2.5 MG/3ML) 0.083% neb solution Take 1 vial (2.5 mg) by nebulization every 6 hours as needed for shortness of breath / dyspnea or wheezing 1 Box 0     acetaminophen 500 MG PO tablet Take 1-2 tablets (500-1,000 mg) by mouth every 6 hours as needed for pain or fever (Patient not taking: Reported on 2/24/2020) 60 tablet 0     atomoxetine (STRATTERA) 80 MG capsule Take 1 capsule (80 mg) by mouth daily (Patient not taking: Reported on 2/24/2020) 30 capsule 0     clonazePAM (KLONOPIN) 2 MG tablet Take 1 tablet (2 mg) by mouth 2 times daily as needed for anxiety (Patient not taking: Reported on 2/24/2020) 30 tablet 0     omeprazole (PRILOSEC OTC) 20 MG EC tablet Take 1 tablet (20 mg) by mouth daily (Patient not taking: Reported on 2/24/2020) 90 tablet 1     order for DME Equipment being ordered: Splint left wrist (Patient not taking: Reported on 7/5/2019) 1 Device 0     pantoprazole (PROTONIX) 40 MG PO EC tablet Take 1 tablet (40 mg) by mouth daily for 30 doses (Patient not taking: Reported on 2/24/2020) 30 tablet 0     QUEtiapine (SEROQUEL) 100 MG tablet Take 1-2 tablets (100-200 mg) by mouth daily (Patient not taking: Reported on 2/24/2020) 60 tablet 1     triamcinolone (KENALOG) 0.1 % external cream Apply topically 2 times daily (Patient not  taking: Reported on 2/6/2020) 60 g 0         PE: Voice pattern is slightly hoarse but no dysphonia.  She is alert and oriented x3.  Does not appear in any acute distress.  She is non-dyspneic appearing.  Throat is moderately erythematous.  No exudate or abscess.  Ears reveal normal TMs with normal landmarks.  Neck reveals some very slight tender anterior nodes but no ghanshyam adenopathy per se.  No posterior adenopathy.  Lungs are clear to auscultation with good air excursion heart is regular.  Skin warm and dry.        TREATMENT: Rapid strep is negative.  Albuterol treatment given in clinic.      ASSESSMENT: Pharyngitis as the predominant symptoms of a viral URI.  She does have a history of asthma and has been wheezing by history although better now.      DIAGNOSIS: Viral URI, Pharyngitis, Bronchospasm      PLAN: Albuterol machine will be dispensed to this patient as well as albuterol to be used as instructed.  Symptomatic instructions for her throat sore throat given, see AVS

## 2020-02-24 NOTE — ANESTHESIA PREPROCEDURE EVALUATION
Anesthesia Pre-Procedure Evaluation    Patient: Lluvia Glaser   MRN: [id number] : 1985          Preoperative Diagnosis: Abdominal pain, epigastric [R10.13]    Procedure(s):  ESOPHAGOGASTRODUODENOSCOPY (EGD)    Past Medical History:   Diagnosis Date     ADHD      Chronic eczema      Gastric ulcer      Generalized anxiety disorder      Smoker      Substance abuse (H)      No past surgical history on file.    Anesthesia Evaluation     . Pt has had prior anesthetic.            ROS/MED HX    ENT/Pulmonary:     (+)tobacco use (1/2), Current use 1/ packs/day  , . .    Neurologic:  - neg neurologic ROS     Cardiovascular:  - neg cardiovascular ROS       METS/Exercise Tolerance:  >4 METS   Hematologic:  - neg hematologic  ROS       Musculoskeletal:  - neg musculoskeletal ROS       GI/Hepatic: Comment: Epigastric pain    (+) Other GI/Hepatic gastric ulcer; hx of substance abuse       Renal/Genitourinary:  - ROS Renal section negative       Endo:  - neg endo ROS       Psychiatric:     (+) psychiatric history anxiety and other (comment) (ADHD)      Infectious Disease:  - neg infectious disease ROS       Malignancy:      - no malignancy   Other: Comment: Substance abuse                         Physical Exam  Normal systems: cardiovascular, pulmonary and dental    Airway   Mallampati: II  TM distance: >3 FB  Neck ROM: full    Dental     Cardiovascular       Pulmonary             Lab Results   Component Value Date    WBC 6.0 2020    HGB 14.1 2020    HCT 39.7 2020     2020     2020    POTASSIUM 4.1 2020    CHLORIDE 110 (H) 2020    CO2 26 2020    BUN 12 2020    CR 0.76 2020    GLC 86 2020    TAMMIE 8.0 (L) 2020    ALBUMIN 3.5 2020    PROTTOTAL 7.1 2020    ALT 45 2020    AST 42 2020    ALKPHOS 89 2020    BILITOTAL 0.3 2020    LIPASE 156 2020    HCG Negative 2020       Preop Vitals  BP  "Readings from Last 3 Encounters:   02/24/20 124/64   02/21/20 116/81   02/17/20 120/84    Pulse Readings from Last 3 Encounters:   02/24/20 96   02/17/20 60   02/06/20 80      Resp Readings from Last 3 Encounters:   02/24/20 18   02/21/20 18   02/17/20 14    SpO2 Readings from Last 3 Encounters:   02/24/20 100%   02/21/20 98%   02/17/20 98%      Temp Readings from Last 1 Encounters:   02/24/20 36.3  C (97.4  F) (Tympanic)    Ht Readings from Last 1 Encounters:   02/24/20 1.638 m (5' 4.5\")      Wt Readings from Last 1 Encounters:   02/24/20 81.6 kg (179 lb 12.8 oz)    Estimated body mass index is 30.39 kg/m  as calculated from the following:    Height as of 2/24/20: 1.638 m (5' 4.5\").    Weight as of 2/24/20: 81.6 kg (179 lb 12.8 oz).       Anesthesia Plan      History & Physical Review  History and physical reviewed and following examination; no interval change.    ASA Status:  2 .    NPO Status:  > 8 hours    Plan for General with Propofol and Intravenous induction.   PONV prophylaxis:  Ondansetron (or other 5HT-3)  Additional equipment: Videolaryngoscope      Postoperative Care  Postoperative pain management:  IV analgesics.      Consents  Anesthetic plan, risks, benefits and alternatives discussed with:  Patient..                 LENA Quintero CRNA  "

## 2020-02-24 NOTE — NURSING NOTE
Clinic Administered Medication Documentation    MEDICATION LIST: Inhalable/Nebs Medication Documentation    Patient was given Albuterol Sulfate Neb. Prior to medication administration, verified patients identity using patient s name and date of birth. Please see MAR and medication order for additional information.   05/21  Aicha Rivera, CMA

## 2020-02-24 NOTE — PROGRESS NOTES
"Subjective     Lluvia PEDROZA Pacheco Glaser is a 34 year old female who presents to clinic today for the following health issues:    HPI   ENT Symptoms             Symptoms: cc Present Absent Comment   Fever/Chills   x    Fatigue   x    Muscle Aches  x     Eye Irritation   x    Sneezing   x    Nasal Chi/Drg  x     Sinus Pressure/Pain  x     Loss of smell   x    Dental pain   x    Sore Throat x x  Doesn't have tonsils. Daughter had strep.   Swollen Glands   x    Ear Pain/Fullness   x    Cough  x     Wheeze  x     Chest Pain   x Has an ulcer has a procedure on Wednesday   Shortness of breath  x     Rash   x    Other   x      Symptom duration:  2 days   Symptom severity:  mild   Treatments tried:  none   Contacts:  daughter       {additonal problems for provider to add (Optional):084432}    {HIST REVIEW/ LINKS 2 (Optional):762743}    Reviewed and updated as needed this visit by Provider         Review of Systems   {ROS COMP (Optional):897970}      Objective    /64 (BP Location: Right arm, Patient Position: Sitting, Cuff Size: Adult Regular)   Pulse 96   Temp 97.4  F (36.3  C) (Tympanic)   Resp 18   Ht 1.638 m (5' 4.5\")   Wt 81.6 kg (179 lb 12.8 oz)   SpO2 100%   BMI 30.39 kg/m    Body mass index is 30.39 kg/m .  Physical Exam   {Exam List (Optional):556805}    {Diagnostic Test Results (Optional):116066::\"Diagnostic Test Results:\",\"Labs reviewed in Epic\"}        {PROVIDER CHARTING PREFERENCE:711387}    "

## 2020-02-24 NOTE — PATIENT INSTRUCTIONS
Ice chips, popsicles, lozenges for throat pain    Tylenol or Advil for pain    Stop smoking    Albuterol as instructed    Recheck if more ill    Recheck 5 days if still ill.  Patient Education     Pharyngitis (Sore Throat), Report Pending    Pharyngitis (sore throat) is often due to a virus. It can also be caused by streptococcus (strep), bacteria. This is often called strep throat. Both viral and strep infections can cause throat pain that is worse when swallowing, aching all over, headache, and fever. Both types of infections are contagious. They may be spread by coughing, kissing, or touching others after touching your mouth or nose.  A test has been done to find out if you or your child have strep throat. Call this facility or your healthcare provider if you were not given your test results. If the test is positive for strep infection, you will need to take antibiotic medicines. A prescription can be called into your pharmacy at that time. If the test is negative, you probably have a viral pharyngitis. This does not need to be treated with antibiotics. Until you receive the results of the strep test, you should stay home from work. If your child is being tested, he or she should stay home from school.  Home care  Rest at home. Drink plenty of fluids so you won't get dehydrated.  If the test is positive for strep, you or your child should not go to work or school for the first 2 days of taking the antibiotics. After this time, you or your child will not be contagious. You or your child can then return to work or school when feeling better.   Use the antibiotic medicine for the full 10 days. Do not stop the medicine even if you or your child feel better. This is very important to make sure the infection is fully treated. It is also important to prevent medicine-resistant germs from growing. If you or your child were given an antibiotic shot, no more antibiotics are needed.  Use throat lozenges or numbing throat  sprays to help reduce pain. Gargling with warm salt water will also help reduce throat pain. Dissolve 1/2 teaspoon of salt in 1 glass of warm water. Children can sip on juice or a popsicle. Children 5 years and older can also suck on a lollipop or hard candy.  Don't eat salty or spicy foods or give them to your child. These can irritate the throat.  Other medicine for a child: You can give your child acetaminophen for fever, fussiness, or discomfort. In babies over 6 months of age, you may use ibuprofen instead of acetaminophen. If your child has chronic liver or kidney disease or ever had a stomach ulcer or GI bleeding, talk with your child s healthcare provider before giving these medicines. Aspirin should never be used by any child under 18 years of age who has a fever. It may cause severe liver damage.  Other medicine for an adult: You may use acetaminophen or ibuprofen to control pain or fever, unless another medicine was prescribed for this. If you have chronic liver or kidney disease or ever had a stomach ulcer or GI bleeding, talk with your healthcare provider before using these medicines.  Follow-up care  Follow up with your healthcare provider or our staff if you or your child don't get better over the next week.  When to seek medical advice  Call your healthcare provider right away if any of these occur:  Fever as directed by your healthcare provider. For children, seek care if:  Your child is of any age and has repeated fevers above 104 F (40 C).  Your child is younger than 2 years of age and has a fever of 100.4 F (38 C) for more than 1 day.  Your child is 2 years old or older and has a fever of 100.4 F (38 C) for more than 3 days.  New or worsening ear pain, sinus pain, or headache  Painful lumps in the back of neck  Stiff neck  Lymph nodes are getting larger   Can t swallow liquids, a lot of drooling, or can t open mouth wide due to throat pain  Signs of dehydration, such as very dark urine or no  urine, sunken eyes, dizziness  Trouble breathing or noisy breathing  Muffled voice  New rash  Other symptoms getting worse  Prevention  Here are steps you can take to help prevent an infection:  Keep good hand washing habits.  Don t have close contact with people who have sore throats, colds, or other upper respiratory infections.  Don t smoke, and stay away from secondhand smoke.  Stay up to date with of your vaccines.  Date Last Reviewed: 11/1/2017 2000-2019 [employer]. [address] [place] [address]. All rights reserved. This information is not intended as a substitute for professional medical care. Always follow your healthcare professional's instructions.           Patient Education     Viral Upper Respiratory Illness with Wheezing (Adult)    You have a viral upper respiratory illness (URI), which is another term for the common cold. When the infection causes a lot of irritation, the air passages can go into spasm. This causes wheezing and shortness of breath.  This illness is contagious during the first few days. It is spread through the air by coughing and sneezing. It may also be spread by direct contact. This could be by touching the sick person and then touching your own eyes, nose, or mouth. Frequent handwashing will decrease the risk.  Most viral illnesses go away within 7 to 10 days with rest and simple home remedies. Sometimes the illness may last for several weeks. Antibiotics will not kill a virus, and they are generally not prescribed for this condition.  Home care  If symptoms are severe, rest at home for the first 2 to 3 days. When you resume activity, don't let yourself get too tired.  If you smoke, stop. Ask your healthcare provider if you need help.  Stay away from secondhand cigarette smoke. Don't let people smoke in your house or car.  You may use acetaminophen or ibuprofen to control pain and fever, unless another medicine was prescribed. Take the medicine only  as directed on the label. If you have chronic liver or kidney disease, have ever had a stomach ulcer or gastrointestinal bleeding, or are taking blood-thinning medicines, talk with your healthcare provider before using these medicines. Aspirin should never be given to anyone under 18 years of age who is ill with a viral infection or fever. It may cause severe liver or brain damage.  Your appetite may be poor, so a light diet is fine. Stay well hydrated by drinking 6 to 8 glasses of fluids per day (water, soft drinks, juices, tea, or soup). Extra fluids will help loosen secretions in the nose and lungs.  Over-the-counter cold medicines will not shorten the length of time you re sick, but they may be helpful for the following symptoms: cough, sore throat, and nasal and sinus congestion. Ask your healthcare provider or pharmacist which over-the-counter medicine to use. Don't use decongestants if you have high blood pressure.  Follow-up care  Follow up with your healthcare provider, or as advised.  When to seek medical advice  Call your healthcare provider right away if any of these occur:  Cough with lots of colored sputum (mucus)  Severe headache; face, neck, or ear pain  Difficulty swallowing due to throat pain  Fever of 100.4 F (38 C) or higher, or as directed by your healthcare provider  Call 911  Call 911 if any of these occur:  Chest pain, shortness of breath, worsening wheezing, or difficulty breathing  Coughing up blood  Very severe pain when swallowing, especially if it goes along with a muffled voice  Date Last Reviewed: 6/1/2018 2000-2019 [employer]. [address] [place] [address], [place] [address]. All rights reserved. This information is not intended as a substitute for professional medical care. Always follow your healthcare professional's instructions.

## 2020-02-26 ENCOUNTER — HOSPITAL ENCOUNTER (OUTPATIENT)
Facility: CLINIC | Age: 35
Discharge: HOME | End: 2020-02-26
Attending: SURGERY | Admitting: SURGERY
Payer: MEDICAID

## 2020-02-26 ENCOUNTER — ANESTHESIA (OUTPATIENT)
Dept: GASTROENTEROLOGY | Facility: CLINIC | Age: 35
End: 2020-02-26
Payer: MEDICAID

## 2020-02-26 VITALS
DIASTOLIC BLOOD PRESSURE: 75 MMHG | OXYGEN SATURATION: 99 % | SYSTOLIC BLOOD PRESSURE: 110 MMHG | WEIGHT: 179 LBS | TEMPERATURE: 97.9 F | HEIGHT: 65 IN | BODY MASS INDEX: 29.82 KG/M2 | RESPIRATION RATE: 16 BRPM | HEART RATE: 83 BPM

## 2020-02-26 LAB — UPPER GI ENDOSCOPY: NORMAL

## 2020-02-26 PROCEDURE — 88341 IMHCHEM/IMCYTCHM EA ADD ANTB: CPT | Performed by: SURGERY

## 2020-02-26 PROCEDURE — 43239 EGD BIOPSY SINGLE/MULTIPLE: CPT | Performed by: SURGERY

## 2020-02-26 PROCEDURE — 88305 TISSUE EXAM BY PATHOLOGIST: CPT | Mod: 26 | Performed by: SURGERY

## 2020-02-26 PROCEDURE — 88305 TISSUE EXAM BY PATHOLOGIST: CPT | Performed by: SURGERY

## 2020-02-26 PROCEDURE — 25000125 ZZHC RX 250: Performed by: SURGERY

## 2020-02-26 PROCEDURE — 88341 IMHCHEM/IMCYTCHM EA ADD ANTB: CPT | Mod: 26 | Performed by: SURGERY

## 2020-02-26 PROCEDURE — 25000128 H RX IP 250 OP 636: Performed by: SURGERY

## 2020-02-26 PROCEDURE — 25800030 ZZH RX IP 258 OP 636: Performed by: SURGERY

## 2020-02-26 PROCEDURE — 88342 IMHCHEM/IMCYTCHM 1ST ANTB: CPT | Mod: 26 | Performed by: SURGERY

## 2020-02-26 PROCEDURE — 25000128 H RX IP 250 OP 636: Performed by: NURSE ANESTHETIST, CERTIFIED REGISTERED

## 2020-02-26 PROCEDURE — 88342 IMHCHEM/IMCYTCHM 1ST ANTB: CPT | Performed by: SURGERY

## 2020-02-26 PROCEDURE — 25000125 ZZHC RX 250: Performed by: NURSE ANESTHETIST, CERTIFIED REGISTERED

## 2020-02-26 PROCEDURE — 37000008 ZZH ANESTHESIA TECHNICAL FEE, 1ST 30 MIN: Performed by: SURGERY

## 2020-02-26 RX ORDER — PROPOFOL 10 MG/ML
INJECTION, EMULSION INTRAVENOUS PRN
Status: DISCONTINUED | OUTPATIENT
Start: 2020-02-26 | End: 2020-02-26

## 2020-02-26 RX ORDER — SODIUM CHLORIDE, SODIUM LACTATE, POTASSIUM CHLORIDE, CALCIUM CHLORIDE 600; 310; 30; 20 MG/100ML; MG/100ML; MG/100ML; MG/100ML
INJECTION, SOLUTION INTRAVENOUS CONTINUOUS
Status: DISCONTINUED | OUTPATIENT
Start: 2020-02-26 | End: 2020-02-26 | Stop reason: HOSPADM

## 2020-02-26 RX ORDER — LIDOCAINE HYDROCHLORIDE 10 MG/ML
INJECTION, SOLUTION INFILTRATION; PERINEURAL PRN
Status: DISCONTINUED | OUTPATIENT
Start: 2020-02-26 | End: 2020-02-26

## 2020-02-26 RX ORDER — FLUMAZENIL 0.1 MG/ML
0.2 INJECTION, SOLUTION INTRAVENOUS
Status: CANCELLED | OUTPATIENT
Start: 2020-02-26 | End: 2020-02-26

## 2020-02-26 RX ORDER — GLYCOPYRROLATE 0.2 MG/ML
INJECTION, SOLUTION INTRAMUSCULAR; INTRAVENOUS PRN
Status: DISCONTINUED | OUTPATIENT
Start: 2020-02-26 | End: 2020-02-26

## 2020-02-26 RX ORDER — NALOXONE HYDROCHLORIDE 0.4 MG/ML
.1-.4 INJECTION, SOLUTION INTRAMUSCULAR; INTRAVENOUS; SUBCUTANEOUS
Status: CANCELLED | OUTPATIENT
Start: 2020-02-26 | End: 2020-02-27

## 2020-02-26 RX ORDER — ONDANSETRON 2 MG/ML
4 INJECTION INTRAMUSCULAR; INTRAVENOUS
Status: COMPLETED | OUTPATIENT
Start: 2020-02-26 | End: 2020-02-26

## 2020-02-26 RX ORDER — LIDOCAINE 40 MG/G
CREAM TOPICAL
Status: DISCONTINUED | OUTPATIENT
Start: 2020-02-26 | End: 2020-02-26 | Stop reason: HOSPADM

## 2020-02-26 RX ADMIN — PROPOFOL 100 MG: 10 INJECTION, EMULSION INTRAVENOUS at 10:30

## 2020-02-26 RX ADMIN — ONDANSETRON 4 MG: 2 INJECTION INTRAMUSCULAR; INTRAVENOUS at 10:48

## 2020-02-26 RX ADMIN — SODIUM CHLORIDE, POTASSIUM CHLORIDE, SODIUM LACTATE AND CALCIUM CHLORIDE: 600; 310; 30; 20 INJECTION, SOLUTION INTRAVENOUS at 10:15

## 2020-02-26 RX ADMIN — LIDOCAINE HYDROCHLORIDE 50 MG: 10 INJECTION, SOLUTION INFILTRATION; PERINEURAL at 10:30

## 2020-02-26 RX ADMIN — GLYCOPYRROLATE 0.2 MG: 0.2 INJECTION, SOLUTION INTRAMUSCULAR; INTRAVENOUS at 10:30

## 2020-02-26 RX ADMIN — LIDOCAINE HYDROCHLORIDE 0.1 ML: 10 INJECTION, SOLUTION EPIDURAL; INFILTRATION; INTRACAUDAL; PERINEURAL at 10:15

## 2020-02-26 ASSESSMENT — MIFFLIN-ST. JEOR: SCORE: 1504.88

## 2020-02-26 ASSESSMENT — LIFESTYLE VARIABLES: TOBACCO_USE: 1

## 2020-02-26 NOTE — ANESTHESIA POSTPROCEDURE EVALUATION
Patient: Lluvia Glaser    Procedure(s):  ESOPHAGOGASTRODUODENOSCOPY, WITH BIOPSY    Diagnosis:Abdominal pain, epigastric [R10.13]  Diagnosis Additional Information: No value filed.    Anesthesia Type:  General    Note:  Anesthesia Post Evaluation    Patient location during evaluation: Bedside  Patient participation: Able to fully participate in evaluation  Level of consciousness: sleepy but conscious  Pain management: adequate  Airway patency: patent  Cardiovascular status: stable  Respiratory status: nasal cannula  Hydration status: stable  PONV: none     Anesthetic complications: None          Last vitals:  Vitals:    02/26/20 0940   BP: 127/76   Pulse: 80   Resp: 20   Temp: 36.6  C (97.9  F)   SpO2: 100%         Electronically Signed By: LENA Khanna CRNA  February 26, 2020  10:43 AM

## 2020-02-26 NOTE — H&P
34 year old year old female here for upper endoscopy for abdominal pain.  Pain is located in epigastrium.  Non radiating.  This has improved over past 2 weeks after stopping NSAIDs and starting PPI.  No black/tarry stools.  Denies nausea, vomiting, melena, history of PUD in the past.  No fevers or chills.        Patient Active Problem List   Diagnosis     Substance abuse (H)     Generalized anxiety disorder     Chronic eczema     Gastric ulcer     ADHD     Smoker       Past Medical History:   Diagnosis Date     ADHD      Chronic eczema      Gastric ulcer      Generalized anxiety disorder      Smoker      Substance abuse (H)        History reviewed. No pertinent surgical history.    History reviewed. No pertinent family history.    No current outpatient medications on file.       Allergies   Allergen Reactions     Blue Dyes Hives     Demerol [Meperidine] Hives       Pt reports that she has been smoking. She has never used smokeless tobacco. She reports that she does not drink alcohol or use drugs.    Exam:    Awake, Alert OX3  Lungs - CTA bilaterally  CV - RRR, no murmurs, distal pulses intact  Abd - soft, non-distended, non-tender, +BS  Extr - No cyanosis or edema    A/P 34 year old year old female in need of upper endoscopy for abdominal pain. Risks, benefits, alternatives, and complications were discussed including the possibility of perforation and the patient agreed to proceed.    Tesfaye Temple, DO on 2/26/2020 at 10:23 AM

## 2020-02-26 NOTE — ANESTHESIA CARE TRANSFER NOTE
Patient: Lluvia Glaser    Procedure(s):  ESOPHAGOGASTRODUODENOSCOPY (EGD)    Diagnosis: Abdominal pain, epigastric [R10.13]  Diagnosis Additional Information: No value filed.    Anesthesia Type:   No value filed.     Note:  Airway :Nasal Cannula  Patient transferred to:Phase II        Vitals: (Last set prior to Anesthesia Care Transfer)    CRNA VITALS  2/26/2020 1010 - 2/26/2020 1040      2/26/2020             Pulse:  82    SpO2:  99 %                Electronically Signed By: LENA Khanna CRNA  February 26, 2020  10:40 AM

## 2020-03-05 ENCOUNTER — OFFICE VISIT (OUTPATIENT)
Dept: FAMILY MEDICINE | Facility: CLINIC | Age: 35
End: 2020-03-05
Payer: COMMERCIAL

## 2020-03-05 ENCOUNTER — ANCILLARY PROCEDURE (OUTPATIENT)
Dept: GENERAL RADIOLOGY | Facility: CLINIC | Age: 35
End: 2020-03-05
Attending: NURSE PRACTITIONER
Payer: COMMERCIAL

## 2020-03-05 VITALS
TEMPERATURE: 97 F | SYSTOLIC BLOOD PRESSURE: 102 MMHG | HEIGHT: 65 IN | OXYGEN SATURATION: 98 % | RESPIRATION RATE: 14 BRPM | HEART RATE: 91 BPM | WEIGHT: 181 LBS | BODY MASS INDEX: 30.16 KG/M2 | DIASTOLIC BLOOD PRESSURE: 69 MMHG
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
## 2020-03-05 DIAGNOSIS — S80.02XA CONTUSION OF LEFT KNEE, INITIAL ENCOUNTER: ICD-10-CM

## 2020-03-05 DIAGNOSIS — S40.012A CONTUSION OF LEFT SHOULDER, INITIAL ENCOUNTER: ICD-10-CM
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
## 2020-03-05 LAB — COPATH REPORT: NORMAL

## 2020-03-05 PROCEDURE — 99213 OFFICE O/P EST LOW 20 MIN: CPT | Performed by: NURSE PRACTITIONER

## 2020-03-05 PROCEDURE — 73562 X-RAY EXAM OF KNEE 3: CPT | Mod: LT

## 2020-03-05 PROCEDURE — 73030 X-RAY EXAM OF SHOULDER: CPT | Mod: LT

## 2020-03-05 PROCEDURE — 73590 X-RAY EXAM OF LOWER LEG: CPT | Mod: LT

## 2020-03-05 ASSESSMENT — MIFFLIN-ST. JEOR: SCORE: 1513.95

## 2020-03-05 NOTE — PROGRESS NOTES
Subjective     Lluvia Glaser is a 34 year old female who presents to clinic today for the following health issues:    HPI   Concern - Pain in left shoulder and left knee down to ankle   Onset: Yesterday she fell     Description:   She fell off the bathroom counter while cleaning and landed on her knees and caught her self with her left arm. Bruising and cracking sound on her  Left knee . Left shin/ankle pain also     Intensity: 8/10    Progression of Symptoms:  worsening    Accompanying Signs & Symptoms:  Na     Previous history of similar problem:   Na     Precipitating factors:   Worsened by: Bending the knee, walking, bearing weight     Alleviating factors:  Improved by: Elevating it     Therapies Tried and outcome: Elevation helps Tylenol helps but pain is still present     Above HPI reviewed. Additionally, was cleaning the light fixtures above her bathroom sink last night when she fell approximately 4 feet striking her knee on the floor. Was somewhat painful when it occurred, however when she awoke this morning there was significant swelling, bruising and pain. Difficult to bear weight. Also has pain to the left shoulder, believes she tried to catch her fall with the left arm. No paresthesias. Did not hit her head, no LOC.       Patient Active Problem List   Diagnosis     Substance abuse (H)     Generalized anxiety disorder     Chronic eczema     Gastric ulcer     ADHD     Smoker     Past Surgical History:   Procedure Laterality Date     ESOPHAGOSCOPY, GASTROSCOPY, DUODENOSCOPY (EGD), COMBINED N/A 2/26/2020    Procedure: ESOPHAGOGASTRODUODENOSCOPY, WITH BIOPSY;  Surgeon: Tesfaye Temple DO;  Location: [facility]       Social History     Tobacco Use     Smoking status: Current Every Day Smoker     Smokeless tobacco: Never Used     Tobacco comment: Recommended patch and lozenge   Substance Use Topics     Alcohol use: No     No family history on file.      Current Outpatient Medications   Medication  "Sig Dispense Refill     albuterol (PROVENTIL) (2.5 MG/3ML) 0.083% neb solution Take 1 vial (2.5 mg) by nebulization every 6 hours as needed for shortness of breath / dyspnea or wheezing 1 Box 0     atomoxetine (STRATTERA) 80 MG capsule Take 1 capsule (80 mg) by mouth daily 30 capsule 0     clonazePAM (KLONOPIN) 2 MG tablet Take 1 tablet (2 mg) by mouth 2 times daily as needed for anxiety 30 tablet 0     order for DME Equipment being ordered: Splint left wrist 1 Device 0     pantoprazole (PROTONIX) 40 MG PO EC tablet Take 1 tablet (40 mg) by mouth daily for 30 doses 30 tablet 0     QUEtiapine (SEROQUEL) 100 MG tablet Take 1-2 tablets (100-200 mg) by mouth daily 60 tablet 1     triamcinolone (KENALOG) 0.1 % external cream Apply topically 2 times daily 60 g 0     omeprazole (PRILOSEC OTC) 20 MG EC tablet Take 1 tablet (20 mg) by mouth daily (Patient not taking: Reported on 3/5/2020) 90 tablet 1       Reviewed and updated as needed this visit by Provider         Review of Systems   ROS COMP: Constitutional, HEENT, cardiovascular, pulmonary, gi and gu systems are negative, except as otherwise noted.      Objective    /69 (BP Location: Right arm, Patient Position: Sitting, Cuff Size: Adult Large)   Pulse 91   Temp 97  F (36.1  C) (Tympanic)   Resp 14   Ht 1.638 m (5' 4.5\")   Wt 82.1 kg (181 lb)   SpO2 98%   BMI 30.59 kg/m    Body mass index is 30.59 kg/m .  Physical Exam  Vitals signs and nursing note reviewed.   Constitutional:       General: She is not in acute distress.     Appearance: Normal appearance.   Musculoskeletal:      Comments: Left shoulder- no deformity. TTP over AC joint and trapezius. ROM limited by pain, but is able to fully range the joint. CMS intact. No TTP of proximal humerus, elbow, forearm.    Left Leg- Significant swelling and ecchymosis overlying the patella, crepitus appreciated.  TTP even to light touch over patella, ROM limited by pain. TTP of proximal fibula, no TTP over proximal " tibia. CMS intact.   Neurological:      Mental Status: She is alert.          Diagnostic Test Results:  Labs reviewed in Epic  Xray - No obvious fracture on my review. Pending radiology interpretation.          Assessment & Plan     1. Fall, initial encounter  Tylenol, limited NSAIDs as she has issues with gastritis. RICE. Let me know if not  Improving.  - XR Knee Left 3 Views; Future  - XR Tibia & Fibula Left 2 Views; Future  - XR Shoulder Left 2 Views; Future    2. Contusion of left knee, initial encounter  Work note provided.  - Crutches Order for DME - ONLY FOR DME    3. Contusion of left shoulder, initial encounter         Patient Instructions   I do not see an obvious abnormality on xray today, but I will call you if the radiologist reads this differently.    Crutches for now.  You can bear weight as tolerated on your knee, but this is going to take time for the healing to occur.    Elevate at rest. Compression bandage may help when up and moving.    Tylenol for pain.      If continuing to have significant pain in 2 weeks, let me know and we will send you to PT.      Return in about 2 weeks (around 3/19/2020) for worsening or continued symptoms.    LENA Nair [facility]

## 2020-03-05 NOTE — NURSING NOTE
"Initial /69 (BP Location: Right arm, Patient Position: Sitting, Cuff Size: Adult Large)   Pulse 91   Temp 97  F (36.1  C) (Tympanic)   Resp 14   Ht 1.638 m (5' 4.5\")   Wt 82.1 kg (181 lb)   SpO2 98%   BMI 30.59 kg/m   Estimated body mass index is 30.59 kg/m  as calculated from the following:    Height as of this encounter: 1.638 m (5' 4.5\").    Weight as of this encounter: 82.1 kg (181 lb). .      "

## 2020-03-05 NOTE — PATIENT INSTRUCTIONS
I do not see an obvious abnormality on xray today, but I will call you if the radiologist reads this differently.    Crutches for now.  You can bear weight as tolerated on your knee, but this is going to take time for the healing to occur.    Elevate at rest. Compression bandage may help when up and moving.    Tylenol for pain.      If continuing to have significant pain in 2 weeks, let me know and we will send you to PT.

## 2020-03-05 NOTE — LETTER
[facility]  [address] [place] [address]  Phone: [phone]      March 5, 2020      RE: Lluvia Glaser  [address]  [place] [address]        To whom it may concern:    Lluvia Glaser is under my professional care. The employee is ABLE to return to work 3/6/2020.    When the patient returns to work, the following restrictions apply until 3/19/2020:  A) Bend: Occasionally (1-3 hours)  B) Squat: Not at all (0 hours)  C) Walk/Stand: Not at all (0 hours), can walk with crutches or as she can tolerate without crutches but should ideally work in a seated position  D) Reach Above Shoulders: Not at all (0 hours)  E) Lift, carry, push, and pull no more than:  11-20 lbs.Light duty-unable to lift more than 20 pounds       Sincerely,    Joanna Sanchez, APRN, CNP

## 2020-03-16 DIAGNOSIS — F41.9 ANXIETY: ICD-10-CM

## 2020-03-16 RX ORDER — QUETIAPINE FUMARATE 100 MG/1
100-200 TABLET, FILM COATED ORAL DAILY
Qty: 60 TABLET | Refills: 1 | Status: CANCELLED | OUTPATIENT
Start: 2020-03-16

## 2020-03-16 RX ORDER — CLONAZEPAM 2 MG/1
2 TABLET ORAL 2 TIMES DAILY PRN
Qty: 30 TABLET | Refills: 0 | Status: SHIPPED | OUTPATIENT
Start: 2020-03-16 | End: 2020-05-05 | Stop reason: DRUGHIGH

## 2020-03-16 NOTE — TELEPHONE ENCOUNTER
Pt has sore throat, states she has puss pockets in the back of her throat.   Recommended UC to evaluate. KPatrinyRARIANE

## 2020-03-16 NOTE — TELEPHONE ENCOUNTER
Klonopin refill.  Last seen and written on 2/17/20 for # 30 tabs.  Unable to see pt in clinic today.  Advise.  Sammie

## 2020-03-16 NOTE — TELEPHONE ENCOUNTER
Reason for Call:  Medication or medication refill:    Do you use a [facility]?  Name of the pharmacy and phone number for the current request:  [facility] [phone]    Name of the medication requested:   Seroquel  Can we leave a detailed message on this number? YES    Phone number patient can be reached at: Home number on file [phone] (home)    Best Time: any    Call taken on 3/16/2020 at 3:31 PM by Gala Marcelo

## 2020-03-17 ENCOUNTER — TELEPHONE (OUTPATIENT)
Dept: OTOLARYNGOLOGY | Facility: CLINIC | Age: 35
End: 2020-03-17

## 2020-03-17 ENCOUNTER — HOSPITAL ENCOUNTER (EMERGENCY)
Facility: CLINIC | Age: 35
Discharge: HOME | End: 2020-03-17
Attending: EMERGENCY MEDICINE | Admitting: EMERGENCY MEDICINE
Payer: COMMERCIAL

## 2020-03-17 VITALS
HEART RATE: 80 BPM | DIASTOLIC BLOOD PRESSURE: 67 MMHG | TEMPERATURE: 98 F | RESPIRATION RATE: 16 BRPM | SYSTOLIC BLOOD PRESSURE: 124 MMHG | OXYGEN SATURATION: 100 %

## 2020-03-17 DIAGNOSIS — J02.0 STREPTOCOCCAL PHARYNGITIS: ICD-10-CM

## 2020-03-17 LAB
DEPRECATED S PYO AG THROAT QL EIA: POSITIVE
SPECIMEN SOURCE: ABNORMAL

## 2020-03-17 PROCEDURE — 99284 EMERGENCY DEPT VISIT MOD MDM: CPT | Mod: Z6 | Performed by: EMERGENCY MEDICINE

## 2020-03-17 PROCEDURE — 87880 STREP A ASSAY W/OPTIC: CPT | Performed by: EMERGENCY MEDICINE

## 2020-03-17 PROCEDURE — 99283 EMERGENCY DEPT VISIT LOW MDM: CPT | Performed by: EMERGENCY MEDICINE

## 2020-03-17 RX ORDER — PREDNISONE 20 MG/1
40 TABLET ORAL DAILY
Qty: 14 TABLET | Refills: 0 | Status: SHIPPED | OUTPATIENT
Start: 2020-03-17 | End: 2020-05-05

## 2020-03-17 RX ORDER — PENICILLIN V POTASSIUM 500 MG/1
500 TABLET, FILM COATED ORAL 2 TIMES DAILY
Qty: 20 TABLET | Refills: 0 | Status: SHIPPED | OUTPATIENT
Start: 2020-03-17 | End: 2020-05-05

## 2020-03-17 NOTE — TELEPHONE ENCOUNTER
"Pt was advised that ENT is currently not scheduling appts at this time.  She was advised to complete the treatment she is currently on and this may resolve her ear issue.  She states, \"no I have been dealing with this ear problem for the past 4 months\".  Call was ended.    Carri Brandon  [facility] RN    "

## 2020-03-17 NOTE — TELEPHONE ENCOUNTER
Reason for Call:  Other appointment    Detailed comments: pt calling stating she has been dealing w/ an inner ear infection. She was in ER this morning and is on abx and prednisone.     Phone Number Patient can be reached at: Home number on file [phone] (home)    Best Time: any     Can we leave a detailed message on this number? YES    Call taken on 3/17/2020 at 11:17 AM by Mariaelena Cordon

## 2020-03-17 NOTE — DISCHARGE INSTRUCTIONS
Penicillin as prescribed, prednisone as prescribed    Drink plenty of fluids and rest    Tylenol ibuprofen for discomfort    Return here for vomiting, trouble breathing, faintness passing out or any other concern.

## 2020-03-17 NOTE — ED NOTES
Patient c/o sore throat and left ear pain for 3 days.  No drainage noted from ear.  Sores noted to back of throat.  No tonsils present.  Difficult to swallow.  Did stated took sons left over amoxicillin

## 2020-03-17 NOTE — ED AVS SNAPSHOT
[facility] Emergency Department  [address] [address]  Phone:  [phone]  Fax:  [phone]                                    Lluvia Glaser   MRN: [id number]    Department:  [facility] Emergency Department   Date of Visit:  3/17/2020           After Visit Summary Signature Page    I have received my discharge instructions, and my questions have been answered. I have discussed any challenges I see with this plan with the nurse or doctor.    ..........................................................................................................................................  Patient/Patient Representative Signature      ..........................................................................................................................................  Patient Representative Print Name and Relationship to Patient    ..................................................               ................................................  Date                                   Time    ..........................................................................................................................................  Reviewed by Signature/Title    ...................................................              ..............................................  Date                                               Time          22EPIC Rev 08/18

## 2020-03-17 NOTE — ED PROVIDER NOTES
History     Chief Complaint   Patient presents with     Otalgia     left ear ache for months offered UC pt refused, sore throat     Pharyngitis     HPI  Lluvia M Pacheco Glaser is a 34 year old female who presents with 3 days cough congestion sore throat low-grade temp subjective, no significant shortness of air.  No vomiting or diarrhea.  Started taking her son's amoxicillin, positive exposure to strep.    Allergies:  Allergies   Allergen Reactions     Blue Dyes Hives     Demerol [Meperidine] Hives       Problem List:    Patient Active Problem List    Diagnosis Date Noted     Substance abuse (H)      Priority: Medium     Generalized anxiety disorder      Priority: Medium     Chronic eczema      Priority: Medium     Gastric ulcer      Priority: Medium     ADHD      Priority: Medium     Smoker      Priority: Medium        Past Medical History:    Past Medical History:   Diagnosis Date     ADHD      Chronic eczema      Gastric ulcer      Generalized anxiety disorder      Smoker      Substance abuse (H)        Past Surgical History:    Past Surgical History:   Procedure Laterality Date     ESOPHAGOSCOPY, GASTROSCOPY, DUODENOSCOPY (EGD), COMBINED N/A 2/26/2020    Procedure: ESOPHAGOGASTRODUODENOSCOPY, WITH BIOPSY;  Surgeon: Tesfaye Temple DO;  Location: [facility]       Family History:    No family history on file.    Social History:  Marital Status:   [2]  Social History     Tobacco Use     Smoking status: Current Every Day Smoker     Smokeless tobacco: Never Used     Tobacco comment: Recommended patch and lozenge   Substance Use Topics     Alcohol use: No     Drug use: No        Medications:    penicillin V (VEETID) 500 MG tablet  predniSONE (DELTASONE) 20 MG tablet  albuterol (PROVENTIL) (2.5 MG/3ML) 0.083% neb solution  atomoxetine (STRATTERA) 80 MG capsule  clonazePAM (KLONOPIN) 2 MG tablet  omeprazole (PRILOSEC OTC) 20 MG EC tablet  order for DME  pantoprazole (PROTONIX) 40 MG PO EC tablet  QUEtiapine  (SEROQUEL) 100 MG tablet  triamcinolone (KENALOG) 0.1 % external cream          Review of Systems  Problem focused review of systems otherwise negative    Physical Exam   BP: 124/67  Pulse: 80  Heart Rate: 84  Temp: 98  F (36.7  C)  Resp: 16  SpO2: 100 %      Physical Exam  Nontoxic-appearing no respiratory distress alert and oriented  Head atraumatic normocephalic  EACs/TMs are unremarkable  Oropharynx moist without lesions  There is no cervical adenopathy neck supple full painless range of motion  Lungs clear no rales rhonchi or wheezes  ED Course        Procedures               Critical Care time:  none               Results for orders placed or performed during the hospital encounter of 03/17/20 (from the past 24 hour(s))   Streptococcus A Rapid Scr w Reflx to PCR    Specimen: Throat   Result Value Ref Range    Strep Specimen Description Throat     Streptococcus Group A Rapid Screen Positive (A) NEG^Negative       Medications - No data to display    Assessments & Plan (with Medical Decision Making)  34-year-old female congestion cough sore throat, rapid strep positive, has been treating with amoxicillin for a couple days.  Cough and wheezing.  Has albuterol inhaler and DuoNeb.  Add prednisone, 10 days penicillin.  Return criteria reviewed.     I have reviewed the nursing notes.    I have reviewed the findings, diagnosis, plan and need for follow up with the patient.            Discharge Medication List as of 3/17/2020  9:49 AM      START taking these medications    Details   penicillin V (VEETID) 500 MG tablet Take 1 tablet (500 mg) by mouth 2 times daily for 10 days, Disp-20 tablet,R-0, E-Prescribe      predniSONE (DELTASONE) 20 MG tablet Take 2 tablets (40 mg) by mouth daily for 7 days, Disp-14 tablet,R-0, E-Prescribe             Final diagnoses:   Streptococcal pharyngitis       3/17/2020   [facility] EMERGENCY DEPARTMENT     Ronald Jaramillo MD  03/17/20 1257       Ronald Jaramillo MD  03/17/20  [id number]

## 2020-04-22 ENCOUNTER — VIRTUAL VISIT (OUTPATIENT)
Dept: FAMILY MEDICINE | Facility: CLINIC | Age: 35
End: 2020-04-22
Payer: COMMERCIAL

## 2020-04-22 DIAGNOSIS — N10 PYELONEPHRITIS, ACUTE: Primary | ICD-10-CM

## 2020-04-22 PROCEDURE — 99213 OFFICE O/P EST LOW 20 MIN: CPT | Mod: 95 | Performed by: INTERNAL MEDICINE

## 2020-04-22 RX ORDER — SULFAMETHOXAZOLE/TRIMETHOPRIM 800-160 MG
1 TABLET ORAL 2 TIMES DAILY
Qty: 14 TABLET | Refills: 0 | Status: SHIPPED | OUTPATIENT
Start: 2020-04-22 | End: 2020-05-05

## 2020-04-22 NOTE — PROGRESS NOTES
"Lluvia Glaser is a 34 year old female who is being evaluated via a billable telephone visit.      The patient has been notified of following:     \"This telephone visit will be conducted via a call between you and your physician/provider. We have found that certain health care needs can be provided without the need for a physical exam.  This service lets us provide the care you need with a short phone conversation.  If a prescription is necessary we can send it directly to your pharmacy.  If lab work is needed we can place an order for that and you can then stop by our lab to have the test done at a later time.    Telephone visits are billed at different rates depending on your insurance coverage. During this emergency period, for some insurers they may be billed the same as an in-person visit.  Please reach out to your insurance provider with any questions.    If during the course of the call the physician/provider feels a telephone visit is not appropriate, you will not be charged for this service.\"    Patient has given verbal consent for Telephone visit?  Yes    How would you like to obtain your AVS? MyChart    Subjective     Lluvia Glaser is a 34 year old female who presents to clinic today for the following health issues:    HPI  UTI - Female  Duration of complaint: 2 weeks    Description:   Painful urination (Dysuria): no            Frequency: YES  Blood in urine (Hematuria): no  Delay in urine (Hesitency): no  Intensity: moderate  Progression of Symptoms:  same and constant - hurts when sit and standing.  Accompanying Signs & Symptoms:  Fever/chills: YES- feel feverish, doesn't have thermometer.  Feeling more run down, tired  Flank pain YES- Both but right side seems to be worse  Nausea and vomiting: no  Any vaginal symptoms: none  Abdominal/Pelvic Pain: YES- cramping  History:   History of frequent UTI's: YES  History of kidney stones: no  Sexually Active: YES, but no concerns about " STI  Possibility of pregnancy: No  Precipitating factors:   Therapies Tried and outcome: na               Current Outpatient Medications   Medication Sig Dispense Refill     albuterol (PROVENTIL) (2.5 MG/3ML) 0.083% neb solution Take 1 vial (2.5 mg) by nebulization every 6 hours as needed for shortness of breath / dyspnea or wheezing 1 Box 0     atomoxetine (STRATTERA) 80 MG capsule Take 1 capsule (80 mg) by mouth daily 30 capsule 0     clonazePAM (KLONOPIN) 2 MG tablet Take 1 tablet (2 mg) by mouth 2 times daily as needed for anxiety 30 tablet 0     omeprazole (PRILOSEC OTC) 20 MG EC tablet Take 1 tablet (20 mg) by mouth daily 90 tablet 1     order for DME Equipment being ordered: Splint left wrist 1 Device 0     QUEtiapine (SEROQUEL) 100 MG tablet Take 1-2 tablets (100-200 mg) by mouth daily 60 tablet 1     triamcinolone (KENALOG) 0.1 % external cream Apply topically 2 times daily 60 g 0       Reviewed and updated as needed this visit by Provider         Review of Systems   ROS COMP: Constitutional, HEENT, cardiovascular, pulmonary, gi and gu systems are negative, except as otherwise noted.       Objective   Reported vitals:  There were no vitals taken for this visit.   healthy, alert and no distress  PSYCH: Alert and oriented times 3; coherent speech, normal   rate and volume, able to articulate logical thoughts, able   to abstract reason, no tangential thoughts, no hallucinations   or delusions  Her affect is normal  RESP: No cough, no audible wheezing, able to talk in full sentences  Remainder of exam unable to be completed due to telephone visits    Diagnostic Test Results:  none         Assessment/Plan:  1. Pyelonephritis, acute -pyelo vs simple cystitis.  Treat with antibiotic.  patient worried about NSAIDs and COVID.  Advised NSAIDs are safe.  Start antibiotic, be seen in clinic if symptoms worsening.  - sulfamethoxazole-trimethoprim (BACTRIM DS) 800-160 MG tablet; Take 1 tablet by mouth 2 times daily for 7  days  Dispense: 14 tablet; Refill: 0    Return in about 1 week (around 4/29/2020) for If symptoms don't improve or if they worsen.      Phone call duration:  9 minutes    Phuong Bejarano DO

## 2020-05-05 ENCOUNTER — VIRTUAL VISIT (OUTPATIENT)
Dept: PSYCHIATRY | Facility: CLINIC | Age: 35
End: 2020-05-05
Attending: NURSE PRACTITIONER
Payer: COMMERCIAL

## 2020-05-05 DIAGNOSIS — F41.1 GENERALIZED ANXIETY DISORDER: ICD-10-CM

## 2020-05-05 DIAGNOSIS — F90.2 ATTENTION DEFICIT HYPERACTIVITY DISORDER (ADHD), COMBINED TYPE: ICD-10-CM

## 2020-05-05 DIAGNOSIS — F51.04 PSYCHOPHYSIOLOGICAL INSOMNIA: Primary | ICD-10-CM

## 2020-05-05 PROCEDURE — 90792 PSYCH DIAG EVAL W/MED SRVCS: CPT | Mod: 95 | Performed by: NURSE PRACTITIONER

## 2020-05-05 RX ORDER — QUETIAPINE FUMARATE 100 MG/1
100-200 TABLET, FILM COATED ORAL
Qty: 60 TABLET | Refills: 1 | Status: SHIPPED | OUTPATIENT
Start: 2020-05-05 | End: 2020-08-07

## 2020-05-05 RX ORDER — DOXEPIN HYDROCHLORIDE 25 MG/1
25 CAPSULE ORAL AT BEDTIME
Qty: 30 CAPSULE | Refills: 1 | Status: SHIPPED | OUTPATIENT
Start: 2020-05-05 | End: 2020-08-07

## 2020-05-05 RX ORDER — CLONAZEPAM 1 MG/1
1 TABLET ORAL 2 TIMES DAILY PRN
Qty: 60 TABLET | Refills: 0 | Status: SHIPPED | OUTPATIENT
Start: 2020-05-05 | End: 2020-05-11 | Stop reason: DRUGHIGH

## 2020-05-05 RX ORDER — ATOMOXETINE 40 MG/1
40 CAPSULE ORAL DAILY
Qty: 30 CAPSULE | Refills: 0 | Status: SHIPPED | OUTPATIENT
Start: 2020-05-05 | End: 2020-08-07

## 2020-05-05 ASSESSMENT — ANXIETY QUESTIONNAIRES
IF YOU CHECKED OFF ANY PROBLEMS ON THIS QUESTIONNAIRE, HOW DIFFICULT HAVE THESE PROBLEMS MADE IT FOR YOU TO DO YOUR WORK, TAKE CARE OF THINGS AT HOME, OR GET ALONG WITH OTHER PEOPLE: VERY DIFFICULT
6. BECOMING EASILY ANNOYED OR IRRITABLE: NEARLY EVERY DAY
2. NOT BEING ABLE TO STOP OR CONTROL WORRYING: SEVERAL DAYS
3. WORRYING TOO MUCH ABOUT DIFFERENT THINGS: SEVERAL DAYS
1. FEELING NERVOUS, ANXIOUS, OR ON EDGE: MORE THAN HALF THE DAYS
7. FEELING AFRAID AS IF SOMETHING AWFUL MIGHT HAPPEN: NOT AT ALL
5. BEING SO RESTLESS THAT IT IS HARD TO SIT STILL: NEARLY EVERY DAY
GAD7 TOTAL SCORE: 13

## 2020-05-05 ASSESSMENT — PATIENT HEALTH QUESTIONNAIRE - PHQ9
5. POOR APPETITE OR OVEREATING: NEARLY EVERY DAY
SUM OF ALL RESPONSES TO PHQ QUESTIONS 1-9: 8

## 2020-05-05 NOTE — PROGRESS NOTES
"Lluvia Glaser is a 34 year old female who is being evaluated via a billable telephone visit.      The patient has been notified of following:     \"This telephone visit will be conducted via a call between you and your physician/provider. We have found that certain health care needs can be provided without the need for a physical exam.  This service lets us provide the care you need with a short phone conversation.  If a prescription is necessary we can send it directly to your pharmacy.  If lab work is needed we can place an order for that and you can then stop by our lab to have the test done at a later time.    Telephone visits are billed at different rates depending on your insurance coverage. During this emergency period, for some insurers they may be billed the same as an in-person visit.  Please reach out to your insurance provider with any questions.    If during the course of the call the physician/provider feels a telephone visit is not appropriate, you will not be charged for this service.\"    Patient has given verbal consent for Telephone visit?  Yes    What phone number would you like to be contacted at? [phone]    How would you like to obtain your AVS? [email or web address]    Phone call duration: 55 minutes                                                             Outpatient Psychiatric Evaluation - Standard Adult    Name:  Lluvia Glaser  : 1985    Source of Referral:  Primary Care Provider: LENA Albrecht CNP   Last visit: 2020  Current Psychotherapist: None       Identifying Data:  Patient is a 34 year old, single  White American female  who presents for initial visit with me.  Patient is currently unemployed. Patient attended the session alone. Consent to communicate signed for no one. Consent for treatment signed and included in electronic medical record. Discussed limits of confidentiality today. My Practice Policy was reviewed and signed.     Patient prefers " to be called: Lluvia     Chief Complaint:    Chief Complaint   Patient presents with     Consult     new pt. ADHD and Anxiety. Patient currently taking Strattera and Klonopin         HPI:      Lluvia is a 34-year-old female who is visiting with me today by telephone to get refills on medication she has been out of for quite some time.  In particular she has not been taking her Strattera and has found it difficult to concentrate.  Because of the pandemic restrictions it has been difficult for her to be isolated.  Lluvia tells me she ran out of her Seroquel and Klonopin last month.        Sandie tells me that she lives alone.  She identifies no trauma history.  However, when she was 12 years of age she was taken away from her family home and placed in foster care.  It was at age 14 years old that she tells me her mother started her on drugs.  At that time she dropped out of school.  Over the last month and a half she admits to using methamphetamines and occasional cannabis.  In February of this year she [facility].    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  They are with her mother who lives in [place].  She has been mandated to go to parenting classes but has been unable to do so for the past 2 months.  She thinks she has 1 month left of classes.  She is in CD treatment and attends group therapies by virtual meetings 3 hours 3 days/week through [facility].  It is difficult for her to attend to these classes due to them occurring right before she goes to bed.      When she does not take medication she sleeps worse.   With regards to her clonazepam, Sandie informs me that she takes it at bedtime as it helps her to slow down panic attacks with racing heart, shortness of breath, and no fainting episodes.  For the past 10 years she has suffered from depression.  With regards to ADHD symptoms, Sandie tells me she was diagnosed when  she was 7 years of age and again we diagnosed through [facility].    She has not been working due to the pandemic restrictions.  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  She tells me she has financial stress due to this unemployment and has become behind on her bills.  Does receive food stamps.  Her children were taken away from her in May 2019.  She informed me that she had been  for 5 years and that the father of her children is still using substances.  She is distressed that they are staying with her mother as she harbors resentment for her starting her on drugs.       Psychiatric Review of Symptoms:  Depression: Interest: Decrease  Depressed Mood  Sleep: Decrease   Guilt: Increase  Concentration: Decrease  Suicide: No  Hopeless: Increase   Helpless: Increase   Worthless: Increase   Irritability: Increase   Ruminations: Increase    PHQ-9 scores:   PHQ-9 SCORE 2/6/2020 5/5/2020   PHQ-9 Total Score 12 8     Alexa:  Distractibility: Increase  Impulsiveness: Increase  Racing Thoughts: Increase  Pressured Speech: Increase   MDQ Score: Positive Screen  Anxiety: Feeling nervous, anxious, or on edge  Worrying too much about different things  Trouble relaxing  Restlessness  Thoughts of impending doom    WAYNE-7 scores:    WAYNE-7 SCORE 2/6/2020 5/5/2020   Total Score 7 13     Panic:  Palpitations  Shortness of Breath  Sense of Impending Doom  Crying   Agoraphobia:  No   PTSD:  History of Trauma   OCD:  No symptoms   Psychosis: No symptoms   ADD / ADHD: No symptoms  Gambling or shoplifting: No   Eating Disorder:  No symptoms  Sleep:   Trouble falling asleep  Trouble staying asleep     Psychiatric History:   Hospitalizations:None  History of Commitment? No   Past Treatment: counseling, MI / CD day treatment, medication(s) from physician / PCP and psychiatry  Suicide Attempts:  Denies  Self-injurious Behavior: Denies  Electroconvulsive Therapy (ECT) or Transcranial Magnetic Stimulation (TMS): No    Genetic Testing: No     Substance Use History:  Current use of drugs or alcohol: Cannabis    Patient reported the following problems as a result of drinking and drug use: child custody, DUI, family problems, financial problems, legal issues and relationship problems.   Based on the clinical interview, there  are indications of drug or alcohol abuse. Sober with one episode of relapse.  Tobacco use: Yes Cigarettes  Ready to quit?  No  Nicotine Replacement Therapy tried: none   Caffeine:  Yes  6 sodas/day  Patient has received chemical dependency treatment in the past at [place], [place]  Recovery Programming Involvement: None    Past Medical History:  Past Medical History:   Diagnosis Date     ADHD      Chronic eczema      Gastric ulcer      Generalized anxiety disorder      Smoker      Substance abuse (H)       Surgery:   Past Surgical History:   Procedure Laterality Date     ESOPHAGOSCOPY, GASTROSCOPY, DUODENOSCOPY (EGD), COMBINED N/A 2020    Procedure: ESOPHAGOGASTRODUODENOSCOPY, WITH BIOPSY;  Surgeon: Tesfaye Temple DO;  Location: [facility]     Allergies:     Allergies   Allergen Reactions     Blue Dyes Hives     Demerol [Meperidine] Hives     Primary Care Provider[facility]  Seizures or Head Injury: No  Diet: No Restrictions  Food Allergies: No   Exercise: No regular exercise program  Supplements: Reviewed per Electronic Medical Record Today    clonazePAM (KLONOPIN) 2 MG tablet, Take 1 tablet (2 mg) by mouth 2 times daily as needed for anxiety  QUEtiapine (SEROQUEL) 100 MG tablet, Take 1-2 tablets (100-200 mg) by mouth daily  [] acetaminophen 500 MG PO tablet, Take 1-2 tablets (500-1,000 mg) by mouth every 6 hours as needed for pain or fever  albuterol (PROVENTIL) (2.5 MG/3ML) 0.083% neb solution, Take 1 vial (2.5 mg) by nebulization every 6 hours as needed for shortness of breath / dyspnea or wheezing (Patient not taking: Reported on 2020)  atomoxetine (STRATTERA) 80 MG  capsule, Take 1 capsule (80 mg) by mouth daily (Patient not taking: Reported on 5/5/2020)    No current facility-administered medications on file prior to visit.        The [place] Prescription Monitoring Program has been reviewed and there are no concerns about diversionary activity for controlled substances at this time.      Vital Signs:  Vitals: There were no vitals taken for this visit.    Labs:  Most recent laboratory results reviewed and the pertinent results include: February lab work within normal limits    No EKG on file.    Review of Systems:  10 systems (general, cardiovascular, respiratory, eyes, ENT, endocrine, GI, , M/S, neurological) were reviewed. Most pertinent finding(s) is/are: She reports migraine headaches, no chest pain, shortness of breath with anxiety, no skin rashes. The remaining systems are all unremarkable.  Family History:   Patient reported family history includes: History reviewed. No pertinent family history.  Mental Illness History: Yes: Anxiety  Substance Abuse History: Yes: Drug addiction  Suicide History: Unknown  Medications: Unknown     Social History:   Born: [place]  Raised: Ran away age 14 years old  Childhood: went to [place]  Siblings:  2 sisters   Highest education level was dropped out age 14 years.   Employment History:  unemployed  Childhood illnesses: Denies  Current Living situation:  [place]  with self . Feels safe at home.  Children: 3 children   Firearms/Weapons Access: No: Patient denies   Service: No    Mental Status Examination:     Appearance:  awake, alert and mild distress  Attitude:  cooperative   Eye Contact:  Unable to assess  Gait and Station: No assistive Devices used and No dizziness or falls  Psychomotor Behavior:  Unable to assess  Oriented to:  time, person, and place  Attention Span and Concentration:  Fair  Speech:  pressured speech and rambling  Mood:  anxious and depressed  Affect:  intensity is  heightened  Associations:  no loose associations  Thought Process:  tangental  Thought Content:  no evidence of suicidal ideation or homicidal ideation, no auditory hallucinations present and no visual hallucinations present  Recent and Remote Memory:  fair Not formally assessed. No amnesia.  Fund of Knowledge: appropriate  Insight:  fair  Judgment:  fair  Impulse Control:  fair    Suicide Risk Assessment:  Today Lluvia Glaser reports that she is having no thoughts to want to end her life or to harm other people. In addition, there are notable risk factors for self-harm, including anxiety, substance abuse, withdrawing and mood change. However, risk is mitigated by commitment to family, history of seeking help when needed, future oriented, no access to firearms or weapons, denies suicidal intent or plan and denies homicidal ideation, intent, or plan. Therefore, based on all available evidence including the factors cited above, Lluvia Glaser does not appear to be at imminent risk for self-harm, does not meet criteria for a 72-hr hold, and therefore remains appropriate for ongoing outpatient level of care.  A thorough assessment of risk factors related to suicide and self-harm have been reviewed and are noted above. The patient convincingly denies acute suicidality on several occasions. Local community safety resources reviewed and printed for patient to use if needed. There was no deceit detected, and the patient presented in a manner that was believable.     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  No flowsheet data found.    The Patient Activation Measure (ULISES) score was completed and recorded in [facility]. This assesses patient knowledge, skill, and confidence for self-management. No flowsheet data found.             Impression:  Lluvia Glaser is visiting with me today by telephone to talk about medication options to manage her symptoms of mood dysregulation, anxiety, and attentional deficits.  Since childhood she has dabbled in substance use and has recently become sober as of March of this year.  Drugs of choice included methamphetamines and cannabis.  At this time she tells me she has no cravings and her goal is to have her 3 children return to her after following requirements outlined to her from child protection services.  She is reporting minimal sleep without the clonazepam.  She also tells me she has difficulties attending to tasks at home and has been unemployed since the pandemic restrictions from jobs through temporary agencies.  She endorses no thoughts to harm herself and is goal oriented to have her children return to her from [place] where they are staying with Lluvia's mother at this time.  She has not been taking the Strattera for approximately 1 month and it has been reintroduced to her schedule.  Clonazepam was continued at twice daily for anxiety.  I did add doxepin at bedtime to help her sleep..  Quetiapine is available as needed at bedtime for mood regulation.  She is to continue with all talk therapy through  [employer] as managed by child protection services requirements.      Medication side effects and alternatives reviewed. Health promotion activities recommended and reviewed today. All questions addressed. Education and counseling completed regarding risks and benefits  of medications and psychotherapy options. Collaborative Care Psychiatry Service model reviewed today. Recommend therapy for additional support.     Treatment Plan:     1. Change Strattera to 40 mg daily for ADHD  2. Change Clonazepam to 1 mg  Twice daily for anxiety  3. Add Doxepin 25 mg at bedtime to help you sleep  4. Continue Quetiapine but take as needed at bedtime 100-200 mg for mood and help you sleep 5.  Continue [facility] therapies      Continue all other medical directions per primary care provider.     Continue all other medications as reviewed per electronic medical record today.     Safety plan reviewed. To the Emergency Department as needed or call after hours crisis line at [phone] or [phone]. [place] Crisis Text Line: Text [place] to 578218  or  Suicide LifeLine Chat: BodBot.org/chat/    To schedule individual or family therapy, call [facility] at [phone].     Schedule an appointment with me in 4 weeks or sooner as needed.  Call [facility] at [phone] to schedule.    Follow up with primary care provider as planned or for acute medical concerns.    Call the psychiatric nurse line with medication questions or concerns at [phone].    [employer]hart may be used to communicate with your provider, but this is not intended to be used for emergencies.    Crisis Resources:    National Suicide Prevention Lifeline: 871.801.3831 (TTY: 820.303.3021). Call anytime for help.  (www.suicidepreventionlifeline.org)  National [place] on Mental Illness (www.sabina.org): 730.662.9293 or 491-955-8508.   Mental Health Association (www.mentalhealth.org): 461.580.4234 or 069-878-2357.  [place] Crisis Text Line: Text [place] to 017123  Suicide LifeLine Chat: BodBot.org/chat    Administrative Billing:   Time spent with patient was 60 minutes and greater than 50% of time or 40 minutes was spent in counseling and coordination of care regarding  above diagnoses and treatment plan.    Patient Status:  Patient will continue to be seen for ongoing consultation and stabilization.    Signed:   RILEY Mohamud-BC   Psychiatry

## 2020-05-05 NOTE — PATIENT INSTRUCTIONS
1. Change Strattera to 40 mg daily for ADHD  2. Change Clonazepam to 1 mg  Twice daily for anxiety  3. Add Doxepin 25 mg at bedtime to help you sleep  4. Continue Quetiapine but take as needed at bedtime 100-200 mg for mood and help you sleep  5.  Continue [facility] therapies      Continue all other medical directions per primary care provider.     Continue all other medications as reviewed per electronic medical record today.     Safety plan reviewed. To the Emergency Department as needed or call after hours crisis line at [phone] or [phone]. [place] Crisis Text Line: Text [place] to 585095  or  Suicide LifeLine Chat: [facility]org/chat/    To schedule individual or family therapy, call [facility] at [phone].     Schedule an appointment with me in 4 weeks or sooner as needed.  Call [facility] at [phone] to schedule.    Follow up with primary care provider as planned or for acute medical concerns.    Call the psychiatric nurse line with medication questions or concerns at [phone].    Fios may be used to communicate with your provider, but this is not intended to be used for emergencies.    Crisis Resources:    National Suicide Prevention Lifeline: 762.267.9964 (TTY: 974.716.3573). Call anytime for help.  (www.suicidepreventionlifeline.org)  National Senath on Mental Illness (www.sabina.org): 826.110.2602 or 945-706-3581.   Mental Health Association (www.mentalhealth.org): 213.859.7940 or 301-805-9052.  [place] Crisis Text Line: Text [place] to 073449  Suicide LifeLine Chat: [facility] [employer]org/chat

## 2020-05-06 ASSESSMENT — ANXIETY QUESTIONNAIRES: GAD7 TOTAL SCORE: 13

## 2020-05-11 ENCOUNTER — TELEPHONE (OUTPATIENT)
Dept: PSYCHIATRY | Facility: CLINIC | Age: 35
End: 2020-05-11

## 2020-05-11 DIAGNOSIS — F41.1 GENERALIZED ANXIETY DISORDER: ICD-10-CM

## 2020-05-11 RX ORDER — CLONAZEPAM 2 MG/1
1 TABLET ORAL 2 TIMES DAILY PRN
Qty: 30 TABLET | Refills: 0 | Status: SHIPPED | OUTPATIENT
Start: 2020-05-11 | End: 2020-05-11

## 2020-05-11 NOTE — TELEPHONE ENCOUNTER
As she had picked up the 1 mg prescription on May 5 I am unable to fill the 2 mg tablet at this time until there is proof that the 1 mg tablets were disposed of properly at the correct number. I cancelled the 2 mg tab  Refill at [facility] for now - not sure where to have Lluvia take those 1 mg tabs to be counted and disposed of

## 2020-05-11 NOTE — TELEPHONE ENCOUNTER
Routing to provider for review.   Keeley: Can clonazepam Rx be changed to previous strength and patient take 1/2 tab BID to avoid the 1mg strength containing blue dye?    Shyla Hodgson, RN    Nurse Liaison  [facility]

## 2020-05-11 NOTE — TELEPHONE ENCOUNTER
"Reason for Call:  Medication or medication refill:    Do you use a [facility]?  Name of the pharmacy and phone number for the current request:     [facility] - [place] - [address] [address]    [phone]    Name of the medication requested: clonazePAM (KLONOPIN) 1 MG tablet    Other request: Pt stated that she has not been feeling well since this med was changed and found that it contains blue dye to which she is allergic. Stated this allergy should be on her medical record. Pt requests that the medication be changed to \"the old one\".    Can we leave a detailed message on this number? YES    Phone number patient can be reached at: Home number on file [phone] (home)    Best Time: ASAP    Call taken on 5/11/2020 at 1:12 PM by Alverto Peter      "

## 2020-05-12 NOTE — TELEPHONE ENCOUNTER
I spoke to Kera Reyes [facility].     Patient may bring in her Clonazepam for counting and disposal. They will call our clinic when patient does so with a count of the number of Clonazepam surrendered.    I attempted to call Lluvia. I reached her voicemail. Message left to call clinic. I left patient detailed instructions to bring her unused Clonazepam to the pharmacy, as stated above.     If patient need assistance, please attempt to connect to nursing if available.

## 2020-05-14 NOTE — TELEPHONE ENCOUNTER
Spoke with pharmacist Cristóbal from [facility].  Patient never brought in her supply to be counted and turned in. Cristóbal wondering if patient changed her mind.     Writer attempted to contact patient to discuss-- no answer, LVM with information to return current supply to pharmacy for new Rx to be written..  If patient calls back, ok to connect with nursing if available.    Shyla Hodgson, RN    Nurse Liaison  [facility]

## 2020-05-18 NOTE — TELEPHONE ENCOUNTER
2nd attempt to call Lluvia re: status of clonazepam. No answer; LVM to call clinic. Tranfer to Psych RN ([phone]) if patient calls.    Debby Malhotra RN on 5/18/2020 at 9:52 AM

## 2020-05-22 ENCOUNTER — TELEPHONE (OUTPATIENT)
Dept: BEHAVIORAL HEALTH | Facility: CLINIC | Age: 35
End: 2020-05-22

## 2020-05-22 NOTE — TELEPHONE ENCOUNTER
Behavioral Health Home Services  No data recorded      Social Work Care Navigator Note      Patient: Lluvia Glaser  Date: May 22, 2020  Preferred Name: Lluvia    Previous PHQ-9:   PHQ-9 SCORE 2/6/2020 5/5/2020   PHQ-9 Total Score 12 8     Previous WAYNE-7:   WAYNE-7 SCORE 2/6/2020 5/5/2020   Total Score 7 13     ULISES LEVEL:  No flowsheet data found.    Preferred Contact:  No data recorded    Type of Contact Today: Phone call (not reached/unavailable)      Data: (subjective / Objective):  SWCC called patient and left a message requesting a call back for initial offer for Behavioral Health Home (BH) services. Plan to attempt again.    SWCC will continue to monitor and provide care progression and coordination.    TRIXIE Moon [facility]  Behavioral Health Home (BHH)   [facility]  [phone]  May 22, 2020  9:09 AM

## 2020-06-03 ENCOUNTER — TELEPHONE (OUTPATIENT)
Dept: BEHAVIORAL HEALTH | Facility: CLINIC | Age: 35
End: 2020-06-03

## 2020-06-03 NOTE — TELEPHONE ENCOUNTER
Behavioral Health Home Services  No data recorded      Social Work Care Navigator Note      Patient: Lluvia Glaser  Date: Sharon 3, 2020  Preferred Name: Lluvia    Previous PHQ-9:   PHQ-9 SCORE 2/6/2020 5/5/2020   PHQ-9 Total Score 12 8     Previous WAYNE-7:   WAYNE-7 SCORE 2/6/2020 5/5/2020   Total Score 7 13     ULISES LEVEL:  No flowsheet data found.    Preferred Contact:  No data recorded    Type of Contact Today: Phone call (patient / identified key support person reached)      Data: (subjective / Objective):    [facility] Introduction:  Hi my name is BRANDEN Moon from your (name) primary care clinic.     I work closely with your primary care provider, [place], [facility].     If it's ok I'd like to talk about some new services available to you, at no out of pocket cost to you.      Before we get started can you verify your insurance for me?  Yes Blue Plus    What social work or case management services do you receive? (If so, are you receiving ACT or TCM?).  Not able to really find out about this today, as patient was driving when [facility] called today. Appt Scheduled for Friday, June 5th at 2pm.    Getting to Know You - Whole Person Care:  This new service is called Behavioral Health Home services, which is designed to support you as a whole person beyond just your medical needs.        I'm here to be a central point of contact for your healthcare needs and to help with:    Housing    Transportation    Financial resources    Comprehensive Health needs (appointment help, medication costs, etc.)    Employment    Education    Health Insurance applications    And connecting with social supports or community resources    Out of the things I mentioned what would you find helpful?  Many of those things. Patient requested to meet for appointment on Friday.      Patient response to [facility] Service offering:   Considering enrolling in [facility] services    TRIXIE Moon JESSICA  Behavioral Health Home ([facility]) Social  [place]  [facility]  [phone]  Sharon 3, 2020  2:42 PM              Next 5 appointments (look out 90 days)    Jun 05, 2020  2:00 PM CDT  Telephone Visit with BRANDEN Moon  [facility] [address] [address]  [phone]

## 2020-06-05 ENCOUNTER — VIRTUAL VISIT (OUTPATIENT)
Dept: BEHAVIORAL HEALTH | Facility: CLINIC | Age: 35
End: 2020-06-05
Payer: COMMERCIAL

## 2020-06-05 DIAGNOSIS — R69 DIAGNOSIS DEFERRED: Primary | ICD-10-CM

## 2020-06-05 NOTE — PROGRESS NOTES
Behavioral Health Home Services  No data recorded      Social Work Care Navigator Note      Patient: Lluvia Glaser  Date: June 5, 2020  Preferred Name: Lluvia    Previous PHQ-9:   PHQ-9 SCORE 2/6/2020 5/5/2020   PHQ-9 Total Score 12 8     Previous WAYNE-7:   WAYNE-7 SCORE 2/6/2020 5/5/2020   Total Score 7 13     ULISES LEVEL:  No flowsheet data found.    Preferred Contact:  No data recorded    Type of Contact Today: Phone call (patient / identified key support person reached)      Data: (subjective / Objective):    [facility] Introduction:  Hi my name is BRANDEN Moon from your (name) primary care clinic.     I work closely with your primary care provider, [place], [facility].     If it's ok I'd like to talk about some new services available to you, at no out of pocket cost to you.      Before we get started can you verify your insurance for me?  [place]    What social work or case management services do you receive? (If so, are you receiving ACT or TCM?).  Not able to connect with patient about this today. Patient stated she needed more time to consider the program. [facility] mailed out Behavioral Health Home (BH) brochure.    Getting to Know You - Whole Person Care:  This new service is called Behavioral Health Home services, which is designed to support you as a whole person beyond just your medical needs.        I'm here to be a central point of contact for your healthcare needs and to help with:    Housing    Transportation    Financial resources    Comprehensive Health needs (appointment help, medication costs, etc.)    Employment    Education    Health Insurance applications    And connecting with social supports or community resources    Out of the things I mentioned what would you find helpful?  Many of those things. Patient requested to meet for appointment on Tuesday, June 16th.      Patient response to [facility] Service offering:   Considering enrolling in [facility] services    TRIXIE Moon  [facility]  Behavioral Health Home ([facility])   [facility]  [phone]  June 5, 2020  2:14 PM

## 2020-06-16 ENCOUNTER — VIRTUAL VISIT (OUTPATIENT)
Dept: BEHAVIORAL HEALTH | Facility: CLINIC | Age: 35
End: 2020-06-16
Payer: COMMERCIAL

## 2020-06-16 DIAGNOSIS — R69 DIAGNOSIS DEFERRED: Primary | ICD-10-CM

## 2020-06-16 NOTE — PROGRESS NOTES
Behavioral Health Home Services  [facility] Clinic: [place]        Social Work Care Navigator Note      Patient: Lluvia Glaser  Date: June 16, 2020  Preferred Name: Lluvia    Previous PHQ-9:   PHQ-9 SCORE 2/6/2020 5/5/2020   PHQ-9 Total Score 12 8     Previous WAYNE-7:   WAYNE-7 SCORE 2/6/2020 5/5/2020   Total Score 7 13     ULISES LEVEL:  No flowsheet data found.    Preferred Contact:  Need for : No  Preferred Contact: Cell      Type of Contact Today: Phone call (patient / identified key support person reached)      Data: (subjective / Objective):    [facility] Introduction:  Hi my name is BRANDEN Moon from your (name) primary care clinic.     I work closely with your primary care provider, [place].     If it's ok I'd like to talk about some new services available to you, at no out of pocket cost to you.      Before we get started can you verify your insurance for me? Yes - [place]    What social work or case management services do you receive? (If so, are you receiving ACT or TCM?).  No    Getting to Know You - Whole Person Care:  This new service is called Behavioral Health Home services, which is designed to support you as a whole person beyond just your medical needs.        I'm here to be a central point of contact for your healthcare needs and to help with:    Housing    Transportation    Financial resources    Comprehensive Health needs (appointment help, medication costs, etc.)    Employment    Education    Health Insurance applications    And connecting with social supports or community resources    Out of the things I mentioned what would you find helpful?  Financial Resources, Comprehensive Health Needs, connecting with social supports or community resources.    Enrollment and Brief Needs Assessment  Access and Ability to use transportation? Yes  Type of Transportation: Patient states she has her own vehicle    Preferred method of contact: Cell phone    Housing:  Type  of Home: Mobile Home  Who do you live with? Self  How does rent get paid? Patient states she owns her own trailer but is behind on her lot rent and utilities. Patient is currently unemployed and collecting unemployment.     *Confirm Phone Number and Address on Facesheet* - Confirmed    Do you have a community therapist? Any DA completed?  No but does have psychiatrist Keeley Mancini. Diagnostic Assessment completed on 05/05/2020    [facility] emailed resources and application for emergency assistance for utilities and rental assistance.    Email Sent to patient:    Akin Saunders to Behavioral Health Home Case Management services through [facility].  You are currently enrolled and your Health & Wellness appointment has been scheduled for Friday, June 19th at 1:30pm.  Once this is completed we are required and will touch base monthly or more often per your request.  Twice a year we will meet either in person or via phone to update your goals.   What you need to know about the program: It is free to you and paid for by your medical assistance. Its voluntary and you are able to dis-enroll at any time if you feel the program is not meeting your needs.  I am delighted to be working with you and look forward to supporting you and helping you make progress on your goals. I have included the utility and Covid-19 resources we talked about today; please let me know if you have additional questions or concerns. I have also messaged your provider as you requested.    Sincerely,      Patient response to [facility] Service offering:   Patient enrolled in [facility] today - patient provided verbal authorization via telehealth visit for enrollment in the Behavioral Health Home ([facility]) program and for electronic communication Release of Information. [facility] faxed enrollment forms to [facility] at [phone]. [facility] faxed enrollment and Release of Information forms to HIM. [facility] information added to care teams.      [place]  TRIXIE Denney [facility]  Behavioral Health Home ([facility])   [facility]  [phone]  June 16, 2020  3:35 PM

## 2020-06-19 ENCOUNTER — TELEPHONE (OUTPATIENT)
Dept: BEHAVIORAL HEALTH | Facility: CLINIC | Age: 35
End: 2020-06-19

## 2020-06-19 NOTE — TELEPHONE ENCOUNTER
Behavioral Health Home Services  [facility] Clinic: [place]        Social Work Care Navigator Note      Patient: Lluvia Glaser  Date: June 19, 2020  Preferred Name: Lluvia    Previous PHQ-9:   PHQ-9 SCORE 2/6/2020 5/5/2020   PHQ-9 Total Score 12 8     Previous WAYNE-7:   WAYNE-7 SCORE 2/6/2020 5/5/2020   Total Score 7 13     ULISES LEVEL:  No flowsheet data found.    Preferred Contact:  Need for : No  Preferred Contact: Cell      Type of Contact Today: Phone call (patient / identified key support person reached)      Data: (subjective / Objective):    Patient called via telehealth visit to complete the comprehensive wellness assessment for Behavioral Health Home Services.  Patient states she is unable to meet today and needs to re-schedule. [facility] rescheduled Health and Wellness appointment with patient today for July 1st at 9am.    TRIXIE Moon [facility]  Behavioral Health [place] ([facility])   [facility]  [phone]  June 19, 2020  1:45 PM

## 2020-07-01 ENCOUNTER — TELEPHONE (OUTPATIENT)
Dept: BEHAVIORAL HEALTH | Facility: CLINIC | Age: 35
End: 2020-07-01

## 2020-07-01 NOTE — TELEPHONE ENCOUNTER
Behavioral Health Home Services  [facility] Clinic: [place]        Social Work Care Navigator Note      Patient: Lluvia Glaser  Date: July 1, 2020  Preferred Name: Lluvia    Previous PHQ-9:   PHQ-9 SCORE 2/6/2020 5/5/2020   PHQ-9 Total Score 12 8     Previous WAYNE-7:   WAYNE-7 SCORE 2/6/2020 5/5/2020   Total Score 7 13     ULISES LEVEL:  No flowsheet data found.    Preferred Contact:  Need for : No  Preferred Contact: Cell      Type of Contact Today: Phone call (not reached/unavailable)      Data: (subjective / Objective):  SWCC called patient and left a message requesting a call back for Health & Wellness assessment (2nd missed appt) for Behavioral Health Home ([facility]) services. Plan to attempt again.    SWCC will continue to monitor and provide care progression and coordination.    TRIXIE Moon [facility]  Behavioral Health Home ([facility])   [employer]  [phone]  July 1, 2020  9:39 AM

## 2020-07-09 ENCOUNTER — TELEPHONE (OUTPATIENT)
Dept: BEHAVIORAL HEALTH | Facility: CLINIC | Age: 35
End: 2020-07-09

## 2020-07-09 NOTE — TELEPHONE ENCOUNTER
Reason for call:  Other   Patient called regarding (reason for call): prescription  Additional comments: pt has appt scheduled for 8/7 but needs refills of all meds before then.  Pt asks for callback because there is a med that sometimes comes as a blue pill but she needs the white version.  Pharmacy is   [facility] - [place], [place] - [address] [address] [phone] (Phone)  [phone] (Fax)         Phone number to reach patient:  Cell number on file:    Telephone Information:   Mobile [phone]       Best Time:  anytime    Can we leave a detailed message on this number?  YES    Travel screening: Not Applicable

## 2020-07-10 NOTE — TELEPHONE ENCOUNTER
Attempted to call Lluvia to discuss refill requests. No answer; LVM to call clinic. Transfer to RN if patient calls.    Last appt with Keeley Mancini: 5/5/20, 4 week follow up recommended  Next appt: 8/7/20    STELLA Malhotra RN on 7/10/2020 at 9:47 AM

## 2020-07-13 NOTE — TELEPHONE ENCOUNTER
2nd attempt to contact Lluvia re: refill requests. No answer; LVM to call clinic. If Lluvia calls, transfer to Psych RN.    Last appt with Keeley Mancini: 5/5/20, 4 week follow up recommended  Next appt: 8/7/20    Tx plan 5/5/20:  1. Change Strattera to 40 mg daily for ADHD  2. Change Clonazepam to 1 mg  Twice daily for anxiety  3. Add Doxepin 25 mg at bedtime to help you sleep  4. Continue Quetiapine but take as needed at bedtime 100-200 mg for mood and help you sleep 5.  Continue [facility] therapies    Debby Malhotra, RN on 7/13/2020 at 9:33 AM

## 2020-07-15 ENCOUNTER — TELEPHONE (OUTPATIENT)
Dept: BEHAVIORAL HEALTH | Facility: CLINIC | Age: 35
End: 2020-07-15

## 2020-07-15 NOTE — TELEPHONE ENCOUNTER
Behavioral Health Home Services  [facility] Clinic: [place]        Social Work Care Navigator Note      Patient: Lluvia Glaser  Date: July 15, 2020  Preferred Name: Lluvia    Previous PHQ-9:   PHQ-9 SCORE 2/6/2020 5/5/2020   PHQ-9 Total Score 12 8     Previous WAYNE-7:   WAYNE-7 SCORE 2/6/2020 5/5/2020   Total Score 7 13     ULISES LEVEL:  No flowsheet data found.    Preferred Contact:  Need for : No  Preferred Contact: Cell      Type of Contact Today: Phone call (not reached/unavailable)      Data: (subjective / Objective):  Attempted to reach patient for Health & Wellness Assessment, but was unsuccessful, left message.  Plan to attempt again.      TRIXIE Moon LGSW Behavioral Health [employer] ([facility])   [facility]  [phone]  July 15, 2020  10:12 AM

## 2020-07-31 ENCOUNTER — TELEPHONE (OUTPATIENT)
Dept: BEHAVIORAL HEALTH | Facility: CLINIC | Age: 35
End: 2020-07-31

## 2020-07-31 NOTE — TELEPHONE ENCOUNTER
Behavioral Health Home Services  [facility] Clinic: [place]        Social Work Care Navigator Note      Patient: Lluvia Glaser  Date: July 31, 2020  Preferred Name: Lluvia    Previous PHQ-9:   PHQ-9 SCORE 2/6/2020 5/5/2020   PHQ-9 Total Score 12 8     Previous WAYNE-7:   WAYNE-7 SCORE 2/6/2020 5/5/2020   Total Score 7 13     ULISES LEVEL:  No flowsheet data found.    Preferred Contact:  Need for : No  Preferred Contact: Cell      Type of Contact Today: Phone call (not reached/unavailable)      Data: (subjective / Objective):  Attempted to reach patient, but was unsuccessful, left message, messaged psychiatry provider and sent reminder letter.  Plan to attempt again.      TRIXIE Moon LGSW Behavioral Health Home ([facility])   [facility]  [phone]  July 31, 2020  9:21 AM

## 2020-07-31 NOTE — LETTER
[facility] Health [place] ([facility]): Health & Wellness Assessment  [facility] Clinic: [place]    [facility]      July 31, 2020    [facility] [place]  [address] [place] [address]      Dear Lluvia Glaser,    I am writing to inform you that I have tried contacting you to schedule your Health and Wellness Assessment which was due back on 06/19/2020. The Health & Wellness Assessment is required to be completed for you to continue to be enrolled in the [facility] [place] ([facility]) program. A Health & Wellness Assessment is where we set your goals for the [facility] program and monitor your progress with these goals. Please call me as soon as your receive this letter so we can get an appointment scheduled.      If you have any questions about your Health & Wellness Assessment, Behavioral Health Home ([facility]) services, or if you feel you no longer was to be enrolled in the [facility] [place] [facility] program, please feel free to contact me by phone or by email. My contact information is listed below. I look forward to hearing from you!    Thank you,          TRIXIE Moon [facility]  Behavioral Health [place] ([facility])   [phone]  [email or web address]

## 2020-08-07 ENCOUNTER — E-VISIT (OUTPATIENT)
Dept: PSYCHIATRY | Facility: CLINIC | Age: 35
End: 2020-08-07
Payer: COMMERCIAL

## 2020-08-07 ENCOUNTER — VIRTUAL VISIT (OUTPATIENT)
Dept: PSYCHIATRY | Facility: CLINIC | Age: 35
End: 2020-08-07
Payer: COMMERCIAL

## 2020-08-07 DIAGNOSIS — F39 EPISODIC MOOD DISORDER (H): ICD-10-CM

## 2020-08-07 DIAGNOSIS — F41.1 GENERALIZED ANXIETY DISORDER: ICD-10-CM

## 2020-08-07 DIAGNOSIS — F19.10 SUBSTANCE ABUSE (H): ICD-10-CM

## 2020-08-07 DIAGNOSIS — F51.04 PSYCHOPHYSIOLOGICAL INSOMNIA: Primary | ICD-10-CM

## 2020-08-07 DIAGNOSIS — F39 EPISODIC MOOD DISORDER (H): Primary | ICD-10-CM

## 2020-08-07 PROCEDURE — 99207 ZZC NON-BILLABLE SERV PER CHARTING: CPT | Performed by: NURSE PRACTITIONER

## 2020-08-07 PROCEDURE — 99214 OFFICE O/P EST MOD 30 MIN: CPT | Mod: 95 | Performed by: NURSE PRACTITIONER

## 2020-08-07 RX ORDER — QUETIAPINE FUMARATE 200 MG/1
200 TABLET, FILM COATED ORAL AT BEDTIME
Qty: 30 TABLET | Refills: 1 | Status: SHIPPED | OUTPATIENT
Start: 2020-08-07 | End: 2020-09-17

## 2020-08-07 RX ORDER — CLONAZEPAM 2 MG/1
TABLET ORAL
Qty: 30 TABLET | Refills: 0 | Status: SHIPPED | OUTPATIENT
Start: 2020-08-07 | End: 2020-09-17

## 2020-08-07 ASSESSMENT — ANXIETY QUESTIONNAIRES
2. NOT BEING ABLE TO STOP OR CONTROL WORRYING: NEARLY EVERY DAY
7. FEELING AFRAID AS IF SOMETHING AWFUL MIGHT HAPPEN: NOT AT ALL
3. WORRYING TOO MUCH ABOUT DIFFERENT THINGS: NOT AT ALL
GAD7 TOTAL SCORE: 15
6. BECOMING EASILY ANNOYED OR IRRITABLE: NEARLY EVERY DAY
5. BEING SO RESTLESS THAT IT IS HARD TO SIT STILL: NEARLY EVERY DAY
1. FEELING NERVOUS, ANXIOUS, OR ON EDGE: NEARLY EVERY DAY
IF YOU CHECKED OFF ANY PROBLEMS ON THIS QUESTIONNAIRE, HOW DIFFICULT HAVE THESE PROBLEMS MADE IT FOR YOU TO DO YOUR WORK, TAKE CARE OF THINGS AT HOME, OR GET ALONG WITH OTHER PEOPLE: EXTREMELY DIFFICULT

## 2020-08-07 ASSESSMENT — PATIENT HEALTH QUESTIONNAIRE - PHQ9
SUM OF ALL RESPONSES TO PHQ QUESTIONS 1-9: 14
5. POOR APPETITE OR OVEREATING: NEARLY EVERY DAY

## 2020-08-07 NOTE — PROGRESS NOTES
"Lluvia Glaser is a 34 year old female who is being evaluated via a billable video visit.      The patient has been notified of following:     \"This video visit will be conducted via a call between you and your physician/provider. We have found that certain health care needs can be provided without the need for an in-person physical exam.  This service lets us provide the care you need with a video conversation.  If a prescription is necessary we can send it directly to your pharmacy.  If lab work is needed we can place an order for that and you can then stop by our lab to have the test done at a later time.    Video visits are billed at different rates depending on your insurance coverage.  Please reach out to your insurance provider with any questions.    If during the course of the call the physician/provider feels a video visit is not appropriate, you will not be charged for this service.\"    Patient has given verbal consent for Video visit? Yes  How would you like to obtain your AVS? MyChart  If you are dropped from the video visit, the video invite should be resent to: Send to e-mail at: [email or web address]  Will anyone else be joining your video visit? No        Video-Visit Details    Type of service:  Video Visit    Video Start Time: 9:26 AM  Video End Time: 9:55 AM    Originating Location (pt. Location): Home    Distant Location (provider location):  [facility]     Platform used for Video Visit: Britney Mancini NP         Outpatient Psychiatric Progress Note    Name: Lluvia Glaser   : 1985                    Primary Care Provider: [facility]   Therapist: yes     PHQ-9 scores:  PHQ-9 SCORE 2020   PHQ-9 Total Score 12 8 14       WAYNE-7 scores:  WAYNE-7 SCORE 2020   Total Score 7 13 15       Patient Identification:    Patient is a 34 year old year old, partnered / significant other  White " "American female  who presents for return visit with me.  Patient is currently unemployed. Patient attended the session alone. Patient prefers to be called: \"Lluvia\".    Interim History:    I last saw Lluvia Glaser for outpatient psychiatry Consultation on May 5, 2020.     During that appointment, we talked  about medication options to manage her symptoms of mood dysregulation, anxiety, and attentional deficits.  Since childhood she has dabbled in substance use and has recently become sober as of March of this year.  Drugs of choice included methamphetamines and cannabis.  At this time she tells me she has no cravings and her goal is to have her 3 children return to her after following requirements outlined to her from child protection services.  She is reporting minimal sleep without the clonazepam.  She also tells me she has difficulties attending to tasks at home and has been unemployed since the pandemic restrictions from jobs through temporary agencies.  She endorses no thoughts to harm herself and is goal oriented to have her children return to her from [place] where they are staying with Lluvia's mother at this time.  She has not been taking the Strattera for approximately 1 month and it has been reintroduced to her schedule.  Clonazepam was continued at twice daily for anxiety.  I did add doxepin at bedtime to help her sleep..  Quetiapine is available as needed at bedtime for mood regulation.  She is to continue with all talk therapy through  [facility] as managed by child protection services requirements.     Current medications include: albuterol (PROVENTIL) (2.5 MG/3ML) 0.083% neb solution, Take 1 vial (2.5 mg) by nebulization every 6 hours as needed for shortness of breath / dyspnea or wheezing (Patient not taking: Reported on 5/5/2020)  atomoxetine (STRATTERA) 40 MG capsule, Take 1 capsule (40 mg) by mouth daily (Patient not taking: Reported on 8/7/2020)  doxepin (SINEQUAN) 25 MG capsule, Take " 1 capsule (25 mg) by mouth At Bedtime (Patient not taking: Reported on 8/7/2020)  QUEtiapine (SEROQUEL) 100 MG tablet, Take 1-2 tablets (100-200 mg) by mouth nightly as needed (depression, mood dysregulation) (Patient not taking: Reported on 8/7/2020)    No current facility-administered medications on file prior to visit.        The [place] Prescription Monitoring Program has been reviewed and there are no concerns about diversionary activity for controlled substances at this time.      I was able to review most recent Primary Care Provider, specialty provider, and therapy visit notes that I have access to.     Today, patient reports that she had to sign parental rights over to her mother on August 17.  No medications for two months.  She has anxiety with chest pain.  She took a friend's clonazepam and felt better.  Depression worse as she cries all the time and has been isolating.  With clonazepam 1 mg tab she got headaches due to the dye in the medication.   She left [facility] outpatient program as they felt she was not doing enough to take care of her mental health.  She sees a therapist.  She would like to start back to work.    She lives with her boyfriend and has no financial stress.       has a past medical history of ADHD, Chronic eczema, Gastric ulcer, Generalized anxiety disorder, Smoker, and Substance abuse (H). She also has no past medical history of Diabetes (H), Motion sickness, PONV (postoperative nausea and vomiting), Sleep apnea, Thyroid disease, or Uncomplicated asthma.    Social history updates:    She identified a local girlfriend and her m other who lives in [place] as supportive.  She is being followed by a Behavior home health specialist regularly    Substance use updates:    sober since October 22 from methamphetamines, does not drin alcohol  Tobacco use: Yes Cigarettes  Ready to quit?  No  Nicotine Replacement Therapy tried: none     Vital Signs:   There were no vitals taken for this  visit.    Labs:    Most recent laboratory results reviewed and no new labs.     Review of Systems:  10 systems (general, cardiovascular, respiratory, eyes, ENT, endocrine, GI, , M/S, neurological) were reviewed. Most pertinent finding(s) is/are: She experiences chest tightness and shortness of breath when she has anxiety, no skin rashes. The remaining systems are all unremarkable.    Mental Status Examination:  Appearance:  awake, alert and mild distress  Attitude:  cooperative   Eye Contact:  adequate  Gait and Station: Normal, No assistive Devices used and No dizziness or falls  Psychomotor Behavior:  no evidence of tardive dyskinesia, dystonia, or tics, fidgeting and intact station, gait and muscle tone  Oriented to:  time, person, and place  Attention Span and Concentration:  Normal  Speech:   pressured speech and Speaks: English  Mood:  anxious, sad  and depressed  Affect:  intensity is heightened  Associations:  no loose associations  Thought Process:  tangental  Thought Content:  no evidence of suicidal ideation or homicidal ideation, no auditory hallucinations present and no visual hallucinations present  Recent and Remote Memory:  intact Not formally assessed. No amnesia.  Fund of Knowledge: appropriate  Insight:  fair  Judgment:  fair  Impulse Control:  fair    Suicide Risk Assessment:  Today Lluvia Glaser reports that she is not having thoughts to end her life or to harm mothers. In addition, there are notable risk factors for self-harm, including anxiety, substance abuse, withdrawing, wrecklessness and mood change. However, risk is mitigated by commitment to family, history of seeking help when needed, future oriented, no access to firearms or weapons, denies suicidal intent or plan and denies homicidal ideation, intent, or plan. Therefore, based on all available evidence including the factors cited above, Lluvia Glaser does not appear to be at imminent risk for self-harm, does not  meet criteria for a 72-hr hold, and therefore remains appropriate for ongoing outpatient level of care.  A thorough assessment of risk factors related to suicide and self-harm have been reviewed and are noted above. The patient convincingly denies suicidality on several occasions. Local community safety resources printed and reviewed for patient to use if needed. There was no deceit detected, and the patient presented in a manner that was believable.     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  [facility] due to excessive demands placed on her to take better care of her mental health.  She has been off of medications for the past 2 months.  Today clonazepam 1 mg twice daily for anxiety was ordered.  Strattera and doxepin were discontinued.  She would like to continue with the quetiapine 200 mg at bedtime as she has been sleeping poorly and also to help with mood dysregulation.  She understands quetiapine's propensity for weight gain but still wants to take the  medication to feel better.  Her fiancé is supportive to her financial and emotional needs..    Medication side effects and alternatives were reviewed. Health promotion activities recommended and reviewed today. All questions addressed. Education and counseling completed regarding risks and benefits of medications and psychotherapy options.    Treatment Plan:    1. Strattera discontinued  2. Clonazepam 1 mg  Twice daily for anxiety  3. Doxepin discontinued  4. Quetiapine 200 mg at bedtime  5. Continue seeing your talk therapist      Continue all other medications as reviewed per electronic medical record today.     Safety plan reviewed. To the Emergency Department as needed or call after hours crisis line at [phone] or [phone]. [place] Crisis Text [place]. Text [place] to 429816 or Suicide LifeLine Chat: AdorStyle.org/chat/    To schedule individual or family therapy, call [facility] at [phone].    Schedule an appointment with me in September or sooner as needed. Call [facility] at [phone] to schedule.    Follow up with primary care provider as planned or for acute medical concerns.    Call the psychiatric nurse line with medication questions or concerns at [phone].    [employer] may be used to communicate with your provider, but this is not intended to be used for emergencies.    Crisis Resources:    National Suicide Prevention Lifeline: 760.213.1791 (TTY: 406.577.7006). Call anytime for help.  (www.suicidepreventionlifeline.org)  National [place] on Mental Illness (www.sabina.org): 253.311.8414 or 289-550-0590.   Mental Health Association (www.mentalhealth.org): 309.382.6559 or 095-413-3227.  [place] Crisis [place]: Text [place] to 189878  Suicide LifeLine Chat: AdorStyle.org/chat    Administrative Billing:   Time spent with patient was 30 minutes and greater than 50% of time or 20 minutes was spent in counseling and coordination of  care regarding above diagnoses and treatment plan.    Patient Status:  Patient will continue to be seen for ongoing consultation and stabilization.    Signed:   RILEY Mohamud-BC   Psychiatry

## 2020-08-07 NOTE — PATIENT INSTRUCTIONS
1. Strattera discontinued  2. Clonazepam 1 mg  Twice daily for anxiety  3. Doxepin discontinued  4. Quetiapine 200 mg at bedtime  5. Continue seeing your talk therapist      Continue all other medications as reviewed per electronic medical record today.     Safety plan reviewed. To the Emergency Department as needed or call after hours crisis line at [phone] or [phone]. [place] Crisis [place]. Text [place] to 228645 or Suicide LifeLine Chat: suicidevMobo.org/chat/    To schedule individual or family therapy, call [facility] at [phone].    Schedule an appointment with me in September or sooner as needed. Call [facility] at [phone] to schedule.    Follow up with primary care provider as planned or for acute medical concerns.    Call the psychiatric nurse line with medication questions or concerns at [phone].    [employer] may be used to communicate with your provider, but this is not intended to be used for emergencies.    Crisis Resources:    National Suicide Prevention Lifeline: 397.740.4777 (TTY: 362.677.9914). Call anytime for help.  (www.suicidepreventionlifeline.org)  National [place] on Mental Illness (www.sabina.org): 622.929.8588 or 163-444-6380.   Mental Health Association (www.mentalhealth.org): 500.727.6886 or 748-646-5204.  [place] Crisis [place]: Text [place] to 676086  Suicide LifeLine Chat: suicideCNG-Oneline.org/chat

## 2020-08-08 ASSESSMENT — ANXIETY QUESTIONNAIRES: GAD7 TOTAL SCORE: 15

## 2020-08-14 ENCOUNTER — DOCUMENTATION ONLY (OUTPATIENT)
Dept: BEHAVIORAL HEALTH | Facility: CLINIC | Age: 35
End: 2020-08-14

## 2020-08-14 ENCOUNTER — TELEPHONE (OUTPATIENT)
Dept: BEHAVIORAL HEALTH | Facility: CLINIC | Age: 35
End: 2020-08-14

## 2020-08-14 NOTE — PROGRESS NOTES
Documentation Only     Upon patients enrollment into [facility] services, Epic did not allow for the appropriate selection for [facility] location for utilization on the ERV dashboard. Updated [facility] brief needs flowsheet to reflect this on the ER dashboard.      TRIXIE Moon [facility]  Behavioral Health Home ([facility])   [facility]  [phone]  August 14, 2020  2:36 PM

## 2020-08-14 NOTE — TELEPHONE ENCOUNTER
Behavioral Health Home Services  [facility]: [place]        Social Work Care Navigator Note      Patient: Lluvia Glaser  Date: August 14, 2020  Preferred Name: Lluvia    Previous PHQ-9:   PHQ-9 SCORE 2/6/2020 5/5/2020 8/7/2020   PHQ-9 Total Score 12 8 14     Previous WAYNE-7:   WAYNE-7 SCORE 2/6/2020 5/5/2020 8/7/2020   Total Score 7 13 15     ULISES LEVEL:  No flowsheet data found.    Preferred Contact:  Need for : No  Preferred Contact: Cell      Type of Contact Today: Phone call (not reached/unavailable)      Data: (subjective / Objective):  Attempted to reach patient for Behavioral Health [place] ([facility]) Health & Wellness Assessment, but was unsuccessful, left message.  Plan to attempt again.      TRIXIE Moon [facility]  Behavioral Health [place] ([facility])   [facility]  [phone]  August 14, 2020  2:04 PM

## 2020-08-19 ENCOUNTER — TELEPHONE (OUTPATIENT)
Dept: BEHAVIORAL HEALTH | Facility: CLINIC | Age: 35
End: 2020-08-19

## 2020-08-19 NOTE — TELEPHONE ENCOUNTER
Behavioral Health Home Services  [facility] Clinic: [place]        Social Work Care Navigator Note      Patient: Lluvia Glaser  Date: August 19, 2020  Preferred Name: Lluvia    Previous PHQ-9:   PHQ-9 SCORE 2/6/2020 5/5/2020 8/7/2020   PHQ-9 Total Score 12 8 14     Previous WAYNE-7:   WAYNE-7 SCORE 2/6/2020 5/5/2020 8/7/2020   Total Score 7 13 15     ULISES LEVEL:  No flowsheet data found.    Preferred Contact:  Need for : No  Preferred Contact: Cell      Type of Contact Today: Phone call (not reached/unavailable)      Data: (subjective / Objective):  Attempted to reach patient for Health & Wellness Assessment, but was unsuccessful, left message.  Plan to attempt again.      TRIXIE Moon [facility]  Behavioral Health [place] ([facility])   [facility]  [phone]  August 19, 2020  1:53 PM

## 2020-09-10 ENCOUNTER — TELEPHONE (OUTPATIENT)
Dept: BEHAVIORAL HEALTH | Facility: CLINIC | Age: 35
End: 2020-09-10

## 2020-09-10 NOTE — TELEPHONE ENCOUNTER
Behavioral Health Home Services  [facility] Clinic: [place]        Social Work Care Navigator Note      Patient: Lluvia Glaser  Date: September 10, 2020  Preferred Name: Lluvia    Previous PHQ-9:   PHQ-9 SCORE 2/6/2020 5/5/2020 8/7/2020   PHQ-9 Total Score 12 8 14     Previous WAYNE-7:   WAYNE-7 SCORE 2/6/2020 5/5/2020 8/7/2020   Total Score 7 13 15     ULISES LEVEL:  No flowsheet data found.    Preferred Contact:  Need for : No  Preferred Contact: Cell      Type of Contact Today: Phone call (not reached/unavailable)      Data: (subjective / Objective):  Attempted to reach patient, but was unsuccessful, left message and mailed out missed Health & Wellness letter.  Plan to attempt again.      TRIXIE Moon [facility]  Behavioral Health [place] ([facility])   [facility]  [phone]  September 10, 2020  2:30 PM

## 2020-09-10 NOTE — LETTER
Behavioral Health Home ([facility]): Health Action Plan  [facility]: [place]    [facility]      Name: Lluvia Glaser  Preferred Name: Lluvia  : 1985  MRN: [id number]    September 10, 2020    [facility] [place] [facility] [place]  [address] [place] [address]      Dear Lluviawendy Glaser,    I am writing to inform you that I have tried contacting you to schedule your Health & Wellness Assessment which was due back in 2020. The Health & Wellness Assessment is required to be completed as you continue to be enrolled in the Behavioral Health Home ([facility]) program. A Health & Wellness Assessment is where we set your goals for the [facility] program and monitor your progress with these goals.  Please call me as soon as your receive this letter so we can get an appointment scheduled.    If we are not able to complete your HAP Review by the end of the month, you will be discharged from Behavioral Health Home ([facility]) effective 2020.    If you have any questions about your Health & Wellness Assessment, Behavioral Health Home ([facility]) services, or if you feel you no longer was to be enrolled in the [facility] [place] ([facility]) program, please feel free to contact me by phone or by email. My contact information is listed below. I look forward to hearing from you!    Thank you,          TRIXIE Moon [facility]  Behavioral Health [place] ([facility])   [phone]  [email or web address]

## 2020-09-17 ENCOUNTER — VIRTUAL VISIT (OUTPATIENT)
Dept: PSYCHIATRY | Facility: CLINIC | Age: 35
End: 2020-09-17
Payer: COMMERCIAL

## 2020-09-17 DIAGNOSIS — F39 EPISODIC MOOD DISORDER (H): ICD-10-CM

## 2020-09-17 DIAGNOSIS — F41.1 GENERALIZED ANXIETY DISORDER: Primary | ICD-10-CM

## 2020-09-17 DIAGNOSIS — F15.21 METHAMPHETAMINE USE DISORDER, MODERATE, IN EARLY REMISSION, IN CONTROLLED ENVIRONMENT, DEPENDENCE (H): ICD-10-CM

## 2020-09-17 DIAGNOSIS — F17.200 TOBACCO DEPENDENCE SYNDROME: ICD-10-CM

## 2020-09-17 DIAGNOSIS — F51.04 PSYCHOPHYSIOLOGICAL INSOMNIA: ICD-10-CM

## 2020-09-17 PROCEDURE — 99214 OFFICE O/P EST MOD 30 MIN: CPT | Mod: 95 | Performed by: NURSE PRACTITIONER

## 2020-09-17 RX ORDER — IMIPRAMINE HYDROCHLORIDE 10 MG/1
10 TABLET, FILM COATED ORAL 2 TIMES DAILY
Qty: 60 TABLET | Refills: 1 | Status: SHIPPED | OUTPATIENT
Start: 2020-09-17 | End: 2020-11-09 | Stop reason: DRUGHIGH

## 2020-09-17 RX ORDER — CLONAZEPAM 2 MG/1
TABLET ORAL
Qty: 30 TABLET | Refills: 0 | Status: SHIPPED | OUTPATIENT
Start: 2020-09-17 | End: 2020-10-12

## 2020-09-17 RX ORDER — QUETIAPINE FUMARATE 200 MG/1
200 TABLET, FILM COATED ORAL AT BEDTIME
Qty: 30 TABLET | Refills: 1 | Status: SHIPPED | OUTPATIENT
Start: 2020-09-17 | End: 2020-11-09

## 2020-09-17 ASSESSMENT — PATIENT HEALTH QUESTIONNAIRE - PHQ9
5. POOR APPETITE OR OVEREATING: NEARLY EVERY DAY
SUM OF ALL RESPONSES TO PHQ QUESTIONS 1-9: 15

## 2020-09-17 ASSESSMENT — ANXIETY QUESTIONNAIRES
7. FEELING AFRAID AS IF SOMETHING AWFUL MIGHT HAPPEN: NOT AT ALL
2. NOT BEING ABLE TO STOP OR CONTROL WORRYING: NOT AT ALL
6. BECOMING EASILY ANNOYED OR IRRITABLE: NEARLY EVERY DAY
1. FEELING NERVOUS, ANXIOUS, OR ON EDGE: NEARLY EVERY DAY
GAD7 TOTAL SCORE: 13
3. WORRYING TOO MUCH ABOUT DIFFERENT THINGS: SEVERAL DAYS
5. BEING SO RESTLESS THAT IT IS HARD TO SIT STILL: NEARLY EVERY DAY
IF YOU CHECKED OFF ANY PROBLEMS ON THIS QUESTIONNAIRE, HOW DIFFICULT HAVE THESE PROBLEMS MADE IT FOR YOU TO DO YOUR WORK, TAKE CARE OF THINGS AT HOME, OR GET ALONG WITH OTHER PEOPLE: EXTREMELY DIFFICULT

## 2020-09-17 NOTE — PATIENT INSTRUCTIONS
Continue clonazepam 1 mg twice daily  2.  Continue quetiapine 200 mg at bedtime  3.  Add doxepin 10 mg twice daily for anxiety and to help you sleep  4.  Continue participation in NA meetings and seek additional talk therapies in the future      Continue all other medications as reviewed per electronic medical record today.     Safety plan reviewed. To the Emergency Department as needed or call after hours crisis line at [phone] or [phone]. [place] Crisis Text [place]. Text [place] to 328959 or Suicide LifeLine Chat: suicideTVbeat.org/chat/    To schedule individual or family therapy, call [facility] at [phone].    Schedule an appointment with me in 6 weeks or sooner as needed. Call [facility] at [phone] to schedule.    Follow up with primary care provider as planned or for acute medical concerns.    Call the psychiatric nurse line with medication questions or concerns at [phone].    [employer] may be used to communicate with your provider, but this is not intended to be used for emergencies.    Crisis Resources:    National Suicide Prevention Lifeline: 116.910.1137 (TTY: 416.850.4383). Call anytime for help.  (www.suicidepreventionlifeline.org)  National [place] on Mental Illness (www.sabina.org): 888.826.5764 or 118-191-8985.   Mental Health Association (www.mentalhealth.org): 290.513.4067 or 437-961-8385.  [place] Crisis Text [place]: Text [place] to 305875  Suicide LifeLine Chat: suicideTVbeat.org/chat

## 2020-09-17 NOTE — PROGRESS NOTES
"Lluvia Glaser is a 34 year old female who is being evaluated via a billable video visit.      The patient has been notified of following:     \"This video visit will be conducted via a call between you and your physician/provider. We have found that certain health care needs can be provided without the need for an in-person physical exam.  This service lets us provide the care you need with a video conversation.  If a prescription is necessary we can send it directly to your pharmacy.  If lab work is needed we can place an order for that and you can then stop by our lab to have the test done at a later time.    Video visits are billed at different rates depending on your insurance coverage.  Please reach out to your insurance provider with any questions.    If during the course of the call the physician/provider feels a video visit is not appropriate, you will not be charged for this service.\"    Patient has given verbal consent for Video visit? Yes  How would you like to obtain your AVS? MyChart  If you are dropped from the video visit, the video invite should be resent to: Text to cell phone: [phone]  Will anyone else be joining your video visit? No        Video-Visit Details    Type of service:  Video Visit    Video Start Time: 1:30 PM  Video End Time: Finished visit by telephone    Originating Location (pt. Location): Home    Distant Location (provider location):  [facility]     Platform used for Video Visit: Britney Mancini NP           Outpatient Psychiatric Progress Note    Name: Lluvia Glaser   : 1985                    Primary Care Provider: [facility]   Therapist: [facility]    PHQ-9 scores:  PHQ-9 SCORE 2020   PHQ-9 Total Score 8 14 15       WAYNE-7 scores:  WAYNE-7 SCORE 2020   Total Score 13 15 13       Patient Identification:    Patient is a 34 year old year old, " "partnered / significant other  White American female  who presents for return visit with me.  Patient is currently unemployed. Patient attended the session alone. Patient prefers to be called: \"Lluvia\".    Interim History:    I last saw Lluvia Glaser for outpatient psychiatry Return Visit on August 7, 2020.     During that appointment, she presented tearful today in describing how she lost parental rights to her children.  She had decided to leave the [facility] outpatient treatment program due to excessive demands placed on her to take better care of her mental health.  She has been off of medications for the past 2 months.  Today clonazepam 1 mg twice daily for anxiety was ordered.  Strattera and doxepin were discontinued.  She would like to continue with the quetiapine 200 mg at bedtime as she has been sleeping poorly and also to help with mood dysregulation.  She understands quetiapine's propensity for weight gain but still wants to take the medication to feel better.  Her fiancé is supportive to her financial and emotional needs.. .     Current medications include: albuterol (PROVENTIL) (2.5 MG/3ML) 0.083% neb solution, Take 1 vial (2.5 mg) by nebulization every 6 hours as needed for shortness of breath / dyspnea or wheezing  clonazePAM (KLONOPIN) 2 MG tablet, Take 1/2 tablet twice daily  QUEtiapine (SEROQUEL) 200 MG tablet, Take 1 tablet (200 mg) by mouth At Bedtime    No current facility-administered medications on file prior to visit.        The [place] Prescription Monitoring Program has been reviewed and there are no concerns about diversionary activity for controlled substances at this time.      I was able to review most recent Primary Care Provider, specialty provider, and therapy visit notes that I have access to.     Today, patient reports that CPS is making her sign custody over to her mom.  Her mental state is where it needs to be.  Lately she has not been able to sleep.  She is having " panic attacks.  She has been taking more Clonazepam.   She tosses and turns at night.  She has been out of clonazepam for a week.  She attends online groups for NA.  Her mom is supportive.  Sober since October 22, 2019.   She isolates as she does not  Want to run into using f friends.  Rapid and pressured speech.     has a past medical history of ADHD, Chronic eczema, Gastric ulcer, Generalized anxiety disorder, Smoker, and Substance abuse (H). She also has no past medical history of Diabetes (H), Motion sickness, PONV (postoperative nausea and vomiting), Sleep apnea, Thyroid disease, or Uncomplicated asthma.    Social history updates:    She prefers not to interact with other people.      Substance use updates:    She denies alcohol use  Tobacco use: Yes Cigarettes  Ready to quit?  No  Nicotine Replacement Therapy tried: none     Vital Signs:   There were no vitals taken for this visit.    Labs:    Most recent laboratory results reviewed and no new labs.     Review of Systems:  10 systems (general, cardiovascular, respiratory, eyes, ENT, endocrine, GI, , M/S, neurological) were reviewed. Most pertinent finding(s) is/are: She reports no chest pain, no shortness of breath, no headaches, no skin rashes. The remaining systems are all unremarkable.    Mental Status Examination:  Appearance:  awake, alert and mild distress  Attitude:  cooperative   Eye Contact:  adequate and wears glasses  Gait and Station: No assistive Devices used and No dizziness or falls  Psychomotor Behavior:  fidgeting  Oriented to:  time, person, and place  Attention Span and Concentration:  Normal  Speech:   pressured speech and Speaks: English  Mood:  anxious and depressed  Affect:  intensity is heightened  Associations:  no loose associations  Thought Process:  tangental  Thought Content:  no evidence of suicidal ideation or homicidal ideation, no auditory hallucinations present and no visual hallucinations present  Recent and Remote Memory:   intact Not formally assessed. No amnesia.  Fund of Knowledge: low-normal  Insight:  fair  Judgment:  fair  Impulse Control:  fair    Suicide Risk Assessment:  Today Lluvia Glaser reports she is having no thoughts to want to end her life or to harm other people. In addition, there are notable risk factors for self-harm, including anxiety, substance abuse, purposelessness/no reason for living, hopelessness, withdrawing and mood change. However, risk is mitigated by commitment to family, history of seeking help when needed, future oriented, no access to firearms or weapons, denies suicidal intent or plan and denies homicidal ideation, intent, or plan. Therefore, based on all available evidence including the factors cited above, Lluvia Glaser does not appear to be at imminent risk for self-harm, does not meet criteria for a 72-hr hold, and therefore remains appropriate for ongoing outpatient level of care.  A thorough assessment of risk factors related to suicide and self-harm have been reviewed and are noted above. The patient convincingly denies suicidality on several occasions. Local community safety resources printed and reviewed for patient to use if needed. There was no deceit detected, and the patient presented in a manner that was believable.     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  This will be the first of 3 sessions as her 3 children will be placed into the custody of her mother.  Even though Sandie understands this is a better environment for them, she still is highly anxious and saddened that her children are not with her.  She worries about how the  will treat her during the court session.  Given this she has been taking an extra half tablet of clonazepam some evenings when she is unable to sleep.  I told her she is already at maximum dose of clonazepam that I am comfortable in prescribing.  She informed me that she has been out of the medication for about a week.  I added doxepin 10 mg twice daily to help her relax.  She will continue the quetiapine 200 mg at bedtime.  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  She participates in an NA support group online but has no other treatment modalities in place.  She does her best to isolate to her home as she does not want to get into situations where she is interacting again with friends who are using.  She tells me \"meth has ruined my life\"..    Medication side effects and alternatives were reviewed. Health promotion activities recommended and reviewed today. All questions addressed. Education and counseling completed regarding risks and benefits of medications and psychotherapy options.    Treatment Plan:    1.  Continue clonazepam 1 mg twice daily  2.  Continue quetiapine 200 mg at bedtime  3.  Add doxepin 10 mg twice daily for anxiety and to help you sleep  4.  Continue participation in NA meetings and seek additional talk therapies in the " future      Continue all other medications as reviewed per electronic medical record today.     Safety plan reviewed. To the Emergency Department as needed or call after hours crisis line at [phone] or [phone]. [place] Crisis Text Line. Text [place] to 551440 or Suicide LifeLine Chat: suicideTestObject.org/chat/    To schedule individual or family therapy, call [facility] at [phone].    Schedule an appointment with me in 6 weeks or sooner as needed. Call [facility] at [phone] to schedule.    Follow up with primary care provider as planned or for acute medical concerns.    Call the psychiatric nurse line with medication questions or concerns at [phone].    Hidden Radio may be used to communicate with your provider, but this is not intended to be used for emergencies.    Crisis Resources:    National Suicide Prevention Lifeline: 270.942.8121 (TTY: 922.629.5848). Call anytime for help.  (www.suicidepreventionlifeline.org)  National [place] on Mental Illness (www.[place]abina.org): 971.781.4747 or 591-862-6829.   Mental Health Association (www.mentalhealth.org): 254.791.3995 or 477-721-8802.  [place] Crisis Text Line: Text [place] to 773350  Suicide LifeLine Chat: suicideTestObject.org/chat    Administrative Billing:   Time spent with patient was 30 minutes and greater than 50% of time or 20 minutes was spent in counseling and coordination of care regarding above diagnoses and treatment plan.    Patient Status:  Patient will continue to be seen for ongoing consultation and stabilization.    Signed:   RILEY Mohamud-BC   Psychiatry

## 2020-09-18 ENCOUNTER — TELEPHONE (OUTPATIENT)
Dept: FAMILY MEDICINE | Facility: CLINIC | Age: 35
End: 2020-09-18

## 2020-09-18 ASSESSMENT — ANXIETY QUESTIONNAIRES: GAD7 TOTAL SCORE: 13

## 2020-09-18 NOTE — TELEPHONE ENCOUNTER
Left message for patient to call us back.  She can schedule F2F visit today - we do have some openings left at this time. Otherwise she can go to ED/UC.    Virgie Turcios RN

## 2020-09-18 NOTE — TELEPHONE ENCOUNTER
Reason for Call:  Migraine all night    Detailed comments: patient is calling and talking so fast that I had to have her repeat what she was saying. She has had a headache all night and puking. States she gets a shot for these, but has not gotten one with [facility]. Looks like she is an [facility] patient. She sounds miserable. Please advise. She was last seen in April by Dr. Bejarano, but would rather come to [facility].    Phone Number Patient can be reached at: Home number on file [phone] (home)    Best Time: any    Can we leave a detailed message on this number? YES   Margarita May  Clinic Station        Call taken on 9/18/2020 at 8:34 AM by Margarita Mcclelland

## 2020-10-12 ENCOUNTER — TELEPHONE (OUTPATIENT)
Dept: PSYCHIATRY | Facility: CLINIC | Age: 35
End: 2020-10-12

## 2020-10-12 NOTE — TELEPHONE ENCOUNTER
See MyChart encounter.     Shyla Hodgson, RN    Nurse Liaison  [facility] Located within [facility]

## 2020-10-12 NOTE — TELEPHONE ENCOUNTER
Reason for call:  Medication   If this is a refill request, has the caller requested the refill from the pharmacy already? Yes  Will the patient be using a [facility]? No  Name of the pharmacy and phone number for the current request:   [facility] - [place], [place] - [address] [address] Phone:  [phone]   Fax:  [phone]            Name of the medication requested: All medications    Other request: Pt missed appt on 10/9, rescheduled for 11/9. Pt is out of meds.  Directed her to request refills from her pharmacy.  If questions or concerns, please contact pt.    Phone number to reach patient:  Cell number on file:    Telephone Information:   Mobile [phone]       Best Time:  Anytime    Can we leave a detailed message on this number?  YES    Travel screening: Not Applicable

## 2020-10-26 ENCOUNTER — TELEPHONE (OUTPATIENT)
Dept: BEHAVIORAL HEALTH | Facility: CLINIC | Age: 35
End: 2020-10-26

## 2020-10-26 NOTE — LETTER
Behavioral Health Home ([facility]): Health Action Plan  [facility]: [place]    [facility]      October 26, 2020    [facility]  [address] [place] [address]      Dear Lluvia Glaser,    I am writing to inform you that I have tried contacting you to schedule your Health & Wellness Assessment which was due back in August. The Health & Wellness Assessment is required to be completed as you continue to be enrolled in the Behavioral Health Home ([facility]) program. A Health & Wellness Assessment is where we establish your goals that we set for the [facility] program and monitor your progress with these goals.  Please call me as soon as your receive this letter so we can get an appointment scheduled.    If we are not able to complete your Health & Wellness Assessment by the end of the month, you will be discharged from Behavioral Health Home ([facility]) effective 10/30/2020.    If you have any questions about your Health & Wellness Assessment, Behavioral Health Home ([facility]) services, or if you feel you no longer was to be enrolled in the Behavioral Health Home ([facility]) program, please feel free to contact me by phone or by email. My contact information is listed below. I look forward to hearing from you!    Thank you,          TRIXIE Moon LGSW Behavioral Health Home ([facility])   [phone]  [email or web address]

## 2020-10-26 NOTE — TELEPHONE ENCOUNTER
Behavioral Health Home Services  [facility]: [place]        Social Work Care Navigator Note      Patient: Lluvia Glaser  Date: October 26, 2020  Preferred Name: Lluvia    Previous PHQ-9:   PHQ-9 SCORE 5/5/2020 8/7/2020 9/17/2020   PHQ-9 Total Score 8 14 15     Previous WAYNE-7:   WAYNE-7 SCORE 5/5/2020 8/7/2020 9/17/2020   Total Score 13 15 13     ULISES LEVEL:  No flowsheet data found.    Preferred Contact:  Need for : No  Preferred Contact: Cell      Type of Contact Today: Phone call (not reached/unavailable)      Data: (subjective / Objective):  Attempted to reach patient for [facility] [place] ([facility]) Health & Wellness Assessment, but was unsuccessful, left message.  Plan to discharge patient from program as of 10/30/2020 due to no contact or completion of Health & Wellness Assessment for the [facility] [place] [facility] program. [facility] mailed out discharge letter to patient today and sent [employer] message.      TRIXIE Moon [facility]  [facility] [place] [facility]   [facility]  [phone]  October 26, 2020  2:39 PM

## 2020-10-29 ENCOUNTER — VIRTUAL VISIT (OUTPATIENT)
Dept: BEHAVIORAL HEALTH | Facility: CLINIC | Age: 35
End: 2020-10-29
Payer: COMMERCIAL

## 2020-10-29 DIAGNOSIS — R69 DIAGNOSIS DEFERRED: Primary | ICD-10-CM

## 2020-10-29 ASSESSMENT — ANXIETY QUESTIONNAIRES
6. BECOMING EASILY ANNOYED OR IRRITABLE: NEARLY EVERY DAY
2. NOT BEING ABLE TO STOP OR CONTROL WORRYING: NOT AT ALL
3. WORRYING TOO MUCH ABOUT DIFFERENT THINGS: NOT AT ALL
GAD7 TOTAL SCORE: 11
1. FEELING NERVOUS, ANXIOUS, OR ON EDGE: MORE THAN HALF THE DAYS
4. TROUBLE RELAXING: NEARLY EVERY DAY
5. BEING SO RESTLESS THAT IT IS HARD TO SIT STILL: NEARLY EVERY DAY
7. FEELING AFRAID AS IF SOMETHING AWFUL MIGHT HAPPEN: NOT AT ALL

## 2020-10-29 ASSESSMENT — PATIENT HEALTH QUESTIONNAIRE - PHQ9: SUM OF ALL RESPONSES TO PHQ QUESTIONS 1-9: 19

## 2020-10-29 NOTE — LETTER
Behavioral Health Home ([facility]): Health Action Plan  [facility]: [place]    [facility]      Name: Lluvia Glaser  Preferred Name: Lluvia  : 1985  MRN: [id number]    My Goals  Goal Areas: Health;Mental Health;Employment / Volunteer;Financial and Social Service Benefits;Transportation    Patient stated goals: Patient would like assistance with her physical and mental health for creating a balanced and healthy lifestyle. Patient would like assistance with budgeting, SSDI and community/social service assistance to aid with county benefits to increase her financial stability. Patient would like assistance with vehicle maintenance and repairs for reliable transportation to get to her appointments, run errands and get out into the community.    Strengths related to each goal: Patient identified strengths: Sobriety of almost one year, supportive family and significant other.     Services and Supports Needed: The [facility] Team will provide monthly contact with patient in order to monitor progress towards goals.    Activities / Actions of Team to support goal(s): [facility] team will locate appropriate resources that align with patient's goals and promote health and wellness.    Activities / Actions of Patient / Parent / Guardian to support goal(s): It is a requirement that patient's primary care physician is through the [facility] system and that they are on Medical Assistance/Medicaid. If either of these were to change or if patient needs any type of assistance, they are to reach out to their Behavioral Health Home ([facility]) team.        Recommended Referral  Tobacco cessation referrals made?: No  Mental Health / Chemical Dependency Referrals: NA  Substance Use Referrals: Not Applicable  Mental Health Referrals: MH Services: [facility], [facility], Community  Provider;[facility]  Community [facility] Referrals: [facility] ;Other (see comments)  Dental Referrals: Yes          My Team Members and  Their Contact Information  Patient Care Team       Relationship Specialty Notifications [facility] PCP - General   1/14/20     Fax: [phone]          [address] [place] [address]    Joanna Sanchez APRN CNP Assigned PCP   6/14/20     Phone: [phone] Fax: [phone] [address] [facility] [facility] [address] [address]    Minnie Denney LSW  Clinic Admissions 6/16/20     [facility] [phone]    Keeley Mancini NP Nurse Practitioner Nurse Practitioner Admissions 6/17/20     Psychiatry Provider    Phone: [phone] Fax: [phone] [address] [place] [address]    Keeley Mancini NP Assigned Behavioral Health Provider   10/23/20     Phone: [phone] Fax: [phone] [address] [place] [address]          My Wellness Plan  Safety Concerns: None Reported / Observed  Recommendations / Plan for safety concerns: A safety and risk management plan has not been developed at this time, however patient was encouraged to call [facility] / 911 should there be a change in any of these risk factors.  Crisis Plan (emergencies / when urgent support needed): Patient states she feels comfortable contacting her mother Shaneka and/or calling the National Suicide Prevention Lifeline at 418-039-0626 or 911 in a crisis or emergency situation.          Lluvia Glaser co-developed the Health Action Plan with the [facility] Team and received a copy of this document.  Date Health Action Plan Completed/Updated: 10/29/20

## 2020-10-29 NOTE — PROGRESS NOTES
Behavioral Health Home Services  [facility]: [place]        Social Work Care Navigator Note      Patient: Lluvia Glaser  Date: October 30, 2020  Preferred Name: Lluvia    Previous PHQ-9:   PHQ-9 SCORE 8/7/2020 9/17/2020 10/29/2020   PHQ-9 Total Score 14 15 19     Previous WAYNE-7:   WAYNE-7 SCORE 5/5/2020 8/7/2020 9/17/2020   Total Score 13 15 13     ULISES LEVEL:  No flowsheet data found.    Preferred Contact:  Need for : No  Preferred Contact: Cell      Type of Contact Today: Phone call (patient / identified key support person reached)      Data: (subjective / Objective):    Patient came in to complete the comprehensive wellness assessment for Behavioral Health Home Services.  See [facility] Flowsheets for details on the assessment.  See [facility], Behavioral Health Home for a copy of the patient's care plan.    Patient identified Goal Areas:  Health;Mental Health;Employment / Volunteer;Financial and Social Service Benefits;Transportation.    Patient stated goals:  Patient would like assistance with her physical and mental health for creating a balanced and healthy lifestyle. Patient would like assistance with budgeting, SSDI and community/social service assistance to aid with county benefits to increase her financial stability. Patient would like assistance with vehicle maintenance and repairs for reliable transportation to get to her appointments, run errands and get out into the community.        Patient would like assistance with her physical and mental health for creating a balanced and healthy lifestyle. Patient would like continued support to manage her physical and mental health issues that can impact her daily living. Patient is currently working with her primary care clinic and psychiatrist to help manage physical and mental health symptoms. Patient appreciates the extra case management and supportive counseling from Behavioral Health Home ([facility]. Patient and [facility] will work together to  make sure patients mental health and physical health needs are being met by meeting at least once a month to check in with physical & mental health symptoms or concerns for additional support.    Patient reports she is recently back on her medications but continues to have difficulty with anxiety, panic attacks, sleep (avg 4-6 hours), trouble concentrating and having little energy to accomplish the things she needs to do. Patient reports she has been sober for a year now and is happy about this. Patient reports other things that impact her mood is the loss of custody of her three children, now with her mother, as of the end of Sept. Patient reports she is learning to cope with this and visits her children often. [facility] and patient will discuss other supportive services that may be helpful to patient such as [place] Choice Assessment or [facility].  Patient reports she does have a supportive significant other. Patient reports they do not live together but do spend time at each others homes.    Patient reports she does not currently have a dentist but would like a provider list. [facility] emailed provider list today.     Patient would like assistance with budgeting, SSDI and community/social service assistance to aid with [facility] benefits to increase her financial stability. Patient states her mental and physical health make it extremely difficult to hold a job and is interested in applying for SSDI. [facility] emailed patient resources and forms today. Patient reports an increase in mental health symptoms due to her current financial situation and stressors. Patient states she is working with the [facility] to determine benefits. [facility] to follow-up with patient about this and provide additional resources if/when needed or requested. Patient reports she is collecting unemployment at this time but will need to investigate additional [facility] resources once this ends. [facility] will continue to provide education, resources and care coordination.      Patient would like assistance with vehicle maintenance and repairs for reliable transportation to get to her appointments, run errands and get out into the community. Patient reports her vehicle needs to have repairs done and often her mother helps pay for repairs. [facility] emailed patient low cost repairs and transportation resources including free medical transportation through [employer]. [facility] will continue to work with patient and monitor resources needed.    Patient reports she is trying to track down one of her children's birth certificates from [place]. [facility] was able to find application request from [place] and emailed it to the patient to have her fill this out and send it in.     Patient identified strengths: Sobriety of almost one year, supportive family and significant other.     Email sent to patient (per request):    Hello,     Per our conversation, I am including the following resources:    1. SSDI - application and information  2. Low Cost Auto Repairs and Transportation Information - including free transportation resources for free pharmacy (once per month) and doctor, dental, or mental health appointments via your health insurance transport [employer]  3. Dental Providers   4. [place] Birth Certificate Request  5. Combined Application  6. Food Shelf Resources    Please feel free to reach out at any time if you have concerns or additional questions before our next appointment on: Thursday, Nov. 12th at 1:30pm via telephone visit. I will contact you on your cellphone.    Sincerely,      TRIXIE Moon [facility]  Behavioral Health [place] ([facility])   [facility]  [phone]      ADDENDUM:  Health Action Plan approved by Behavioral Health Clinician 11/2/2020. [facility] sent patient a copy of approved HAP in the mail. Next Health Action Plan review due in 04 of 2021.    BRANDEN Moon  11/2/2020  1:12 PM

## 2020-10-29 NOTE — Clinical Note
Hello - I was able to connect with this patient to complete her Health & Wellness assessment. I will continue to meet with her several times and month to provide additional support, connection to services and care coordination.    Thank you,  TRIXIE Moon [facility]  Behavioral Health Home ([facility])   [facility]  [phone]

## 2020-10-29 NOTE — LETTER
"Behavioral Health [place] ([facility]): Health Action Plan  [facility]: [place]    Well and Beyond      Name: Lluviawendy Glaser  Preferred Name: Lluvia  : 1985  MRN: [id number]    2020    [facility]  [address] [place] [address]    Dear Lluvia,    I have enclosed the Health & Wellness Assessment that we completed together that includes your goals for Behavioral Health Home ([facility]) Services. As you continue being enrolled in Behavioral Health Home ([facility]) services, I wanted to give you some information so you know what to expect moving forward.    What to Expect on a Monthly Basis: It is a requirement that I make contact with you on a monthly basis (by phone or meeting with you in clinic) to check in on how things are going and if you need any assistance. Please feel free to reach out to your [facility] team between these contacts if you need assistance or have any questions.    What to Expect every Six Months: Every six months, it is a requirement that we complete a Health Action Plan (HAP) review. During this in-clinic appointment, we will monitor our progress towards existing goals and set new goals for the next 6 month time period.    What kinds of support can [facility] offer now that I am enrolled?   - Housing Coordination  - Transportation Resources  - Financial Resources  - Coordination with the [facility] for Benefits (MA, SNAP benefits, etc)  - Disability Eligibility and Benefits  - Employment and Education Coordination  - Disability Related Information and Education Resources  - Referrals for mental health services, chemical dependency assessment/treatment, etc     If you or someone you know is experiencing a mental health crisis and you need help, the following crisis hotlines are available to help.    If you are in immediate danger, call 911.  Suicide Prevention Lifeline: 7-575-855-TALK (7446)  Crisis Text Line Service: Text \"[place]\" to 806605.    [facility]" [facility] Emergency Department - Behavioral Emergency Center  [address], [place] [address]  [phone] [facility] - [employer] [facility] Crisis Response Services (Adults & Children)  [phone]   [facility] - Acute Psychiatric Services (APS)  Assessment & Referral: [phone]  Suicide Hotline: 593.465.9610 [facility] Crisis Team  117.414.8859   [facility] Adult Mental Health Services   [phone]  Referrals: [phone]  Crisis: [phone] [facility] - Emergency Department  [address] [place] [address]  [phone]   [place] Link[employer]et  3-561-HhfjDnv ([phone]) [facility] Mental Health and Resource Crisis Line  [phone]   [facility] - Community Outreach for Psychiatric Emergencies  [phone] [facility] Crisis Services - [facility] Adult Mental Health Services - Crisis Services (24/7)  Information & Referrals: [phone]  Crisis Line: 283.458.2236   [facility] Emergency Center (24/7)  [phone] TDD Crisis Help Line Serving [facility]  [phone]  or [facility]  to 911322    [facility] and [facility]  Mental Health  [address] [place] [address]  [phone]  [address] [place] [address]  [phone]  web: co.[facility].mn.  Mental-Health    [facility]  Mental Health  [address] [place] [address]  [phone]  web: [facility] Family [facility]  Family Services  [address] [address]  [phone]  web: [facility] Family [facility]   Family Services  [address] [place] [address]  [phone]  web: [email or web address]/adult mental health    [facility] and Human Services Department  [address], [place]  [address]  [phone]  [address] [place] [address]  [phone]  Toll Free: [phone]  web: shaymn.[facility] and human services     Please let me know if you have any questions or if there is anything that we can assist with. I can be reached by phone, [email or web address] message, or by email. I look forward to continuing to work with you!    Sincerely,          TRIXIE Moon [facility]  Behavioral Health [place] ([facility])   [phone]  [email or web address]

## 2020-11-09 ENCOUNTER — VIRTUAL VISIT (OUTPATIENT)
Dept: PSYCHIATRY | Facility: CLINIC | Age: 35
End: 2020-11-09
Payer: COMMERCIAL

## 2020-11-09 DIAGNOSIS — F51.04 PSYCHOPHYSIOLOGICAL INSOMNIA: ICD-10-CM

## 2020-11-09 DIAGNOSIS — F41.1 GENERALIZED ANXIETY DISORDER: ICD-10-CM

## 2020-11-09 DIAGNOSIS — F17.200 TOBACCO DEPENDENCE SYNDROME: Primary | ICD-10-CM

## 2020-11-09 DIAGNOSIS — F39 EPISODIC MOOD DISORDER (H): ICD-10-CM

## 2020-11-09 DIAGNOSIS — F15.21 METHAMPHETAMINE USE DISORDER, MODERATE, IN EARLY REMISSION, IN CONTROLLED ENVIRONMENT, DEPENDENCE (H): ICD-10-CM

## 2020-11-09 PROCEDURE — 99214 OFFICE O/P EST MOD 30 MIN: CPT | Mod: 95 | Performed by: NURSE PRACTITIONER

## 2020-11-09 RX ORDER — QUETIAPINE FUMARATE 200 MG/1
200 TABLET, FILM COATED ORAL AT BEDTIME
Qty: 30 TABLET | Refills: 1 | Status: SHIPPED | OUTPATIENT
Start: 2020-11-09 | End: 2020-12-21

## 2020-11-09 RX ORDER — IMIPRAMINE HCL 25 MG
25 TABLET ORAL 2 TIMES DAILY
Qty: 60 TABLET | Refills: 1 | Status: SHIPPED | OUTPATIENT
Start: 2020-11-09 | End: 2020-12-21

## 2020-11-09 RX ORDER — CLONAZEPAM 2 MG/1
TABLET ORAL
Qty: 30 TABLET | Refills: 0 | Status: SHIPPED | OUTPATIENT
Start: 2020-11-09 | End: 2020-12-21

## 2020-11-09 ASSESSMENT — ANXIETY QUESTIONNAIRES
1. FEELING NERVOUS, ANXIOUS, OR ON EDGE: SEVERAL DAYS
6. BECOMING EASILY ANNOYED OR IRRITABLE: MORE THAN HALF THE DAYS
IF YOU CHECKED OFF ANY PROBLEMS ON THIS QUESTIONNAIRE, HOW DIFFICULT HAVE THESE PROBLEMS MADE IT FOR YOU TO DO YOUR WORK, TAKE CARE OF THINGS AT HOME, OR GET ALONG WITH OTHER PEOPLE: VERY DIFFICULT
GAD7 TOTAL SCORE: 13
3. WORRYING TOO MUCH ABOUT DIFFERENT THINGS: NEARLY EVERY DAY
7. FEELING AFRAID AS IF SOMETHING AWFUL MIGHT HAPPEN: NOT AT ALL
2. NOT BEING ABLE TO STOP OR CONTROL WORRYING: SEVERAL DAYS
5. BEING SO RESTLESS THAT IT IS HARD TO SIT STILL: NEARLY EVERY DAY

## 2020-11-09 ASSESSMENT — PATIENT HEALTH QUESTIONNAIRE - PHQ9
SUM OF ALL RESPONSES TO PHQ QUESTIONS 1-9: 13
5. POOR APPETITE OR OVEREATING: NEARLY EVERY DAY

## 2020-11-09 NOTE — PROGRESS NOTES
"Lluvia Glaser is a 34 year old female who is being evaluated via a billable telephone visit.      The patient has been notified of following:     \"This telephone visit will be conducted via a call between you and your physician/provider. We have found that certain health care needs can be provided without the need for a physical exam.  This service lets us provide the care you need with a short phone conversation.  If a prescription is necessary we can send it directly to your pharmacy.  If lab work is needed we can place an order for that and you can then stop by our lab to have the test done at a later time.    Telephone visits are billed at different rates depending on your insurance coverage. During this emergency period, for some insurers they may be billed the same as an in-person visit.  Please reach out to your insurance provider with any questions.    If during the course of the call the physician/provider feels a telephone visit is not appropriate, you will not be charged for this service.\"    Patient has given verbal consent for Telephone visit?  Yes    What phone number would you like to be contacted at? [phone]    How would you like to obtain your AVS? [facility]    Phone call duration: 20 minutes    Keeley Mancini NP            Outpatient Psychiatric Progress Note    Name: Lluvia Glaser   : 1985                    Primary Care Provider: [facility]   Therapist: NAVEED rehman     PHQ-9 scores:  PHQ-9 SCORE 2020 2020 10/29/2020   PHQ-9 Total Score 14 15 19       WAYNE-7 scores:  WAYNE-7 SCORE 2020   Total Score 13 15 13       Patient Identification:    Patient is a 34 year old year old, partnered / significant other  White American female  who presents for return visit with me.  Patient is currently unemployed. Patient attended the session alone. Patient prefers to be called: \"Lluvia\".    Interim History:    I last saw " "Lluvia Bergman Jailyn for outpatient psychiatry Return Visit on September 17, 2020.     During that appointment, she presented with  distress as she told me about an upcoming court appearance she has to make in September 28.  This will be the first of 3 sessions as her 3 children will be placed into the custody of her mother.  Even though Sandie understands this is a better environment for them, she still is highly anxious and saddened that her children are not with her.  She worries about how the  will treat her during the court session.  Given this she has been taking an extra half tablet of clonazepam some evenings when she is unable to sleep.  I told her she is already at maximum dose of clonazepam that I am comfortable in prescribing.  She informed me that she has been out of the medication for about a week.  I added doxepin 10 mg twice daily to help her relax.  She will continue the quetiapine 200 mg at bedtime.  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  She participates in an NA support group online but has no other treatment modalities in place.  She does her best to isolate to her home as she does not want to get into situations where she is interacting again with friends who are using.  She tells me \"meth has ruined my life\"..    .     Current medications include:      albuterol (PROVENTIL) (2.5 MG/3ML) 0.083% neb solution, Take 1 vial (2.5 mg) by nebulization every 6 hours as needed for shortness of breath / dyspnea or wheezing       clonazePAM (KLONOPIN) 2 MG tablet, Take 1/2 tablet twice daily.  Refill 28 days after last fill       imipramine (TOFRANIL) 10 MG tablet, Take 1 tablet (10 mg) by mouth 2 times daily       QUEtiapine (SEROQUEL) 200 MG tablet, Take 1 tablet (200 mg) by mouth At Bedtime    No current facility-administered medications on file prior to visit.        The [place] Prescription Monitoring Program has been reviewed and there are no concerns about " diversionary activity for controlled substances at this time.      I was able to review most recent Primary Care Provider, specialty provider, and therapy visit notes that I have access to.     Today, patient reports  that things are the same.  With the holidays coming up she has had difficulties handling being without her kids.  She has been attending NA online.  She is staying sober.  Her sleep is disrupted with ruminating thoughts.  She does not want to increase the seroquel as she worries about enueresis.  Sometimes she has shortness of breath with her anxiety.  Sometimes she takes the whole tablet of clonazepam at bedtime.       has a past medical history of ADHD, Chronic eczema, Gastric ulcer, Generalized anxiety disorder, Smoker, and Substance abuse (H). She also has no past medical history of Diabetes (H), Motion sickness, PONV (postoperative nausea and vomiting), Sleep apnea, Thyroid disease, or Uncomplicated asthma.    Social history updates:    She is living with her boyfriend who is supportive.  Her mom has full custody of her children.      Substance use updates:    She does not drink alcohol  Tobacco use: Yes Cigarettes  Ready to quit?  No  Nicotine Replacement Therapy tried: none     Vital Signs:   There were no vitals taken for this visit.    Labs:    Most recent laboratory results reviewed and no new labs.     Review of Systems:  10 systems (general, cardiovascular, respiratory, eyes, ENT, endocrine, GI, , M/S, neurological) were reviewed. Most pertinent finding(s) is/are: she reports no pain,  She has some shortness of breath, . The remaining systems are all unremarkable.    Mental Status Examination:  Appearance:  awake, alert and mild distress  Attitude:  cooperative   Eye Contact:  Unable to assess  Gait and Station: No assistive Devices used and No dizziness or falls  Psychomotor Behavior:  Unable to assess  Oriented to:  time, person, and place  Attention Span and Concentration:   Fair  Speech:   pressured speech and Speaks: English  Mood:  anxious and depressed  Affect:  intensity is heightened  Associations:  no loose associations  Thought Process:  tangental  Thought Content:  no evidence of suicidal ideation or homicidal ideation, no auditory hallucinations present and no visual hallucinations present  Recent and Remote Memory:  intact Not formally assessed. No amnesia.  Fund of Knowledge: appropriate  Insight:  fair  Judgment:  fair  Impulse Control:  fair    Suicide Risk Assessment:  Today Lluvia Glaser reports that she is having no thoughts to end her life or harm other people. In addition, there are notable risk factors for self-harm, including anxiety, substance abuse, recent loss of Custody of her children, withdrawing and mood change. However, risk is mitigated by commitment to family, history of seeking help when needed, future oriented, denies suicidal intent or plan and denies homicidal ideation, intent, or plan. Therefore, based on all available evidence including the factors cited above, Lluvia Glaser does not appear to be at imminent risk for self-harm, does not meet criteria for a 72-hr hold, and therefore remains appropriate for ongoing outpatient level of care.  A thorough assessment of risk factors related to suicide and self-harm have been reviewed and are noted above. The patient convincingly denies suicidality on several occasions. Local community safety resources printed and reviewed for patient to use if needed. There was no deceit detected, and the patient presented in a manner that was believable.     DSM5 Diagnosis:  296.32 (F33.1) Major Depressive Disorder, Recurrent Episode, Moderate With melancholic features  300.02 (F41.1) Generalized Anxiety Disorder  780.52 (G47.00) Insomnia Disorder   With ohter medical comorbidity  Recurrent    Substance-Related & Addictive Disorders Specify if: In a controlled environment, Specify current severity:   305.1(F17.200) Moderate    Medical comorbidities include:   Patient Active Problem List    Diagnosis Date Noted     Substance abuse (H)      Priority: Medium     Generalized anxiety disorder      Priority: Medium     Chronic eczema      Priority: Medium     Gastric ulcer      Priority: Medium     ADHD      Priority: Medium     Smoker      Priority: Medium       Assessment:    Lluvia Glaser presented tearful and in distress as a recent court hearing occurred designating her mother with full custody of her children with plans to adopt them.  While Sandie will be able to see her children she harbors severe guilt symptoms and sadness that she is unable to care for them.  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  She admits to taking her clonazepam more at bedtime to the point of running out before the end of the month.  No more tablets will be given to her other than prescribed.  Instead, she agreed to an increase in her imipramine dose to 25 mg twice daily for her anxiety and depression symptoms.  She declined an increase in the quetiapine at bedtime as she was fearful of having incontinence due to sleeping too soundly.  Forms are pending for approval for her to receive Funding as she is under financial stress and is unable to work.    Medication side effects and alternatives were reviewed. Health promotion activities recommended and reviewed today. All questions addressed. Education and counseling completed regarding risks and benefits of medications and psychotherapy options.    Treatment Plan:        1.  Continue quetiapine 200 mg at bedtime    2.  Continue clonazepam 1 mg twice daily    3.  Increase imipramine to 25 mg twice daily    4.  Continue support through Narcotics Anonymous      Continue all other medications as reviewed per electronic medical record today.     Safety plan reviewed. To the Emergency Department as needed or call after hours crisis line at  [phone] or [phone]. [place] Crisis Text Line. Text [place] to 255388 or Suicide LifeLine Chat: suicideXOR.MOTORS.org/chat/    To schedule individual or family therapy, call [facility] at [phone].    Schedule an appointment with me in 6 weeks or sooner as needed. Call [facility] at [phone] to schedule.    Follow up with primary care provider as planned or for acute medical concerns.    Call the psychiatric nurse line with medication questions or concerns at [phone].    [employer] Mediahart may be used to communicate with your provider, but this is not intended to be used for emergencies.    Crisis Resources:    National Suicide Prevention Lifeline: 586.708.7254 (TTY: 314.569.6454). Call anytime for help.  (www.suicidepreventionlifeline.org)  National [place] on Mental Illness (www.sabina.org): 525.729.4875 or 983-417-7138.   Mental Health Association (www.mentalhealth.org): 789.482.4893 or 231-550-4332.  [place] Crisis Text Line: Text [place] to 726554  Suicide LifeLine Chat: suicideXOR.MOTORS.org/chat    Administrative Billing:   Time spent with patient was 20 minutes and was spent in counseling and coordination of care regarding above diagnoses and treatment plan.    Patient Status:  Patient will continue to be seen for ongoing consultation and stabilization.    Signed:   RILEY Mohamud-BC   Psychiatry

## 2020-11-10 ASSESSMENT — ANXIETY QUESTIONNAIRES: GAD7 TOTAL SCORE: 13

## 2020-11-12 ENCOUNTER — APPOINTMENT (OUTPATIENT)
Dept: BEHAVIORAL HEALTH | Facility: CLINIC | Age: 35
End: 2020-11-12
Payer: COMMERCIAL

## 2020-11-12 ENCOUNTER — TELEPHONE (OUTPATIENT)
Dept: BEHAVIORAL HEALTH | Facility: CLINIC | Age: 35
End: 2020-11-12

## 2020-11-12 NOTE — TELEPHONE ENCOUNTER
Behavioral Health Home Services  [facility]: [place]        Social Work Care Navigator Note      Patient: Lluvia Glaser  Date: November 12, 2020  Preferred Name: Lluvia    Previous PHQ-9:   PHQ-9 SCORE 9/17/2020 10/29/2020 11/9/2020   PHQ-9 Total Score 15 19 13     Previous WAYNE-7:   WAYNE-7 SCORE 8/7/2020 9/17/2020 11/9/2020   Total Score 15 13 13     ULISES LEVEL:  No flowsheet data found.    Preferred Contact:  Need for : No  Preferred Contact: Cell      Type of Contact Today: Phone call (not reached/unavailable)      Data: (subjective / Objective):  Attempted to reach patient for Behavioral Health [place] ([facility]) monthly check-in, but was unsuccessful, left message.  Plan to attempt again.      TRIXIE Moon [facility]  [facility] [place] ([facility])   [facility]  [phone]  November 12, 2020  2:03 PM

## 2020-11-19 ENCOUNTER — ALLIED HEALTH/NURSE VISIT (OUTPATIENT)
Dept: BEHAVIORAL HEALTH | Facility: CLINIC | Age: 35
End: 2020-11-19
Payer: COMMERCIAL

## 2020-11-19 DIAGNOSIS — R69 DIAGNOSIS DEFERRED: Primary | ICD-10-CM

## 2020-11-19 NOTE — PROGRESS NOTES
Behavioral Health Home Services  [facility]: [place]        Social Work Care Navigator Note      Patient: Lluvia Glaser  Date: November 19, 2020  Preferred Name: Lluvia    Previous PHQ-9:   PHQ-9 SCORE 9/17/2020 10/29/2020 11/9/2020   PHQ-9 Total Score 15 19 13     Previous WAYNE-7:   WAYNE-7 SCORE 8/7/2020 9/17/2020 11/9/2020   Total Score 15 13 13     ULISES LEVEL:  No flowsheet data found.    Preferred Contact:  Need for : No  Preferred Contact: Cell      Type of Contact Today: Phone call (patient / identified key support person reached)      Data: (subjective / Objective):  Recent ED/IP Admission or Discharge?   None    Patient Goals:  Goal Areas: Health;Mental Health;Employment / Volunteer;Financial and Social Service Benefits;Transportation  Patient stated goals: Patient would like assistance with her physical and mental health for creating a balanced and healthy lifestyle. Patient would like assistance with budgeting, SSDI and community/social service assistance to aid with county benefits to increase her financial stability. Patient would like assistance with vehicle maintenance and repairs for reliable transportation to get to her appointments, run errands and get out into the community.        [facility] Core Service Provided:  Comprehensive Care Management: utilized the electronic medical record / patient registry to identify and support patient's health conditions / needs more effectively   Care Transitions: focused on the coordinated and seamless movement of patient between or within different levels of care or settings  Care Coordination: provided care management services/referrals necessary to ensure patient and their identified supports have access to medical, behavioral health, pharmacology and recovery support services.  Ensured that patient's care is integrated across all settings and services.   Individual and Family Support: aimed to help clients reduce barriers to achieving goals,  increase health literacy and knowledge about chronic condition(s), increase self-efficacy skills, and improve health outcomes  Referral to Community and Social Support Services: Provided patient with referrals (see plan section)  Assisted in scheduling an appointment to a referral / service (see plan section)  Followed-up with patient on whether or not they completed a referral    Current Stressors / Issues / Care Plan Objective Addressed Today:  SWCC and patient were able to connect today via telehealth visit for Behavioral Health Home ([facility]) monthly check-in.    1. Discussed current stressors. Patient reports she is working with [facility] for authorization of benefits due to her unemployment coming to an end. Patient reports she needs to have a medical doctor sign off on her paperwork for this. Patient reports she can email paperwork for [facility] to send to provider.    Patient reports she has not applied for SSDI at this time. Patient reports she finds it the process confusing and would like additional support from [facility] to help apply. SWCC and patient scheduled an appointment for next week to go online and apply.        Intervention:  Motivational Interviewing: Expressed Empathy/Understanding, Supported Autonomy, Collaboration, Evocation, Permission to raise concern or advise, Open-ended questions and Reflections: simple and complex   Target Behavior(s): Explored and resolved challenges to attending appointments as scheduled and Explored current social supports and reinforced opportunities to increase engagement    Assessment: (Progress on Goals / Homework):  Patient would benefit from continued coordination in reaching their goals set for the Behavioral Health Home ([facility]) program. [facility] reviewed Health Action Plan goals and will continue to monitor progress and work with patient and their care team.      Plan: (Homework, other):  Patient was encouraged to continue to seek condition-related information and  education.      Scheduled a Phone follow up appointment with JESSICA TRINIDAD in 1 week     Patient has set self-identified goals and will monitor progress until the next appointment on: 11/24/2020.       TRIXIE Moon [facility]  Behavioral Health Home ([facility])   [facility]  [phone]  November 20, 2020  4:19 PM                Next 5 appointments (look out 90 days)    Dec 14, 2020  1:40 PM  Office Visit with Susan Anaya MD  [facility] ([facility]) [id number] [address] [id number] [phone]

## 2020-11-20 ENCOUNTER — TELEPHONE (OUTPATIENT)
Dept: BEHAVIORAL HEALTH | Facility: CLINIC | Age: 35
End: 2020-11-20

## 2020-11-20 NOTE — TELEPHONE ENCOUNTER
Behavioral Health Home Services  [facility]: [place]        Social Work Care Navigator Note      Patient: Lluvia Glaser  Date: November 20, 2020  Preferred Name: Lluvia    Previous PHQ-9:   PHQ-9 SCORE 9/17/2020 10/29/2020 11/9/2020   PHQ-9 Total Score 15 19 13     Previous WAYNE-7:   WAYNE-7 SCORE 8/7/2020 9/17/2020 11/9/2020   Total Score 15 13 13     ULISES LEVEL:  No flowsheet data found.    Preferred Contact:  Need for : No  Preferred Contact: Cell      Type of Contact Today: Phone call (not reached/unavailable)      Data: (subjective / Objective):  Attempted to reach patient for Behavioral Health Home ([facility]) care coordination, but was unsuccessful, left message. CC received paperwork from patient but patient needs to fill out her portion of the paperwork and will also need phone number and fax number for [facility] . Plan to attempt again.      TRIXIE Moon [facility]  Behavioral Health [place] ([facility])   [facility]  [phone]  November 20, 2020  1:08 PM            Next 5 appointments (look out 90 days)    Dec 14, 2020  1:40 PM  Office Visit with Susan Anaya MD  [facility] ([facility]) [id number] [address] [id number] [phone]

## 2020-11-23 ENCOUNTER — TELEPHONE (OUTPATIENT)
Dept: BEHAVIORAL HEALTH | Facility: CLINIC | Age: 35
End: 2020-11-23

## 2020-11-23 NOTE — TELEPHONE ENCOUNTER
Behavioral Health Home Services  [facility]: [place]        Social Work Care Navigator Note      Patient: Lluvia Glaser  Date: November 23, 2020  Preferred Name: Lluvia    Previous PHQ-9:   PHQ-9 SCORE 9/17/2020 10/29/2020 11/9/2020   PHQ-9 Total Score 15 19 13     Previous WAYNE-7:   WAYNE-7 SCORE 8/7/2020 9/17/2020 11/9/2020   Total Score 15 13 13     ULISES LEVEL:  No flowsheet data found.    Preferred Contact:  Need for : No  Preferred Contact: Cell      Type of Contact Today: MyChart / Email (patient reached)      Data: (subjective / Objective):  Patient Goals  Goal Areas: Health;Mental Health;Employment / Volunteer;Financial and Social Service Benefits;Transportation  Patient stated goals: Patient would like assistance with her physical and mental health for creating a balanced and healthy lifestyle. Patient would like assistance with budgeting, SSDI and community/social service assistance to aid with county benefits to increase her financial stability. Patient would like assistance with vehicle maintenance and repairs for reliable transportation to get to her appointments, run errands and get out into the community.      [facility] Service Provided:  Comprehensive Care Management: utilized the electronic medical record / patient registry to identify and support patient's health conditions / needs more effectively   Care Transitions: focused on the coordinated and seamless movement of patient between or within different levels of care or settings  Care Coordination: provided care management services/referrals necessary to ensure patient and their identified supports have access to medical, behavioral health, pharmacology and recovery support services.  Ensured that patient's care is integrated across all settings and services.   Referral to Community and Social Support Services: Assisted in scheduling an appointment to a referral / service (see plan section)  Followed-up with patient on whether or not  they completed a referral     Data:  [facility] messaged patient's psychiatry provider about county form Request for Medical Opinion. Provider will not be in the office until Wed. 12/2/2020 to fill out form. Provider to fax to [facility] once completed at fax #[phone], attn Corrie Arrieta. [facility] faxed form to provider at [phone]. [facility] notified patient of update.    Plan:  [facility] will continue to monitor paperwork and coordinate with provider and patient.    TRIXIE Moon [facility]  Behavioral Health Home ([facility])   [facility]  [phone]  November 23, 2020  11:14 AM                  Next 5 appointments (look out 90 days)    Dec 14, 2020  1:40 PM  Office Visit with Susan Anaya MD  [facility] ([facility]) [address] [facility] [address]  [phone]

## 2020-11-24 ENCOUNTER — ALLIED HEALTH/NURSE VISIT (OUTPATIENT)
Dept: BEHAVIORAL HEALTH | Facility: CLINIC | Age: 35
End: 2020-11-24
Payer: COMMERCIAL

## 2020-11-24 DIAGNOSIS — R69 DIAGNOSIS DEFERRED: Primary | ICD-10-CM

## 2020-11-24 NOTE — PROGRESS NOTES
Behavioral Health Home Services  [facility]: [place]        Social Work Care Navigator Note      Patient: Lluvia Glaser  Date: November 24, 2020  Preferred Name: Lluvia    Previous PHQ-9:   PHQ-9 SCORE 9/17/2020 10/29/2020 11/9/2020   PHQ-9 Total Score 15 19 13     Previous WAYNE-7:   WAYNE-7 SCORE 8/7/2020 9/17/2020 11/9/2020   Total Score 15 13 13     ULISES LEVEL:  No flowsheet data found.    Preferred Contact:  Need for : No  Preferred Contact: Cell      Type of Contact Today: Phone call (patient / identified key support person reached)      Data: (subjective / Objective):  Recent ED/IP Admission or Discharge?   None    Patient Goals:  Goal Areas: Health;Mental Health;Employment / Volunteer;Financial and Social Service Benefits;Transportation  Patient stated goals: Patient would like assistance with her physical and mental health for creating a balanced and healthy lifestyle. Patient would like assistance with budgeting, SSDI and community/social service assistance to aid with county benefits to increase her financial stability. Patient would like assistance with vehicle maintenance and repairs for reliable transportation to get to her appointments, run errands and get out into the community.        [facility] Core Service Provided:  Comprehensive Care Management: utilized the electronic medical record / patient registry to identify and support patient's health conditions / needs more effectively   Care Transitions: focused on the coordinated and seamless movement of patient between or within different levels of care or settings  Care Coordination: provided care management services/referrals necessary to ensure patient and their identified supports have access to medical, behavioral health, pharmacology and recovery support services.  Ensured that patient's care is integrated across all settings and services.   Individual and Family Support: aimed to help clients reduce barriers to achieving goals,  increase health literacy and knowledge about chronic condition(s), increase self-efficacy skills, and improve health outcomes  Referral to Community and Social Support Services: Assisted in scheduling an appointment to a referral / service (see plan section)  Followed-up with patient on whether or not they completed a referral    Current Stressors / Issues / Care Plan Objective Addressed Today:  SWCC and patient were able to meet today via telehealth visit for Behavioral Health Home ([facility]) monthly check-in.    1. SWCC assisted patient with SSDI online application to start process of SSDI claim. Patient reports she now feels comfortable completing the online application herself and will need to gather her work history to continue.    Intervention:  Motivational Interviewing: Expressed Empathy/Understanding, Supported Autonomy, Collaboration, Evocation, Permission to raise concern or advise, Open-ended questions and Reflections: simple and complex   Target Behavior(s): Explored and resolved challenges to attending appointments as scheduled and Explored current social supports and reinforced opportunities to increase engagement    Assessment: (Progress on Goals / Homework):  Patient would benefit from continued coordination in reaching their goals set for the Behavioral Health Home ([facility]) program. SWCC reviewed Health Action Plan goals and will continue to monitor progress and work with patient and their care team.      Plan: (Homework, other):  Patient was encouraged to continue to seek condition-related information and education.      Scheduled a Phone follow up appointment with JESSICA TRINIDAD in 2 weeks     Patient has set self-identified goals and will monitor progress until the next appointment on: 12/08/2020.         TRIXIE Moon [facility]  Behavioral Health Home ([facility])   [facility]  [phone]  November 24, 2020  1:41 PM                  Next 5 appointments (look out 90 days)    Dec  14, 2020  1:40 PM  Office Visit with Susan Anaya MD  [facility] ([facility]) [address] [address] [facility] [address]  [phone]

## 2020-11-30 ENCOUNTER — E-VISIT (OUTPATIENT)
Dept: FAMILY MEDICINE | Facility: CLINIC | Age: 35
End: 2020-11-30
Payer: COMMERCIAL

## 2020-11-30 DIAGNOSIS — Z53.9 ERRONEOUS ENCOUNTER--DISREGARD: Primary | ICD-10-CM

## 2020-12-02 ENCOUNTER — E-VISIT (OUTPATIENT)
Dept: FAMILY MEDICINE | Facility: CLINIC | Age: 35
End: 2020-12-02
Payer: COMMERCIAL

## 2020-12-02 DIAGNOSIS — R30.0 DIFFICULT OR PAINFUL URINATION: Primary | ICD-10-CM

## 2020-12-02 DIAGNOSIS — N39.0 URINARY TRACT INFECTION, ACUTE: ICD-10-CM

## 2020-12-02 DIAGNOSIS — R30.0 DIFFICULT OR PAINFUL URINATION: ICD-10-CM

## 2020-12-02 DIAGNOSIS — R82.90 NONSPECIFIC FINDING ON EXAMINATION OF URINE: Primary | ICD-10-CM

## 2020-12-02 LAB
ALBUMIN UR-MCNC: NEGATIVE MG/DL
APPEARANCE UR: ABNORMAL
BACTERIA #/AREA URNS HPF: ABNORMAL /HPF
BILIRUB UR QL STRIP: NEGATIVE
COLOR UR AUTO: YELLOW
GLUCOSE UR STRIP-MCNC: NEGATIVE MG/DL
HGB UR QL STRIP: ABNORMAL
KETONES UR STRIP-MCNC: NEGATIVE MG/DL
LEUKOCYTE ESTERASE UR QL STRIP: ABNORMAL
NITRATE UR QL: NEGATIVE
NON-SQ EPI CELLS #/AREA URNS LPF: ABNORMAL /LPF
PH UR STRIP: 7 PH (ref 5–7)
RBC #/AREA URNS AUTO: ABNORMAL /HPF
SOURCE: ABNORMAL
SP GR UR STRIP: 1.02 (ref 1–1.03)
UROBILINOGEN UR STRIP-ACNC: 0.2 EU/DL (ref 0.2–1)
WBC #/AREA URNS AUTO: ABNORMAL /HPF

## 2020-12-02 PROCEDURE — 87086 URINE CULTURE/COLONY COUNT: CPT | Performed by: FAMILY MEDICINE

## 2020-12-02 PROCEDURE — 99421 OL DIG E/M SVC 5-10 MIN: CPT | Performed by: FAMILY MEDICINE

## 2020-12-02 PROCEDURE — 87088 URINE BACTERIA CULTURE: CPT | Performed by: FAMILY MEDICINE

## 2020-12-02 PROCEDURE — 87186 SC STD MICRODIL/AGAR DIL: CPT | Performed by: FAMILY MEDICINE

## 2020-12-02 PROCEDURE — 81001 URINALYSIS AUTO W/SCOPE: CPT | Performed by: FAMILY MEDICINE

## 2020-12-02 RX ORDER — SULFAMETHOXAZOLE/TRIMETHOPRIM 800-160 MG
1 TABLET ORAL 2 TIMES DAILY
Qty: 6 TABLET | Refills: 0 | Status: SHIPPED | OUTPATIENT
Start: 2020-12-02 | End: 2020-12-05

## 2020-12-02 NOTE — PATIENT INSTRUCTIONS
Thank you for choosing us for your care. Given your symptoms, I would like you to do a lab-only visit to determine what is causing them.  I have placed the orders.  Please schedule an appointment with the lab right here in [facility], or call [phone].  I will let you know when the results are back and next steps to take.    Urinary Tract Infections in Women    Urinary tract infections (UTIs) are most often caused by bacteria. These bacteria enter the urinary tract. The bacteria may come from inside the body. Or they may travel from the skin outside the rectum or vagina into the urethra. Female anatomy makes it easy for bacteria from the bowel to enter a woman s urinary tract, which is the most common source of UTI. This means women develop UTIs more often than men. Pain in or around the urinary tract is a common UTI symptom. But the only way to know for sure if you have a UTI for the healthcare provider to test your urine. The two tests that may be done are the urinalysis and urine culture.   Types of UTIs    Cystitis. A bladder infection (cystitis) is the most common UTI in women. You may have urgent or frequent need to pee. You may also have pain, burning when you pee, and bloody urine.    Urethritis. This is an inflamed urethra, which is the tube that carries urine from the bladder to outside the body. You may have lower stomach or back pain. You may also have urgent or frequent need to pee.    Pyelonephritis. This is a kidney infection. If not treated, it can be serious and damage your kidneys. In severe cases, you may need to stay in the hospital. You may have a fever and lower back pain.    Medicines to treat a UTI  Most UTIs are treated with antibiotics. These kill the bacteria. The length of time you need to take them depends on the type of infection. It may be as short as 3 days. If you have repeated UTIs, you may need a low-dose antibiotic for several months. Take antibiotics exactly as directed. Don t  stop taking them until all of the medicine is gone. If you stop taking the antibiotic too soon, the infection may not go away. You may also develop a resistance to the antibiotic. This can make it much harder to treat.   Lifestyle changes to treat and prevent UTIs   The lifestyle changes below will help get rid of your UTI. They may also help prevent future UTIs.     Drink plenty of fluids. This includes water, juice, or other caffeine-free drinks. Fluids help flush bacteria out of your body.    Empty your bladder. Always empty your bladder when you feel the urge to pee. And always pee before going to sleep. Urine that stays in your bladder can lead to infection. Try to pee before and after sex as well.    Practice good personal hygiene. Wipe yourself from front to back after using the toilet. This helps keep bacteria from getting into the urethra.    Use condoms during sex. These help prevent UTIs caused by sexually transmitted bacteria. Also don't use spermicides during sex. These can increase the risk for UTIs. Choose other forms of birth control instead. For women who tend to get UTIs after sex, a low-dose of a preventive antibiotic may be used. Be sure to discuss this option with your healthcare provider.    Follow up with your healthcare provider as directed. He or she may test to make sure the infection has cleared. If needed, more treatment may be started.  [employer] last reviewed this educational content on 7/1/2019 2000-2020 [employer]. [address] [place] [address], [place] [address]. All rights reserved. This information is not intended as a substitute for professional medical care. Always follow your healthcare professional's instructions.

## 2020-12-04 LAB
BACTERIA SPEC CULT: ABNORMAL
Lab: ABNORMAL
SPECIMEN SOURCE: ABNORMAL

## 2020-12-07 ENCOUNTER — E-VISIT (OUTPATIENT)
Dept: FAMILY MEDICINE | Facility: CLINIC | Age: 35
End: 2020-12-07
Payer: COMMERCIAL

## 2020-12-07 DIAGNOSIS — B37.31 YEAST INFECTION OF THE VAGINA: Primary | ICD-10-CM

## 2020-12-07 DIAGNOSIS — B37.31 CANDIDAL VULVOVAGINITIS: ICD-10-CM

## 2020-12-07 PROCEDURE — 99421 OL DIG E/M SVC 5-10 MIN: CPT | Performed by: FAMILY MEDICINE

## 2020-12-07 RX ORDER — FLUCONAZOLE 150 MG/1
150 TABLET ORAL ONCE
Qty: 1 TABLET | Refills: 0 | Status: SHIPPED | OUTPATIENT
Start: 2020-12-07 | End: 2020-12-07

## 2020-12-07 NOTE — PATIENT INSTRUCTIONS
Thank you for choosing us for your care. I have placed an order for a prescription so that you can start treatment. View your full visit summary for details by clicking on the link below. Your pharmacist will able to address any questions you may have about the medication.     If you re not feeling better within 2-3 days, please schedule an appointment.  You can schedule an appointment right here in [facility] [employer], or call [phone]  If the visit is for the same symptoms as your e-visit, we ll refund the cost of your e-visit if seen within seven days.      Yeast Infection (Candida Vaginal Infection)    You have a Candida vaginal infection. This is also known as a yeast infection. It is most often caused by a type of yeast (fungus) called Candida. Candida are normally found in the vagina. But if they increase in number, this can lead to infection and cause symptoms.  Symptoms of a yeast infection can include:    Clumpy or thin, white discharge, which may look like cottage cheese    Itching or burning    Burning with urination  Certain factors can make a yeast infection more likely. These can include:    Taking certain medicines, such as antibiotics or birth control pills    Pregnancy    Diabetes    Weak immune system  A yeast infection is most often treated with antifungal medicine. This may be given as a vaginal cream or pills you take by mouth. Treatment may last for about 1 to 7 days. Women with severe or recurrent infections may need longer courses of treatment.  Home care    If you re prescribed medicine, be sure to use it as directed. Finish all of the medicine, even if your symptoms go away. Note: Don t try to treat yourself using over-the-counter products without talking to your provider first. He or she will let you know if this is a good option for you.    Ask your provider what steps you can take to help reduce your risk of having a yeast infection in the future.    Follow-up care  Follow up with your  healthcare provider, or as directed.  When to seek medical advice  Call your healthcare provider right away if:    You have a fever of 100.4 F (38 C) or higher, or as directed by your provider.    Your symptoms worsen, or they don t go away within a few days of starting treatment.    You have new pain in the lower belly or pelvic region.    You have side effects that bother you or a reaction to the cream or pills you re prescribed.    You or any partners you have sex with have new symptoms, such as a rash, joint pain, or sores.  [employer] last reviewed this educational content on 10/1/2017    3876-9049 [employer]. [address] [place] [address], [place] [address]. All rights reserved. This information is not intended as a substitute for professional medical care. Always follow your healthcare professional's instructions.

## 2020-12-08 ENCOUNTER — ALLIED HEALTH/NURSE VISIT (OUTPATIENT)
Dept: BEHAVIORAL HEALTH | Facility: CLINIC | Age: 35
End: 2020-12-08
Payer: COMMERCIAL

## 2020-12-08 DIAGNOSIS — R69 DIAGNOSIS DEFERRED: Primary | ICD-10-CM

## 2020-12-08 NOTE — PROGRESS NOTES
Behavioral Health Home Services  [facility]: [place]        Social Work Care Navigator Note      Patient: Lluvia Glaser  Date: December 8, 2020  Preferred Name: lluvia    Previous PHQ-9:   PHQ-9 SCORE 9/17/2020 10/29/2020 11/9/2020   PHQ-9 Total Score 15 19 13     Previous WAYNE-7:   WAYNE-7 SCORE 8/7/2020 9/17/2020 11/9/2020   Total Score 15 13 13     ULISES LEVEL:  No flowsheet data found.    Preferred Contact:  Need for : No  Preferred Contact: Cell      Type of Contact Today: Phone call (patient / identified key support person reached)      Data: (subjective / Objective):  Recent ED/IP Admission or Discharge?   None    Patient Goals:  Goal Areas: Health;Mental Health;Employment / Volunteer;Financial and Social Service Benefits;Transportation  Patient stated goals: Patient would like assistance with her physical and mental health for creating a balanced and healthy lifestyle. Patient would like assistance with budgeting, SSDI and community/social service assistance to aid with county benefits to increase her financial stability. Patient would like assistance with vehicle maintenance and repairs for reliable transportation to get to her appointments, run errands and get out into the community.        [facility] Core Service Provided:  Comprehensive Care Management: utilized the electronic medical record / patient registry to identify and support patient's health conditions / needs more effectively   Care Transitions: focused on the coordinated and seamless movement of patient between or within different levels of care or settings  Care Coordination: provided care management services/referrals necessary to ensure patient and their identified supports have access to medical, behavioral health, pharmacology and recovery support services.  Ensured that patient's care is integrated across all settings and services.   Individual and Family Support: aimed to help clients reduce barriers to achieving goals,  increase health literacy and knowledge about chronic condition(s), increase self-efficacy skills, and improve health outcomes  Referral to Community and Social Support Services: Followed-up with patient on whether or not they completed a referral    Current Stressors / Issues / Care Plan Objective Addressed Today:  SWCC and patient were able to meet today via telehealth visit for Behavioral Health Home ([facility]) monthly check-in.    1. Patient reports [facility] received paperwork from psychiatric provider. Patient reports that due to the fact that she is continuing to receive unemployment benefits, set to  right after , if not renewed by Covid restrictions, she is not eligible at this time. SWCC and patient will continue to monitor and reach out to [facility] for additional assistance once benefits end.     2. Patient reports she has not quite completed her SSDI application, as she is needing to finish work history. Patient reports she will be completing this in the next few weeks. SWCC to follow-up with patient.    3. SWCC and patient discussed upcoming appointments with establishing PCP and follow-up with psychiatry later this month.      Intervention:  Motivational Interviewing: Expressed Empathy/Understanding, Supported Autonomy, Collaboration, Evocation, Permission to raise concern or advise and Open-ended questions   Target Behavior(s): Explored and resolved challenges to attending appointments as scheduled and Explored current social supports and reinforced opportunities to increase engagement    Assessment: (Progress on Goals / Homework):  Patient would benefit from continued coordination in reaching their goals set for the Behavioral Health Home ([facility]) program. SWCC reviewed Health Action Plan goals and will continue to monitor progress and work with patient and their care team.      Plan: (Homework, other):  Patient was encouraged to continue to seek condition-related information and education.       Scheduled a Phone follow up appointment with JESSICA TRINIDAD in 2 weeks     Patient has set self-identified goals and will monitor progress until the next appointment on: 12/22/2020.        TRIXIE Moon [facility]  Behavioral Health Home ([facility])   [facility]  [phone]  December 8, 2020  1:15 PM          Next 5 appointments (look out 90 days)    Dec 14, 2020  1:40 PM  Office Visit with Susan Anaya MD  [facility] ([facility]) [id number] [facility] [id number] [phone]

## 2020-12-13 ENCOUNTER — HEALTH MAINTENANCE LETTER (OUTPATIENT)
Age: 35
End: 2020-12-13

## 2020-12-14 ENCOUNTER — OFFICE VISIT (OUTPATIENT)
Dept: FAMILY MEDICINE | Facility: CLINIC | Age: 35
End: 2020-12-14
Payer: COMMERCIAL

## 2020-12-14 VITALS
TEMPERATURE: 97.2 F | SYSTOLIC BLOOD PRESSURE: 120 MMHG | WEIGHT: 164 LBS | OXYGEN SATURATION: 99 % | HEART RATE: 74 BPM | BODY MASS INDEX: 28 KG/M2 | DIASTOLIC BLOOD PRESSURE: 80 MMHG | HEIGHT: 64 IN

## 2020-12-14 DIAGNOSIS — N92.0 MENORRHAGIA WITH REGULAR CYCLE: ICD-10-CM

## 2020-12-14 DIAGNOSIS — G43.829 MENSTRUAL MIGRAINE WITHOUT STATUS MIGRAINOSUS, NOT INTRACTABLE: Primary | ICD-10-CM

## 2020-12-14 PROCEDURE — 99214 OFFICE O/P EST MOD 30 MIN: CPT | Performed by: FAMILY MEDICINE

## 2020-12-14 RX ORDER — PROPRANOLOL HYDROCHLORIDE 40 MG/1
TABLET ORAL
Qty: 60 TABLET | Refills: 1 | Status: SHIPPED | OUTPATIENT
Start: 2020-12-14 | End: 2021-02-01

## 2020-12-14 RX ORDER — SUMATRIPTAN 50 MG/1
50 TABLET, FILM COATED ORAL
Qty: 18 TABLET | Refills: 3 | Status: SHIPPED | OUTPATIENT
Start: 2020-12-14 | End: 2021-09-09

## 2020-12-14 ASSESSMENT — MIFFLIN-ST. JEOR: SCORE: 1428.9

## 2020-12-14 NOTE — PROGRESS NOTES
Subjective     Lluvia Glaser is a 34 year old female who presents to clinic today for the following health issues:    HPI         Headache  Onset/Duration: x 3 -5 years  Description  Location: bilateral in the frontal area, bilateral in the occipital area   Character: sharp pain, squeezing pain  Frequency:  X once a month for a full week  Duration:  X 1 week  Wake with headaches: YES  Able to do daily activities when headache present: no   Intensity:  moderate, severe  Progression of Symptoms:  worsening  Accompanying signs and symptoms:  Stiff neck: no  Neck or upper back pain: YES - only her back  Sinus or URI symptoms no   Fever: no  Nausea or vomiting: YES  Dizziness: YES  Numbness/tingling: no  Weakness: YES  Visual changes: when looking at light it is painful and the lights stay in her vision  History  Head trauma: no  Family history of migraines: YES  Daily pain medication use: no  Previous tests for headaches: no  Neurologist evaluation: no  Precipitating or Alleviating factors (light/sound/sleep/caffeine): only turning lights and lying down helps  Therapies tried and outcome: Ibuprofen (Advil, Motrin)              Outcome - not effective  Frequent/daily pain medication use: no    Has never been on preventative medications.   Often (but not always) will get a headache with or just before her menstrual cycle.       Patient also would like to see OB/GYN for IUD placement.  She has a h/o menorrhagia.  She bled constantly on Depo Provera.  Oral contraceptive pills are not recommend with her tobacco use. She was going to have an IUD placed previously but had an infection that needed to be treated - note from 12/30 shows this to be a  Yeast infection.       Patient is establishing care with me today as her new PCP.  She declined having a full physical, pap, etc today and will re-schedule for that.         Review of Systems   Constitutional, HEENT, cardiovascular, pulmonary, gi and gu systems are negative,  "except as otherwise noted.      Objective    /80 (BP Location: Right arm, Patient Position: Sitting, Cuff Size: Adult Large)   Pulse 74   Temp 97.2  F (36.2  C) (Tympanic)   Ht 1.626 m (5' 4\")   Wt 74.4 kg (164 lb)   SpO2 99%   Breastfeeding No   BMI 28.15 kg/m    Body mass index is 28.15 kg/m .  Physical Exam   GENERAL APPEARANCE: healthy, alert and no distress  NEURO: Normal strength and tone, mentation intact and speech normal            Assessment & Plan     Menstrual migraine without status migrainosus, not intractable     - SUMAtriptan (IMITREX) 50 MG tablet; Take 1 tablet (50 mg) by mouth at onset of headache for migraine May repeat in 2 hours. Max 4 tablets/24 hours.  - propranolol (INDERAL) 40 MG tablet; Take 0.5 tablets (20 mg) by mouth 2 times daily for 7 days, THEN 1 tablet (40 mg) 2 times daily for 23 days.    Menorrhagia with regular cycle     - OB/GYN REFERRAL     BMI:   Estimated body mass index is 28.15 kg/m  as calculated from the following:    Height as of this encounter: 1.626 m (5' 4\").    Weight as of this encounter: 74.4 kg (164 lb).                No follow-ups on file.    Susan Anaya MD  [facility]      "

## 2020-12-14 NOTE — PATIENT INSTRUCTIONS
"  Patient Education     Migraine Headache: Stages and Treatment    A migraine headache tends to progress in stages. Learning these stages can help you better understand what is happening. Then you can learn ways to reduce pain and relieve other symptoms. Methods for relieving your symptoms include self-care and medicines.   Migraine stages  Migraines tend to progress through 4 stages. Many people don't have all stages, and stages may differ with each headache:     Prodrome. A few hours to a day or so before the headache, you may feel tired, (yawning many times), uneasy, or sunshine. You may also feel bloated or crave certain foods.    Aura. Up to an hour before the headache starts, some migraine sufferers experience aura--flashing lights, blind spots, other vision problems, confusion, difficulty speaking, or other neurologic symptoms.    Headache. Moderate to severe pain affects one side of the head and then can spread to both sides, often along with nausea. You may be highly sensitive to light, sound, and odors. Vomiting or diarrhea may also happen. This stage lasts 4 to 72 hours.    Postdrome. After your headache ends, you may feel tired, achy, and \"washed out.\" This may last for a day or so.  Self-care during a migraine  Here is what you can do:    Use a cold compress. Wrap a thin cloth around a cold pack, a cold can of soda, or a bag of frozen vegetables. Apply this to your temple or other pain site.    Drink fluids. If nausea makes it hard to drink, try sucking on ice.    Rest. If possible, lie down. Try not to bend over, as this may increase your pain. Sometimes laying in a dark quiet room can help the migraine from being aggravated.      Try caffeine. Some people find that drinking fluids with caffeine, such as coffee or tea, helps to lessen migraine pain.  Using medicines  Work with your healthcare provider to find the right medicines for you. Medicines for migraine may relieve pain (analgesics), relieve " nausea, or attack the migraine's root causes (migraine-specific medicines).   Rebound headache  Taking analgesics each day, or even several times a week, may lead to more frequent and severe headaches. These are called rebound headaches. If you think you're having rebound headaches, tell your healthcare provider. He or she can help you safely decrease your medicine. Rebound caffeine withdrawal headaches can also happen. Certain medicines are addictive and can cause rebound headaches when discontinued abruptly.   [employer] last reviewed this educational content on 5/1/2018 2000-2020 [employer]. [address] [place] [address], [place] [address]. All rights reserved. This information is not intended as a substitute for professional medical care. Always follow your healthcare professional's instructions.

## 2020-12-16 ENCOUNTER — TELEPHONE (OUTPATIENT)
Dept: PSYCHIATRY | Facility: CLINIC | Age: 35
End: 2020-12-16

## 2020-12-16 DIAGNOSIS — F39 EPISODIC MOOD DISORDER (H): ICD-10-CM

## 2020-12-16 DIAGNOSIS — F41.1 GENERALIZED ANXIETY DISORDER: ICD-10-CM

## 2020-12-16 NOTE — TELEPHONE ENCOUNTER
"Requested Prescriptions   Pending Prescriptions Disp Refills     clonazePAM (KLONOPIN) 2 MG tablet 30 tablet 0     Sig: Take 1/2 tablet twice daily.  Refill 28 days after last fill       There is no refill protocol information for this order        imipramine (TOFRANIL) 25 MG tablet 60 tablet 1     Sig: Take 1 tablet (25 mg) by mouth 2 times daily       Tricyclic Antidepressants Protocol Passed - 12/16/2020  1:31 PM        Passed - Blood pressure under 140/90 in past 12 months     BP Readings from Last 3 Encounters:   12/14/20 120/80   03/17/20 124/67 03/05/20 102/69                 Passed - Recent (12 mo) or future (30 days) visit within the authorizing provider's specialty      Patient has had an office visit with the authorizing provider or a provider within the authorizing providers department within the previous 12 mos or has a future within next 30 days. See \"Patient Info\" tab in inbasket, or \"Choose Columns\" in Meds & Orders section of the refill encounter.              Passed - Medication is active on med list        Passed - Patient is age 18 or older        Passed - No active pregnancy on record        Passed - No positive pregnancy test in past 12 months           QUEtiapine (SEROQUEL) 200 MG tablet 30 tablet 1     Sig: Take 1 tablet (200 mg) by mouth At Bedtime       Antipsychotic Medications Failed - 12/16/2020  1:31 PM        Failed - Lipid panel on file within the past 12 months     No lab results found.            Passed - Blood pressure under 140/90 in past 12 months     BP Readings from Last 3 Encounters:   12/14/20 120/80   03/17/20 124/67   03/05/20 102/69                 Passed - Patient is 12 years of age or older        Passed - CBC on file in past 12 months     Recent Labs   Lab Test 02/21/20  1126   WBC 6.0   RBC 4.49   HGB 14.1   HCT 39.7                    Passed - Heart Rate on file within past 12 months     Pulse Readings from Last 3 Encounters:   12/14/20 74   03/17/20 80 " "  03/05/20 91               Passed - A1c or Glucose on file in past 12 months     Recent Labs   Lab Test 02/21/20  1126   GLC 86       Please review patients last 3 weights. If a weight gain of >10 lbs exists, you may refill the prescription once after instructing the patient to schedule an appointment within the next 30 days.    Wt Readings from Last 3 Encounters:   12/14/20 74.4 kg (164 lb)   03/05/20 82.1 kg (181 lb)   02/26/20 81.2 kg (179 lb)             Passed - Medication is active on med list        Passed - Patient is not pregnant        Passed - No positve pregnancy test on file in past 12 months        Passed - Recent (6 mo) or future (30 days) visit within the authorizing provider's specialty     Patient had office visit in the last 6 months or has a visit in the next 30 days with authorizing provider or within the authorizing provider's specialty.  See \"Patient Info\" tab in inbasket, or \"Choose Columns\" in Meds & Orders section of the refill encounter.                 "

## 2020-12-17 DIAGNOSIS — R30.0 DYSURIA: ICD-10-CM

## 2020-12-17 LAB
ALBUMIN UR-MCNC: NEGATIVE MG/DL
APPEARANCE UR: CLEAR
BACTERIA #/AREA URNS HPF: ABNORMAL /HPF
BILIRUB UR QL STRIP: NEGATIVE
COLOR UR AUTO: YELLOW
GLUCOSE UR STRIP-MCNC: NEGATIVE MG/DL
HGB UR QL STRIP: NEGATIVE
KETONES UR STRIP-MCNC: NEGATIVE MG/DL
LEUKOCYTE ESTERASE UR QL STRIP: ABNORMAL
MUCOUS THREADS #/AREA URNS LPF: PRESENT /LPF
NITRATE UR QL: NEGATIVE
NON-SQ EPI CELLS #/AREA URNS LPF: ABNORMAL /LPF
PH UR STRIP: 5.5 PH (ref 5–7)
RBC #/AREA URNS AUTO: ABNORMAL /HPF
SOURCE: ABNORMAL
SP GR UR STRIP: >1.03 (ref 1–1.03)
SPECIMEN SOURCE: ABNORMAL
UROBILINOGEN UR STRIP-ACNC: 0.2 EU/DL (ref 0.2–1)
WBC #/AREA URNS AUTO: ABNORMAL /HPF
WET PREP SPEC: ABNORMAL

## 2020-12-17 PROCEDURE — 81001 URINALYSIS AUTO W/SCOPE: CPT | Performed by: FAMILY MEDICINE

## 2020-12-17 PROCEDURE — 87088 URINE BACTERIA CULTURE: CPT | Performed by: FAMILY MEDICINE

## 2020-12-17 PROCEDURE — 87210 SMEAR WET MOUNT SALINE/INK: CPT | Performed by: FAMILY MEDICINE

## 2020-12-17 PROCEDURE — 87086 URINE CULTURE/COLONY COUNT: CPT | Performed by: FAMILY MEDICINE

## 2020-12-17 PROCEDURE — 87186 SC STD MICRODIL/AGAR DIL: CPT | Performed by: FAMILY MEDICINE

## 2020-12-17 NOTE — RESULT ENCOUNTER NOTE
Wet prep shows some yeast.  There are just a few white blood cells but no significant signs of urinary tract infection. I'm going to send a prescription for diflucan 150 mg x 1 to the pharmacy for the yeast.  Urine culture is being done to see if there is a urinary tract infection that would warrant antibiotics.    Susan Anaya M.D.

## 2020-12-18 RX ORDER — IMIPRAMINE HCL 25 MG
25 TABLET ORAL 2 TIMES DAILY
Qty: 60 TABLET | Refills: 1 | OUTPATIENT
Start: 2020-12-18

## 2020-12-18 RX ORDER — QUETIAPINE FUMARATE 200 MG/1
200 TABLET, FILM COATED ORAL AT BEDTIME
Qty: 30 TABLET | Refills: 1 | OUTPATIENT
Start: 2020-12-18

## 2020-12-18 NOTE — TELEPHONE ENCOUNTER
"Controlled Substance Refill Request for clonazepam 2mg    Last refill: 11/9/20    Last clinic visit:  11/9/20    Clinic visit frequency required: \"6 weeks\"  Next appt: None - schedulers will contact and attempt to schedule patient    Controlled substance agreement on file: No.    Documentation in problem list reviewed:  Yes    Processing:  Rx to be electronically transmitted to pharmacy by provider     [place]:   [place]    Could not attach reports as this writer is not a delegate of [employer]  "

## 2020-12-21 ENCOUNTER — VIRTUAL VISIT (OUTPATIENT)
Dept: PSYCHOLOGY | Facility: CLINIC | Age: 35
End: 2020-12-21
Payer: COMMERCIAL

## 2020-12-21 DIAGNOSIS — F41.1 GENERALIZED ANXIETY DISORDER: ICD-10-CM

## 2020-12-21 DIAGNOSIS — F39 EPISODIC MOOD DISORDER (H): ICD-10-CM

## 2020-12-21 DIAGNOSIS — F31.12 BIPOLAR 1 DISORDER WITH MODERATE MANIA (H): Primary | ICD-10-CM

## 2020-12-21 DIAGNOSIS — F90.1 ADHD (ATTENTION DEFICIT HYPERACTIVITY DISORDER), PREDOMINANTLY HYPERACTIVE IMPULSIVE TYPE: ICD-10-CM

## 2020-12-21 PROCEDURE — 99214 OFFICE O/P EST MOD 30 MIN: CPT | Mod: 95 | Performed by: NURSE PRACTITIONER

## 2020-12-21 RX ORDER — IMIPRAMINE HCL 25 MG
25 TABLET ORAL 2 TIMES DAILY
Qty: 60 TABLET | Refills: 1 | Status: SHIPPED | OUTPATIENT
Start: 2020-12-21 | End: 2021-03-05

## 2020-12-21 RX ORDER — CLONAZEPAM 2 MG/1
TABLET ORAL
Qty: 30 TABLET | Refills: 0 | Status: SHIPPED | OUTPATIENT
Start: 2020-12-21 | End: 2020-12-30

## 2020-12-21 RX ORDER — QUETIAPINE FUMARATE 200 MG/1
200 TABLET, FILM COATED ORAL AT BEDTIME
Qty: 30 TABLET | Refills: 1 | Status: SHIPPED | OUTPATIENT
Start: 2020-12-21 | End: 2021-02-01

## 2020-12-21 RX ORDER — ATOMOXETINE 40 MG/1
40 CAPSULE ORAL DAILY
Qty: 30 CAPSULE | Refills: 1 | Status: SHIPPED | OUTPATIENT
Start: 2020-12-21 | End: 2021-03-05 | Stop reason: DRUGHIGH

## 2020-12-21 RX ORDER — CLONAZEPAM 2 MG/1
TABLET ORAL
Qty: 30 TABLET | Refills: 0 | Status: CANCELLED | OUTPATIENT
Start: 2020-12-21

## 2020-12-21 ASSESSMENT — PATIENT HEALTH QUESTIONNAIRE - PHQ9
5. POOR APPETITE OR OVEREATING: NEARLY EVERY DAY
SUM OF ALL RESPONSES TO PHQ QUESTIONS 1-9: 13

## 2020-12-21 ASSESSMENT — ANXIETY QUESTIONNAIRES
6. BECOMING EASILY ANNOYED OR IRRITABLE: NEARLY EVERY DAY
IF YOU CHECKED OFF ANY PROBLEMS ON THIS QUESTIONNAIRE, HOW DIFFICULT HAVE THESE PROBLEMS MADE IT FOR YOU TO DO YOUR WORK, TAKE CARE OF THINGS AT HOME, OR GET ALONG WITH OTHER PEOPLE: EXTREMELY DIFFICULT
2. NOT BEING ABLE TO STOP OR CONTROL WORRYING: NOT AT ALL
7. FEELING AFRAID AS IF SOMETHING AWFUL MIGHT HAPPEN: NOT AT ALL
3. WORRYING TOO MUCH ABOUT DIFFERENT THINGS: NEARLY EVERY DAY
1. FEELING NERVOUS, ANXIOUS, OR ON EDGE: MORE THAN HALF THE DAYS
GAD7 TOTAL SCORE: 14
5. BEING SO RESTLESS THAT IT IS HARD TO SIT STILL: NEARLY EVERY DAY

## 2020-12-21 NOTE — PATIENT INSTRUCTIONS
1.  Add Vraylar 1.5 mg daily    2.  Restart Strattera 40 mg daily    3.  Do not restart doxepin    4.  Continue imipramine 25 mg twice daily    5.  Continue quetiapine 200 mg at bedtime    6.  Continue clonazepam 1 to 2 mg daily as needed for severe anxiety    Continue all other medications as reviewed per electronic medical record today.     Safety plan reviewed. To the Emergency Department as needed or call after hours crisis line at [phone] or [phone]. [place] Crisis Text Line. Text [place] to 030696 or Suicide LifeLine Chat: suicideEmote Games.org/chat/    To schedule individual or family therapy, call [facility] at [phone].    Schedule an appointment with me in 6 weeks or sooner as needed. Call [facility] at [phone] to schedule.    Follow up with primary care provider as planned or for acute medical concerns.    Call the psychiatric nurse line with medication questions or concerns at [phone].    Phone.com may be used to communicate with your provider, but this is not intended to be used for emergencies.    Crisis Resources:    National Suicide Prevention Lifeline: 816.213.2112 (TTY: 873.264.3886). Call anytime for help.  (www.suicidepreventionlifeline.org)  National [place] on Mental Illness (www.[place].org): 172.468.5220 or 618-742-3302.   Mental Health Association (www.mentalhealth.org): 845.585.6138 or 456-886-1377.  [place] Crisis Text Line: Text [place] to 428248  Suicide LifeLine Chat: suicideEmote Games.org/chat       dyspnea on exertion/peripheral edema/orthopnea

## 2020-12-21 NOTE — TELEPHONE ENCOUNTER
[facility] pharmacy called stating they cannot refill Clonazepam due to [place] law. Stated that this med needs to be sent by an MD in order to fill. [facility] in [facility] phone#: [phone]

## 2020-12-21 NOTE — PROGRESS NOTES
"Lluvia Glaser is a 35 year old female who is being evaluated via a billable telephone visit.      The patient has been notified of following:     \"This telephone visit will be conducted via a call between you and your physician/provider. We have found that certain health care needs can be provided without the need for a physical exam.  This service lets us provide the care you need with a short phone conversation.  If a prescription is necessary we can send it directly to your pharmacy.  If lab work is needed we can place an order for that and you can then stop by our lab to have the test done at a later time.    Telephone visits are billed at different rates depending on your insurance coverage. During this emergency period, for some insurers they may be billed the same as an in-person visit.  Please reach out to your insurance provider with any questions.    If during the course of the call the physician/provider feels a telephone visit is not appropriate, you will not be charged for this service.\"    Patient has given verbal consent for Telephone visit?  Yes    What phone number would you like to be contacted at? [phone]    How would you like to obtain your AVS? [facility]    Phone call duration: 30 minutes    Keeley Mancini NP          Outpatient Psychiatric Progress Note    Name: Lluvia Glaser   : 1985                    Primary Care Provider: [facility]   Therapist: None     PHQ-9 scores:  PHQ-9 SCORE 10/29/2020 2020 2020   PHQ-9 Total Score 19 13 13       WAYNE-7 scores:  WAYNE-7 SCORE 2020   Total Score 13 13 14       Patient Identification:    Patient is a 35 year old year old, partnered / significant other  White American female  who presents for return visit with me.  Patient is currently unemployed. Patient attended the session alone. Patient prefers to be called: \"Lluvia\".    Interim History:    I last saw Lluvia" KASIA Glaser for outpatient psychiatry Return Visit on November 9, 2020.     During that appointment, she  presented tearful and in distress as a recent court hearing occurred designating her mother with full custody of her children with plans to adopt them.  While Sandie will be able to see her children she harbors severe guilt symptoms and sadness that she is unable to care for them.  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  She admits to taking her clonazepam more at bedtime to the point of running out before the end of the month.  No more tablets will be given to her other than prescribed.  Instead, she agreed to an increase in her imipramine dose to 25 mg twice daily for her anxiety and depression symptoms.  She declined an increase in the quetiapine at bedtime as she was fearful of having incontinence due to sleeping too soundly.  Forms are pending for approval for her to receive Funding as she is under financial stress and is unable to work.    .     Current medications include:      ciprofloxacin (CIPRO) 250 MG tablet, Take 1 tablet (250 mg) by mouth 2 times daily for 3 days       clonazePAM (KLONOPIN) 2 MG tablet, Take 1/2 tablet twice daily.  Refill 28 days after last fill       imipramine (TOFRANIL) 25 MG tablet, Take 1 tablet (25 mg) by mouth 2 times daily       propranolol (INDERAL) 40 MG tablet, Take 0.5 tablets (20 mg) by mouth 2 times daily for 7 days, THEN 1 tablet (40 mg) 2 times daily for 23 days.       QUEtiapine (SEROQUEL) 200 MG tablet, Take 1 tablet (200 mg) by mouth At Bedtime       SUMAtriptan (IMITREX) 50 MG tablet, Take 1 tablet (50 mg) by mouth at onset of headache for migraine May repeat in 2 hours. Max 4 tablets/24 hours.       albuterol (PROVENTIL) (2.5 MG/3ML) 0.083% neb solution, Take 1 vial (2.5 mg) by nebulization every 6 hours as needed for shortness of breath / dyspnea or wheezing (Patient not taking: Reported on 11/9/2020)    No  current facility-administered medications on file prior to visit.        The [place] Prescription Monitoring Program has been reviewed and there are no concerns about diversionary activity for controlled substances at this time.      I was able to review most recent Primary Care Provider, specialty provider, and therapy visit notes that I have access to.     Today, patient reports that she has not been able to get her medications refilled in a timely manner.  She now is in [place] staying with her mother and her children over the Christmas holiday.  She presented with pressured speech and asked about restarting something for bipolar disorder now that she is sleeping better.  She also agreed to restart Strattera as she has difficulty staying organized and focused.  She denies any suicide thinking but tells me it will be sad when she has to leave her children to come home at the end of the week.     has a past medical history of ADHD, Chronic eczema, Gastric ulcer, Generalized anxiety disorder, Smoker, and Substance abuse (H). She also has no past medical history of Diabetes (H), Motion sickness, PONV (postoperative nausea and vomiting), Sleep apnea, Thyroid disease, or Uncomplicated asthma.    Social history updates:    Lluvia is in [place] right now for 1 week to stay with her mother and her children.  She anticipates it will be difficult to return to [place] as her mother now has full custody of her children.    Substance use updates:    She denies alcohol use  Tobacco use: Yes Cigarettes  Ready to quit?  No  Nicotine Replacement Therapy tried: None    Vital Signs:   There were no vitals taken for this visit.    Labs:    Most recent laboratory results reviewed and no new labs.     Review of Systems:  10 systems (general, cardiovascular, respiratory, eyes, ENT, endocrine, GI, , M/S, neurological) were reviewed. Most pertinent finding(s) is/are: She reports no chest pain, no cough, no shortness of breath, no  headaches. The remaining systems are all unremarkable.    Mental Status Examination:  Appearance:  awake, alert and Moderate distress  Attitude:  cooperative   Eye Contact:  Unable to assess  Gait and Station: No assistive Devices used and No dizziness or falls  Psychomotor Behavior:  Unable to assess  Oriented to:  time, person, and place  Attention Span and Concentration:  Easily distracted  Speech:   pressured speech and Speaks: English  Mood:  anxious and depressed  Affect:  intensity is heightened  Associations:  no loose associations  Thought Process:  disorganized and tangental  Thought Content:  no evidence of suicidal ideation or homicidal ideation, no auditory hallucinations present and no visual hallucinations present  Recent and Remote Memory:  intact Not formally assessed. No amnesia.  Fund of Knowledge: appropriate  Insight:  fair  Judgment:  fair  Impulse Control:  fair    Suicide Risk Assessment:  Today Lluvia Glaser reports that she is having no thoughts to want to end her life or to harm other people. In addition, there are notable risk factors for self-harm, including anxiety, substance abuse and mood change. However, risk is mitigated by commitment to family, history of seeking help when needed, future oriented, denies suicidal intent or plan and denies homicidal ideation, intent, or plan. Therefore, based on all available evidence including the factors cited above, Lluvia Glaser does not appear to be at imminent risk for self-harm, does not meet criteria for a 72-hr hold, and therefore remains appropriate for ongoing outpatient level of care.  A thorough assessment of risk factors related to suicide and self-harm have been reviewed and are noted above. The patient convincingly denies suicidality on several occasions. Local community safety resources printed and reviewed for patient to use if needed. There was no deceit detected, and the patient presented in a manner that was  believable.     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  She has ongoing mood swings and impulsivity's.  I added Vraylar 1.5 mg daily.  For her attentional deficit she will restart Strattera at 40 mg daily.  She has had difficulties getting her medications so I will not restart the doxepin at this time.  I want her to continue the imipramine 25 mg twice daily.  She will continue the clonazepam 1 mg twice daily for her anxiety and is cautioned to take this sparingly.  Finally for mood regulation and to help her sleep she will continue with the quetiapine 200 mg at bedtime..    Medication side effects and alternatives were reviewed. Health promotion activities recommended and reviewed today. All questions addressed. Education and counseling completed regarding risks and benefits of medications and psychotherapy options.    Treatment Plan:        1.  Add Vraylar 1.5 mg daily    2.  Restart Strattera 40 mg daily    3.  Do not restart doxepin    4.  Continue imipramine 25 mg twice daily    5.  Continue quetiapine 200 mg at bedtime    6.  Continue clonazepam 1 to 2 mg daily as needed for severe anxiety      Continue all other medications as reviewed per electronic medical record today.     Safety plan reviewed. To the  Emergency Department as needed or call after hours crisis line at [phone] or [phone]. [place] Crisis Text Line. Text [place] to 727966 or Suicide LifeLine Chat: suicideShanghai Xik[employer].org/chat/    To schedule individual or family therapy, call [facility] at [phone].    Schedule an appointment with me in 6 weeks or sooner as needed. Call [facility] at [phone] to schedule.    Follow up with primary care provider as planned or for acute medical concerns.    Call the psychiatric nurse line with medication questions or concerns at [phone].    Dig[employer] may be used to communicate with your provider, but this is not intended to be used for emergencies.    Crisis Resources:    National Suicide Prevention Lifeline: 541.520.1063 (TTY: 570.176.3249). Call anytime for help.  (www.suicidepreventionlifeline.org)  National [place] on Mental Illness (www.sabina.org): 932.240.5951 or 337-777-9411.   Mental Health Association (www.mentalhealth.org): 672.878.3233 or 561-036-8943.  [place] Crisis Text Line: Text [place] to 553749  Suicide LifeLine Chat: suicideShanghai [employer].org/chat    Administrative Billing:   Time spent with patient was 30 minutes and greater than 50% of time or 20 minutes was spent in counseling and coordination of care regarding above diagnoses and treatment plan.    Patient Status:  Patient will continue to be seen for ongoing consultation and stabilization.    Signed:   RILEY Mohamud-BC   Psychiatry

## 2020-12-22 ENCOUNTER — TELEPHONE (OUTPATIENT)
Dept: BEHAVIORAL HEALTH | Facility: CLINIC | Age: 35
End: 2020-12-22

## 2020-12-22 ENCOUNTER — TELEPHONE (OUTPATIENT)
Dept: PSYCHIATRY | Facility: CLINIC | Age: 35
End: 2020-12-22

## 2020-12-22 DIAGNOSIS — F41.1 GENERALIZED ANXIETY DISORDER: ICD-10-CM

## 2020-12-22 DIAGNOSIS — F31.12 BIPOLAR 1 DISORDER WITH MODERATE MANIA (H): ICD-10-CM

## 2020-12-22 ASSESSMENT — ANXIETY QUESTIONNAIRES: GAD7 TOTAL SCORE: 14

## 2020-12-22 NOTE — TELEPHONE ENCOUNTER
Reason for call:  Medication   If this is a refill request, has the caller requested the refill from the pharmacy already? Yes  Will the patient be using a [facility]? No  Name of the pharmacy and phone number for the current request:   [facility] [address] [place] [address] Phone:  [phone]   Fax:  [phone]            Name of the medication requested: vraylar    Other request: Pharmacy told pt that prior auth is needed for vraylar. Klonopin has not been filled either but pt is not sure why.  Would like a callback to discuss.    Phone number to reach patient:  Cell number on file:    Telephone Information:   Mobile [phone]       Best Time:  Anytime    Can we leave a detailed message on this number?  YES    Travel screening: Not Applicable

## 2020-12-22 NOTE — TELEPHONE ENCOUNTER
I spoke to pharmacy. RX coming from out of state in the [place] require the NP to have an attending MD send in the RX. NPs are not independent in [place] so their state laws apply.    Patient was NOT in the state of [place] yesterday for her visit. She was in [place] yesterday and picked up all her other meds, except the Clonazepam.     Sending to MARY Mohamud for decision. Clonazepam must be sent in by MD.

## 2020-12-22 NOTE — TELEPHONE ENCOUNTER
Behavioral [facility] Home Services  [facility]: [place]        Social Work Care Navigator Note      Patient: Harsha Glaser  Date: December 22, 2020  Preferred Name: harsha    Previous PHQ-9:   PHQ-9 SCORE 10/29/2020 11/9/2020 12/21/2020   PHQ-9 Total Score 19 13 13     Previous WAYNE-7:   WAYNE-7 SCORE 9/17/2020 11/9/2020 12/21/2020   Total Score 13 13 14     ULISES LEVEL:  No flowsheet data found.    Preferred Contact:  Need for : No  Preferred Contact: Cell      Type of Contact Today: Phone call (not reached/unavailable)      Data: (subjective / Objective):  Attempted to reach patient for Behavioral [facility] [place] ([facility]) monthly check-in, but was unsuccessful, left message.  Plan to attempt again.      TRIXIE Moon [facility]  [facility] [place] ([facility])   [facility]  [phone]  December 22, 2020  1:58 PM

## 2020-12-23 NOTE — TELEPHONE ENCOUNTER
Patient established care with  on 12/14.  Will send to this provider for review.    Shyla Hodgson, RN    Nurse Liaison  [facility]

## 2020-12-23 NOTE — TELEPHONE ENCOUNTER
Pt calling looking for follow from previous phone encounter. Pt wanting to speak with provider stating she may need adjustment of Bipolar medications.

## 2020-12-23 NOTE — TELEPHONE ENCOUNTER
I'm confused by my role in this.  I have not been involved in patients mental health at all to this point.  Why are we sending controlled medications to a pharmacy in [place]?  Clonazepam is not a medication I usually prescribe chronically, particularly when there is a history of drug abuse.      Susan Anaya M.D.

## 2020-12-23 NOTE — TELEPHONE ENCOUNTER
Spoke to Dr. Mc and in communication with him regarding sending enough clonazepam to the [place] pharmacy for a week.

## 2020-12-23 NOTE — TELEPHONE ENCOUNTER
Patient calling again, states the pharmacy told her to call back, let her know we were waiting on response from Keeley's team

## 2020-12-24 ENCOUNTER — MYC MEDICAL ADVICE (OUTPATIENT)
Dept: ADDICTION MEDICINE | Facility: CLINIC | Age: 35
End: 2020-12-24

## 2020-12-24 NOTE — TELEPHONE ENCOUNTER
MeetCast message sent to patient with this information.     Shyla Hodgson, RN    Nurse Liaison  [facility]

## 2020-12-24 NOTE — TELEPHONE ENCOUNTER
Keeley Mancini, Jennifer Woods; P Psych Rn - [facility]   Caller: Unspecified (Yesterday, 11:50 AM)             No changes until her return from [place].  She told me that she is only going to be there a week          Bizen message sent to patient with this information.     Shyla Hodgson, GENARO    Nurse Liaison  [facility] Psychiatric Services

## 2020-12-30 RX ORDER — CLONAZEPAM 2 MG/1
TABLET ORAL
Qty: 30 TABLET | Refills: 0 | Status: SHIPPED | OUTPATIENT
Start: 2020-12-30 | End: 2021-02-01

## 2020-12-30 NOTE — TELEPHONE ENCOUNTER
Called patient back, she is back from [place] and wants to inform [facility] CARRERA. She said they were not able to fill her meds there. Please see encounters below. Routing to provider.

## 2020-12-30 NOTE — TELEPHONE ENCOUNTER
The patient is back in town and would like to connect with the provider regarding her medications.     The patient does have a return visit scheduled for 2.1.2021.     Please call [phone], a detailed message can be left if needed.

## 2020-12-30 NOTE — TELEPHONE ENCOUNTER
Spoke to Sandie, ordered Vraylar and clonazepam and Toni Borjas.  She will contact me if she needs refills on any other medications.

## 2021-01-03 ENCOUNTER — TELEPHONE (OUTPATIENT)
Dept: PSYCHIATRY | Facility: CLINIC | Age: 36
End: 2021-01-03

## 2021-01-04 NOTE — TELEPHONE ENCOUNTER
Central Prior Authorization Team   Phone: [phone]      PA Initiation    Medication: vraylar - INITIATED  Insurance Company: [employer] PMAP - Phone [phone] Fax [phone]  Pharmacy Filling the Rx: [facility] [place] [address] [address]  Filling Pharmacy Phone: [phone]  Filling Pharmacy Fax: [phone]  Start Date: 1/4/2021

## 2021-01-04 NOTE — TELEPHONE ENCOUNTER
Prior Authorization Retail Medication Request    Medication/Dose: vraylar  ICD code (if different than what is on RX):    Previously Tried and Failed:  seroquel  Rationale:  Patient has used previously    Insurance Name:  BC/WAYNE DOUGLAS  Insurance ID:  Not provided  [facility] Key: [id number]      Pharmacy Information (if different than what is on RX)  Name:    Phone:

## 2021-01-05 ENCOUNTER — TELEPHONE (OUTPATIENT)
Dept: PSYCHIATRY | Facility: CLINIC | Age: 36
End: 2021-01-05

## 2021-01-05 DIAGNOSIS — F41.1 GENERALIZED ANXIETY DISORDER: ICD-10-CM

## 2021-01-05 DIAGNOSIS — F31.12 BIPOLAR 1 DISORDER WITH MODERATE MANIA (H): Primary | ICD-10-CM

## 2021-01-05 NOTE — TELEPHONE ENCOUNTER
Central Prior Authorization Team   Phone: [phone]      PRIOR AUTHORIZATION DENIED    Medication: vraylar - DENIED    Denial Date: 1/4/2021    Denial Rational: Patient must try and fail two preferred drugs:      Appeal Information:

## 2021-01-05 NOTE — TELEPHONE ENCOUNTER
Reason for Call:  Medication or medication refill:    Do you use a [facility]?  Name of the pharmacy and phone number for the current request:    [facility] [address] [place] [address] Phone:  [phone]   Fax:  [phone]            Name of the medication requested:   clonazePAM (KLONOPIN) 2 MG tablet           Other request: Pharmacist advised that Keeley T's licensure does not allow them to fill this controlled medication. And they want to connect to see if the patient has had this filled somewhere else.    Can we leave a detailed message on this number? YES    Phone number patient can be reached at: Other phone number:  [phone]*    Best Time: ASAP    Call taken on 1/5/2021 at 10:12 AM by Marnie Sullivan

## 2021-01-06 NOTE — TELEPHONE ENCOUNTER
Reason for Call:  Received call from patient, stated she wants her meds    Phone Number Patient can be reached at: [phone]    Call taken on 1/6/2021 at 12:16 PM by Ashok Austin

## 2021-01-06 NOTE — TELEPHONE ENCOUNTER
Please review and advise as pharmacy states your license does not cover Klonopin at the pharmacy which is in [place]

## 2021-01-06 NOTE — TELEPHONE ENCOUNTER
Dr. Mancini  PA for vraylar has been denied.    Denial Rational: Patient must try and fail two preferred drugs:       [place]

## 2021-01-07 NOTE — TELEPHONE ENCOUNTER
Spoke with patient regarding this-- she states that the insurance told her we haven't spoken with them at all regarding this PA. Writer informed patient that our PA team has been in contact with her insurance since 1/3, and that the approval was denied. Informed patient that the encounter had been sent to Kee[facility] to review the criteria needed to appeal and we were waiting on her response. Patient extremely agitated and rude with writer on the phone.     Routing to provider high priority to review.     Shyla Hodgson, RN    Nurse Liaison  [facility] Services

## 2021-01-07 NOTE — TELEPHONE ENCOUNTER
Pt called back regarding medication refill and wanting to know the status. Pt stated she has been trying to get medicaiton refilled for over a week. Pt requested call back at [phone]

## 2021-01-08 RX ORDER — OLANZAPINE 5 MG/1
5 TABLET ORAL AT BEDTIME
Qty: 30 TABLET | Refills: 0 | Status: SHIPPED | OUTPATIENT
Start: 2021-01-08 | End: 2021-02-01

## 2021-01-08 NOTE — TELEPHONE ENCOUNTER
1/8/21 Received call from Patient requesting the status of the medication authorization. She would like a call back [phone]. JESSICA

## 2021-01-08 NOTE — TELEPHONE ENCOUNTER
Connected with pt. She states she has been on abilify and latuda before. She has not tried olanzapine. She said she's willing to try anything at this point.

## 2021-01-11 ENCOUNTER — TELEPHONE (OUTPATIENT)
Dept: BEHAVIORAL HEALTH | Facility: CLINIC | Age: 36
End: 2021-01-11

## 2021-01-11 NOTE — TELEPHONE ENCOUNTER
Behavioral Health Home Services  [facility]: [place]        Social Work Care Navigator Note      Patient: Harsha Glaser  Date: January 11, 2021  Preferred Name: harsha    Previous PHQ-9:   PHQ-9 SCORE 10/29/2020 11/9/2020 12/21/2020   PHQ-9 Total Score 19 13 13     Previous WAYNE-7:   WAYNE-7 SCORE 9/17/2020 11/9/2020 12/21/2020   Total Score 13 13 14     ULISES LEVEL:  No flowsheet data found.    Preferred Contact:  Need for : No  Preferred Contact: Cell      Type of Contact Today: Phone call (patient / identified key support person reached)      Data: (subjective / Objective):  Attempted to reach patient for Behavioral Health Home ([facility]) monthly check-in, but was unsuccessful, left message and sent [employer] message.  Plan to attempt again.      TRIXIE Moon [facility]  [facility]al [facility] [place] ([facility])   [facility]  [phone]  January 11, 2021  2:58 PM

## 2021-01-14 ENCOUNTER — TELEPHONE (OUTPATIENT)
Dept: PSYCHIATRY | Facility: CLINIC | Age: 36
End: 2021-01-14

## 2021-01-14 NOTE — TELEPHONE ENCOUNTER
Patient had Vraylar prescribed on 12/30/20 for a 30 day supply plus one refill. Patient should have a refill available.      Called patient and left message with the above information.

## 2021-01-14 NOTE — TELEPHONE ENCOUNTER
Reason for call:  Medication   If this is a refill request, has the caller requested the refill from the pharmacy already? Yes  Will the patient be using a [facility]? No  Name of the pharmacy and phone number for the current request:   [facility] [address] [place] [address] Phone:  [phone]   Fax:  [phone]            Name of the medication requested: cariprazine (VRAYLAR) 1.5 MG CAPS capsule      Other request: pharmacy requests a new script so pt can get refill  Phone number to reach patient: [phone]    Best Time:  anytime    Can we leave a detailed message on this number?  YES    Travel screening: Not Applicable

## 2021-01-15 NOTE — TELEPHONE ENCOUNTER
Keeley NP asked that patient be called and ask if she had tried Latuda, Abilify or olanzapine.     Called patient, no response. Left voice message for her to return call.

## 2021-01-18 ENCOUNTER — TELEPHONE (OUTPATIENT)
Dept: BEHAVIORAL HEALTH | Facility: CLINIC | Age: 36
End: 2021-01-18

## 2021-01-18 NOTE — TELEPHONE ENCOUNTER
Behavioral Health Home Services  [facility]: [place]        Social Work Care Navigator Note      Patient: Harsha Glaser  Date: January 18, 2021  Preferred Name: harsha    Previous PHQ-9:   PHQ-9 SCORE 10/29/2020 11/9/2020 12/21/2020   PHQ-9 Total Score 19 13 13     Previous WAYNE-7:   WAYNE-7 SCORE 9/17/2020 11/9/2020 12/21/2020   Total Score 13 13 14     ULISES LEVEL:  No flowsheet data found.    Preferred Contact:  Need for : No  Preferred Contact: Cell      Type of Contact Today: Phone call (not reached/unavailable)      Data: (subjective / Objective):  Attempted to reach patient for [facility] [place] ([facility]) monthly check-in, but was unsuccessful, left message and mailed letter.  Plan to attempt again.     TRIXIE Moon [facility]  [facility] [place] ([facility])   [facility]  [phone]  January 18, 2021  1:41 PM

## 2021-01-18 NOTE — LETTER
Behavioral Health Home ([facility]): Health Action Plan  [facility] Clinic: [place]    [facility]      Name: Lluviawendy Glaser  Preferred Name: lluvia  : 1985  MRN: [id number]    2021    [facility] [place] [facility]  [address] [place] [address]      Dear Lluvia Glaser,    I am writing to inform you that I have tried contacting you to schedule your Behavioral Health Home ([facility]) monthly check-in that is due this month. The Behavioral Health Home ([facility]) monthly check-in is a requiredment of this program as you continue to be enrolled in the Behavioral Health Home ([facility]) program. Please call me as soon as your receive this letter so we can get an appointment scheduled.      If you have any questions about Behavioral Health Home ([facility]) services, or if you feel you no longer was to be enrolled in the Behavioral Health Home ([facility]) program, please feel free to contact me by phone or by email. My contact information is listed below. I look forward to hearing from you!    Thank you,          TRIXIE Moon LGSW Behavioral Health Home ([facility])   [phone]  [email or web address]

## 2021-01-26 ENCOUNTER — DOCUMENTATION ONLY (OUTPATIENT)
Dept: BEHAVIORAL HEALTH | Facility: CLINIC | Age: 36
End: 2021-01-26

## 2021-01-26 NOTE — PROGRESS NOTES
DOCUMENTATION ONLY:  Upon patients enrollment into [facility] services, [facility] did not allow for the appropriate selection for [facility] location for utilization on the ERV dashboard. Updated [facility] brief needs flowsheet to reflect this on the new Power  dashboard.    TRIXIE Moon [facility]  Behavioral Health Home ([facility])   [facility]  [phone]  January 26, 2021  8:31 AM

## 2021-02-01 ENCOUNTER — VIRTUAL VISIT (OUTPATIENT)
Dept: PSYCHOLOGY | Facility: CLINIC | Age: 36
End: 2021-02-01
Payer: COMMERCIAL

## 2021-02-01 ENCOUNTER — TELEPHONE (OUTPATIENT)
Dept: PSYCHIATRY | Facility: CLINIC | Age: 36
End: 2021-02-01

## 2021-02-01 ENCOUNTER — TELEPHONE (OUTPATIENT)
Dept: FAMILY MEDICINE | Facility: CLINIC | Age: 36
End: 2021-02-01

## 2021-02-01 DIAGNOSIS — G43.829 MENSTRUAL MIGRAINE WITHOUT STATUS MIGRAINOSUS, NOT INTRACTABLE: ICD-10-CM

## 2021-02-01 DIAGNOSIS — F41.1 GENERALIZED ANXIETY DISORDER: ICD-10-CM

## 2021-02-01 DIAGNOSIS — F31.12 BIPOLAR 1 DISORDER WITH MODERATE MANIA (H): ICD-10-CM

## 2021-02-01 DIAGNOSIS — F39 EPISODIC MOOD DISORDER (H): ICD-10-CM

## 2021-02-01 PROCEDURE — 99214 OFFICE O/P EST MOD 30 MIN: CPT | Mod: 95 | Performed by: NURSE PRACTITIONER

## 2021-02-01 RX ORDER — CLONAZEPAM 2 MG/1
TABLET ORAL
Qty: 30 TABLET | Refills: 0 | Status: SHIPPED | OUTPATIENT
Start: 2021-02-01 | End: 2021-03-05

## 2021-02-01 RX ORDER — QUETIAPINE FUMARATE 200 MG/1
200 TABLET, FILM COATED ORAL AT BEDTIME
Qty: 30 TABLET | Refills: 1 | Status: SHIPPED | OUTPATIENT
Start: 2021-02-01 | End: 2021-03-05

## 2021-02-01 RX ORDER — OLANZAPINE 5 MG/1
5 TABLET ORAL AT BEDTIME
Qty: 30 TABLET | Refills: 0 | Status: CANCELLED | OUTPATIENT
Start: 2021-02-01

## 2021-02-01 ASSESSMENT — ANXIETY QUESTIONNAIRES
5. BEING SO RESTLESS THAT IT IS HARD TO SIT STILL: NEARLY EVERY DAY
3. WORRYING TOO MUCH ABOUT DIFFERENT THINGS: NOT AT ALL
6. BECOMING EASILY ANNOYED OR IRRITABLE: MORE THAN HALF THE DAYS
IF YOU CHECKED OFF ANY PROBLEMS ON THIS QUESTIONNAIRE, HOW DIFFICULT HAVE THESE PROBLEMS MADE IT FOR YOU TO DO YOUR WORK, TAKE CARE OF THINGS AT HOME, OR GET ALONG WITH OTHER PEOPLE: VERY DIFFICULT
GAD7 TOTAL SCORE: 9
2. NOT BEING ABLE TO STOP OR CONTROL WORRYING: NOT AT ALL
1. FEELING NERVOUS, ANXIOUS, OR ON EDGE: SEVERAL DAYS
7. FEELING AFRAID AS IF SOMETHING AWFUL MIGHT HAPPEN: NOT AT ALL

## 2021-02-01 ASSESSMENT — PATIENT HEALTH QUESTIONNAIRE - PHQ9
SUM OF ALL RESPONSES TO PHQ QUESTIONS 1-9: 10
5. POOR APPETITE OR OVEREATING: NEARLY EVERY DAY

## 2021-02-01 NOTE — PATIENT INSTRUCTIONS
1.  Continue atomoxetine 40 mg daily    2.  Continue quetiapine 200 mg at bedtime    3.  Discontinue olanzapine    4.  Add Vraylar 1.5 mg at bedtime    5.  Continue clonazepam 1 mg twice daily as needed for anxiety    6.  Continue imipramine 25 mg twice daily      Continue all other medications as reviewed per electronic medical record today.     Safety plan reviewed. To the Emergency Department as needed or call after hours crisis line at [phone] or [phone]. [place] Crisis Text [place]. Text [place] to 395483 or Suicide LifeLine Chat: suicide[employer].org/chat/    To schedule individual or family therapy, call [facility] at [phone].    Schedule an appointment with me in 2 weeks or sooner as needed. Call [facility] at [phone] to schedule.    Follow up with primary care provider as planned or for acute medical concerns.    Call the psychiatric nurse line with medication questions or concerns at [phone].    [employer] may be used to communicate with your provider, but this is not intended to be used for emergencies.    Crisis Resources:    National Suicide Prevention Lifeline: 804.704.7503 (TTY: 846.103.1298). Call anytime for help.  (www.suicidepreventionlifeline.org)  National [place] on Mental Illness (www.[place]abina.org): 473.199.6367 or 615-283-4697.   Mental Health Association (www.mentalhealth.org): 745.699.2109 or 907-764-4787.  [place] Crisis Text [place]: Text [place] to 742762  Suicide LifeLine Chat: suicide[employer].org/chat

## 2021-02-01 NOTE — PROGRESS NOTES
"lluvia is a 35 year old who is being evaluated via a billable telephone visit.      What phone number would you like to be contacted at? [phone]  How would you like to obtain your AVS? [facility]          Outpatient Psychiatric Progress Note    Name: Lluvia Glaser   : 1985                    Primary Care Provider: [facility]   Therapist: None     PHQ-9 scores:  PHQ-9 SCORE 2020   PHQ-9 Total Score 13 13 10       WAYNE-7 scores:  WAYNE-7 SCORE 2020   Total Score 13 14 9       Patient Identification:    Patient is a 35 year old year old, partnered / significant other  White American female  who presents for return visit with me.  Patient is currently unemployed. Patient attended the session alone. Patient prefers to be called: \"Lluvia\".    Interim History:    I last saw Lluvia Glaser for outpatient psychiatry Return Visit on 2020.     During that appointment, she expressed concern about not being treated for her bipolar disorder.  She has ongoing mood swings and impulsivity's.  I added Vraylar 1.5 mg daily.  For her attentional deficit she will restart Strattera at 40 mg daily.  She has had difficulties getting her medications so I will not restart the doxepin at this time.  I want her to continue the imipramine 25 mg twice daily.  She will continue the clonazepam 1 mg twice daily for her anxiety and is cautioned to take this sparingly.  Finally for mood regulation and to help her sleep she will continue with the quetiapine 200 mg at bedtime.. .     Current medications include:      atomoxetine (STRATTERA) 40 MG capsule, Take 1 capsule (40 mg) by mouth daily       cariprazine (VRAYLAR) 1.5 MG CAPS capsule, Take 1 capsule (1.5 mg) by mouth daily       clonazePAM (KLONOPIN) 2 MG tablet, Take 1/2 tablet twice daily.  Refill 28 days after last fill       imipramine (TOFRANIL) 25 MG tablet, Take 1 tablet (25 mg) by " mouth 2 times daily       OLANZapine (ZYPREXA) 5 MG tablet, Take 1 tablet (5 mg) by mouth At Bedtime       propranolol (INDERAL) 40 MG tablet, Take 0.5 tablets (20 mg) by mouth 2 times daily for 7 days, THEN 1 tablet (40 mg) 2 times daily for 23 days.       SUMAtriptan (IMITREX) 50 MG tablet, Take 1 tablet (50 mg) by mouth at onset of headache for migraine May repeat in 2 hours. Max 4 tablets/24 hours.       QUEtiapine (SEROQUEL) 200 MG tablet, Take 1 tablet (200 mg) by mouth At Bedtime (Patient not taking: Reported on 2/1/2021)    No current facility-administered medications on file prior to visit.        The [place] Prescription Monitoring Program has been reviewed and there are no concerns about diversionary activity for controlled substances at this time.      I was able to review most recent Primary Care Provider, specialty provider, and therapy visit notes that I have access to.     Today, patient reports that the olanzapine made her feel more normal but then she felt sick and groggy.  She has anxiety that continues.  She denies suicide thinking.  She sleeps poorly.   She has not received her seroquel.  Lluvia is feeling like she wants to move to [place] away from her boyfriend.  Medications require further adjustments to stabilize her mood more.     has a past medical history of ADHD, Chronic eczema, Gastric ulcer, Generalized anxiety disorder, Smoker, and Substance abuse (H). She also has no past medical history of Diabetes (H), Motion sickness, PONV (postoperative nausea and vomiting), Sleep apnea, Thyroid disease, or Uncomplicated asthma.    Social history updates:    She has a girlfriend that lives in [place]    Substance use updates:    She does not drink alcohol  Tobacco use: Yes Cigarettes  Ready to quit?  No  Nicotine Replacement Therapy tried: none      Vital Signs:   There were no vitals taken for this visit.    Labs:    Most recent laboratory results reviewed and no new labs.     Review of  Systems:  10 systems (general, cardiovascular, respiratory, eyes, ENT, endocrine, GI, , M/S, neurological) were reviewed. Most pertinent finding(s) is/are: she reports no headaches when she takes her medication, no chest pain,  No skin rashes. The remaining systems are all unremarkable.    Mental Status Examination:  Appearance:  awake, alert  Attitude:  cooperative   Eye Contact:  unable to assess  Gait and Station: No assistive Devices used and No dizziness or falls  Psychomotor Behavior:  unable to assess  Oriented to:  time, person, and place  Attention Span and Concentration:  Fair  Speech:   pressured speech and Speaks: English  Mood:  anxious  Affect:  intensity is heightened  Associations:  no loose associations  Thought Process:  goal oriented and tangental  Thought Content:  no evidence of suicidal ideation or homicidal ideation, no auditory hallucinations present and no visual hallucinations present  Recent and Remote Memory:  intact Not formally assessed. No amnesia.  Fund of Knowledge: appropriate  Insight:  fair  Judgment:  fair  Impulse Control:  fair    Suicide Risk Assessment:  Today Lluvia Glaser reports that she is having no thoughts to want to end her life or to harm other people. In addition, there are notable risk factors for self-harm, including single status, anxiety and mood change. However, risk is mitigated by commitment to family, history of seeking help when needed, future oriented, denies suicidal intent or plan and denies homicidal ideation, intent, or plan. Therefore, based on all available evidence including the factors cited above, Lluvia Glaser does not appear to be at imminent risk for self-harm, does not meet criteria for a 72-hr hold, and therefore remains appropriate for ongoing outpatient level of care.  A thorough assessment of risk factors related to suicide and self-harm have been reviewed and are noted above. The patient convincingly denies suicidality  on several occasions. Local community safety resources printed and reviewed for patient to use if needed. There was no deceit detected, and the patient presented in a manner that was believable.     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  I ordered it today at 200 mg at bedtime.  She had to try olanzapine before ordering the Vraylar.  When she took the olanzapine, Sandie informed me that she felt foggy and became diaphoretic.  Olanzapine was discontinued and I will reorder the Vraylar at 1.5 mg in the evening.  She had taken some extra clonazepam since she did not have the Seroquel but I informed her she may not get an early refill.  She informed me she has 4 tablets left..  Imipramine will continue at 25 mg twice daily for anxiety.  For ADHD she will continue with Strattera at 40 mg daily with the potential to increase to 60 mg at her next visit.  She is contemplating moving to [place] to be closer to her children and her mother.    Medication side effects and alternatives were reviewed. Health promotion activities recommended and reviewed today. All questions addressed. Education and counseling completed regarding risks and benefits of medications and  psychotherapy options.    Treatment Plan:        1.  Continue atomoxetine 40 mg daily    2.  Continue quetiapine 200 mg at bedtime    3.  Discontinue olanzapine    4.  Add Vraylar 1.5 mg at bedtime    5.  Continue clonazepam 1 mg twice daily as needed for anxiety    6.  Continue imipramine 25 mg twice daily      Continue all other medications as reviewed per electronic medical record today.     Safety plan reviewed. To the Emergency Department as needed or call after hours crisis line at [phone] or [phone]. [place] Crisis Text Line. Text [place] to 667024 or Suicide LifeLine Chat: suicidePeeky.org/chat/    To schedule individual or family therapy, call [facility] at [phone].    Schedule an appointment with me in 2 weeks or sooner as needed. Call [facility] at [phone] to schedule.    Follow up with primary care provider as planned or for acute medical concerns.    Call the psychiatric nurse line with medication questions or concerns at [phone].    [employer] may be used to communicate with your provider, but this is not intended to be used for emergencies.    Crisis Resources:    National Suicide Prevention Lifeline: 809.100.4015 (TTY: 109.390.3339). Call anytime for help.  (www.suicidepreventionlifeline.org)  National [place] on Mental Illness (www.[place].org): 762.854.2853 or 054-065-7139.   Mental Health Association (www.mentalhealth.org): 794.701.9488 or 666-556-9993.  [place] Crisis Text [place]: Text [place] to 931894  Suicide LifeLine Chat: suicidePeeky.org/chat    Administrative Billing:   Time spent with patient was 30 minutes and greater than 50% of time or 20 minutes was spent in counseling and coordination of care regarding above diagnoses and treatment plan.    Patient Status:  Patient will continue to be seen for ongoing consultation and stabilization.    Signed:   RILEY Mohamud-BC   Psychiatry

## 2021-02-01 NOTE — TELEPHONE ENCOUNTER
"Reason for call:  Medication     If this is a refill request, has the caller requested the refill from the pharmacy already? YES    Will the patient be using a [facility]? No     Name of the pharmacy and phone number for the current request:   [facility] [address] [place] [address] [id number] [place] [address] ROAD  [phone]  (pt stated she \"[facility]\" [facility] but state is [place]?)    Name of the medication requested: Pt stated multiple medications need to be refilled including a new one.     Other request: Pt requested meds be sent to pharmacy, stated Dr. WELDON was supposed to fill them today but didn't, just got off the phone with [facility], still waiting for meds to be sent.     Phone number to reach patient:  Cell number on file:    Telephone Information:   Mobile [phone]       Best Time:  ASAP    Can we leave a detailed message on this number?  YES    Travel screening: Not Applicable  "

## 2021-02-01 NOTE — TELEPHONE ENCOUNTER
"Requested Prescriptions   Pending Prescriptions Disp Refills     propranolol (INDERAL) 40 MG tablet [Pharmacy Med Name: propranolol 40 mg tablet] 60 tablet 1     Sig: Take 0.5 tablets (20 mg) by mouth 2 times daily for 7 days, THEN 1 tablet (40 mg) 2 times daily for 23 days.       Beta-Blockers Protocol Passed - 2/1/2021  8:00 AM        Passed - Blood pressure under 140/90 in past 12 months     BP Readings from Last 3 Encounters:   12/14/20 120/80   03/17/20 124/67   03/05/20 102/69                 Passed - Patient is age 6 or older        Passed - Recent (12 mo) or future (30 days) visit within the authorizing provider's specialty     Patient has had an office visit with the authorizing provider or a provider within the authorizing providers department within the previous 12 mos or has a future within next 30 days. See \"Patient Info\" tab in inbasket, or \"Choose Columns\" in Meds & Orders section of the refill encounter.              Passed - Medication is active on med list             "

## 2021-02-02 RX ORDER — PROPRANOLOL HYDROCHLORIDE 40 MG/1
40 TABLET ORAL 2 TIMES DAILY
Qty: 60 TABLET | Refills: 1 | Status: SHIPPED | OUTPATIENT
Start: 2021-02-02 | End: 2021-03-05

## 2021-02-02 ASSESSMENT — ANXIETY QUESTIONNAIRES: GAD7 TOTAL SCORE: 9

## 2021-02-02 NOTE — TELEPHONE ENCOUNTER
Routing refill request to provider for review/approval because:  Routed to provider due to specific dosing instructions when this prescription was written.  Pt last seen 12/14/20.  Further follow up or virtual updates?    Nevin Laboy RN

## 2021-02-02 NOTE — TELEPHONE ENCOUNTER
Reason for Call:  Medication or medication refill:    Do you use a [facility]?  Name of the pharmacy and phone number for the current request:  [id number] - [address] AT [address]   Phone:  [phone]  Fax:  [phone]      Name of the medication requested:   cariprazine (VRAYLAR) 1.5 MG CAPS capsule      Other request: Patient called in frustrated about her medication not being filled yet. Patient advised this medication needs a PA and she said the pharmacy advised there is no approved one    Can we leave a detailed message on this number? YES    Phone number patient can be reached at: Home number on file [phone] (home)    Best Time: ASAP    Call taken on 2/2/2021 at 11:25 AM by Marnie Sullivan

## 2021-02-02 NOTE — TELEPHONE ENCOUNTER
PA Initiation    Medication: cariprazine (VRAYLAR) 1.5 MG CAPS capsule   Insurance Company: [employer] - Phone [phone] Fax [phone]  Pharmacy Filling the Rx: [id number] - [address] AT [address]  Filling Pharmacy Phone: [phone]  Filling Pharmacy Fax: [phone]  Start Date: 2/2/2021

## 2021-02-02 NOTE — TELEPHONE ENCOUNTER
Called patient and informed her that her meds were filled by [facility] and that the PA will be taken care of by the PA pool.

## 2021-02-02 NOTE — TELEPHONE ENCOUNTER
Pt called back stating she needs a PA for her Vraylar.  She reports the pharmacy has sent a request for the PA and have not heard anything back yet.  She would like Keeley's team to complete the PA as soon as possible.

## 2021-02-02 NOTE — TELEPHONE ENCOUNTER
Most medications filled yesterday, others not filled since patient should still have enough for another week and a half.

## 2021-02-02 NOTE — TELEPHONE ENCOUNTER
Pt informed she needed to schedule a virtual appt to discuss how med is working. Pt will call back to schedule an appt not wanting to schedule @ this time since she had enough meds @ home.  Refill may have been an automatic request from Pharmacy. KpaWendi

## 2021-02-02 NOTE — TELEPHONE ENCOUNTER
Patient should follow up with me (virtual ok) to discuss how the new medication is working for her. New prescription sent to pharmacy for refill at the 40 mg twice daily dose.     Susan Anaya M.D.

## 2021-02-03 ENCOUNTER — ALLIED HEALTH/NURSE VISIT (OUTPATIENT)
Dept: BEHAVIORAL HEALTH | Facility: CLINIC | Age: 36
End: 2021-02-03
Payer: COMMERCIAL

## 2021-02-03 DIAGNOSIS — R69 DIAGNOSIS DEFERRED: Primary | ICD-10-CM

## 2021-02-03 NOTE — PROGRESS NOTES
Behavioral Health Home Services  [facility]: [place]        Social Work Care Navigator Note      Patient: Lluvia Glaser  Date: February 3, 2021  Preferred Name: lluvia    Previous PHQ-9:   PHQ-9 SCORE 11/9/2020 12/21/2020 2/1/2021   PHQ-9 Total Score 13 13 10     Previous WAYNE-7:   WAYNE-7 SCORE 11/9/2020 12/21/2020 2/1/2021   Total Score 13 14 9     ULISES LEVEL:  No flowsheet data found.    Preferred Contact:  Need for : No  Preferred Contact: Cell      Type of Contact Today: Phone call (patient / identified key support person reached)      Data: (subjective / Objective):  Recent ED/IP Admission or Discharge?   None    Patient Goals:  Goal Areas: Health;Mental Health;Employment / Volunteer;Financial and Social Service Benefits;Transportation  Patient stated goals: Patient would like assistance with her physical and mental health for creating a balanced and healthy lifestyle. Patient would like assistance with budgeting, SSDI and community/social service assistance to aid with county benefits to increase her financial stability. Patient would like assistance with vehicle maintenance and repairs for reliable transportation to get to her appointments, run errands and get out into the community.        [facility] Core Service Provided:  Comprehensive Care Management: utilized the electronic medical record / patient registry to identify and support patient's health conditions / needs more effectively   Care Transitions: focused on the coordinated and seamless movement of patient between or within different levels of care or settings  Care Coordination: provided care management services/referrals necessary to ensure patient and their identified supports have access to medical, behavioral health, pharmacology and recovery support services.  Ensured that patient's care is integrated across all settings and services.   Individual and Family Support: aimed to help clients reduce barriers to achieving goals, increase  health literacy and knowledge about chronic condition(s), increase self-efficacy skills, and improve health outcomes  Referral to Community and Social Support Services: Coordinated / Confirmed patient's appointment time or referral and transportation plan  Followed-up with patient on whether or not they completed a referral    Current Stressors / Issues / Care Plan Objective Addressed Today:  SWCC and patient were able to meet today for Behavioral Health Home ([facility]) monthly check-in via telehealth visit. All required ROIs have been filed with HIM/patient chart.    1. Patient reports she was able to establish with PCP at [facility]. Patient needs to schedule appt w/PCP for follow-up with full physical and PAP. Patient reports per PCP note in December that she would like to schedule with OB/GYN to have IUD placement. SWCC to follow-up at next appointment.    2. Patient reports mood stable and sleep is poor. Patient reports she is working on refilling her Seroquel, not liking her newest medication and working with the pharmacy on Vraylar prior auth for medication. Psychiatry provider faxed in prior auth forms to pharmacy. Patient has a follow-up appointment for med check with provider in two weeks.    Patient reports she stopped seeing her therapist. Patient reports she feels like her mood is fairly stable and feels supported by psychiatric prescriber and Behavioral Health Home ([facility]) .    3. Patient reports she is thinking about moving to [place] to be closer to her children and her mother. [facility] provided support/empathy and will continue to follow-up with patient.      4. Patient continues to receive unemployment. Patient requesting [facility] to send website for SSDI application. CC resent email. [facility] encouraged patient to continue working on application. Patient and [facility] discussed if/when unemployment stops she will be ready to immediately send her application in for review with SSDI. Patient  stated agreement with this plan. SWCC to continue to follow-up and provide resources.    Intervention:  Motivational Interviewing: Expressed Empathy/Understanding, Supported Autonomy, Collaboration, Evocation, Permission to raise concern or advise, Open-ended questions and Reflections: simple and complex   Target Behavior(s): Explored thoughts about taking an anti-depressant, Explored and resolved challenges related to taking anti-depressants as prescribed, Explored thoughts and readiness to participate in individual therapy, Explored and resolved challenges to attending appointments as scheduled, Explored current social supports and reinforced opportunities to increase engagement and Explored current sleep hygeine and patient's perception and knowledge of relationship to mood    Assessment: (Progress on Goals / Homework):  Patient would benefit from continued coordination in reaching their goals set for the Behavioral Health Home ([facility]) program. SWCC reviewed Health Action Plan goals and will continue to monitor progress and work with patient and their care team.      Plan: (Homework, other):  Patient was encouraged to continue to seek condition-related information and education.      Scheduled a Phone follow up appointment with SW  in 4 weeks     Patient has set self-identified goals and will monitor progress until the next appointment on: 03/03/2021.        TRIXIE Moon [facility]  Behavioral Health Home ([facility])   [facility]  [phone]  February 3, 2021  11:29 AM                  Next 5 appointments (look out 90 days)    Feb 15, 2021  2:45 PM  Telephone Visit with Keeley Mancini NP  [facility] ([facility]) [address] [address] [phone]

## 2021-02-04 ENCOUNTER — TELEPHONE (OUTPATIENT)
Dept: PSYCHIATRY | Facility: CLINIC | Age: 36
End: 2021-02-04

## 2021-02-04 NOTE — TELEPHONE ENCOUNTER
Contacted plan to follow up on PA request.  Request is still under review. I did submit as an urgent request.  Per insurance rep, urgents can take up to 7 days.

## 2021-02-04 NOTE — TELEPHONE ENCOUNTER
Faxed [facility] Formulary Exception form to RN fax regarding Vralar Capsules. Filed.

## 2021-02-04 NOTE — TELEPHONE ENCOUNTER
Prior Authorization Approval    Authorization Effective Date: 1/1/2021  Authorization Expiration Date: 2/4/2022  Medication: cariprazine (VRAYLAR) 1.5 MG CAPS capsule--APPROVED  Approved Dose/Quantity:   Reference #:     Insurance Company: Done In :[employer] - Phone [phone] Fax [phone]  Expected CoPay:       CoPay Card Available:      Foundation Assistance Needed:    Which Pharmacy is filling the prescription (Not needed for infusion/clinic administered): [id number] - [address] AT [address]  Pharmacy Notified: Yes  Patient Notified: Yes **Instructed pharmacy to notify patient when script is ready to /ship.**

## 2021-02-09 NOTE — TELEPHONE ENCOUNTER
Called [facility] and spoke to Malena who said, the patient picked up her Vraylar on 2/5/2021 with no charge.

## 2021-02-15 ENCOUNTER — VIRTUAL VISIT (OUTPATIENT)
Dept: PSYCHOLOGY | Facility: CLINIC | Age: 36
End: 2021-02-15
Payer: COMMERCIAL

## 2021-02-15 DIAGNOSIS — R69 PSYCHIATRIC DIAGNOSIS DEFERRED: Primary | ICD-10-CM

## 2021-02-15 NOTE — PROGRESS NOTES
Patient no-showed today's appointment; provider notified for review of record, patient agrees to reschedule missed appointment.

## 2021-03-03 ENCOUNTER — ALLIED HEALTH/NURSE VISIT (OUTPATIENT)
Dept: BEHAVIORAL HEALTH | Facility: CLINIC | Age: 36
End: 2021-03-03
Payer: COMMERCIAL

## 2021-03-03 DIAGNOSIS — R69 DIAGNOSIS DEFERRED: Primary | ICD-10-CM

## 2021-03-03 NOTE — PROGRESS NOTES
Behavioral Health Home Services  [facility] Clinic: [place]        Social Work Care Navigator Note      Patient: Lluvia Glaser  Date: March 3, 2021  Preferred Name: lluvia    Previous PHQ-9:   PHQ-9 SCORE 11/9/2020 12/21/2020 2/1/2021   PHQ-9 Total Score 13 13 10     Previous WAYNE-7:   WAYNE-7 SCORE 11/9/2020 12/21/2020 2/1/2021   Total Score 13 14 9     ULISES LEVEL:  No flowsheet data found.    Preferred Contact:  Need for : No  Preferred Contact: Cell      Type of Contact Today: Phone call (patient / identified key support person reached)      Data: (subjective / Objective):  Recent ED/IP Admission or Discharge?   None    Patient Goals:  Goal Areas: Health;Mental Health;Employment / Volunteer;Financial and Social Service Benefits;Transportation  Patient stated goals: Patient would like assistance with her physical and mental health for creating a balanced and healthy lifestyle. Patient would like assistance with budgeting, SSDI and community/social service assistance to aid with county benefits to increase her financial stability. Patient would like assistance with vehicle maintenance and repairs for reliable transportation to get to her appointments, run errands and get out into the community.        [facility] Core Service Provided:  Comprehensive Care Management: utilized the electronic medical record / patient registry to identify and support patient's health conditions / needs more effectively   Care Transitions: focused on the coordinated and seamless movement of patient between or within different levels of care or settings  Care Coordination: provided care management services/referrals necessary to ensure patient and their identified supports have access to medical, behavioral health, pharmacology and recovery support services.  Ensured that patient's care is integrated across all settings and services.   Individual and Family Support: aimed to help clients reduce barriers to achieving goals, increase  health literacy and knowledge about chronic condition(s), increase self-efficacy skills, and improve health outcomes  Referral to Community and Social Support Services: Provided patient with referrals (see plan section)  Assisted in scheduling an appointment to a referral / service (see plan section)  Coordinated / Confirmed patient's appointment time or referral and transportation plan  Followed-up with patient on whether or not they completed a referral    Current Stressors / Issues / Care Plan Objective Addressed Today:  CC and patient were able to meet today for Behavioral Health Home ([facility]) monthly check-in via telehealth visit. All required ROIs have been filed with HIM/patient chart.    1. Patient reports mood and sleep are stable at this time. Patient reports that she had to change medications from Vraylar to Latuda due to weight gain. Patient reports the Vraylar really helped with mood stabilization but needed to switch to Latuda instead. Patient reports she feels like the Latuda is working, just not quite as well as the Vraylar, but is happy not to have the weight gain.    2. [facility] and patient discussed OB/GYN referral placed by PCP in December for IUD placement. Patient reports she would like to move forward with this and does have the scheduling phone number. Patient reports she plans to call next week to get this set up. Patient reports once she is seen by OB/GYN she will follow-up with physical with PCP and getting that appt set up.    3. Patient reports that today she is going to visit her children and her mom in [place]. Patient report she is very excited about this and so are her children. Patient reports she is just visiting for a long weekend. [facility] provided support/empathy and motivational interviewing.    4. Patient continues to receive unemployment. Patient reports she is holding on moving forward with application until her unemployment is exhausted. [facility] mailed SSDI Adult Disability starter  kit to help patient gather resources and prepare to apply for SSDI. [facility] to continue to follow-up and provide resources.    Intervention:  Motivational Interviewing: Expressed Empathy/Understanding, Supported Autonomy, Collaboration, Evocation, Permission to raise concern or advise, Open-ended questions, Reflections: simple and complex, Change talk (evoked) and Reframe   Target Behavior(s): Explored thoughts about taking an anti-depressant, Explored and resolved challenges related to taking anti-depressants as prescribed, Explored thoughts and readiness to participate in individual therapy, Explored and resolved challenges to attending appointments as scheduled, Explored current social supports and reinforced opportunities to increase engagement and Explored current sleep hygeine and patient's perception and knowledge of relationship to mood    Assessment: (Progress on Goals / Homework):  Patient would benefit from continued coordination in reaching their goals set for the Behavioral Health Home ([facility]) program. [facility] reviewed Health Action Plan goals and will continue to monitor progress and work with patient and their care team.      Plan: (Homework, other):  Patient was encouraged to continue to seek condition-related information and education.      Scheduled a Phone follow up appointment with SW CC in 4 weeks     Patient has set self-identified goals and will monitor progress until the next appointment on: 04/07/2021.      TRIXIE Moon [facility]  Behavioral Health Home ([facility])   [facility]  [phone]  March 3, 2021  2:32 PM

## 2021-03-05 ENCOUNTER — VIRTUAL VISIT (OUTPATIENT)
Dept: PSYCHIATRY | Facility: CLINIC | Age: 36
End: 2021-03-05
Payer: COMMERCIAL

## 2021-03-05 DIAGNOSIS — G43.829 MENSTRUAL MIGRAINE WITHOUT STATUS MIGRAINOSUS, NOT INTRACTABLE: ICD-10-CM

## 2021-03-05 DIAGNOSIS — F39 EPISODIC MOOD DISORDER (H): ICD-10-CM

## 2021-03-05 DIAGNOSIS — F41.1 GENERALIZED ANXIETY DISORDER: ICD-10-CM

## 2021-03-05 DIAGNOSIS — F31.12 BIPOLAR 1 DISORDER WITH MODERATE MANIA (H): ICD-10-CM

## 2021-03-05 DIAGNOSIS — F90.2 ATTENTION DEFICIT HYPERACTIVITY DISORDER (ADHD), COMBINED TYPE: Primary | ICD-10-CM

## 2021-03-05 PROCEDURE — 99214 OFFICE O/P EST MOD 30 MIN: CPT | Mod: 95 | Performed by: NURSE PRACTITIONER

## 2021-03-05 RX ORDER — QUETIAPINE FUMARATE 200 MG/1
200 TABLET, FILM COATED ORAL AT BEDTIME
Qty: 30 TABLET | Refills: 1 | Status: SHIPPED | OUTPATIENT
Start: 2021-03-05 | End: 2021-04-05

## 2021-03-05 RX ORDER — LURASIDONE HYDROCHLORIDE 40 MG/1
40 TABLET, FILM COATED ORAL
Qty: 30 TABLET | Refills: 1 | Status: SHIPPED | OUTPATIENT
Start: 2021-03-05 | End: 2021-04-05

## 2021-03-05 RX ORDER — CLONAZEPAM 2 MG/1
TABLET ORAL
Qty: 30 TABLET | Refills: 0 | Status: SHIPPED | OUTPATIENT
Start: 2021-03-05 | End: 2021-04-05

## 2021-03-05 RX ORDER — ATOMOXETINE 60 MG/1
60 CAPSULE ORAL DAILY
Qty: 30 CAPSULE | Refills: 1 | Status: SHIPPED | OUTPATIENT
Start: 2021-03-05 | End: 2021-04-05 | Stop reason: DRUGHIGH

## 2021-03-05 RX ORDER — PROPRANOLOL HYDROCHLORIDE 40 MG/1
40 TABLET ORAL 2 TIMES DAILY
Qty: 60 TABLET | Refills: 1 | Status: SHIPPED | OUTPATIENT
Start: 2021-03-05 | End: 2021-04-05

## 2021-03-05 RX ORDER — IMIPRAMINE HCL 25 MG
25 TABLET ORAL 2 TIMES DAILY
Qty: 60 TABLET | Refills: 1 | Status: SHIPPED | OUTPATIENT
Start: 2021-03-05 | End: 2021-04-05

## 2021-03-05 ASSESSMENT — ANXIETY QUESTIONNAIRES
5. BEING SO RESTLESS THAT IT IS HARD TO SIT STILL: SEVERAL DAYS
1. FEELING NERVOUS, ANXIOUS, OR ON EDGE: NOT AT ALL
6. BECOMING EASILY ANNOYED OR IRRITABLE: SEVERAL DAYS
3. WORRYING TOO MUCH ABOUT DIFFERENT THINGS: NOT AT ALL
2. NOT BEING ABLE TO STOP OR CONTROL WORRYING: NOT AT ALL
IF YOU CHECKED OFF ANY PROBLEMS ON THIS QUESTIONNAIRE, HOW DIFFICULT HAVE THESE PROBLEMS MADE IT FOR YOU TO DO YOUR WORK, TAKE CARE OF THINGS AT HOME, OR GET ALONG WITH OTHER PEOPLE: NOT DIFFICULT AT ALL
7. FEELING AFRAID AS IF SOMETHING AWFUL MIGHT HAPPEN: NOT AT ALL
GAD7 TOTAL SCORE: 3

## 2021-03-05 ASSESSMENT — PATIENT HEALTH QUESTIONNAIRE - PHQ9
5. POOR APPETITE OR OVEREATING: SEVERAL DAYS
SUM OF ALL RESPONSES TO PHQ QUESTIONS 1-9: 6

## 2021-03-05 NOTE — PATIENT INSTRUCTIONS
1.  Increase atomoxetine to 60 mg daily    2.  Continue quetiapine 200 mg at bedtime    3.  Increase Latuda to 40 mg daily with food    4.  Vraylar was discontinued    5.  Continue clonazepam 1 mg twice daily as needed for anxiety    6.  Continue imipramine 25 mg twice daily    7.  Continue propranolol 40 mg twice daily      Continue all other medications as reviewed per electronic medical record today.     Safety plan reviewed. To the Emergency Department as needed or call after hours crisis line at [phone] or [phone]. [place] Crisis Text [place]. Text [place] to 174777 or Suicide LifeLine Chat: [email or web address][email or web address]/chat/    To schedule individual or family therapy, call [facility] at [phone].    Schedule an appointment with me in April or sooner as needed. Call [facility] at [phone] to schedule.    Follow up with primary care provider as planned or for acute medical concerns.    Call the psychiatric nurse line with medication questions or concerns at [phone].    [place] [employer] may be used to communicate with your provider, but this is not intended to be used for emergencies.    Crisis Resources:    National Suicide Prevention Lifeline: 686.637.2508 (TTY: 933.572.4396). Call anytime for help.  (www.suicidepreventionlifeline.org)  National [place] on Mental Illness (www.sabina.org): 118.818.6634 or 191-276-2269.   Mental Health Association (www.mentalhealth.org): 554.813.8558 or 738-525-1136.  [place] Crisis [place]: Text [place] to 126588  Suicide LifeLine Chat: [facility]org/chat
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
## 2021-03-06 ASSESSMENT — ANXIETY QUESTIONNAIRES: GAD7 TOTAL SCORE: 3

## 2021-04-05 ENCOUNTER — VIRTUAL VISIT (OUTPATIENT)
Dept: PSYCHIATRY | Facility: CLINIC | Age: 36
End: 2021-04-05
Payer: COMMERCIAL

## 2021-04-05 DIAGNOSIS — F31.12 BIPOLAR 1 DISORDER WITH MODERATE MANIA (H): Primary | ICD-10-CM

## 2021-04-05 DIAGNOSIS — F41.1 GENERALIZED ANXIETY DISORDER: ICD-10-CM

## 2021-04-05 DIAGNOSIS — F90.2 ATTENTION DEFICIT HYPERACTIVITY DISORDER (ADHD), COMBINED TYPE: ICD-10-CM

## 2021-04-05 PROCEDURE — 99214 OFFICE O/P EST MOD 30 MIN: CPT | Mod: 95 | Performed by: NURSE PRACTITIONER

## 2021-04-05 RX ORDER — IMIPRAMINE HCL 25 MG
25 TABLET ORAL 2 TIMES DAILY
Qty: 60 TABLET | Refills: 1 | Status: SHIPPED | OUTPATIENT
Start: 2021-04-05 | End: 2021-06-04

## 2021-04-05 RX ORDER — PROPRANOLOL HYDROCHLORIDE 40 MG/1
40 TABLET ORAL 2 TIMES DAILY
Qty: 60 TABLET | Refills: 1 | Status: SHIPPED | OUTPATIENT
Start: 2021-04-05 | End: 2021-06-04

## 2021-04-05 RX ORDER — QUETIAPINE FUMARATE 200 MG/1
200 TABLET, FILM COATED ORAL AT BEDTIME
Qty: 30 TABLET | Refills: 1 | Status: SHIPPED | OUTPATIENT
Start: 2021-04-05 | End: 2021-04-19

## 2021-04-05 RX ORDER — CLONAZEPAM 2 MG/1
TABLET ORAL
Qty: 30 TABLET | Refills: 0 | Status: SHIPPED | OUTPATIENT
Start: 2021-04-05 | End: 2021-04-19

## 2021-04-05 RX ORDER — ATOMOXETINE 25 MG/1
25 CAPSULE ORAL DAILY
Qty: 30 CAPSULE | Refills: 1 | Status: SHIPPED | OUTPATIENT
Start: 2021-04-05 | End: 2021-06-04

## 2021-04-05 ASSESSMENT — ANXIETY QUESTIONNAIRES
1. FEELING NERVOUS, ANXIOUS, OR ON EDGE: NEARLY EVERY DAY
5. BEING SO RESTLESS THAT IT IS HARD TO SIT STILL: NEARLY EVERY DAY
IF YOU CHECKED OFF ANY PROBLEMS ON THIS QUESTIONNAIRE, HOW DIFFICULT HAVE THESE PROBLEMS MADE IT FOR YOU TO DO YOUR WORK, TAKE CARE OF THINGS AT HOME, OR GET ALONG WITH OTHER PEOPLE: EXTREMELY DIFFICULT
7. FEELING AFRAID AS IF SOMETHING AWFUL MIGHT HAPPEN: NEARLY EVERY DAY
2. NOT BEING ABLE TO STOP OR CONTROL WORRYING: NEARLY EVERY DAY
6. BECOMING EASILY ANNOYED OR IRRITABLE: NEARLY EVERY DAY
3. WORRYING TOO MUCH ABOUT DIFFERENT THINGS: NEARLY EVERY DAY
GAD7 TOTAL SCORE: 21

## 2021-04-05 ASSESSMENT — PATIENT HEALTH QUESTIONNAIRE - PHQ9
SUM OF ALL RESPONSES TO PHQ QUESTIONS 1-9: 25
5. POOR APPETITE OR OVEREATING: NEARLY EVERY DAY

## 2021-04-05 NOTE — PATIENT INSTRUCTIONS
1.  Decrease atomoxetine to 25 mg daily    2.  Continue quetiapine 200 mg at bedtime    3.  Discontinue Latuda     4.  Restart Vraylar 1.5 mgdaily    5.  Continue clonazepam 1 mg twice daily as needed for anxiety    6.  Continue imipramine 25 mg twice daily    7.  Continue propranolol 40 mg twice daily      Continue all other medications as reviewed per electronic medical record today.     Safety plan reviewed. To the Emergency Department as needed or call after hours crisis line at [phone] or [phone]. [place] Crisis Text [place]. Text [place] to 888282 or Suicide LifeLine Chat: [email or web address]/chat/    To schedule individual or family therapy, call [facility] at [phone].    Schedule an appointment with me on April 19 or sooner as needed. Call [facility] at [phone] to schedule.    Follow up with primary care provider as planned or for acute medical concerns.    Call the psychiatric nurse line with medication questions or concerns at [phone].    [facility] may be used to communicate with your provider, but this is not intended to be used for emergencies.    Crisis Resources:    National Suicide Prevention Lifeline: 340.611.2290 (TTY: 184.666.7240). Call anytime for help.  (www.suicidepreventionlifeline.org)  National [place] on Mental Illness (www.sabina.org): 962.922.5953 or 241-276-4119.   Mental Health Association (www.mentalhealth.org): 337.377.7068 or 549-667-9778.  [place] Crisis Text [place]: Text [place] to 472835  Suicide LifeLine Chat: [email or web address]/chat

## 2021-04-05 NOTE — PROGRESS NOTES
"lluvia is a 35 year old who is being evaluated via a billable telephone visit.      What phone number would you like to be contacted at? [phone]  How would you like to obtain your AVS? [facility]          Outpatient Psychiatric Progress Note    Name: Lluvia Glaser   : 1985                    Primary Care Provider: Susan Anaya MD   Therapist: none     PHQ-9 scores:  PHQ-9 SCORE 2021 3/5/2021 2021   PHQ-9 Total Score 10 6 25       WAYNE-7 scores:  WAYNE-7 SCORE 2021 3/5/2021 2021   Total Score 9 3 21       Patient Identification:    Patient is a 35 year old year old, single  White American female  who presents for return visit with me.  Patient is currently unemployed and disabled. Patient attended the session alone. Patient prefers to be called: \"Lluvia\".    Interim History:    I last saw Lluvia Glaser for outpatient psychiatry Return Visit on 2021.     During that appointment, she presented with rapid and pressured speech.  She had difficulties hearing me on the telephone.  Today we increased her atomoxetine to 60 mg daily as she has noticed little change in her abilities to focus and concentrate.  Propranolol 40 mg twice daily and imipramine twice daily at 25 mg each dose, will continue to help manage anxiety symptoms.  She is being prescribed clonazepam 1 mg twice daily as needed for anxiety.  Mood swings continue and her Latuda was increased to 40 mg daily.  She felt the Vraylar worked better for her but she did not like the weight gain.  Quetiapine continues at 200 mg at bedtime for mood dysregulation and to help her sleep at night. .     Current medications include: atomoxetine (STRATTERA) 60 MG capsule, Take 1 capsule (60 mg) by mouth daily  clonazePAM (KLONOPIN) 2 MG tablet, Take 1/2 tablet twice daily.  Refill 28 days after last fill  imipramine (TOFRANIL) 25 MG tablet, Take 1 tablet (25 mg) by mouth 2 times daily  lurasidone (LATUDA) 40 MG TABS tablet, " Take 1 tablet (40 mg) by mouth daily with food  propranolol (INDERAL) 40 MG tablet, Take 1 tablet (40 mg) by mouth 2 times daily  QUEtiapine (SEROQUEL) 200 MG tablet, Take 1 tablet (200 mg) by mouth At Bedtime  SUMAtriptan (IMITREX) 50 MG tablet, Take 1 tablet (50 mg) by mouth at onset of headache for migraine May repeat in 2 hours. Max 4 tablets/24 hours.    No current facility-administered medications on file prior to visit.        The [place] Prescription Monitoring Program has been reviewed and there are no concerns about diversionary activity for controlled substances at this time.      I was able to review most recent Primary Care Provider, specialty provider, and therapy visit notes that I have access to.     Today, patient reports that since stopping the Latuda, she has felt worse.  She is having more intense mood swings and periods of irritability.  It has been difficult for her to spend extended periods of time with her children in [place] which is distressing to her.  Her sleep has been disrupted.  Panic attacks are occurring.     has a past medical history of ADHD, Chronic eczema, Gastric ulcer, Generalized anxiety disorder, Smoker, and Substance abuse (H). She also has no past medical history of Diabetes (H), Motion sickness, PONV (postoperative nausea and vomiting), Sleep apnea, Thyroid disease, or Uncomplicated asthma.    Social history updates:    Lluvia lives alone.  Her mother has custody of her children.    Substance use updates:    She denies alcohol use  Tobacco use: Yes Cigarettes  Ready to quit?  No  Nicotine Replacement Therapy tried: None    Vital Signs:   There were no vitals taken for this visit.    Labs:    Most recent laboratory results reviewed and no new labs.     Review of Systems:  10 systems (general, cardiovascular, respiratory, eyes, ENT, endocrine, GI, , M/S, neurological) were reviewed. Most pertinent finding(s) is/are: She reports no chest pain, no shortness of breath, no  skin rashes. The remaining systems are all unremarkable.    Mental Status Examination:  Appearance:  awake, alert and Moderate distress  Attitude:  cooperative   Eye Contact:  Unable to assess  Gait and Station: No assistive Devices used and No dizziness or falls  Psychomotor Behavior:  Unable to assess  Oriented to:  time, person, and place  Attention Span and Concentration:  Fair  Speech:   pressured speech and Speaks: English  Mood:  anxious and depressed  Affect:  intensity is heightened  Associations:  no loose associations  Thought Process:  tangental  Thought Content:  no evidence of suicidal ideation or homicidal ideation, no auditory hallucinations present and no visual hallucinations present  Recent and Remote Memory:  intact Not formally assessed. No amnesia.  Fund of Knowledge: appropriate  Insight:  fair  Judgment:  fair  Impulse Control:  fair    Suicide Risk Assessment:  Today Lluvia Glaser reports that she is having no thoughts to want to end her life or to harm other people. In addition, there are notable risk factors for self-harm, including single status, anxiety and mood change. However, risk is mitigated by commitment to family, history of seeking help when needed, future oriented, denies suicidal intent or plan and denies homicidal ideation, intent, or plan. Therefore, based on all available evidence including the factors cited above, Lluvia Glaser does not appear to be at imminent risk for self-harm, does not meet criteria for a 72-hr hold, and therefore remains appropriate for ongoing outpatient level of care.  A thorough assessment of risk factors related to suicide and self-harm have been reviewed and are noted above. The patient convincingly denies suicidality on several occasions. Local community safety resources printed and reviewed for patient to use if needed. There was no deceit detected, and the patient presented in a manner that was believable.     DSM5  Diagnosis:  Attention-Deficit/Hyperactivity Disorder  314.01 (F90.2) Combined presentation  296.42 Bipolar I Disorder Current or Most Recent Episode Manic, Moderate   300.02 (F41.1) Generalized Anxiety Disorder    Medical comorbidities include:   Patient Active Problem List    Diagnosis Date Noted     Substance abuse (H)      Priority: Medium     Generalized anxiety disorder      Priority: Medium     Chronic eczema      Priority: Medium     Gastric ulcer      Priority: Medium     ADHD      Priority: Medium     Smoker      Priority: Medium       Assessment:    Lluvia Glaser reported feeling worse when taking the Latuda as she had heightened mood swings to the point that it was difficult for her to be around her children.  The Latuda was discontinued and the Vraylar was restarted.  Of note, Lluvia had wanted to stop the Vraylar due to weight gain but at this point she wants to feel better.  The atomoxetine was decreased to 25 mg daily.  She will continue the quetiapine 200 mg at bedtime.  She tells me this helps her sleep.  Clonazepam is continued at 1 mg twice daily as needed for anxiety.  Imipramine and propranolol will continue at their current doses to help manage her anxiety.  At this point, Lluvia would like to see her mood swings better regulated.    Medication side effects and alternatives were reviewed. Health promotion activities recommended and reviewed today. All questions addressed. Education and counseling completed regarding risks and benefits of medications and psychotherapy options.    Treatment Plan:           1.  Decrease atomoxetine to 25 mg daily    2.  Continue quetiapine 200 mg at bedtime    3.  Discontinue Latuda     4.  Restart Vraylar 1.5 mgdaily    5.  Continue clonazepam 1 mg twice daily as needed for anxiety    6.  Continue imipramine 25 mg twice daily    7.  Continue propranolol 40 mg twice daily      Continue all other medications as reviewed per electronic medical record today.      Safety plan reviewed. To the Emergency Department as needed or call after hours crisis line at [phone] or [phone]. [place] Crisis Text Line. Text [place] to 069596 or Suicide LifeLine Chat: suicideCombat Stroke.org/chat/    To schedule individual or family therapy, call [facility] at [phone].    Schedule an appointment with me on April 19 or sooner as needed. Call [facility] at [phone] to schedule.    Follow up with primary care provider as planned or for acute medical concerns.    Call the psychiatric nurse line with medication questions or concerns at [phone].    WeYAPt may be used to communicate with your provider, but this is not intended to be used for emergencies.    Crisis Resources:    National Suicide Prevention Lifeline: 920.516.3088 (TTY: 887.988.3824). Call anytime for help.  (www.suicidepreventionlifeline.org)  National [place] on Mental Illness (www.[place].org): 805.607.1305 or 458-048-4248.   Mental Health Association (www.mentalhealth.org): 555.543.4220 or 357-798-9716.  [place] Crisis Text Line: Text [place] to 757679  Suicide LifeLine Chat: suicideCombat Stroke.org/chat    Administrative Billing:   Time spent with patient was 30 minutes and greater than 50% of time or 20 minutes was spent in counseling and coordination of care regarding above diagnoses and treatment plan.    Patient Status:  Patient will continue to be seen for ongoing consultation and stabilization.    Signed:   RILEY Mohamud-BC   Psychiatry

## 2021-04-06 ASSESSMENT — ANXIETY QUESTIONNAIRES: GAD7 TOTAL SCORE: 21

## 2021-04-07 ENCOUNTER — ALLIED HEALTH/NURSE VISIT (OUTPATIENT)
Dept: BEHAVIORAL HEALTH | Facility: CLINIC | Age: 36
End: 2021-04-07
Payer: COMMERCIAL

## 2021-04-07 DIAGNOSIS — R69 DIAGNOSIS DEFERRED: Primary | ICD-10-CM

## 2021-04-07 NOTE — PROGRESS NOTES
Behavioral Health Home Services  [facility]: [place]        Social Work Care Navigator Note      Patient: Lluvia Glaser  Date: April 7, 2021  Preferred Name: lluvia    Previous PHQ-9:   PHQ-9 SCORE 2/1/2021 3/5/2021 4/5/2021   PHQ-9 Total Score 10 6 25     Previous WAYNE-7:   WAYNE-7 SCORE 2/1/2021 3/5/2021 4/5/2021   Total Score 9 3 21     ULISES LEVEL:  No flowsheet data found.    Preferred Contact:  Need for : No  Preferred Contact: Cell      Type of Contact Today: Phone call (patient / identified key support person reached)      Data: (subjective / Objective):  Recent ED/IP Admission or Discharge?   None    Patient Goals:  Goal Areas: Health;Mental Health;Employment / Volunteer;Financial and Social Service Benefits;Transportation  Patient stated goals: Patient would like assistance with her physical and mental health for creating a balanced and healthy lifestyle. Patient would like assistance with budgeting, SSDI and community/social service assistance to aid with [employer] benefits to increase her financial stability. Patient would like assistance with vehicle maintenance and repairs for reliable transportation to get to her appointments, run errands and get out into the community.        [facility] Core Service Provided:  Comprehensive Care Management: utilized the electronic medical record / patient registry to identify and support patient's health conditions / needs more effectively   Care Transitions: focused on the coordinated and seamless movement of patient between or within different levels of care or settings  Care Coordination: provided care management services/referrals necessary to ensure patient and their identified supports have access to medical, behavioral health, pharmacology and recovery support services.  Ensured that patient's care is integrated across all settings and services.   Individual and Family Support: aimed to help clients reduce barriers to achieving goals, increase health  literacy and knowledge about chronic condition(s), increase self-efficacy skills, and improve health outcomes  Referral to Community and Social Support Services: Assisted in scheduling an appointment to a referral / service (see plan section)  Coordinated / Confirmed patient's appointment time or referral and transportation plan  Followed-up with patient on whether or not they completed a referral    Current Stressors / Issues / Care Plan Objective Addressed Today:  [facility] and patient were able to meet today for Behavioral Health Home ([facility]) monthly check-in via telehealth visit. All required ROIs have been filed with HIM/patient chart.    Patient reports she does not have the time to complete Health Action Plan today. Scheduled Health Action Plan for 04/21.    1. Patient reports mood and sleep were off the past month. Patient reports she met with psychiatric provider on Monday and is now back on Vraylar with additional medication changes. [facility] will follow-up with patient in two weeks to check in.      2. [facility] and patient discussed OB/GYN referral placed by PCP in December for IUD placement. Patient reports she would like to move forward with this and does have the scheduling phone number. Patient reports she plans to call next week to get this set up. Patient reports once she is seen by OB/GYN she will follow-up with physical with PCP and getting that appt set up. - Update - patient has not followed up with this but will do so in the next two weeks. [facility] to check back in with patient.     3. Patient reports that her visit to [place] to visit her mom and kids went ok. Patient reports first several days there ok but did not end on a great note. Patient reports she is taking a break from talking to her mother, as she often makes patient feel guilty and bad about herself. Patient reports her children were happy to see her and they are doing well. [facility] provided support/empathy and motivational interviewing.     4. Patient  continues to receive unemployment. Patient reports she is holding on moving forward with application until her unemployment is exhausted. Patient reports she did receive the SSDI Adult Disability starter kit and advocacy agencies [facility] mailed out to her. Patient reports she will review these in the next few weeks and pick out an agency. Patient reports she may need [facility] to place initial referral. CC to continue to follow-up and provide resources.    Intervention:  Motivational Interviewing: Expressed Empathy/Understanding, Supported Autonomy, Collaboration, Evocation, Permission to raise concern or advise, Open-ended questions, Reflections: simple and complex, Rolled with resistance: Emphasized patient autonomy, Simple reflection, Shifted topic to defuse resistance, Reframed sustain talk in the direction of change and Evoked patient agenda, Change talk (evoked) and Reframe   Target Behavior(s): Explored thoughts about taking an anti-depressant, Explored and resolved challenges related to taking anti-depressants as prescribed, Explored thoughts and readiness to participate in individual therapy, Explored and resolved challenges to attending appointments as scheduled, Explored current social supports and reinforced opportunities to increase engagement and Explored current sleep hygeine and patient's perception and knowledge of relationship to mood    Assessment: (Progress on Goals / Homework):  Patient would benefit from continued coordination in reaching their goals set for the Behavioral Health Home ([facility]) program. [facility] reviewed Health Action Plan goals and will continue to monitor progress and work with patient and their care team.      Plan: (Homework, other):  Patient was encouraged to continue to seek condition-related information and education.      Scheduled a Phone follow up appointment with JESSICA  in 2 weeks     Patient has set self-identified goals and will monitor progress until the next appointment on:  04/21/2021.        TRIXIE [facility]  [facility] ([facility])   [facility]  [phone]  April 7, 2021  2:32 PM

## 2021-04-17 ENCOUNTER — HEALTH MAINTENANCE LETTER (OUTPATIENT)
Age: 36
End: 2021-04-17

## 2021-04-19 ENCOUNTER — VIRTUAL VISIT (OUTPATIENT)
Dept: PSYCHIATRY | Facility: CLINIC | Age: 36
End: 2021-04-19
Payer: COMMERCIAL

## 2021-04-19 DIAGNOSIS — F41.1 GENERALIZED ANXIETY DISORDER: ICD-10-CM

## 2021-04-19 DIAGNOSIS — R25.9 ABNORMAL INVOLUNTARY MOVEMENT: Primary | ICD-10-CM

## 2021-04-19 PROCEDURE — 99214 OFFICE O/P EST MOD 30 MIN: CPT | Mod: 95 | Performed by: NURSE PRACTITIONER

## 2021-04-19 RX ORDER — BENZTROPINE MESYLATE 0.5 MG/1
0.5 TABLET ORAL 2 TIMES DAILY PRN
Qty: 60 TABLET | Refills: 1 | Status: SHIPPED | OUTPATIENT
Start: 2021-04-19 | End: 2021-06-04

## 2021-04-19 RX ORDER — CLONAZEPAM 2 MG/1
TABLET ORAL
Qty: 45 TABLET | Refills: 0 | Status: SHIPPED | OUTPATIENT
Start: 2021-04-19 | End: 2021-06-04

## 2021-04-19 ASSESSMENT — PATIENT HEALTH QUESTIONNAIRE - PHQ9
SUM OF ALL RESPONSES TO PHQ QUESTIONS 1-9: 3
5. POOR APPETITE OR OVEREATING: SEVERAL DAYS

## 2021-04-19 ASSESSMENT — ANXIETY QUESTIONNAIRES
5. BEING SO RESTLESS THAT IT IS HARD TO SIT STILL: SEVERAL DAYS
3. WORRYING TOO MUCH ABOUT DIFFERENT THINGS: NOT AT ALL
1. FEELING NERVOUS, ANXIOUS, OR ON EDGE: NOT AT ALL
2. NOT BEING ABLE TO STOP OR CONTROL WORRYING: NOT AT ALL
GAD7 TOTAL SCORE: 2
IF YOU CHECKED OFF ANY PROBLEMS ON THIS QUESTIONNAIRE, HOW DIFFICULT HAVE THESE PROBLEMS MADE IT FOR YOU TO DO YOUR WORK, TAKE CARE OF THINGS AT HOME, OR GET ALONG WITH OTHER PEOPLE: SOMEWHAT DIFFICULT
7. FEELING AFRAID AS IF SOMETHING AWFUL MIGHT HAPPEN: NOT AT ALL
6. BECOMING EASILY ANNOYED OR IRRITABLE: NOT AT ALL

## 2021-04-19 NOTE — PROGRESS NOTES
"lluvia is a 35 year old who is being evaluated via a billable telephone visit.      What phone number would you like to be contacted at? [phone]  How would you like to obtain your AVS? [facility]         Outpatient Psychiatric Progress Note    Name: Lluvia Glaser   : 1985                    Primary Care Provider: Susan Anaya MD   Therapist: None    PHQ-9 scores:  PHQ-9 SCORE 3/5/2021 2021 2021   PHQ-9 Total Score 6 25 3       WAYNE-7 scores:  WAYNE-7 SCORE 3/5/2021 2021 2021   Total Score 3 21 2       Patient Identification:    Patient is a 35 year old year old, single  White American female  who presents for return visit with me.  Patient is currently unemployed. Patient attended the session alone. Patient prefers to be called: \"Lluvia\".    Interim History:    I last saw Lluvia Glaser for outpatient psychiatry Return Visit on 2021.     During that appointment, she reported feeling worse when taking the Latuda as she had heightened mood swings to the point that it was difficult for her to be around her children.  The Latuda was discontinued and the Vraylar was restarted.  Of note, Lluvia had wanted to stop the Vraylar due to weight gain but at this point she wants to feel better.  The atomoxetine was decreased to 25 mg daily.  She will continue the quetiapine 200 mg at bedtime.  She tells me this helps her sleep.  Clonazepam is continued at 1 mg twice daily as needed for anxiety.  Imipramine and propranolol will continue at their current doses to help manage her anxiety.  At this point, Lluvia would like to see her mood swings better regulated. .     Current medications include: atomoxetine (STRATTERA) 25 MG capsule, Take 1 capsule (25 mg) by mouth daily  cariprazine (VRAYLAR) 1.5 MG CAPS capsule, Take 1 capsule (1.5 mg) by mouth daily  clonazePAM (KLONOPIN) 2 MG tablet, Take 1/2 tablet twice daily.  Refill 28 days after last fill  imipramine (TOFRANIL) 25 MG " tablet, Take 1 tablet (25 mg) by mouth 2 times daily  propranolol (INDERAL) 40 MG tablet, Take 1 tablet (40 mg) by mouth 2 times daily  QUEtiapine (SEROQUEL) 200 MG tablet, Take 1 tablet (200 mg) by mouth At Bedtime  SUMAtriptan (IMITREX) 50 MG tablet, Take 1 tablet (50 mg) by mouth at onset of headache for migraine May repeat in 2 hours. Max 4 tablets/24 hours.    No current facility-administered medications on file prior to visit.        The [place] Prescription Monitoring Program has been reviewed and there are no concerns about diversionary activity for controlled substances at this time.      I was able to review most recent Primary Care Provider, specialty provider, and therapy visit notes that I have access to.     Today, patient reports she thinks that she is getting twitching in her neck, arms and legs.  She is feeling more stable.  She is not taking Seroquel.  She is concerned about weight gain.  She is planning a trip May 9 to [place].  No depression no SI no self  Harm thoughts. She talks with the  twice a month.       has a past medical history of ADHD, Chronic eczema, Gastric ulcer, Generalized anxiety disorder, Smoker, and Substance abuse (H). She also has no past medical history of Diabetes (H), Motion sickness, PONV (postoperative nausea and vomiting), Sleep apnea, Thyroid disease, or Uncomplicated asthma.    Social history updates:    Lluvia remains unemployed    Substance use updates:    She denies alcohol use  Tobacco use: Yes Cigarettes  Ready to quit?  No  Nicotine Replacement Therapy tried: None    Vital Signs:   There were no vitals taken for this visit.    Labs:    Most recent laboratory results reviewed and no new labs.     Review of Systems:  10 systems (general, cardiovascular, respiratory, eyes, ENT, endocrine, GI, , M/S, neurological) were reviewed. Most pertinent finding(s) is/are: She reports no chest pain, no shortness of breath, no skin rashes. The remaining systems  are all unremarkable.    Mental Status Examination:  Appearance:  awake, alert and mild distress  Attitude:  cooperative   Eye Contact:  Unable to assess  Gait and Station: No assistive Devices used and No dizziness or falls  Psychomotor Behavior:  Unable to assess  Oriented to:  time, person, and place  Attention Span and Concentration:  Fair  Speech:   pressured speech and Speaks: English  Mood:  anxious and depressed  Affect:  intensity is heightened  Associations:  no loose associations  Thought Process:  tangental  Thought Content:  no evidence of suicidal ideation or homicidal ideation, no auditory hallucinations present and no visual hallucinations present  Recent and Remote Memory:  intact Not formally assessed. No amnesia.  Fund of Knowledge: appropriate  Insight:  fair  Judgment:  fair  Impulse Control:  fair    Suicide Risk Assessment:  Today Lluvia Glaser reports that she is having no thoughts to want to end her life or to harm other people. In addition, there are notable risk factors for self-harm, including single status, anxiety, substance abuse, wrecklessness and mood change. However, risk is mitigated by commitment to family, history of seeking help when needed, future oriented, denies suicidal intent or plan and denies homicidal ideation, intent, or plan. Therefore, based on all available evidence including the factors cited above, Lluvia Glaser does not appear to be at imminent risk for self-harm, does not meet criteria for a 72-hr hold, and therefore remains appropriate for ongoing outpatient level of care.  A thorough assessment of risk factors related to suicide and self-harm have been reviewed and are noted above. The patient convincingly denies suicidality on several occasions. Local community safety resources printed and reviewed for patient to use if needed. There was no deceit detected, and the patient presented in a manner that was believable.     DSM5 Diagnosis:  296.40  Bipolar I Disorder Current or Most Recent Episode Hypomanic  300.02 (F41.1) Generalized Anxiety Disorder    Medical comorbidities include:   Patient Active Problem List    Diagnosis Date Noted     Substance abuse (H)      Priority: Medium     Generalized anxiety disorder      Priority: Medium     Chronic eczema      Priority: Medium     Gastric ulcer      Priority: Medium     ADHD      Priority: Medium     Smoker      Priority: Medium       Assessment:    Lluvia PEDROZA Pacheco Glaser reported feeling more calm with less mood swings since restarting the Vraylar.  She had taken it upon herself to stop the quetiapine and now has developed some muscle twitching in her extremities and her neck.  She reported no trouble swallowing.  I added benztropine to help with muscle movements.  Quetiapine was discontinued.  She has been taking clonazepam up to 2 mg twice daily.  I spoke with her about reducing that in order to avoid developing a tolerance.  I did order 45 tablets today of the 2 mg tablet of clonazepam and strongly urged her to take it as prescribed 1/2 to 1 tablet twice daily as needed.  Overall she was more understandable today with less pressured speech.  She tells me family members have noticed that she has been better able to engage with them..    Medication side effects and alternatives were reviewed. Health promotion activities recommended and reviewed today. All questions addressed. Education and counseling completed regarding risks and benefits of medications and psychotherapy options.    Treatment Plan:    and     1.  Continue atomoxetine 25 mg daily     2.  Discontinue quetiapine     3.  Add benztropine 0.5 mg twice daily as needed for movements     4.  Continue Vraylar 1.5 mgdaily    5.  Continue clonazepam 1 - 2  mg twice daily as needed for anxiety-45 tabs filled     6.  Continue imipramine 25 mg twice daily    7.  Continue propranolol 40 mg twice daily    8.  Continue behavior health home care services with  [facility]         Continue all other medications as reviewed per electronic medical record today.     Safety plan reviewed. To the Emergency Department as needed or call after hours crisis line at [phone] or [phone]. [place] Crisis Text [place]. Text [place] to 047255 or Suicide LifeLine Chat: suicidePeople Pattern.org/chat/    To schedule individual or family therapy, call [facility] at [phone].    Schedule an appointment with me in 2 weeks or sooner as needed. Call [facility] at [phone] to schedule.    Follow up with primary care provider as planned or for acute medical concerns.    Call the psychiatric nurse line with medication questions or concerns at [phone].    [employer] may be used to communicate with your provider, but this is not intended to be used for emergencies.    Crisis Resources:    National Suicide Prevention Lifeline: 226.235.3222 (TTY: 872.853.1906). Call anytime for help.  (www.suicidepreventionlifeline.org)  National [place] on Mental Illness (www.sabian.org): 193.996.8832 or 992-653-7304.   Mental Health Association (www.mentalhealth.org): 887.610.5029 or 598-206-1646.  [place] Crisis [place]: Text [place] to 006241  Suicide LifeLine Chat: suicidePeople Pattern.org/chat      Patient Status:  Patient will continue to be seen for ongoing consultation and stabilization.    Signed:   RILEY Mohamud-BC   Psychiatry

## 2021-04-19 NOTE — PATIENT INSTRUCTIONS
1.  Continue atomoxetine 25 mg daily     2.  Discontinue quetiapine     3.  Add benztropine 0.5 mg twice daily as needed for movements     4.  Continue Vraylar 1.5 mgdaily    5.  Continue clonazepam 1 - 2  mg twice daily as needed for anxiety-45 tabs filled     6.  Continue imipramine 25 mg twice daily    7.  Continue propranolol 40 mg twice daily    8.  Continue behavior health home care services with [facility]         Continue all other medications as reviewed per electronic medical record today.     Safety plan reviewed. To the Emergency Department as needed or call after hours crisis line at [phone] or [phone]. [place] Crisis Text [place]. Text [place] to 034593 or Suicide LifeLine Chat: FileTrek.org/chat/    To schedule individual or family therapy, call [facility] at [phone].    Schedule an appointment with me in 2 weeks or sooner as needed. Call [facility] at [phone] to schedule.    Follow up with primary care provider as planned or for acute medical concerns.    Call the psychiatric nurse line with medication questions or concerns at [phone].    [place] TrailMapshart may be used to communicate with your provider, but this is not intended to be used for emergencies.    Crisis Resources:    National Suicide Prevention Lifeline: 332.509.4421 (TTY: 904.473.7764). Call anytime for help.  (www.suicidepreventionlifeline.org)  National [place] on Mental Illness (www.sabina.org): 833.515.5625 or 580-051-3516.   Mental Health Association (www.mentalhealth.org): 309.530.7113 or 051-544-4585.  [place] Crisis Text [place]: Text [place] to 470596  Suicide LifeLine Chat: FileTrek.org/chat

## 2021-04-20 ASSESSMENT — ANXIETY QUESTIONNAIRES: GAD7 TOTAL SCORE: 2

## 2021-04-21 ENCOUNTER — ALLIED HEALTH/NURSE VISIT (OUTPATIENT)
Dept: BEHAVIORAL HEALTH | Facility: CLINIC | Age: 36
End: 2021-04-21
Payer: COMMERCIAL

## 2021-04-21 DIAGNOSIS — R69 DIAGNOSIS DEFERRED: Primary | ICD-10-CM

## 2021-04-21 NOTE — Clinical Note
Hello,    I was able to meet with this patient to update Health & Wellness goals for the next 6-months.  I will continue to meet with this patient at least once a month for case management and social work services.    Thank you,  TRIXIE Moon [facility]  Behavioral Health Home ([facility])   [facility]  [phone]

## 2021-04-21 NOTE — LETTER
Behavioral Health Home ([facility]): Health Action Plan  [facility] Clinic: [place]    Well and Beyond      Name: Lluvia Glaser  Preferred Name: lluvia  : 1985  MRN: [id number]      My Goals  Goal Areas: Health;Mental Health;Employment / Volunteer;Financial and Social Service Benefits;Transportation      Patient stated goals: Lluvia would like assistance with her physical and mental health for creating a balanced and healthy lifestyle. Lluvia would like assistance with budgeting, SSDI and community/social service assistance to aid with county benefits to increase her financial stability. Lluvia would like assistance with vehicle maintenance and repairs for reliable transportation to get to her appointments, run errands and get out into the community.    Strengths related to each goal: Lluvia identified strengths: Sobriety of almost one year, supportive family and significant other.     Services and Supports Needed: The [facility] Team will provide monthly contact with patient in order to monitor progress towards goals.    Activities / Actions of Team to support goal(s): [facility] team will locate appropriate resources that align with patient's goals and promote health and wellness.    Activities / Actions of Patient / Parent / Guardian to support goal(s): It is a requirement that patient's primary care physician is through the [facility] system and that they are on Medical Assistance/Medicaid. If either of these were to change or if patient needs any type of assistance, they are to reach out to their Behavioral Health Home ([facility]) team.        Recommended Referral  Tobacco cessation referrals made?: No  Mental Health / Chemical Dependency Referrals: Yes  Substance Use Referrals: Not Applicable  Mental Health Referrals: MH Services: [facility], [facility], Community MH Provider;[facility];Psychiatry  Community Health Referrals: [facility] Financial Services;[facility] ;Other (see comments)  Dental Referral: Yes        My Team Members and  Their Contact Information  Patient Care Team       Relationship Specialty Notifications Start End    Susan Anaya MD PCP - General Family Medicine Admissions 2/3/21     Phone: [phone] Pager: [phone] Fax: [phone]        [address] [facility] [address]    Minnie Denney LSW  Clinic Admissions 6/16/20     [facility] [phone]    Keeley Mancini NP Nurse Practitioner Nurse Practitioner Admissions 6/17/20     Psychiatry Provider    Phone: [phone] Fax: [phone]         [address] [place] [address]    Keeley Mancini NP Assigned Behavioral Health Provider   10/23/20     Phone: [phone] Fax: [phone] [address] [place] [address]    Susan Anaya MD Assigned PCP   12/20/20     Phone: [phone] Pager: [phone] Fax: [phone]        [address] [address] [address]          My Wellness Plan  Safety Concerns: None Reported / Observed  Recommendations / Plan for safety concerns: A safety and risk management plan has not been developed at this time, however patient was encouraged to call [facility] / 911 should there be a change in any of these risk factors.  Crisis Plan (emergencies / when urgent support needed): Patient states she feels comfortable contacting her mother Shaneka and/or calling the National Suicide Prevention Lifeline at 143-020-4095 or 911 in a crisis or emergency situation.          Lluvia Glaser co-developed the Health Action Plan with the [facility] Team and received a copy of this document.  Date Health Action Plan Completed/Updated: 04/21/21

## 2021-04-21 NOTE — PROGRESS NOTES
cBehavioral Health Home Services  [facility] Clinic: [place]        Social Work Care Navigator Note      Patient: Harsha Glaser  Date: April 21, 2021  Preferred Name: harsha    Previous PHQ-9:   PHQ-9 SCORE 3/5/2021 4/5/2021 4/19/2021   PHQ-9 Total Score 6 25 3     Previous WAYNE-7:   WAYNE-7 SCORE 3/5/2021 4/5/2021 4/19/2021   Total Score 3 21 2     ULISES LEVEL:  No flowsheet data found.    Preferred Contact:  Need for : No  Preferred Contact: Cell      Type of Contact Today: Phone call (patient / identified key support person reached)      Data: (subjective / Objective):    Patient came in to complete the comprehensive wellness assessment for Behavioral Health Home Services.  See [facility] Flowsheets for details on the assessment.  See [facility], [facility] for a copy of the patient's care plan.    Patient completed WAYNE-7 and PHQ-9 on 04/19/2021 with psychiatric provider, Keeley Mancini NP.    Patient identified Goal Areas:  Health;Mental Health;Employment / Volunteer;Financial and Social Service Benefits;Transportation.     Patient stated goals:  Harsha would like assistance with her physical and mental health for creating a balanced and healthy lifestyle. Harsha would like assistance with budgeting, SSDI and community/social service assistance to aid with county benefits to increase her financial stability. Harsha would like assistance with vehicle maintenance and repairs for reliable transportation to get to her appointments, run errands and get out into the community.        Harsha would like assistance with her physical and mental health for creating a balanced and healthy lifestyle. Patient would like continued support to manage her physical and mental health issues that can impact her daily living. Patient is currently working with her primary care clinic and psychiatrist to help manage physical and mental health symptoms. Patient appreciates the extra case management and supportive counseling  from Behavioral Health Home ([facility]) [facility]. Patient and [facility] will work together to make sure patients mental health and physical health needs are being met by meeting at least once a month to check in with physical & mental health symptoms or concerns for additional support.    Patient has been able to establish care in the last six months with a primary care provider. Patient continues to need to schedule appt w/PCP for follow-up with full physical and PAP, per PCP note in December that patient would like to schedule with OB/GYN to have IUD placement. Patient reports she would like to move forward with this and would appreciate [facility] assistance to help remind her to do this.      Patient reports she has been meeting with her psychiatrist regularly to manage her moods and medications. Patient reports mood has been stable. Patient reports there have been recent medication changes, as she has been having migraines the past 3-weeks. Patient will continue to work with PCP and psychiatry to manage physical and mental health.     Patient reports she has not picked up her new medications, as she continues to have difficulty with transportation. [facility] encouraged patient to call her [employer] program for a free once a month pharmacy ride. Patient reports she will call today to do this.    Patient reports she stopped seeing her therapist in the past 6-months. Patient reports she feels like her mood is fairly stable and feels supported by psychiatric prescriber and Behavioral Health Home ([facility]) .    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 Patient reports other things that impact her mood is the loss of custody of her three children, now with her mother, as of the end of Sept. 2020.  Patient reports she is learning to cope with this and visits her children often. [facility] and patient will continue to discuss other supportive services that may be helpful to patient such as  [place] Choice Assessment or [facility].  Patient reports she does have a supportive significant other. Patient reports they do not live together but do spend time at each others homes.     Patient reports she does not currently have a dentist but would like a provider list. [facility] emailed provider list today. Update patient reports she does have provider list but does need to schedule appt. Patient requesting to keep this as goal for next six months.      Lluvia would like assistance with budgeting, SSDI and community/social service assistance to aid with county benefits to increase her financial stability. Patient states her mental and physical health make it extremely difficult to hold a job and is interested in applying for SSDI. [facility] emailed patient resources and forms today. Patient reports an increase in mental health symptoms due to her current financial situation and stressors. Patient states she is working with the [facility] to determine benefits. [facility] to follow-up with patient about this and provide additional resources if/when needed or requested. Patient reports she continues to collect unemployment at this time but will need to investigate additional [facility] resources once this ends. [facility] will continue to provide education, resources and care coordination.     Update - patient reports she has applied for SSDI and received additional forms in the mail. Patient reports she has not reviewed these yet. Patient continues to not be eligible for [facility] benefits and receives unemployment each week that helps her financially. [facility] and patient will continue to monitor and investigate additional [facility] resources as needed.     Patient reports she does not need tax assistance this year. Patient reports she will be applying for the new [employer] obi that just got funded to help with utilities and rental assistance.      Lluvia would like assistance with vehicle maintenance and repairs for reliable transportation to get  to her appointments, run errands and get out into the community. Patient continues to report her vehicle can be unreliable. Patient reports she continues to struggle with transportation and does have transportation list from [facility]. [facility] reminded patient today that she can use [employer] transportation for free medical, dental, mental health or pharmacy transportation needs. [facility] will continue to support patient with this goal.     Patient identified strengths: Sobriety of a year an a half, supportive family and significant other.     Overall, in the past 6-months the patient has completed several goals and is making progress on additional wellness goals. [facility] will continue to provide support to assist patient in attaining wellness goals.    TRIXIE Moon [facility]  Behavioral Health [place] ([facility])   [facility]  [phone]    ADDENDUM:  Health Action Plan approved by Behavioral Health Clinician 5/4/2021. [facility] sent patient a copy of approved HAP in the mail. Next Health Action Plan review due in October of 2021.    BRANDEN Moon  5/4/2021  1:52 PM

## 2021-04-21 NOTE — LETTER
"Behavioral Health [place] ([facility]): Health Action Plan  [facility] Clinic: [place]    [facility]      Name: Lluvia KASIA Glaser  Preferred Name: lluvia  : 1985  MRN: [id number]    May 4, 2021    [facility]  [address] [place] [address]    Dear Lluvia,    I have enclosed the Health Action Plan (HAP) that we completed together that includes your goals for Behavioral Health Home ([facility]) Services for the next 6-months. As you continue being enrolled in Behavioral Health Home ([facility]) services, I wanted to give you some information so you know what to expect moving forward.    What to Expect on a Monthly Basis: It is a requirement that I make contact with you on a monthly basis (by phone or meeting with you in clinic) to check in on how things are going and if you need any assistance. Please feel free to reach out to your [facility] team between these contacts if you need assistance or have any questions.    What to Expect every Six Months: Every six months, it is a requirement that we complete a Health Action Plan (HAP) review. During this in-clinic appointment, we will monitor our progress towards existing goals and set new goals for the next 6 month time period.    What kinds of support can [facility] offer now that I am enrolled?   - Housing Coordination  - Transportation Resources  - Financial Resources  - Coordination with the [facility] for Benefits (MA, SNAP benefits, etc)  - Disability Eligibility and Benefits  - Employment and Education Coordination  - Disability Related Information and Education Resources  - Referrals for mental health services, chemical dependency assessment/treatment, etc     If you or someone you know is experiencing a mental health crisis and you need help, the following crisis hotlines are available to help.    If you are in immediate danger, call 911.  Suicide Prevention Lifeline: 4-510-866-TALK (4857)  Crisis Text Line Service: Text \"[place]\" to 853129.    [facility] " [facility] Emergency Department - Behavioral Emergency Center  [address] [place] [address]  [phone] [facility] - [employer] [facility] Crisis Response Services (Adults & Children)  [phone]   [facility] - Acute Psychiatric Services (APS)  Assessment & Referral: [phone]  Suicide Hotline: 515.665.3476 [place] Crisis Team  [phone]   [facility] Adult Mental Health Services   [phone]  Referrals: [phone]  Crisis: [phone] [facility] - Emergency Department  [address] [place] [address]  [phone]   [place] [facility]  4-258-HwlkAsd (1-279.964.3638) [facility] Mental Health and Resource Crisis Line  [phone]   [facility] - Community Outreach for Psychiatric Emergencies  [phone] [facility] Crisis Services - [facility] Adult Mental Health Services - Crisis Services (24/7)  Information & Referrals: [phone]  Crisis Line: 568.655.6010   [facility] [place] [facility] (24/7)  [phone] TDD Crisis Help Line Serving [facility]  [phone]  or text  [place]  to 429756    [facility] and Human [facility]  Mental Health  [address] [place] [address]  [phone]  [address] [place] [address]  [phone]  web: co.[email or web address].[place].  Mental-Health    [facility]  Mental Health  [address] [place] [address]  [phone]  web: [facility] Family [facility]  Family Services  [address] [address]  [phone]  web: [facility] Family [facility]   Family Services  [address] [place] [address]  [phone]  web: [email or web address].[place]./adult mental health    [facility] and Human Services Department  [address], [place]  [address]  [phone]  [address] [place] [address]  [phone]  Toll Free: [phone]  web: [email or web address] and human services     Please let me know if you have any questions or if there is anything that we can assist with. I can be reached by phone, [employer] message, or by email. I look forward to continuing to work with you!    Sincerely,          TRIXIE Moon [facility]  Behavioral Health [place] ([facility])   [phone]  [email or web address]

## 2021-04-27 ENCOUNTER — TELEPHONE (OUTPATIENT)
Dept: FAMILY MEDICINE | Facility: CLINIC | Age: 36
End: 2021-04-27

## 2021-04-27 NOTE — TELEPHONE ENCOUNTER
[facility] and spoke with Indu, pharmacy technician. May second is the earliest that the patient will be able to fill her clonazepam 2 mg.

## 2021-04-27 NOTE — TELEPHONE ENCOUNTER
Reason for Call:  Other prescription    Detailed comments: pt asking if the pharmacy can be called to ok her clonazepam refill, the pharmacy states its to early but pt explains the dose was changed and that is why she needs this filled now- [facility]    Phone Number Patient can be reached at: Home number on file [phone] (home)    Best Time: any    Can we leave a detailed message on this number? YES    Call taken on 4/27/2021 at 1:34 PM by Liberty Bejarano

## 2021-04-28 NOTE — TELEPHONE ENCOUNTER
Pt calling about this Rx, gave pt the info about pharmacy unable to fill until 5/2 but pt explains this Rx is wrote differently then what was talked about taking during last virtual visit  (Clonazepam is continued at 1 mg twice daily as needed for anxiety) and Rx is wrote as -Take 1/2 to 1 tablet  twice daily.  Refill 28 days after last fill. I explained to pt this must be related to the 28 day fill. Please advise pt

## 2021-05-01 ENCOUNTER — E-VISIT (OUTPATIENT)
Dept: URGENT CARE | Facility: URGENT CARE | Age: 36
End: 2021-05-01
Payer: COMMERCIAL

## 2021-05-01 DIAGNOSIS — J01.90 ACUTE BACTERIAL SINUSITIS: Primary | ICD-10-CM

## 2021-05-01 DIAGNOSIS — B96.89 ACUTE BACTERIAL SINUSITIS: Primary | ICD-10-CM

## 2021-05-01 PROCEDURE — 99421 OL DIG E/M SVC 5-10 MIN: CPT | Performed by: PHYSICIAN ASSISTANT

## 2021-05-01 RX ORDER — AMOXICILLIN 500 MG/1
500 CAPSULE ORAL 3 TIMES DAILY
Qty: 30 CAPSULE | Refills: 0 | Status: SHIPPED | OUTPATIENT
Start: 2021-05-01 | End: 2021-05-11

## 2021-05-01 RX ORDER — GUAIFENESIN 1200 MG/1
1200 TABLET, EXTENDED RELEASE ORAL 2 TIMES DAILY
Qty: 60 TABLET | Refills: 0 | Status: SHIPPED | OUTPATIENT
Start: 2021-05-01 | End: 2021-08-20

## 2021-05-01 NOTE — PATIENT INSTRUCTIONS
Dear Lluvia Glaser    After reviewing your responses, I've been able to diagnose you with?a sinus infection caused by bacteria.?     Based on your responses and diagnosis, I have prescribed medications to treat your symptoms. I have sent this to your pharmacy.?     It is also important to stay well hydrated, get lots of rest and take over-the-counter decongestants,?tylenol?or ibuprofen if you?are able to?take those medications per your primary care provider to help relieve discomfort.?     It is important that you take?all of?your prescribed medication even if your symptoms are improving after a few doses.? Taking?all of?your medicine helps prevent the symptoms from returning.?     If your symptoms worsen, you develop severe headache, vomiting, high fever (>102), or are not improving in 7 days, please contact your primary care provider for an appointment or visit any of our convenient Walk-in Care or Urgent Care Centers to be seen which can be found on our website?here.?     Thanks again for choosing?us?as your health care partner,?   ?  Mauricio Lara PA-C?

## 2021-05-03 ENCOUNTER — TELEPHONE (OUTPATIENT)
Dept: PSYCHIATRY | Facility: CLINIC | Age: 36
End: 2021-05-03

## 2021-05-03 NOTE — TELEPHONE ENCOUNTER
Returned call to patient and asked her to submit a refill request to her pharmacy. Patient verbalized understanding.

## 2021-05-03 NOTE — TELEPHONE ENCOUNTER
Reason for Call:  Medication or medication refill: per client all her med's need re-fill     Do you use a [facility]?  Name of the pharmacy and phone number for the current request:    [facility] - [place] - [address] [address]    Name of the medication requested: per pt all her meds need re-fill     Other request: due to uto client was not able to be seen today 5/3   Next avail was margarito 6/18   Per client she can not wait till 6/18.   Client would like to talk with provider before that       Can we leave a detailed message on this number? YES    Phone number patient can be reached at: Home number on file [phone] (home)    Best Time: ASAP     Call taken on 5/3/2021 at 7:46 AM by Jaswinder Linn

## 2021-05-12 ENCOUNTER — TELEPHONE (OUTPATIENT)
Dept: FAMILY MEDICINE | Facility: CLINIC | Age: 36
End: 2021-05-12

## 2021-05-12 NOTE — TELEPHONE ENCOUNTER
Patient Quality Outreach Summary      Summary:    Patient is due/failing the following:   Cervical Cancer Screening - PAP Needed    Type of outreach:    Sent [employer] message.    Questions for provider review:    None                                                                                                                    Diana Sun MA

## 2021-05-19 ENCOUNTER — ALLIED HEALTH/NURSE VISIT (OUTPATIENT)
Dept: BEHAVIORAL HEALTH | Facility: CLINIC | Age: 36
End: 2021-05-19
Payer: COMMERCIAL

## 2021-05-19 DIAGNOSIS — R69 DIAGNOSIS DEFERRED: Primary | ICD-10-CM

## 2021-05-19 NOTE — Clinical Note
FYI - no action needed. I was able to set up appt for her today for her physical and PAP on June 2nd, hopefully she will keep her appt.    TRIXIE Moon [facility]  Behavioral Health Home ([facility])   [facility]  [phone]

## 2021-05-20 NOTE — PROGRESS NOTES
Behavioral Health Home Services  [facility] Clinic: [place]        Social Work Care Navigator Note      Patient: Lluvia Glaser  Date: May 20, 2021  Preferred Name: lluvia    Previous PHQ-9:   PHQ-9 SCORE 3/5/2021 4/5/2021 4/19/2021   PHQ-9 Total Score 6 25 3     Previous WAYNE-7:   WAYNE-7 SCORE 3/5/2021 4/5/2021 4/19/2021   Total Score 3 21 2     ULISES LEVEL:  No flowsheet data found.    Preferred Contact:  Need for : No  Preferred Contact: Cell      Type of Contact Today: Phone call (patient / identified key support person reached)      Data: (subjective / Objective):  Recent ED/IP Admission or Discharge?   None    Patient Goals:  Goal Areas: Health;Mental Health;Employment / Volunteer;Financial and Social Service Benefits;Transportation  Patient stated goals: Lluvia would like assistance with her physical and mental health for creating a balanced and healthy lifestyle. Lluvia would like assistance with budgeting, SSDI and community/social service assistance to aid with county benefits to increase her financial stability. Lluvia would like assistance with vehicle maintenance and repairs for reliable transportation to get to her appointments, run errands and get out into the community.        [facility] Core Service Provided:  Comprehensive Care Management: utilized the electronic medical record / patient registry to identify and support patient's health conditions / needs more effectively   Care Transitions: focused on the coordinated and seamless movement of patient between or within different levels of care or settings  Care Coordination: provided care management services/referrals necessary to ensure patient and their identified supports have access to medical, behavioral health, pharmacology and recovery support services.  Ensured that patient's care is integrated across all settings and services.   Individual and Family Support: aimed to help clients reduce barriers to achieving goals, increase health literacy  and knowledge about chronic condition(s), increase self-efficacy skills, and improve health outcomes  Referral to Community and Social Support Services: Provided patient with referrals (see plan section)  Assisted in scheduling an appointment to a referral / service (see plan section)  Coordinated / Confirmed patient's appointment time or referral and transportation plan  Followed-up with patient on whether or not they completed a referral    Current Stressors / Issues / Care Plan Objective Addressed Today:  SWCC and patient were able to meet today for Behavioral Health Home ([facility]) monthly check-in via telehealth visit. All required ROIs have been filed with HIM/patient chart.    1. Patient reports mood has been up and down in the past two weeks but depressed and down the past week due to not being able to be with her daughter for her birthday this year. SWCC and patient processed feelings about this today. SWCC provided support/empathy and coping skills.    Patient reports sleep has been better and energy levels have been good. Patient reports she is taking her medications as directed and feels like her medications are working. Patient reports she has an appt with her psychiatric provider in the middle of June.    Patient reports she needs to make an appt with her PCP for a physical and PAP and would like assistance with this. [facility] was able to make appt for patient today.    Patient reports she does not like her current weight and has been trying to eat healthier and would like to investigate resources for healthy weight management. SWCC and patient discussed obi funding for the [facility] closest to her. Patient reports she is interested in resources. [facility] mailed out [facility] obi information today and will follow-up with patient at next visit.    Patient reports her sobriety is going well and she does not have concerns about this at this time.    2. Patient reports she was pulled over and received citation plus has a  court date on June 8th. Patient reports she is concerned about her license and would like resources to help with this. [facility] mailed information for the [employer] program today.    Patient reports her water pump went out in her car. Patient reports she believes that one of her friends may be able to fix it. However, she would like additional low cost repair resources. [facility] mailed off resources today for low cost auto repairs today.    3. Patient continues to receive unemployment benefits. Patient reports she does have the [employer] abebe for [place] but has not yet filled it out. Patient reports she will be doing so in the next few days. Patient reports she cannot fill out the online information to apply for SSDI but would like [facility] to send SSDI advocacy list to help with the process for submitting a claim. [facility] mailed out advocacy list today and will follow-up with patient at next visit.    Intervention:  Motivational Interviewing: Expressed Empathy/Understanding, Supported Autonomy, Collaboration, Evocation, Permission to raise concern or advise, Open-ended questions, Reflections: simple and complex, Rolled with resistance: Emphasized patient autonomy, Simple reflection, Complex reflection, Shifted topic to defuse resistance, Reframed sustain talk in the direction of change and Evoked patient agenda, Change talk (evoked) and Reframe   Target Behavior(s): Explored thoughts about taking an anti-depressant, Explored and resolved challenges related to taking anti-depressants as prescribed, Explored and resolved challenges to attending appointments as scheduled, Explored patient's knowledge of the impact of exercise on mood, Explored current exercise activities and thoughts about how this influences mood, Explored current social supports and reinforced opportunities to increase engagement, Explored current sleep hygeine and patient's perception and knowledge of relationship to mood, Explored patient's current diet and  knowledge of influence on mood and Explored patient's perception of how alcohol and / or drugs influences mood    Assessment: (Progress on Goals / Homework):  Patient would benefit from continued coordination in reaching their goals set for the Behavioral Health Home ([facility]) program. [facility] reviewed Health Action Plan goals and will continue to monitor progress and work with patient and their care team.      Plan: (Homework, other):  Patient was encouraged to continue to seek condition-related information and education.      Scheduled a Phone follow up appointment with [facility] in 4 weeks     Patient has set self-identified goals and will monitor progress until the next appointment on: 06/16/2021.         TRIXIE Moon [facility]  Behavioral Health Home ([facility])   [facility]  [phone]                    Next 5 appointments (look out 90 days)    Jun 02, 2021 10:40 AM  PHYSICAL with Susan Anaya MD  [facility] ([facility] [place] [facility] ) [address] [address]  [phone]

## 2021-06-04 DIAGNOSIS — R25.9 ABNORMAL INVOLUNTARY MOVEMENT: ICD-10-CM

## 2021-06-04 DIAGNOSIS — J01.90 ACUTE BACTERIAL SINUSITIS: ICD-10-CM

## 2021-06-04 DIAGNOSIS — F90.2 ATTENTION DEFICIT HYPERACTIVITY DISORDER (ADHD), COMBINED TYPE: ICD-10-CM

## 2021-06-04 DIAGNOSIS — B96.89 ACUTE BACTERIAL SINUSITIS: ICD-10-CM

## 2021-06-04 DIAGNOSIS — G43.829 MENSTRUAL MIGRAINE WITHOUT STATUS MIGRAINOSUS, NOT INTRACTABLE: ICD-10-CM

## 2021-06-04 DIAGNOSIS — F31.12 BIPOLAR 1 DISORDER WITH MODERATE MANIA (H): ICD-10-CM

## 2021-06-04 DIAGNOSIS — F41.1 GENERALIZED ANXIETY DISORDER: ICD-10-CM

## 2021-06-04 RX ORDER — ATOMOXETINE 25 MG/1
25 CAPSULE ORAL DAILY
Qty: 30 CAPSULE | Refills: 0 | Status: SHIPPED | OUTPATIENT
Start: 2021-06-04 | End: 2021-07-08

## 2021-06-04 RX ORDER — SUMATRIPTAN 50 MG/1
50 TABLET, FILM COATED ORAL
Qty: 18 TABLET | Refills: 3 | OUTPATIENT
Start: 2021-06-04

## 2021-06-04 RX ORDER — CLONAZEPAM 2 MG/1
TABLET ORAL
Qty: 45 TABLET | Refills: 0 | Status: SHIPPED | OUTPATIENT
Start: 2021-06-04 | End: 2021-07-08

## 2021-06-04 RX ORDER — GUAIFENESIN 1200 MG/1
1200 TABLET, EXTENDED RELEASE ORAL 2 TIMES DAILY
Qty: 60 TABLET | Refills: 0 | OUTPATIENT
Start: 2021-06-04

## 2021-06-04 RX ORDER — IMIPRAMINE HCL 25 MG
25 TABLET ORAL 2 TIMES DAILY
Qty: 60 TABLET | Refills: 0 | Status: SHIPPED | OUTPATIENT
Start: 2021-06-04 | End: 2021-07-08

## 2021-06-04 RX ORDER — BENZTROPINE MESYLATE 0.5 MG/1
0.5 TABLET ORAL 2 TIMES DAILY PRN
Qty: 60 TABLET | Refills: 1 | Status: SHIPPED | OUTPATIENT
Start: 2021-06-04 | End: 2022-04-28

## 2021-06-04 RX ORDER — PROPRANOLOL HYDROCHLORIDE 40 MG/1
40 TABLET ORAL 2 TIMES DAILY
Qty: 60 TABLET | Refills: 0 | Status: SHIPPED | OUTPATIENT
Start: 2021-06-04 | End: 2021-07-08

## 2021-06-04 NOTE — TELEPHONE ENCOUNTER
Date of Last Office Visit: 4/19/21  Date of Next Office Visit: 6/18/21  No shows since last visit: none  Cancellations since last visit: none    Medication requested: benztropine 0.5mg Date last ordered: 4/19/21 Qty: 60 Refills: 1  Medication requested: clonazepam 2mg Date last ordered: 4/19/21  Qty: 45 Refills: 1  Medication requested: atomoxetine 25mg Date last ordered: 4/5/21 Qty: 30 Refills: 1  Medication requested: vraylar 1.5mg Date last ordered: 4/5/21 Qty: 30 Refills: 1  Medication requested: imipramine 25mg Date last ordered: 4/5/21 Qty: 60 Refills: 1  Medication requested: propranolol 40mg Date last ordered: 4/5/21 Qty: 60 Refills: 1     [place] of [place] ?: yes  Medication last filled date: clonazepam 5/3/21 Qty filled: 45  Other controlled substance on [place] ?: no      Lapse in medication adherence greater than 5 days?: no  If yes, call patient and gather details:   Medication refill request verified as identical to current order?: yes  Result of Last DAM, VPA, Li+ Level, CBC, or Carbamazepine Level (at or since last visit): N/A    [x]Medication refilled per  Medication Refill in Ambulatory Care  policy. - propranolol, imiprimine, cariprazine, atomoxetine  [x]Medication unable to be refilled by RN due to criteria not met as indicated below:    []Eligibility - not seen in the last year   []Supervision - no future appointment   []Compliance - no shows, cancellations or lapse in therapy   []Verification - order discrepancy   [x]Controlled medication   []Medication not included in policy   []90-day supply request   [x]Other - requesting early

## 2021-06-16 ENCOUNTER — ALLIED HEALTH/NURSE VISIT (OUTPATIENT)
Dept: BEHAVIORAL HEALTH | Facility: CLINIC | Age: 36
End: 2021-06-16
Payer: COMMERCIAL

## 2021-06-16 DIAGNOSIS — R69 DIAGNOSIS DEFERRED: Primary | ICD-10-CM

## 2021-06-16 NOTE — PROGRESS NOTES
Behavioral Health Home Services  [facility]: [place]        Social Work Care Navigator Note      Patient: Lluvia Glaser  Date: June 16, 2021  Preferred Name: lluvia    Previous PHQ-9:   PHQ-9 SCORE 3/5/2021 4/5/2021 4/19/2021   PHQ-9 Total Score 6 25 3     Previous WAYNE-7:   WAYNE-7 SCORE 3/5/2021 4/5/2021 4/19/2021   Total Score 3 21 2     ULISES LEVEL:  No flowsheet data found.    Preferred Contact:  Need for : No  Preferred Contact: Cell      Type of Contact Today: Phone call (patient / identified key support person reached)      Data: (subjective / Objective):  Recent ED/IP Admission or Discharge?   None    Patient Goals:  Goal Areas: Health;Mental Health;Employment / Volunteer;Financial and Social Service Benefits;Transportation  Patient stated goals: Lluvia would like assistance with her physical and mental health for creating a balanced and healthy lifestyle. Lluvia would like assistance with budgeting, SSDI and community/social service assistance to aid with county benefits to increase her financial stability. Lluvia would like assistance with vehicle maintenance and repairs for reliable transportation to get to her appointments, run errands and get out into the community.        [facility] Core Service Provided:  Comprehensive Care Management: utilized the electronic medical record / patient registry to identify and support patient's health conditions / needs more effectively   Care Transitions: focused on the coordinated and seamless movement of patient between or within different levels of care or settings  Care Coordination: provided care management services/referrals necessary to ensure patient and their identified supports have access to medical, behavioral health, pharmacology and recovery support services.  Ensured that patient's care is integrated across all settings and services.   Individual and Family Support: aimed to help clients reduce barriers to achieving goals, increase health literacy  and knowledge about chronic condition(s), increase self-efficacy skills, and improve health outcomes  Referral to Community and Social Support Services: Provided patient with referrals (see plan section)  Assisted in scheduling an appointment to a referral / service (see plan section)  Coordinated / Confirmed patient's appointment time or referral and transportation plan  Followed-up with patient on whether or not they completed a referral    Current Stressors / Issues / Care Plan Objective Addressed Today:  [facility] and patient were able to meet today for Behavioral Health Home ([facility]) monthly check-in via telehealth visit. All required ROIs have been filed with HIM/patient chart.    1. Patient reports mood has been up and anxiety has been well managed over the past two weeks, as patient reports her 9-year old daughter is visiting from [place]. Patient report the adoption has been finalized for her children and her mom now has full custody. Patient reports both sadness and relief that this is done, as now she can have short visits with them and they can come visit her. [facility] and patient processed feelings about this today. [facility] provided support/empathy and coping skills.     Patient reports sleep and energy levels continue to be improved. Patient reports she is taking her medications as directed and feels like her medications are working. Patient reports she has an appt with her psychiatric provider in the middle of June.     Patient reports she needs to make an appt with her PCP for a physical and PAP and would like assistance with this, as she missed this due to her daughter being in town. [facility].     Patient reports she does not like her current weight and has been trying to eat healthier and would like to investigate resources for healthy weight management. [facility] and patient discussed obi funding for the [facility] closest to her. Patient reports she is interested in resources. [facility] mailed  out [facility] obi information today and will follow-up with patient at next visit. Update - patient reports continued interest in this and will be checking into this in the next few weeks.     Patient reports her sobriety is going well and she does not have concerns about this at this time.     2. Patient reports her court date has been rescheduled for Sept. Patient reports she was pulled over and received citation and will need CD/Rule 25 Assessment. Patient reports she is concerned about her license and would like resources to help with this. [facility] mailed information for the [employer] D[employer] program today. Update - Patient requesting assistance to get CD/Rule 25 completed. [facility] placed referral for [employer] today. [facility] will follow-up with patient at next visit.      Patient reports her water pump went out in her car. Patient reports she believes that one of her friends may be able to fix it. However, she would like additional low cost repair resources. [facility] mailed off resources today for low cost auto repairs today. Update - [facility] to follow-up with this at next appt.     3. Patient continues to receive unemployment benefits. Patient reports she does have the [employer] abebe for EA but has not yet filled it out. Patient reports she will be doing so in the next few days. Patient reports she cannot fill out the online information to apply for SSDI but would like [facility] to send SSDI advocacy list to help with the process for submitting a claim. [facility] mailed out advocacy list today and will follow-up with patient at next visit.    Intervention:  Motivational Interviewing: Expressed Empathy/Understanding, Supported Autonomy, Collaboration, Evocation, Permission to raise concern or advise, Open-ended questions, Reflections: simple and complex, Rolled with resistance: Emphasized patient autonomy, Simple reflection, Complex reflection, Reframed sustain talk in the direction of change and Evoked patient agenda, Change talk  (evoked) and Reframe   Target Behavior(s): Explored thoughts about taking an anti-depressant, Explored and resolved challenges related to taking anti-depressants as prescribed, Explored thoughts and readiness to participate in individual therapy, Explored and resolved challenges to attending appointments as scheduled, Explored patient's knowledge of the impact of exercise on mood, Explored current exercise activities and thoughts about how this influences mood, Explored current social supports and reinforced opportunities to increase engagement, Explored current sleep hygeine and patient's perception and knowledge of relationship to mood and Explored patient's perception of how alcohol and / or drugs influences mood    Assessment: (Progress on Goals / Homework):  Patient would benefit from continued coordination in reaching their goals set for the Behavioral Health Home ([facility]) program. [facility] reviewed Health Action Plan goals and will continue to monitor progress and work with patient and their care team.      Plan: (Homework, other):  Patient was encouraged to continue to seek condition-related information and education.      Scheduled a Phone follow up appointment with SW  in 2 weeks     Patient has set self-identified goals and will monitor progress until the next appointment on: 06/30/2021.         TRIXIE Moon [facility]  Behavioral [facility] ([facility])   [facility]  [phone]  June 16, 2021  2:35 PM                   Next 5 appointments (look out 90 days)    Jul 08, 2021  1:20 PM  PHYSICAL with Susan Anaya MD  [facility] ([facility] ) [id number] [facility] [id number] [phone]

## 2021-06-17 ENCOUNTER — MYC MEDICAL ADVICE (OUTPATIENT)
Dept: FAMILY MEDICINE | Facility: CLINIC | Age: 36
End: 2021-06-17

## 2021-06-17 ASSESSMENT — ANXIETY QUESTIONNAIRES
7. FEELING AFRAID AS IF SOMETHING AWFUL MIGHT HAPPEN: NOT AT ALL
GAD7 TOTAL SCORE: 10
4. TROUBLE RELAXING: MORE THAN HALF THE DAYS
GAD7 TOTAL SCORE: 10
3. WORRYING TOO MUCH ABOUT DIFFERENT THINGS: SEVERAL DAYS
2. NOT BEING ABLE TO STOP OR CONTROL WORRYING: SEVERAL DAYS
7. FEELING AFRAID AS IF SOMETHING AWFUL MIGHT HAPPEN: NOT AT ALL
1. FEELING NERVOUS, ANXIOUS, OR ON EDGE: SEVERAL DAYS
6. BECOMING EASILY ANNOYED OR IRRITABLE: MORE THAN HALF THE DAYS
5. BEING SO RESTLESS THAT IT IS HARD TO SIT STILL: NEARLY EVERY DAY
GAD7 TOTAL SCORE: 10

## 2021-06-17 ASSESSMENT — PATIENT HEALTH QUESTIONNAIRE - PHQ9
SUM OF ALL RESPONSES TO PHQ QUESTIONS 1-9: 12
10. IF YOU CHECKED OFF ANY PROBLEMS, HOW DIFFICULT HAVE THESE PROBLEMS MADE IT FOR YOU TO DO YOUR WORK, TAKE CARE OF THINGS AT HOME, OR GET ALONG WITH OTHER PEOPLE: VERY DIFFICULT
SUM OF ALL RESPONSES TO PHQ QUESTIONS 1-9: 12

## 2021-06-18 ENCOUNTER — VIRTUAL VISIT (OUTPATIENT)
Dept: PSYCHIATRY | Facility: CLINIC | Age: 36
End: 2021-06-18
Payer: COMMERCIAL

## 2021-06-18 DIAGNOSIS — Z53.9 NO SHOW: Primary | ICD-10-CM

## 2021-06-18 ASSESSMENT — ANXIETY QUESTIONNAIRES: GAD7 TOTAL SCORE: 10

## 2021-06-18 ASSESSMENT — PATIENT HEALTH QUESTIONNAIRE - PHQ9: SUM OF ALL RESPONSES TO PHQ QUESTIONS 1-9: 12

## 2021-06-22 ENCOUNTER — ALLIED HEALTH/NURSE VISIT (OUTPATIENT)
Dept: BEHAVIORAL HEALTH | Facility: CLINIC | Age: 36
End: 2021-06-22
Payer: COMMERCIAL

## 2021-06-22 DIAGNOSIS — R69 DIAGNOSIS DEFERRED: Primary | ICD-10-CM

## 2021-06-22 NOTE — PROGRESS NOTES
Behavioral Health Home Services  [facility] Clinic: [place]        Social Work Care Navigator Note      Patient: Lluvia Glaser  Date: June 22, 2021  Preferred Name: lluvia    Previous PHQ-9:   PHQ-9 SCORE 4/5/2021 4/19/2021 6/17/2021   PHQ-9 Total Score MyChart - - 12 (Moderate depression)   PHQ-9 Total Score 25 3 12     Previous WAYNE-7:   WAYNE-7 SCORE 4/5/2021 4/19/2021 6/17/2021   Total Score - - 10 (moderate anxiety)   Total Score 21 2 10     ULISES LEVEL:  No flowsheet data found.    Preferred Contact:  Need for : No  Preferred Contact: Cell      Type of Contact Today: Phone call (patient / identified key support person reached)      Data: (subjective / Objective):  Recent ED/IP Admission or Discharge?   None    Patient Goals:  Goal Areas: Health;Mental Health;Employment / Volunteer;Financial and Social Service Benefits;Transportation  Patient stated goals: Lluvia would like assistance with her physical and mental health for creating a balanced and healthy lifestyle. Lluvia would like assistance with budgeting, SSDI and community/social service assistance to aid with county benefits to increase her financial stability. Lluvia would like assistance with vehicle maintenance and repairs for reliable transportation to get to her appointments, run errands and get out into the community.        [facility] Core Service Provided:  Comprehensive Care Management: utilized the electronic medical record / patient registry to identify and support patient's health conditions / needs more effectively   Care Transitions: focused on the coordinated and seamless movement of patient between or within different levels of care or settings  Care Coordination: provided care management services/referrals necessary to ensure patient and their identified supports have access to medical, behavioral health, pharmacology and recovery support services.  Ensured that patient's care is integrated across all settings and services.   Individual and  Family Support: aimed to help clients reduce barriers to achieving goals, increase health literacy and knowledge about chronic condition(s), increase self-efficacy skills, and improve health outcomes  Referral to Community and Social Support Services: Provided patient with referrals (see plan section)  Assisted in scheduling an appointment to a referral / service (see plan section)  Coordinated / Confirmed patient's appointment time or referral and transportation plan  Followed-up with patient on whether or not they completed a referral    Current Stressors / Issues / Care Plan Objective Addressed Today:  [facility] and patient were able to meet today for Behavioral Health Home ([facility]) monthly check-in via telehealth visit. All required ROIs have been filed with HIM/patient chart.    1. Patient called today to let [facility] know that she had not received phone call from Rule 25 referral placed by [facility] on June 16th. [facility] placed phone call to [employer] CD program. Intake reports they did call patient and left a detailed message on June 16th and patient has not called back to schedule. Intake provided [facility] with direct line for CD Assessments admissions at [phone]. [facility] was able to call patient back with update and provided patient with phone number. Patient reports she will be calling today. [facility] to check in with patient at next visit.     Intervention:  Motivational Interviewing: Expressed Empathy/Understanding, Supported Autonomy, Collaboration, Evocation, Permission to raise concern or advise, Open-ended questions, Reflections: simple and complex, Rolled with resistance: Emphasized patient autonomy, Simple reflection, Complex reflection, Shifted topic to defuse resistance, Reframed sustain talk in the direction of change and Evoked patient agenda, Change talk (evoked) and Reframe   Target Behavior(s): Explored and resolved challenges to attending appointments as scheduled, Explored current social supports and  reinforced opportunities to increase engagement and Explored patient's perception of how alcohol and / or drugs influences mood    Assessment: (Progress on Goals / Homework):  Patient would benefit from continued coordination in reaching their goals set for the Behavioral Health Home ([facility]) program. CC reviewed Health Action Plan goals and will continue to monitor progress and work with patient and their care team.      Plan: (Homework, other):  Patient was encouraged to continue to seek condition-related information and education.      Scheduled a Phone follow up appointment with [facility] in 1 week     Patient has set self-identified goals and will monitor progress until the next appointment on: 06/30/2021.        TRIXIE Moon [facility]  Behavioral Health Home ([facility])   [facility]  [phone]  June 22, 2021                    Next 5 appointments (look out 90 days)    Jul 08, 2021  1:20 PM  PHYSICAL with Susan Anaya MD  [facility] ([facility] ) [id number] [address] [address]  [phone]

## 2021-06-24 ENCOUNTER — TELEPHONE (OUTPATIENT)
Dept: BEHAVIORAL HEALTH | Facility: CLINIC | Age: 36
End: 2021-06-24

## 2021-06-24 NOTE — TELEPHONE ENCOUNTER
Behavioral Health Home Services  [facility]: [place]        Social Work Care Navigator Note      Patient: Lluvia Glaser  Date: June 24, 2021  Preferred Name: lluvia    Previous PHQ-9:   PHQ-9 SCORE 4/5/2021 4/19/2021 6/17/2021   PHQ-9 Total Score MyChart - - 12 (Moderate depression)   PHQ-9 Total Score 25 3 12     Previous WAYNE-7:   WAYNE-7 SCORE 4/5/2021 4/19/2021 6/17/2021   Total Score - - 10 (moderate anxiety)   Total Score 21 2 10     ULISES LEVEL:  No flowsheet data found.    Preferred Contact:  Need for : No  Preferred Contact: Cell      Type of Contact Today: Phone call (patient / identified key support person reached)      Data: (subjective / Objective):  Patient Goals  Goal Areas: Health;Mental Health;Employment / Volunteer;Financial and Social Service Benefits;Transportation  Patient stated goals: Lluvia would like assistance with her physical and mental health for creating a balanced and healthy lifestyle. Lluvia would like assistance with budgeting, SSDI and community/social service assistance to aid with county benefits to increase her financial stability. Lluvia would like assistance with vehicle maintenance and repairs for reliable transportation to get to her appointments, run errands and get out into the community.      [facility] Service Provided:  Comprehensive Care Management: utilized the electronic medical record / patient registry to identify and support patient's health conditions / needs more effectively   Care Transitions: focused on the coordinated and seamless movement of patient between or within different levels of care or settings  Care Coordination: provided care management services/referrals necessary to ensure patient and their identified supports have access to medical, behavioral health, pharmacology and recovery support services.  Ensured that patient's care is integrated across all settings and services.   Individual and Family Support: aimed to help clients reduce barriers  to achieving goals, increase health literacy and knowledge about chronic condition(s), increase self-efficacy skills, and improve health outcomes  Referral to Community and Social Support Services: Assisted in scheduling an appointment to a referral / service (see plan section)  Followed-up with patient on whether or not they completed a referral     Data:  [facility] received phone call from [employer] program at [phone]. Scheduling reports patient is scheduled for CD Eval on Wed. June 30th at 10am.    Plan:  [facility] will check-in with patient at next visit.    TRIXIE Moon [facility]  Behavioral Health Home ([facility])   [facility]  [phone]  June 24, 2021  1:54 PM                  Next 5 appointments (look out 90 days)    Jul 08, 2021  1:20 PM  PHYSICAL with Susan Anaya MD  [facility] ([facility] ) [id number] [facility] [id number]  [phone]

## 2021-06-30 ENCOUNTER — TELEPHONE (OUTPATIENT)
Dept: BEHAVIORAL HEALTH | Facility: CLINIC | Age: 36
End: 2021-06-30

## 2021-06-30 NOTE — TELEPHONE ENCOUNTER
Behavioral Health Home Services  [facility] Clinic: [place]        Social Work Care Navigator Note      Patient: Harsha Glaser  Date: June 30, 2021  Preferred Name: harsha    Previous PHQ-9:   PHQ-9 SCORE 4/5/2021 4/19/2021 6/17/2021   PHQ-9 Total Score MyChart - - 12 (Moderate depression)   PHQ-9 Total Score 25 3 12     Previous WAYNE-7:   WAYNE-7 SCORE 4/5/2021 4/19/2021 6/17/2021   Total Score - - 10 (moderate anxiety)   Total Score 21 2 10     ULISES LEVEL:  No flowsheet data found.    Preferred Contact:  Need for : No  Preferred Contact: Cell      Type of Contact Today: Phone call (not reached/unavailable)      Data: (subjective / Objective):  Attempted to reach patient (x2) for Behavioral Health [place] ([facility]) monthly check-in, but was unsuccessful, left message.  Plan to attempt again.      TRIXIE Moon [facility]  Behavioral [facility] ([facility])   [facility]  [phone]  June 30, 2021  2:28 PM            Next 5 appointments (look out 90 days)    Jul 08, 2021  1:20 PM  PHYSICAL with Susan Anaya MD  [facility] ([facility] ) [id number] [facility] [id number]  [phone]

## 2021-07-20 ASSESSMENT — ENCOUNTER SYMPTOMS
HEADACHES: 1
BREAST MASS: 0
ABDOMINAL PAIN: 1

## 2021-07-21 ENCOUNTER — ALLIED HEALTH/NURSE VISIT (OUTPATIENT)
Dept: BEHAVIORAL HEALTH | Facility: CLINIC | Age: 36
End: 2021-07-21
Payer: COMMERCIAL

## 2021-07-21 DIAGNOSIS — R69 DIAGNOSIS DEFERRED: Primary | ICD-10-CM

## 2021-07-21 ASSESSMENT — ENCOUNTER SYMPTOMS
ABDOMINAL PAIN: 1
BREAST MASS: 0
HEADACHES: 1

## 2021-07-21 NOTE — PROGRESS NOTES
SUBJECTIVE:   CC: Lluvia Glaser is an 35 year old woman who presents for preventive health visit.       Patient has been advised of split billing requirements and indicates understanding: Yes  Healthy Habits:     Getting at least 3 servings of Calcium per day:  Yes    Bi-annual eye exam:  Yes    Dental care twice a year:  NO    Sleep apnea or symptoms of sleep apnea:  Excessive snoring    Diet:  Regular (no restrictions)    Duration of exercise:  N/A    Taking medications regularly:  Yes    Medication side effects:  Not applicable    PHQ-2 Total Score: 2    Additional concerns today:  Yes              Today's PHQ-2 Score:   PHQ-2 ( 1999 [employer]) 7/22/2021   Q1: Little interest or pleasure in doing things 0   Q2: Feeling down, depressed or hopeless 0   PHQ-2 Score 0   Q1: Little interest or pleasure in doing things -   Q2: Feeling down, depressed or hopeless -   PHQ-2 Score -       Abuse: Current or Past (Physical, Sexual or Emotional) - No  Do you feel safe in your environment? Yes    Have you ever done Advance Care Planning? (For example, a Health Directive, POLST, or a discussion with a medical provider or your loved ones about your wishes): No, advance care planning information given to patient to review.  Patient plans to discuss their wishes with loved ones or provider.      Social History     Tobacco Use     Smoking status: Current Every Day Smoker     Packs/day: 0.50     Years: 10.00     Pack years: 5.00     Types: Cigarettes     Smokeless tobacco: Former User     Tobacco comment: 2-3 cigs daily   Substance Use Topics     Alcohol use: Not Currently     If you drink alcohol do you typically have >3 drinks per day or >7 drinks per week? No     No flowsheet data found.    Reviewed orders with patient.  Reviewed health maintenance and updated orders accordingly - Yes  Lab work is in process    Breast Cancer Screening:  Any new diagnosis of family breast, ovarian, or bowel cancer? No    Breast CA Risk  "Assessment (FHS-7) 6/13/2021   Do you have a family history of breast, colon, or ovarian cancer? No / Unknown         Patient under 40 years of age: Routine Mammogram Screening not recommended.   Pertinent mammograms are reviewed under the imaging tab.    History of abnormal Pap smear: YES - updated in Problem List and Health Maintenance accordingly     Reviewed and updated as needed this visit by clinical staff  Tobacco  Allergies  Meds   Med Hx  Surg Hx  Fam Hx  Soc Hx        Reviewed and updated as needed this visit by Provider                    Review of Systems   HENT: Positive for congestion.    Gastrointestinal: Positive for abdominal pain.   Breasts:  Negative for tenderness, breast mass and discharge.   Genitourinary: Positive for pelvic pain and vaginal discharge. Negative for vaginal bleeding.   Neurological: Positive for headaches.     Urine  Has a strange odor    Vaginal discharge    Abnormal pap 12/2019 with colop showing PABLO 1 - has not had follow up since then.    Periods very heavy - uses pap/tampon.  Tried nuvaring but still had heavy periods.    Has not ever had a pelvic US         OBJECTIVE:   /82 (BP Location: Right arm, Patient Position: Sitting, Cuff Size: Adult Large)   Pulse 91   Temp 98.3  F (36.8  C) (Tympanic)   Resp 17   Ht 1.652 m (5' 5.04\")   Wt 76.7 kg (169 lb)   LMP 06/22/2021 (Approximate)   SpO2 99%   Breastfeeding No   BMI 28.09 kg/m    Physical Exam  GENERAL: healthy, alert and no distress  NECK: no adenopathy, no asymmetry, masses, or scars and thyroid normal to palpation  RESP: lungs clear to auscultation - no rales, rhonchi or wheezes  BREAST: normal without masses, tenderness or nipple discharge and no palpable axillary masses or adenopathy  CV: regular rate and rhythm, normal S1 S2, no S3 or S4, no murmur, click or rub, no peripheral edema and peripheral pulses strong  ABDOMEN: soft, nontender, no hepatosplenomegaly, no masses and bowel sounds " normal   (female): normal female external genitalia, normal urethral meatus , vaginal mucosa pink, moist, well rugated and cervix with nabothian cyst at 2 oclock. Visible leukoplakia at 5-7 oclock.  Minimal discharge. GC obtained, wet prep obtained, pap obtained  MS: no gross musculoskeletal defects noted, no edema    Results for orders placed or performed in visit on 07/22/21   UA macro with reflex to Microscopic and Culture - Clinc Collect     Status: Abnormal    Specimen: Urine, Midstream   Result Value Ref Range    Color Urine Yellow Colorless, Straw, Light Yellow, Yellow    Appearance Urine Slightly Cloudy (A) Clear    Glucose Urine Negative Negative mg/dL    Bilirubin Urine Negative Negative    Ketones Urine Negative Negative mg/dL    Specific Gravity Urine >=1.030 1.003 - 1.035    Blood Urine Negative Negative    pH Urine 5.5 5.0 - 7.0    Protein Albumin Urine Negative Negative mg/dL    Urobilinogen Urine 0.2 0.2, 1.0 E.U./dL    Nitrite Urine Positive (A) Negative    Leukocyte Esterase Urine Trace (A) Negative   Urine Microscopic Exam     Status: Abnormal   Result Value Ref Range    Bacteria Urine Many (A) None Seen /HPF    RBC Urine 0-2 0-2 /HPF /HPF    WBC Urine 10-25 (A) 0-5 /HPF /HPF    Squamous Epithelials Urine Few (A) None Seen /LPF   Wet prep - Clinic Collect     Status: Abnormal    Specimen: Vagina; Swab   Result Value Ref Range    Trichomonas Absent Absent    Yeast Absent Absent    Clue Cells Present (A) Absent    WBCs/high power field 2+ (A) None         ASSESSMENT/PLAN:   1. Routine general medical examination at a health care facility       2. Dysplasia of cervix, low grade (PABLO 1)  Visible leukoplakia-strongly recommend she see GYN and have colposcopy  Pap is pending  - Pap imaged thin layer diagnostic with HPV (select HPV order below)  - Ob/Gyn Referral; Future    3. Menorrhagia with regular cycle  US recommended  See GYN for options for treatment.   - US Pelvic Complete with Transvaginal;  "Future  - Ob/Gyn Referral; Future    4. Vaginal discharge     - Wet prep - Clinic Collect    5. Routine screening for STI (sexually transmitted infection)     - NEISSERIA GONORRHOEA PCR  - CHLAMYDIA TRACHOMATIS PCR    6. Dysuria/acute UTI -  Culture pending  Start Bactrim DS 1 tab twice daily x 3 days     - UA macro with reflex to Microscopic and Culture - Clinc Collect    7. BACTERIAL VAGINOSIS  metrogel at HS x 5 nights    Patient has been advised of split billing requirements and indicates understanding: Yes  COUNSELING:  Reviewed preventive health counseling, as reflected in patient instructions       Regular exercise       Healthy diet/nutrition       Contraception       Safe sex practices/STD prevention    Estimated body mass index is 28.09 kg/m  as calculated from the following:    Height as of this encounter: 1.652 m (5' 5.04\").    Weight as of this encounter: 76.7 kg (169 lb).    Weight management plan: Patient referred to endocrine and/or weight management specialty    She reports that she has been smoking cigarettes. She has a 5.00 pack-year smoking history. She has quit using smokeless tobacco.  Tobacco Cessation Action Plan:   Information offered: Patient not interested at this time      Counseling Resources:  ATP IV Guidelines  Pooled Cohorts Equation Calculator  Breast Cancer Risk Calculator  BRCA-Related Cancer Risk Assessment: FHS-7 Tool  FRAX Risk Assessment  ICSI Preventive Guidelines  Dietary Guidelines for Americans, 2010  USDA's MyPlate  ASA Prophylaxis  Lung CA Screening    Susan Anaya MD  [facility]  "

## 2021-07-21 NOTE — PROGRESS NOTES
Behavioral Health Home Services  [facility]: [place]        Social Work Care Navigator Note      Patient: Lluvia Glaser  Date: July 21, 2021  Preferred Name: lluvia    Previous PHQ-9:   PHQ-9 SCORE 4/5/2021 4/19/2021 6/17/2021   PHQ-9 Total Score MyChart - - 12 (Moderate depression)   PHQ-9 Total Score 25 3 12     Previous WAYNE-7:   WAYNE-7 SCORE 4/5/2021 4/19/2021 6/17/2021   Total Score - - 10 (moderate anxiety)   Total Score 21 2 10     ULISES LEVEL:  No flowsheet data found.    Preferred Contact:  Need for : No  Preferred Contact: Cell      Type of Contact Today: Phone call (patient / identified key support person reached)      Data: (subjective / Objective):  Recent ED/IP Admission or Discharge?   None    Patient Goals:  Goal Areas: Health;Mental Health;Employment / Volunteer;Financial and Social Service Benefits;Transportation  Patient stated goals: Lluvia would like assistance with her physical and mental health for creating a balanced and healthy lifestyle. Lluvia would like assistance with budgeting, SSDI and community/social service assistance to aid with county benefits to increase her financial stability. Lluvia would like assistance with vehicle maintenance and repairs for reliable transportation to get to her appointments, run errands and get out into the community.        [facility] Core Service Provided:  Comprehensive Care Management: utilized the electronic medical record / patient registry to identify and support patient's health conditions / needs more effectively   Care Transitions: focused on the coordinated and seamless movement of patient between or within different levels of care or settings  Care Coordination: provided care management services/referrals necessary to ensure patient and their identified supports have access to medical, behavioral health, pharmacology and recovery support services.  Ensured that patient's care is integrated across all settings and services.   Individual and  Family Support: aimed to help clients reduce barriers to achieving goals, increase health literacy and knowledge about chronic condition(s), increase self-efficacy skills, and improve health outcomes  Referral to Community and Social Support Services: Provided patient with referrals (see plan section)  Assisted in scheduling an appointment to a referral / service (see plan section)  Coordinated / Confirmed patient's appointment time or referral and transportation plan  Followed-up with patient on whether or not they completed a referral    Current Stressors / Issues / Care Plan Objective Addressed Today:  CC and patient were able to meet today for Behavioral Health Home ([facility]) monthly check-in via telehealth visit. All required ROIs have been filed with HIM/patient chart.    1. Patient reports she is starting a new job on Friday as  and . Patient reports she is both happy about this but anxious. CC and patient discussed coping strategies today.    2. Patient reports she wants to move on from s.o. and move out. Patient reports she needs to care for herself. Patient reports she does not believe the relationship is healthy for her and feels like it is preventing her from being close to her children, who are currently living with her mother full-time.    3. Patient reports she is looking for a new place to live but finding housing with a felony can be difficult. [facility] mailed out housing stabilization form. [facility] let patient know to send this back to [facility] via email attachment. Patient in agreement with this.     4. Patient reports she cannot find her SSDI resources [facility] mailed out. [facility] remailed SSDI resources today.     5.Patient reports her court date has been rescheduled for Sept. Patient reports she was pulled over and received citation and completed CD/Rule 25 Assessment. Patient reports her Rule 25 went well. Patient reports program would like her to complete weekly meetings and will be signing  "up with [employer] Patient continues to report her license is suspended for a year. Patient reports she would like information mailed out again for the [employer]. [facility] emailed patient link today.    Patient continues to report sobriety going well since Oct. 22, 2019.     6. Patient would like more information and would like to consult with psychiatry provider about referral for medical marijuana let patient know she does not have qualifying diagnosis but will discuss with provider. Patient reports mood up and down. Patient reports she has been isolating to manage the \"downhill spiral\" of her relationship with s.o. Patient reports anxiety levels are manageable but trigger when she is with s.o. Patient reports she continues to live with s.o. in hotel and feels trapped by this. Patient reports medications are stable. Patient reports she feels like medications are working and taking them as directed with no missed doses.    7. Patient reports she has appt with provider tomorrow for PAP. [facility] to follow-up with patient at next visit.    8. Patient reports she does not like her current weight and has been trying to eat healthier and would like to investigate resources for healthy weight management. [facility] and patient discussed obi funding for the [facility] closest to her. Patient reports she is interested in resources. [facility] mailed out [facility] obi information today and will follow-up with patient at next visit. Update - patient reports continued interest in this and will be checking into this in the next few weeks. Keep working on this.    9. Patient reports she got newer car. Patient reports she did have to make some repairs but is happy to do so, as the vehicle only has 113,000 miles.     10. Patient reports she continues to receive unemployement. [facility] to follow-up with patient to see if she completed EAP with [employer].        Intervention:  Motivational Interviewing: Expressed Empathy/Understanding, " Supported Autonomy, Collaboration, Evocation, Permission to raise concern or advise, Open-ended questions, Reflections: simple and complex, Rolled with resistance: Emphasized patient autonomy, Simple reflection, Complex reflection, Shifted topic to defuse resistance, Reframed sustain talk in the direction of change and Evoked patient agenda, Change talk (evoked) and Reframe   Target Behavior(s): Explored thoughts about taking an anti-depressant, Explored and resolved challenges related to taking anti-depressants as prescribed, Explored thoughts and readiness to participate in individual therapy, Explored and resolved challenges to attending appointments as scheduled, Explored current social supports and reinforced opportunities to increase engagement and Explored patient's perception of how alcohol and / or drugs influences mood    Assessment: (Progress on Goals / Homework):  Patient would benefit from continued coordination in reaching their goals set for the Behavioral Health Home ([facility]) program. [facility] reviewed Health Action Plan goals and will continue to monitor progress and work with patient and their care team.      Plan: (Homework, other):  Patient was encouraged to continue to seek condition-related information and education.      Scheduled a Phone follow up appointment with JESSICA  in 2 weeks     Patient has set self-identified goals and will monitor progress until the next appointment on: 08/04/2021.        TRIXIE Moon [facility]  Behavioral Health Home ([facility])   [facility]  [phone]                  Next 5 appointments (look out 90 days)    Jul 22, 2021  1:20 PM  PHYSICAL with Susan Anaya MD  [facility] ([facility] ) [address] [address]  [phone]

## 2021-07-22 ENCOUNTER — OFFICE VISIT (OUTPATIENT)
Dept: FAMILY MEDICINE | Facility: CLINIC | Age: 36
End: 2021-07-22
Payer: COMMERCIAL

## 2021-07-22 VITALS
TEMPERATURE: 98.3 F | HEIGHT: 65 IN | OXYGEN SATURATION: 99 % | HEART RATE: 91 BPM | RESPIRATION RATE: 17 BRPM | BODY MASS INDEX: 28.16 KG/M2 | DIASTOLIC BLOOD PRESSURE: 82 MMHG | SYSTOLIC BLOOD PRESSURE: 126 MMHG | WEIGHT: 169 LBS

## 2021-07-22 DIAGNOSIS — N89.8 VAGINAL DISCHARGE: ICD-10-CM

## 2021-07-22 DIAGNOSIS — B96.89 BV (BACTERIAL VAGINOSIS): ICD-10-CM

## 2021-07-22 DIAGNOSIS — Z11.3 ROUTINE SCREENING FOR STI (SEXUALLY TRANSMITTED INFECTION): ICD-10-CM

## 2021-07-22 DIAGNOSIS — Z00.00 ROUTINE GENERAL MEDICAL EXAMINATION AT A HEALTH CARE FACILITY: Primary | ICD-10-CM

## 2021-07-22 DIAGNOSIS — N92.0 MENORRHAGIA WITH REGULAR CYCLE: ICD-10-CM

## 2021-07-22 DIAGNOSIS — N76.0 BV (BACTERIAL VAGINOSIS): ICD-10-CM

## 2021-07-22 DIAGNOSIS — N87.0 DYSPLASIA OF CERVIX, LOW GRADE (CIN 1): ICD-10-CM

## 2021-07-22 DIAGNOSIS — N39.0 URINARY TRACT INFECTION, ACUTE: ICD-10-CM

## 2021-07-22 DIAGNOSIS — R30.0 DYSURIA: ICD-10-CM

## 2021-07-22 LAB
ALBUMIN UR-MCNC: NEGATIVE MG/DL
APPEARANCE UR: ABNORMAL
BACTERIA #/AREA URNS HPF: ABNORMAL /HPF
BILIRUB UR QL STRIP: NEGATIVE
CLUE CELLS: PRESENT
COLOR UR AUTO: YELLOW
GLUCOSE UR STRIP-MCNC: NEGATIVE MG/DL
HGB UR QL STRIP: NEGATIVE
KETONES UR STRIP-MCNC: NEGATIVE MG/DL
LEUKOCYTE ESTERASE UR QL STRIP: ABNORMAL
NITRATE UR QL: POSITIVE
PH UR STRIP: 5.5 [PH] (ref 5–7)
RBC #/AREA URNS AUTO: ABNORMAL /HPF
SP GR UR STRIP: >=1.03 (ref 1–1.03)
SQUAMOUS #/AREA URNS AUTO: ABNORMAL /LPF
TRICHOMONAS, WET PREP: ABNORMAL
UROBILINOGEN UR STRIP-ACNC: 0.2 E.U./DL
WBC #/AREA URNS AUTO: ABNORMAL /HPF
WBC'S/HIGH POWER FIELD, WET PREP: ABNORMAL
YEAST, WET PREP: ABNORMAL

## 2021-07-22 PROCEDURE — 87210 SMEAR WET MOUNT SALINE/INK: CPT | Performed by: FAMILY MEDICINE

## 2021-07-22 PROCEDURE — 87086 URINE CULTURE/COLONY COUNT: CPT | Performed by: FAMILY MEDICINE

## 2021-07-22 PROCEDURE — 87624 HPV HI-RISK TYP POOLED RSLT: CPT | Performed by: FAMILY MEDICINE

## 2021-07-22 PROCEDURE — 88175 CYTOPATH C/V AUTO FLUID REDO: CPT | Performed by: FAMILY MEDICINE

## 2021-07-22 PROCEDURE — 87491 CHLMYD TRACH DNA AMP PROBE: CPT | Performed by: FAMILY MEDICINE

## 2021-07-22 PROCEDURE — 81001 URINALYSIS AUTO W/SCOPE: CPT | Performed by: FAMILY MEDICINE

## 2021-07-22 PROCEDURE — 87186 SC STD MICRODIL/AGAR DIL: CPT | Performed by: FAMILY MEDICINE

## 2021-07-22 PROCEDURE — 99395 PREV VISIT EST AGE 18-39: CPT | Performed by: FAMILY MEDICINE

## 2021-07-22 PROCEDURE — 99213 OFFICE O/P EST LOW 20 MIN: CPT | Mod: 25 | Performed by: FAMILY MEDICINE

## 2021-07-22 PROCEDURE — 87591 N.GONORRHOEAE DNA AMP PROB: CPT | Performed by: FAMILY MEDICINE

## 2021-07-22 RX ORDER — SULFAMETHOXAZOLE/TRIMETHOPRIM 800-160 MG
1 TABLET ORAL 2 TIMES DAILY
Qty: 6 TABLET | Refills: 0 | Status: SHIPPED | OUTPATIENT
Start: 2021-07-22 | End: 2021-07-25

## 2021-07-22 RX ORDER — METRONIDAZOLE 7.5 MG/G
1 GEL VAGINAL DAILY
Qty: 25 G | Refills: 0 | Status: SHIPPED | OUTPATIENT
Start: 2021-07-22 | End: 2021-07-27

## 2021-07-22 ASSESSMENT — MIFFLIN-ST. JEOR: SCORE: 1463.07

## 2021-07-22 ASSESSMENT — PAIN SCALES - GENERAL: PAINLEVEL: NO PAIN (0)

## 2021-07-23 ENCOUNTER — TELEPHONE (OUTPATIENT)
Dept: BEHAVIORAL HEALTH | Facility: CLINIC | Age: 36
End: 2021-07-23

## 2021-07-23 LAB
C TRACH DNA SPEC QL NAA+PROBE: NEGATIVE
N GONORRHOEA DNA SPEC QL NAA+PROBE: NEGATIVE

## 2021-07-23 NOTE — TELEPHONE ENCOUNTER
Behavioral Health Home Services  [facility]: [place]        Social Work Care Navigator Note      Patient: Harsha Glaser  Date: July 23, 2021  Preferred Name: harsha    Previous PHQ-9:   PHQ-9 SCORE 4/5/2021 4/19/2021 6/17/2021   PHQ-9 Total Score MyChart - - 12 (Moderate depression)   PHQ-9 Total Score 25 3 12     Previous WAYNE-7:   WAYNE-7 SCORE 4/5/2021 4/19/2021 6/17/2021   Total Score - - 10 (moderate anxiety)   Total Score 21 2 10     ULISES LEVEL:  No flowsheet data found.    Preferred Contact:  Need for : No  Preferred Contact: Cell      Type of Contact Today: Phone call (not reached/unavailable)      Data: (subjective / Objective):  CC received message from patient reporting high anxiety, as this is her first day at work. [facility] attempted to reach patient, but was unsuccessful, left message.  Plan to attempt again.      TRIXIE Moon LGSW Behavioral Health Home ([facility])   [facility]  [phone]  July 23, 2021  4:00 PM

## 2021-07-24 LAB — BACTERIA UR CULT: ABNORMAL

## 2021-07-26 LAB
BKR LAB AP GYN ADEQUACY: NORMAL
BKR LAB AP GYN INTERPRETATION: NORMAL
BKR LAB AP HPV REFLEX: NORMAL
BKR LAB AP PREVIOUS ABNORMAL: NORMAL
PATH REPORT.COMMENTS IMP SPEC: NORMAL
PATH REPORT.RELEVANT HX SPEC: NORMAL

## 2021-07-27 ENCOUNTER — MYC MEDICAL ADVICE (OUTPATIENT)
Dept: FAMILY MEDICINE | Facility: CLINIC | Age: 36
End: 2021-07-27

## 2021-07-28 LAB
HUMAN PAPILLOMA VIRUS 16 DNA: NEGATIVE
HUMAN PAPILLOMA VIRUS 18 DNA: NEGATIVE
HUMAN PAPILLOMA VIRUS FINAL DIAGNOSIS: NORMAL
HUMAN PAPILLOMA VIRUS OTHER HR: NEGATIVE

## 2021-07-30 ENCOUNTER — PATIENT OUTREACH (OUTPATIENT)
Dept: FAMILY MEDICINE | Facility: CLINIC | Age: 36
End: 2021-07-30

## 2021-07-30 DIAGNOSIS — N87.0 CIN I (CERVICAL INTRAEPITHELIAL NEOPLASIA I): ICD-10-CM

## 2021-08-04 ENCOUNTER — TELEPHONE (OUTPATIENT)
Dept: BEHAVIORAL HEALTH | Facility: CLINIC | Age: 36
End: 2021-08-04

## 2021-08-04 NOTE — TELEPHONE ENCOUNTER
Behavioral Health Home Services  [facility]: [place]        Social Work Care Navigator Note      Patient: Harsha Glaser  Date: August 4, 2021  Preferred Name: harsha    Previous PHQ-9:   PHQ-9 SCORE 4/5/2021 4/19/2021 6/17/2021   PHQ-9 Total Score MyChart - - 12 (Moderate depression)   PHQ-9 Total Score 25 3 12     Previous WAYNE-7:   WAYNE-7 SCORE 4/5/2021 4/19/2021 6/17/2021   Total Score - - 10 (moderate anxiety)   Total Score 21 2 10     ULISES LEVEL:  No flowsheet data found.    Preferred Contact:  Need for : No  Preferred Contact: Cell      Type of Contact Today: Phone call (not reached/unavailable)      Data: (subjective / Objective):  Attempted to reach patient (x2), but was unsuccessful, left message and sent patient MyChart message.  Plan to attempt again.      TRIXIE Moon [facility]  Behavioral Health [place] ([facility])   [facility]  [phone]  August 4, 2021  3:07 PM            Next 5 appointments (look out 90 days)    Aug 05, 2021  1:00 PM  Office Visit with Liberty Dickerson MD  [facility] ([facility] ) [address] [address]  [phone]

## 2021-08-05 ENCOUNTER — OFFICE VISIT (OUTPATIENT)
Dept: OBGYN | Facility: CLINIC | Age: 36
End: 2021-08-05
Payer: COMMERCIAL

## 2021-08-05 ENCOUNTER — TELEPHONE (OUTPATIENT)
Dept: OBGYN | Facility: CLINIC | Age: 36
End: 2021-08-05

## 2021-08-05 ENCOUNTER — HOSPITAL ENCOUNTER (OUTPATIENT)
Dept: ULTRASOUND IMAGING | Facility: CLINIC | Age: 36
Discharge: HOME | End: 2021-08-05
Attending: FAMILY MEDICINE | Admitting: FAMILY MEDICINE
Payer: COMMERCIAL

## 2021-08-05 VITALS
WEIGHT: 167.6 LBS | HEIGHT: 64 IN | BODY MASS INDEX: 28.61 KG/M2 | TEMPERATURE: 98.3 F | HEART RATE: 100 BPM | RESPIRATION RATE: 16 BRPM | DIASTOLIC BLOOD PRESSURE: 86 MMHG | SYSTOLIC BLOOD PRESSURE: 127 MMHG

## 2021-08-05 DIAGNOSIS — Z11.59 ENCOUNTER FOR SCREENING FOR OTHER VIRAL DISEASES: ICD-10-CM

## 2021-08-05 DIAGNOSIS — N92.0 MENORRHAGIA WITH REGULAR CYCLE: ICD-10-CM

## 2021-08-05 DIAGNOSIS — N88.0: ICD-10-CM

## 2021-08-05 DIAGNOSIS — N92.4 EXCESSIVE BLEEDING IN PREMENOPAUSAL PERIOD: Primary | ICD-10-CM

## 2021-08-05 LAB
HCG UR QL: NEGATIVE
INTERNAL QC OK POCT: NORMAL

## 2021-08-05 PROCEDURE — 88305 TISSUE EXAM BY PATHOLOGIST: CPT | Mod: 59 | Performed by: PATHOLOGY

## 2021-08-05 PROCEDURE — 99203 OFFICE O/P NEW LOW 30 MIN: CPT | Performed by: OBSTETRICS & GYNECOLOGY

## 2021-08-05 PROCEDURE — 81025 URINE PREGNANCY TEST: CPT | Performed by: OBSTETRICS & GYNECOLOGY

## 2021-08-05 PROCEDURE — 88305 TISSUE EXAM BY PATHOLOGIST: CPT | Performed by: PATHOLOGY

## 2021-08-05 PROCEDURE — 76856 US EXAM PELVIC COMPLETE: CPT

## 2021-08-05 RX ORDER — ACETAMINOPHEN 325 MG/1
975 TABLET ORAL ONCE
Status: CANCELLED | OUTPATIENT
Start: 2021-08-05 | End: 2021-08-05

## 2021-08-05 RX ORDER — CEFAZOLIN SODIUM 2 G/100ML
2 INJECTION, SOLUTION INTRAVENOUS SEE ADMIN INSTRUCTIONS
Status: CANCELLED | OUTPATIENT
Start: 2021-08-05

## 2021-08-05 RX ORDER — PHENAZOPYRIDINE HYDROCHLORIDE 100 MG/1
200 TABLET, FILM COATED ORAL ONCE
Status: CANCELLED | OUTPATIENT
Start: 2021-08-05 | End: 2021-08-05

## 2021-08-05 RX ORDER — CEFAZOLIN SODIUM 2 G/100ML
2 INJECTION, SOLUTION INTRAVENOUS
Status: CANCELLED | OUTPATIENT
Start: 2021-08-05

## 2021-08-05 ASSESSMENT — MIFFLIN-ST. JEOR: SCORE: 1440.23

## 2021-08-05 NOTE — TELEPHONE ENCOUNTER
"[id number]  Lluvia KASIA Glaser    You are now scheduled for surgery at [facility].  Below are the details for your surgery.  Please read the \"Preparing for Your Surgery\" instructions and let us know if you have any questions.    Type of surgery: total laparoscopic hysterectomy, bilateral salpingectomy, ovaries stay in.     Surgeon:  Liberty Dickerson MD  Location of surgery: [facility] OR    Date of surgery: 8-25-21    Time: 11:00am   Arrival Time: 9:30am    Time can change, to be confirmed a couple of days prior by pre-op surgery nurse.    Pre-Op Appt Date: Patient to schedule with a PCP or Family Practice Provider within 30 days to the surgery.  Post-Op Appt Date: To be determined by provider     COVID test 2-4 days prior at [facility]  Date 8-23-21  Time 11:00am    Packet sent out: Yes  Pre-cert/Authorization completed:  TBD by Financial Securing Office.   MA Sterilization/Hysterectomy Acknowledgment Consent signed: Yes signed 8-5-21    [facility]  [phone]    Fax: [phone]  Same Day Surgery [phone]  Fax: [phone]  [facility] [phone]    "

## 2021-08-05 NOTE — PROGRESS NOTES
"Source: patient  CC: \"I want a hysterectomy\"  Lluvia is a 35 year old  here for consultation at the request of Susan Anaya for  \"Whiteness on the cervix\".  During her annual appointment, she raised concerns about heavier menses.     1. Heavy menses: She has a full 7 day menses and has to change her tampon + pad every hour.  She has a hard time with daily activities during her menses.  Menses are generally monthly--sometimes twice a month.  Very painful.  Has tried oral contraceptive pills, nuvaring, depo provera, and Mirena in the past without much benefit.  Has pelvic U/S this afternoon.  Prior tubal ligation so no interest in childbearing.     2. Cervical leukoplakia seen during annual exam.  Pap NIL.  HPV neg.  genprobe neg.  Wet prep +BV.    ROS: Ten point review of systems was reviewed and negative except the above.    Gyne: - STD's    Patient Active Problem List   Diagnosis     Substance abuse (H)     Generalized anxiety disorder     Chronic eczema     Gastric ulcer     ADHD     Smoker     PABLO I (cervical intraepithelial neoplasia I)     Past Medical History:   Diagnosis Date     ADHD      Chronic eczema      PABLO I (cervical intraepithelial neoplasia I) 2019 NIL 10/11/10 ASCUS, neg HPV 19 Colpo d/t cervical lesions on exam, bx PABLO I 21 NIL pap, neg HPV. Plan: await provider     Depressive disorder      Gastric ulcer      Generalized anxiety disorder      Smoker      Substance abuse (H)      Past Surgical History:   Procedure Laterality Date     ESOPHAGOSCOPY, GASTROSCOPY, DUODENOSCOPY (EGD), COMBINED N/A 2020    Procedure: ESOPHAGOGASTRODUODENOSCOPY, WITH BIOPSY;  Surgeon: Tesfaye Temple DO;  Location: [facility]     TONSILLECTOMY       TUBAL LIGATION         ALL/Meds: Her medication and allergy histories were reviewed and are documented in their appropriate chart areas.    Social History     Tobacco Use     Smoking status: Current Every Day Smoker     Packs/day: " "0.50     Years: 10.00     Pack years: 5.00     Types: Cigarettes     Smokeless tobacco: Former User     Tobacco comment: 2-3 cigs daily   Vaping Use     Vaping Use: Never used   Substance Use Topics     Alcohol use: Not Currently     Drug use: Not Currently     Types: Methamphetamines     Comment: sober since 10/2019       FH: Her family history was reviewed and documented in its appropriate chart area.    PE: /86 (BP Location: Right arm, Patient Position: Chair, Cuff Size: Adult Regular)   Pulse 100   Temp 98.3  F (36.8  C) (Tympanic)   Resp 16   Ht 1.626 m (5' 4\")   Wt 76 kg (167 lb 9.6 oz)   LMP 07/23/2021   Breastfeeding No   BMI 28.77 kg/m    Body mass index is 28.77 kg/m .    General Appearance:  healthy, alert, active, no distress  HEENT: NCAT  Abdomen: Soft, nontender.  Normal bowel sounds.  No masses  Pelvic:       - Ext: Normal external genitalia       - Urethra: no lesions, no masses, no hypermobility       - Urethral Meatus: normal appearance       - Bladder: no tenderness, no masses       - Vagina: pink, moist, normal rugae, ovulatory appearing discharge       - Cervix:  white lesion at the 6 o'clock position, multiparous.  Using Tischler biopsy, a representative sampling was obtained of the whitened epithelium    Endometrial biopsy was performed without complication.  Cervix was prepped with betadine. No tenaculum needed.  Pipelle sounded to 7.5 cm.  A representative sample was aspirated from the cavity and sent to pathology.        A/P     ICD-10-CM    1. Excessive bleeding in premenopausal period  N92.4 Case Request: total laparoscopic hysterectomy, bilateral salpingectomy, ovaries stay in.     Case Request: total laparoscopic hysterectomy, bilateral salpingectomy, ovaries stay in.     HCG qualitative urine POCT     Surgical pathology exam     Surgical pathology exam   2. Leukoplakia of cervix  N88.0 Surgical pathology exam     Surgical pathology exam       1. Patient has already tried " multiple conservative therapies without much improvement in bleeding or cramping.  We reviewed the options of ablation versus hysterectomy, but patient is certain that she does not want future children and wants definitive surgical therapy.  We reviewed that ovaries don't need to be removed, especially given her age.  Reviewed R/B/A of total laparoscopic hysterectomy including but not limited to bleeding, infection, damage to other organs, and possible need to convert to abdominal approach.  Reviewed pre and post op course. Patient to see PCP for pre-op evaluation and see me to review surgery.  All questions answered.     Liberty Dickerson M.D.    35 minutes spent on the date of the encounter doing chart review, review of test results, patient visit and documentation

## 2021-08-05 NOTE — NURSING NOTE
"Initial /86 (BP Location: Right arm, Patient Position: Chair, Cuff Size: Adult Regular)   Pulse 100   Temp 98.3  F (36.8  C) (Tympanic)   Resp 16   Ht 1.626 m (5' 4\")   Wt 76 kg (167 lb 9.6 oz)   LMP 07/23/2021   Breastfeeding No   BMI 28.77 kg/m   Estimated body mass index is 28.77 kg/m  as calculated from the following:    Height as of this encounter: 1.626 m (5' 4\").    Weight as of this encounter: 76 kg (167 lb 9.6 oz). .    Nighat Kemp, KENDRA    "

## 2021-08-09 LAB
PATH REPORT.COMMENTS IMP SPEC: NORMAL
PATH REPORT.FINAL DX SPEC: NORMAL
PATH REPORT.FINAL DX SPEC: NORMAL
PATH REPORT.GROSS SPEC: NORMAL
PATH REPORT.GROSS SPEC: NORMAL
PATH REPORT.MICROSCOPIC SPEC OTHER STN: NORMAL
PATH REPORT.MICROSCOPIC SPEC OTHER STN: NORMAL
PATH REPORT.RELEVANT HX SPEC: NORMAL
PATH REPORT.RELEVANT HX SPEC: NORMAL
PHOTO IMAGE: NORMAL
PHOTO IMAGE: NORMAL

## 2021-08-16 DIAGNOSIS — B96.89 BV (BACTERIAL VAGINOSIS): ICD-10-CM

## 2021-08-16 DIAGNOSIS — N76.0 BV (BACTERIAL VAGINOSIS): ICD-10-CM

## 2021-08-16 DIAGNOSIS — N39.0 URINARY TRACT INFECTION, ACUTE: ICD-10-CM

## 2021-08-20 RX ORDER — SULFAMETHOXAZOLE/TRIMETHOPRIM 800-160 MG
1 TABLET ORAL 2 TIMES DAILY
Qty: 6 TABLET | Refills: 0 | OUTPATIENT
Start: 2021-08-20 | End: 2021-08-23

## 2021-08-20 RX ORDER — METRONIDAZOLE 7.5 MG/G
1 GEL VAGINAL DAILY
Qty: 70 G | Refills: 0 | OUTPATIENT
Start: 2021-08-20 | End: 2021-08-25

## 2021-08-20 NOTE — TELEPHONE ENCOUNTER
Spoke with patient, she states that she doesn't need these meds.  Possibly requested by pharmacy.    Virgie Turcios RN

## 2021-08-22 NOTE — TELEPHONE ENCOUNTER
Writer attempted to contact patient, this is the final attempt that will be made-- no answer, LVM to call clinic back  If patient calls back, please connect with Psych RN.    Shyla Hodgson, RN    Nurse Liaison  [facility]     Normal for race

## 2021-08-23 ENCOUNTER — OFFICE VISIT (OUTPATIENT)
Dept: FAMILY MEDICINE | Facility: CLINIC | Age: 36
End: 2021-08-23
Payer: COMMERCIAL

## 2021-08-23 VITALS
DIASTOLIC BLOOD PRESSURE: 88 MMHG | SYSTOLIC BLOOD PRESSURE: 120 MMHG | BODY MASS INDEX: 29.02 KG/M2 | TEMPERATURE: 98 F | WEIGHT: 170 LBS | HEIGHT: 64 IN | RESPIRATION RATE: 18 BRPM | HEART RATE: 97 BPM | OXYGEN SATURATION: 99 %

## 2021-08-23 DIAGNOSIS — N92.0 MENORRHAGIA WITH REGULAR CYCLE: ICD-10-CM

## 2021-08-23 DIAGNOSIS — Z11.59 ENCOUNTER FOR SCREENING FOR OTHER VIRAL DISEASES: ICD-10-CM

## 2021-08-23 DIAGNOSIS — Z01.818 PREOPERATIVE EXAMINATION: Primary | ICD-10-CM

## 2021-08-23 PROCEDURE — U0005 INFEC AGEN DETEC AMPLI PROBE: HCPCS | Performed by: NURSE PRACTITIONER

## 2021-08-23 PROCEDURE — 99214 OFFICE O/P EST MOD 30 MIN: CPT | Performed by: NURSE PRACTITIONER

## 2021-08-23 PROCEDURE — U0003 INFECTIOUS AGENT DETECTION BY NUCLEIC ACID (DNA OR RNA); SEVERE ACUTE RESPIRATORY SYNDROME CORONAVIRUS 2 (SARS-COV-2) (CORONAVIRUS DISEASE [COVID-19]), AMPLIFIED PROBE TECHNIQUE, MAKING USE OF HIGH THROUGHPUT TECHNOLOGIES AS DESCRIBED BY CMS-2020-01-R: HCPCS | Performed by: NURSE PRACTITIONER

## 2021-08-23 ASSESSMENT — MIFFLIN-ST. JEOR: SCORE: 1451.11

## 2021-08-23 NOTE — PROGRESS NOTES
[facility]  [address] [facility] [place] [address]  Phone: [phone]  Primary Provider: Susan Anaya  Pre-op Performing Provider: KATIE RAMON      PREOPERATIVE EVALUATION:  Today's date: 8/23/2021    Lluvia Glaser is a 35 year old female who presents for a preoperative evaluation.    Surgical Information:  Surgery/Procedure: total laparoscopic hysterectomy, bilateral salpingectomy, ovaries stay in.  [place] [facility] [place]: Dr Dickerson   Surgery Date: 8-25-21  Time of Surgery: 10:05am  Where patient plans to recover: Other: with her mother at mother's house   Fax number for surgical facility: Note does not need to be faxed, will be available electronically in Epic.    Type of Anesthesia Anticipated: General    Assessment & Plan     The proposed surgical procedure is considered INTERMEDIATE risk.    Preoperative examination  Preoperative examination completed today secondary to menorrhagia with regular cycle for a hysterectomy and salpingectomy.  Recommend proceeding without further clinical clarification.    Menorrhagia with regular cycle  Has had menorrhagia and desired hysterectomy after failing conservative management.    Encounter for screening for other viral diseases  - Asymptomatic COVID-19 Virus (Coronavirus) by PCR Nasopharyngeal         Risks and Recommendations:  The patient has the following additional risks and recommendations for perioperative complications:   - No identified additional risk factors other than previously addressed    Medication Instructions:  Patient is to take all scheduled medications on the day of surgery    RECOMMENDATION:  APPROVAL GIVEN to proceed with proposed procedure, without further diagnostic evaluation.              Subjective     HPI related to upcoming procedure: Patient has had menorrhagia with heavy menses for a full 7 days that are painful.  She has tried oral contraceptives, vaginal contraceptive, Depo, and  Mirena without significant improvement.  No acute abnormalities noted on ultrasound.      Preop Questions 8/23/2021   1. Have you ever had a heart attack or stroke? No   2. Have you ever had surgery on your heart or blood vessels, such as a stent placement, a coronary artery bypass, or surgery on an artery in your head, neck, heart, or legs? No   3. Do you have chest pain with activity? No   4. Do you have a history of  heart failure? No   5. Do you currently have a cold, bronchitis or symptoms of other infection? No   6. Do you have a cough, shortness of breath, or wheezing? No   7. Do you or anyone in your family have previous history of blood clots? No   8. Do you or does anyone in your family have a serious bleeding problem such as prolonged bleeding following surgeries or cuts? No   9. Have you ever had problems with anemia or been told to take iron pills? No   10. Have you had any abnormal blood loss such as black, tarry or bloody stools, or abnormal vaginal bleeding? No   11. Have you ever had a blood transfusion? No   12. Are you willing to have a blood transfusion if it is medically needed before, during, or after your surgery? Yes   13. Have you or any of your relatives ever had problems with anesthesia? No   14. Do you have sleep apnea, excessive snoring or daytime drowsiness? No   15. Do you have any artifical heart valves or other implanted medical devices like a pacemaker, defibrillator, or continuous glucose monitor? No   16. Do you have artificial joints? No   17. Are you allergic to latex? No   18. Is there any chance that you may be pregnant? No     Health Care Directive:  Patient does not have a Health Care Directive or Living Will: Discussed advance care planning with patient; however, patient declined at this time.    Preoperative Review of :   reviewed - controlled substances reflected in medication list.      Status of Chronic Conditions:  Stable managmeent of Bipolar d/o. ADHD well  managed at this time. Migraines managed with propanolol and prn use of imitrex and ibuprofen.     Review of Systems  CONSTITUTIONAL: NEGATIVE for fever, chills, change in weight  INTEGUMENTARY/SKIN: NEGATIVE for worrisome rashes, moles or lesions  EYES: NEGATIVE for vision changes or irritation  ENT/MOUTH: NEGATIVE for ear, mouth and throat problems  RESP: NEGATIVE for significant cough or SOB  CV: NEGATIVE for chest pain, palpitations or peripheral edema  GI: NEGATIVE for nausea, abdominal pain, heartburn, or change in bowel habits  : NEGATIVE for frequency, dysuria, or hematuria  MUSCULOSKELETAL: NEGATIVE for significant arthralgias or myalgia  NEURO: NEGATIVE for weakness, dizziness or paresthesias  ENDOCRINE: NEGATIVE for temperature intolerance, skin/hair changes  HEME: NEGATIVE for bleeding problems  PSYCHIATRIC: NEGATIVE for changes in mood or affect    Patient Active Problem List    Diagnosis Date Noted     Excessive bleeding in premenopausal period 08/05/2021     Priority: Medium     Added automatically from request for surgery [id number]       Substance abuse (H)      Priority: Medium     Meth.  Sober since 10/22/2019       Generalized anxiety disorder      Priority: Medium     Chronic eczema      Priority: Medium     Gastric ulcer      Priority: Medium     ADHD      Priority: Medium     Smoker      Priority: Medium     PABLO I (cervical intraepithelial neoplasia I) 12/24/2019     Priority: Medium     8/4/06 NIL  10/11/10 ASCUS, neg HPV  12/24/19 Colpo d/t cervical lesions on exam, bx PABLO I  Above per Care Everywhere  7/22/21 NIL pap, neg HPV. Plan: colposcopy due to visible leukoplakia on exam, due bef 10/22/21   7/30/21 Pt notified         Past Medical History:   Diagnosis Date     ADHD      Chronic eczema      PABLO I (cervical intraepithelial neoplasia I) 12/24/2019 8/4/06 NIL 10/11/10 ASCUS, neg HPV 12/24/19 Colpo d/t cervical lesions on exam, bx PABLO I 7/22/21 NIL pap, neg HPV. Plan: await provider      "Depressive disorder      Gastric ulcer      Generalized anxiety disorder      Smoker      Substance abuse (H)      Past Surgical History:   Procedure Laterality Date     ESOPHAGOSCOPY, GASTROSCOPY, DUODENOSCOPY (EGD), COMBINED N/A 02/26/2020    Procedure: ESOPHAGOGASTRODUODENOSCOPY, WITH BIOPSY;  Surgeon: Tesfaye Temple DO;  Location: [facility]     TONSILLECTOMY       TUBAL LIGATION       Current Outpatient Medications   Medication Sig Dispense Refill     atomoxetine (STRATTERA) 25 MG capsule Take 1 capsule (25 mg) by mouth daily 30 capsule 0     benztropine (COGENTIN) 0.5 MG tablet Take 1 tablet (0.5 mg) by mouth 2 times daily as needed for movement disorders 60 tablet 1     cariprazine (VRAYLAR) 1.5 MG CAPS capsule Take 1 capsule (1.5 mg) by mouth daily 30 capsule 0     clonazePAM (KLONOPIN) 2 MG tablet Take 1/2 to 1 tablet  twice daily.  Refill 28 days after last fill 45 tablet 0     imipramine (TOFRANIL) 25 MG tablet Take 1 tablet (25 mg) by mouth 2 times daily 60 tablet 0     propranolol (INDERAL) 40 MG tablet Take 1 tablet (40 mg) by mouth 2 times daily 60 tablet 0     SUMAtriptan (IMITREX) 50 MG tablet Take 1 tablet (50 mg) by mouth at onset of headache for migraine May repeat in 2 hours. Max 4 tablets/24 hours. 18 tablet 3       Allergies   Allergen Reactions     Blue Dyes Hives     Demerol [Meperidine] Hives        Social History     Tobacco Use     Smoking status: Current Every Day Smoker     Packs/day: 0.50     Years: 10.00     Pack years: 5.00     Types: Cigarettes     Smokeless tobacco: Former User     Tobacco comment: 2-3 cigs daily   Substance Use Topics     Alcohol use: Not Currently     Family History   Problem Relation Age of Onset     Cancer Mother         \"in her stomach, maybe ovaries\"     Depression Mother      Anxiety Disorder Mother      Mental Illness Mother      Attention Deficit Disorder Son      Bipolar Disorder Son      History   Drug Use Unknown     Comment: sober since 10/2019 "         Objective     There were no vitals taken for this visit.    Physical Exam    GENERAL APPEARANCE: healthy, alert and no distress     EYES: EOMI, PERRL     HENT: ear canals and TM's normal and nose and mouth without ulcers or lesions     NECK: no adenopathy, no asymmetry, masses, or scars and thyroid normal to palpation     RESP: lungs clear to auscultation - no rales, rhonchi or wheezes     CV: regular rates and rhythm, normal S1 S2, no S3 or S4 and no murmur, click or rub     ABDOMEN:  soft, nontender, no HSM or masses and bowel sounds normal     MS: extremities normal- no gross deformities noted, no evidence of inflammation in joints, FROM in all extremities.     SKIN: no suspicious lesions or rashes     NEURO: Normal strength and tone, sensory exam grossly normal, mentation intact and speech normal     PSYCH: mentation appears normal. and affect normal/bright     LYMPHATICS: No cervical adenopathy    Recent Labs   Lab Test 02/21/20  1126 02/03/20  1013   HGB 14.1 13.2    187    139   POTASSIUM 4.1 4.1   CR 0.76 0.80        Diagnostics:  Labs pending at this time.  Results will be reviewed when available.   No EKG required, no history of coronary heart disease, significant arrhythmia, peripheral arterial disease or other structural heart disease.    Revised Cardiac Risk Index (RCRI):  The patient has the following serious cardiovascular risks for perioperative complications:   - No serious cardiac risks = 0 points     RCRI Interpretation: 0 points: Class I (very low risk - 0.4% complication rate)           Signed Electronically by: LENA John CNP  Copy of this evaluation report is provided to requesting physician.

## 2021-08-23 NOTE — PATIENT INSTRUCTIONS
Preparing for Your Surgery  Getting started  A nurse will call you to review your health history and instructions. They will give you an arrival time based on your scheduled surgery time.  Please be ready to share the following:    Your doctor's clinic name and phone number    Your medical, surgical and anesthesia history    A list of allergies and sensitivities    A list of medicines, including herbal treatments and over-the-counter drugs    Whether the patient has a legal guardian (ask how to send us the papers in advance)  If you have a child who's having surgery, please ask for a copy of Preparing for Your Child's Surgery.    Preparing for surgery    Within 30 days of surgery: Have a pre-op exam (sometimes called an H&P, or History and Physical). This can be done at a clinic or pre-operative center.  ? If you're having a , you may not need this exam. Talk to your care team    At your pre-op exam, talk to your care team about all medicines you take. If you need to stop any medicines before surgery, ask when to start taking them again.  ? We do this for your safety. Many medicines can make you bleed too much during surgery. Some change how well surgery (anesthesia) drugs work.    Call your insurance company to let them know you're having surgery. (If you don't have insurance, call [phone].)    Call your clinic if there's any change in your health. This includes signs of a cold or flu (sore throat, runny nose, cough, rash, fever). It also includes a scrape or scratch near the surgery site.    If you have questions on the day of surgery, call your hospital or surgery center.  Eating and drinking guidelines  For your safety: Unless your surgeon tells you otherwise, follow the guidelines below.    Eat and drink as usual until 8 hours before surgery. After that, no food or milk.    Drink clear liquids until 2 hours before surgery. These are liquids you can see through, like water, Gatorade and Propel  Water. You may also have black coffee and tea (no cream or milk).    Nothing by mouth within 2 hours of surgery. This includes gum, candy and breath mints.    If you drink, stop drinking alcohol the night before surgery.    If your care team tells you to take medicine on the morning of surgery, it's okay to take it with a sip of water.  Preventing infection    Shower or bathe the night before and morning of your surgery. Follow the instructions your clinic gave you. (If no instructions, use regular soap.)    Don't shave or clip hair near your surgery site. We'll remove the hair if needed.    Don't smoke or vape the morning of surgery. You may chew nicotine gum up to 2 hours before surgery. A nicotine patch is okay.  ? Note: Some surgeries require you to completely quit smoking and nicotine. Check with your surgeon.    Your care team will make every effort to keep you safe from infection. We will:  ? Clean our hands often with soap and water (or an alcohol-based hand rub).  ? Clean the skin at your surgery site with a special soap that kills germs.  ? Give you a special gown to keep you warm. (Cold raises the risk of infection.)  ? Wear special hair covers, masks, gowns and gloves during surgery.  ? Give antibiotic medicine, if prescribed. Not all surgeries need antibiotics.  What to bring on the day of surgery    Photo ID and insurance card    Copy of your health care directive, if you have one    Glasses and hearing aides (bring cases)  ? You can't wear contacts during surgery    Inhaler and eye drops, if you use them (tell us about these when you arrive)    CPAP machine or breathing device, if you use them    A few personal items, if spending the night    If you have . . .  ? A pacemaker or ICD (cardiac defibrillator): Bring the ID card.  ? An implanted stimulator: Bring the remote control.  ? A legal guardian: Bring a copy of the certified (court-stamped) guardianship papers.  Please remove any jewelry, including  body piercings. Leave jewelry and other valuables at home.  If you're going home the day of surgery  Important: If you don't follow the rules below, we must cancel your surgery.     Arrange for someone to drive you home after surgery. You may not drive, take a taxi or take public transportation by yourself (unless you'll have local anesthesia only).    Arrange for a responsible adult to stay with you overnight. If you don't, we may keep you in the hospital overnight, and you may need to pay the costs yourself.  Questions?   If you have any questions for your care team, list them here: _________________________________________________________________________________________________________________________________________________________________________________________________________________________________________________________________________________________________________________________  For informational purposes only. Not to replace the advice of your health care provider. Copyright   2003, 2019 [employer]. All rights reserved. Clinically reviewed by Laure Bedoya MD. [employer] 922942 - REV 4/20.    Before Your Procedure or Hospital Admission  Testing for COVID-19 (Coronavirus)  Thank you for choosing [facility] for your health care needs. This is a very challenging time for everyone. The World Health Organization and the [facility] have declared the COVID-19 virus a pandemic.   Our goal is to keep you and our team here at [facility] safe and healthy. We've taken several steps to make this happen. For example:    We screen our staff, care teams and patients for COVID-19.    Everyone at [facility] must wear a mask and stay 6 feet apart.    We are limiting hospital and clinic visitors.  Before you come in  All patients must get tested for COVID-19. Your test needs to happen 2 to 4 days before you check in to the hospital or surgery site.   A clinic scheduler will call  "about a week in advance to set up a testing time at one of our labs where we'll take a swab of your nose or throat.  Note: If you go to a clinic or pharmacy like [facility] or [facility] for your test, make sure it's a \"RT-PCR\" test, not a \"rapid\" COVID-19 test. (See Questions and Answers below.)  After the test, please stay at home and stay out of contact with other people. It will be harder for you to recover if you get COVID-19 before your treatment.  Please follow all current safety guidelines, including:    Limit trips outside your home.    Limit the number of people you see.    Always wear a mask outside your home.    Use social distancing. (Stay 6 feet away from others whenever you can.)    Wash your hands often.  If your test shows you have COVID-19  If your test is positive, we'll let you know. A positive test means that you have the virus.   We'll probably have to postpone your admission, surgery or procedure. Your doctor will discuss this with you. After that, we'll let you know what to do and when you can reschedule.   We may need to cancel your treatment on short notice for other reasons, too.  If your test shows you DON'T have COVID-19  Even if your test is negative, you may still get COVID-19. It's rare but, sometimes, the test result is wrong. You could also catch the virus after taking the test.   There's a very small chance that you could catch COVID-19 in the hospital or surgery center. [facility] has taken many steps to prevent this from happening.   Day of your surgery or procedure    Please come wearing a mask or something else that covers both your nose and mouth.    When you arrive, we'll ask you some questions to find out if you have any signs or symptoms of COVID-19.    Ask your care team if you can have visitors. All visitors must wear masks and will be screened for signs of COVID-19.  ? Even if no visitors are allowed, you can still have with you:    Your legal guardian or legal decision " "maker    A parent and one other visitor, if you are younger than 18 years old    A partner and a , if you are in labor  ? We might need to teach you about taking care of yourself after surgery. If so, a visitor can come into the hospital to learn about it, too.  ? The rules for visitors change often, depending on how much the virus is spreading. To learn more, see Visiting a Loved One in the Hospital during the COVID-19 Outbreak.  Please call your care team, hospital or surgery center if you have any questions. We thank you for your understanding and for choosing [facility] for your care.   Questions and Answers  Does it matter where I get tested for COVID-19?  Yes. We urge you to get tested at one of our [facility] COVID-19 testing sites. We process these tests in our lab and can get the results quickly. Your [facility] care team needs to get your results before you check in.  What should I do if I can't get tested at [facility]?  You can get tested somewhere else, but you'll need to take these extra steps:  1. Contact your family doctor or clinic to arrange your test.  2. Take the test within 4 days of your surgery or procedure. We can't accept tests older than 4 days.  3. Make sure your doctor or clinic faxes your results to [facility] at [phone].  If we don't get your results in time, we may have to postpone or cancel your treatment.  Ask if you're getting a \"RT-PCR\" COVID-19 test. It should NOT be a \"rapid\" COVID-19 test. Many drug stores use \"rapid\" tests, but they may not be as accurate. We don't accept the results of \"rapid\" tests.  For informational purposes only. Not to replace the advice of your health care provider. Copyright   2020 [facility] [employer]. All rights reserved. Clinically reviewed by Infection [employer] and the [facility] COVID-19 Clinical Team. [employer] 909717 - REV 10/20.    How to Take Your Medication Before Surgery  - Take all " of your medications before surgery as usual  - STOP taking all vitamins and herbal supplements 14 days before surgery.

## 2021-08-24 ENCOUNTER — ANESTHESIA EVENT (OUTPATIENT)
Dept: SURGERY | Facility: CLINIC | Age: 36
End: 2021-08-24
Payer: COMMERCIAL

## 2021-08-24 LAB — SARS-COV-2 RNA RESP QL NAA+PROBE: NEGATIVE

## 2021-08-24 RX ORDER — ACETAMINOPHEN 325 MG/1
975 TABLET ORAL ONCE
Status: CANCELLED | OUTPATIENT
Start: 2021-08-24 | End: 2021-08-24

## 2021-08-24 ASSESSMENT — LIFESTYLE VARIABLES: TOBACCO_USE: 1

## 2021-08-24 NOTE — ANESTHESIA PREPROCEDURE EVALUATION
Anesthesia Pre-Procedure Evaluation    Patient: Lluvia Glaser   MRN: [id number] : 1985        Preoperative Diagnosis: Excessive bleeding in premenopausal period [N92.4]   Procedure : Procedure(s):  total laparoscopic hysterectomy, bilateral salpingectomy, ovaries stay in.     Past Medical History:   Diagnosis Date     ADHD      Chronic eczema      PABLO I (cervical intraepithelial neoplasia I) 2019 NIL 10/11/10 ASCUS, neg HPV 19 Colpo d/t cervical lesions on exam, bx PABLO I 21 NIL pap, neg HPV. Plan: await provider     Depressive disorder      Gastric ulcer      Generalized anxiety disorder      Smoker      Substance abuse (H)       Past Surgical History:   Procedure Laterality Date     ESOPHAGOSCOPY, GASTROSCOPY, DUODENOSCOPY (EGD), COMBINED N/A 2020    Procedure: ESOPHAGOGASTRODUODENOSCOPY, WITH BIOPSY;  Surgeon: Tesfaye Temple DO;  Location: [facility]     TONSILLECTOMY       TUBAL LIGATION        Allergies   Allergen Reactions     Blue Dyes Hives     Demerol [Meperidine] Hives      Social History     Tobacco Use     Smoking status: Current Every Day Smoker     Packs/day: 0.50     Years: 10.00     Pack years: 5.00     Types: Cigarettes     Smokeless tobacco: Former User     Tobacco comment: 2-3 cigs daily   Substance Use Topics     Alcohol use: Not Currently      Wt Readings from Last 1 Encounters:   21 77.1 kg (170 lb)        Anesthesia Evaluation            ROS/MED HX  ENT/Pulmonary:     (+) tobacco use, Current use,     Neurologic: Comment: ADHD      Cardiovascular:       METS/Exercise Tolerance:     Hematologic:       Musculoskeletal:       GI/Hepatic: Comment: Gastric ulcer      Renal/Genitourinary:       Endo:       Psychiatric/Substance Use:     (+) psychiatric history anxiety and depression     Infectious Disease:       Malignancy:       Other:            Physical Exam    Airway  airway exam normal      Mallampati: II   TM distance: > 3 FB   Neck  ROM: full   Mouth opening: > 3 cm    Respiratory Devices and Support         Dental  no notable dental history         Cardiovascular   cardiovascular exam normal       Rhythm and rate: regular and normal     Pulmonary   pulmonary exam normal        breath sounds clear to auscultation           OUTSIDE LABS:  CBC:   Lab Results   Component Value Date    WBC 6.0 02/21/2020    WBC 5.2 02/03/2020    HGB 14.1 02/21/2020    HGB 13.2 02/03/2020    HCT 39.7 02/21/2020    HCT 38.1 02/03/2020     02/21/2020     02/03/2020     BMP:   Lab Results   Component Value Date     02/21/2020     02/03/2020    POTASSIUM 4.1 02/21/2020    POTASSIUM 4.1 02/03/2020    CHLORIDE 110 (H) 02/21/2020    CHLORIDE 109 02/03/2020    CO2 26 02/21/2020    CO2 27 02/03/2020    BUN 12 02/21/2020    BUN 12 02/03/2020    CR 0.76 02/21/2020    CR 0.80 02/03/2020    GLC 86 02/21/2020    GLC 85 02/03/2020     COAGS: No results found for: PTT, INR, FIBR  POC:   Lab Results   Component Value Date    HCG Negative 08/05/2021     HEPATIC:   Lab Results   Component Value Date    ALBUMIN 3.5 02/21/2020    PROTTOTAL 7.1 02/21/2020    ALT 45 02/21/2020    AST 42 02/21/2020    ALKPHOS 89 02/21/2020    BILITOTAL 0.3 02/21/2020     OTHER:   Lab Results   Component Value Date    TAMMIE 8.0 (L) 02/21/2020    LIPASE 156 02/21/2020       Anesthesia Plan    ASA Status:  2   NPO Status:  NPO Appropriate    Anesthesia Type: General.     - Airway: ETT   Induction: Intravenous.   Maintenance: TIVA.   Techniques and Equipment:     - Airway: Video-Laryngoscope         Consents            Postoperative Care    Pain management: Multi-modal analgesia.   PONV prophylaxis: Ondansetron (or other 5HT-3), Dexamethasone or Solumedrol     Comments:                Cecilio Arita CRNA, APRN JOLIE

## 2021-08-25 ENCOUNTER — ANESTHESIA (OUTPATIENT)
Dept: SURGERY | Facility: CLINIC | Age: 36
End: 2021-08-25
Payer: COMMERCIAL

## 2021-08-25 ENCOUNTER — HOSPITAL ENCOUNTER (OUTPATIENT)
Facility: CLINIC | Age: 36
Discharge: HOME | End: 2021-08-25
Attending: OBSTETRICS & GYNECOLOGY | Admitting: OBSTETRICS & GYNECOLOGY
Payer: COMMERCIAL

## 2021-08-25 ENCOUNTER — NURSE TRIAGE (OUTPATIENT)
Dept: NURSING | Facility: CLINIC | Age: 36
End: 2021-08-25

## 2021-08-25 VITALS
SYSTOLIC BLOOD PRESSURE: 121 MMHG | OXYGEN SATURATION: 100 % | BODY MASS INDEX: 29.02 KG/M2 | DIASTOLIC BLOOD PRESSURE: 83 MMHG | HEIGHT: 64 IN | RESPIRATION RATE: 16 BRPM | HEART RATE: 78 BPM | TEMPERATURE: 98 F | WEIGHT: 170 LBS

## 2021-08-25 DIAGNOSIS — N92.4 EXCESSIVE BLEEDING IN PREMENOPAUSAL PERIOD: ICD-10-CM

## 2021-08-25 DIAGNOSIS — G89.18 ACUTE POST-OPERATIVE PAIN: Primary | ICD-10-CM

## 2021-08-25 LAB
BASOPHILS # BLD AUTO: 0 10E3/UL (ref 0–0.2)
BASOPHILS NFR BLD AUTO: 0 %
CREAT SERPL-MCNC: 0.89 MG/DL (ref 0.52–1.04)
EOSINOPHIL # BLD AUTO: 0.1 10E3/UL (ref 0–0.7)
EOSINOPHIL NFR BLD AUTO: 2 %
ERYTHROCYTE [DISTWIDTH] IN BLOOD BY AUTOMATED COUNT: 13.1 % (ref 10–15)
GFR SERPL CREATININE-BSD FRML MDRD: 84 ML/MIN/1.73M2
HCG UR QL: NEGATIVE
HCT VFR BLD AUTO: 35.3 % (ref 35–47)
HGB BLD-MCNC: 12.5 G/DL (ref 11.7–15.7)
IMM GRANULOCYTES # BLD: 0 10E3/UL
IMM GRANULOCYTES NFR BLD: 0 %
LYMPHOCYTES # BLD AUTO: 1.5 10E3/UL (ref 0.8–5.3)
LYMPHOCYTES NFR BLD AUTO: 29 %
MCH RBC QN AUTO: 29.6 PG (ref 26.5–33)
MCHC RBC AUTO-ENTMCNC: 35.4 G/DL (ref 31.5–36.5)
MCV RBC AUTO: 84 FL (ref 78–100)
MONOCYTES # BLD AUTO: 0.5 10E3/UL (ref 0–1.3)
MONOCYTES NFR BLD AUTO: 10 %
NEUTROPHILS # BLD AUTO: 3 10E3/UL (ref 1.6–8.3)
NEUTROPHILS NFR BLD AUTO: 59 %
NRBC # BLD AUTO: 0 10E3/UL
NRBC BLD AUTO-RTO: 0 /100
PLATELET # BLD AUTO: 180 10E3/UL (ref 150–450)
RBC # BLD AUTO: 4.22 10E6/UL (ref 3.8–5.2)
WBC # BLD AUTO: 5.2 10E3/UL (ref 4–11)

## 2021-08-25 PROCEDURE — 88307 TISSUE EXAM BY PATHOLOGIST: CPT | Mod: TC | Performed by: OBSTETRICS & GYNECOLOGY

## 2021-08-25 PROCEDURE — 58571 TLH W/T/O 250 G OR LESS: CPT | Mod: 80 | Performed by: OBSTETRICS & GYNECOLOGY

## 2021-08-25 PROCEDURE — 250N000011 HC RX IP 250 OP 636: Performed by: NURSE ANESTHETIST, CERTIFIED REGISTERED

## 2021-08-25 PROCEDURE — 272N000001 HC OR GENERAL SUPPLY STERILE: Performed by: OBSTETRICS & GYNECOLOGY

## 2021-08-25 PROCEDURE — 710N000012 HC RECOVERY PHASE 2, PER MINUTE: Performed by: OBSTETRICS & GYNECOLOGY

## 2021-08-25 PROCEDURE — 999N000141 HC STATISTIC PRE-PROCEDURE NURSING ASSESSMENT: Performed by: OBSTETRICS & GYNECOLOGY

## 2021-08-25 PROCEDURE — 370N000017 HC ANESTHESIA TECHNICAL FEE, PER MIN: Performed by: OBSTETRICS & GYNECOLOGY

## 2021-08-25 PROCEDURE — 85025 COMPLETE CBC W/AUTO DIFF WBC: CPT | Performed by: OBSTETRICS & GYNECOLOGY

## 2021-08-25 PROCEDURE — 360N000077 HC SURGERY LEVEL 4, PER MIN: Performed by: OBSTETRICS & GYNECOLOGY

## 2021-08-25 PROCEDURE — 271N000001 HC OR GENERAL SUPPLY NON-STERILE: Performed by: OBSTETRICS & GYNECOLOGY

## 2021-08-25 PROCEDURE — 250N000009 HC RX 250: Performed by: OBSTETRICS & GYNECOLOGY

## 2021-08-25 PROCEDURE — 250N000009 HC RX 250: Performed by: NURSE ANESTHETIST, CERTIFIED REGISTERED

## 2021-08-25 PROCEDURE — 250N000011 HC RX IP 250 OP 636: Performed by: OBSTETRICS & GYNECOLOGY

## 2021-08-25 PROCEDURE — 250N000013 HC RX MED GY IP 250 OP 250 PS 637: Performed by: OBSTETRICS & GYNECOLOGY

## 2021-08-25 PROCEDURE — 710N000009 HC RECOVERY PHASE 1, LEVEL 1, PER MIN: Performed by: OBSTETRICS & GYNECOLOGY

## 2021-08-25 PROCEDURE — 36415 COLL VENOUS BLD VENIPUNCTURE: CPT | Performed by: OBSTETRICS & GYNECOLOGY

## 2021-08-25 PROCEDURE — 258N000003 HC RX IP 258 OP 636: Performed by: NURSE ANESTHETIST, CERTIFIED REGISTERED

## 2021-08-25 PROCEDURE — 250N000013 HC RX MED GY IP 250 OP 250 PS 637: Performed by: NURSE ANESTHETIST, CERTIFIED REGISTERED

## 2021-08-25 PROCEDURE — 81025 URINE PREGNANCY TEST: CPT | Performed by: OBSTETRICS & GYNECOLOGY

## 2021-08-25 PROCEDURE — 82565 ASSAY OF CREATININE: CPT | Performed by: OBSTETRICS & GYNECOLOGY

## 2021-08-25 PROCEDURE — 58571 TLH W/T/O 250 G OR LESS: CPT | Performed by: OBSTETRICS & GYNECOLOGY

## 2021-08-25 RX ORDER — FENTANYL CITRATE 50 UG/ML
INJECTION, SOLUTION INTRAMUSCULAR; INTRAVENOUS PRN
Status: DISCONTINUED | OUTPATIENT
Start: 2021-08-25 | End: 2021-08-25

## 2021-08-25 RX ORDER — PROPOFOL 10 MG/ML
INJECTION, EMULSION INTRAVENOUS CONTINUOUS PRN
Status: DISCONTINUED | OUTPATIENT
Start: 2021-08-25 | End: 2021-08-25

## 2021-08-25 RX ORDER — PHENAZOPYRIDINE HYDROCHLORIDE 200 MG/1
200 TABLET, FILM COATED ORAL ONCE
Status: DISCONTINUED | OUTPATIENT
Start: 2021-08-25 | End: 2021-08-25 | Stop reason: HOSPADM

## 2021-08-25 RX ORDER — ACETAMINOPHEN 325 MG/1
975 TABLET ORAL EVERY 6 HOURS PRN
Qty: 50 TABLET | Refills: 0 | Status: SHIPPED | OUTPATIENT
Start: 2021-08-25 | End: 2021-09-23

## 2021-08-25 RX ORDER — FENTANYL CITRATE 50 UG/ML
50 INJECTION, SOLUTION INTRAMUSCULAR; INTRAVENOUS
Status: DISCONTINUED | OUTPATIENT
Start: 2021-08-25 | End: 2021-08-25 | Stop reason: HOSPADM

## 2021-08-25 RX ORDER — HYDROXYZINE HYDROCHLORIDE 25 MG/1
25 TABLET, FILM COATED ORAL EVERY 6 HOURS PRN
Status: DISCONTINUED | OUTPATIENT
Start: 2021-08-25 | End: 2021-08-25 | Stop reason: HOSPADM

## 2021-08-25 RX ORDER — MAGNESIUM SULFATE HEPTAHYDRATE 40 MG/ML
2 INJECTION, SOLUTION INTRAVENOUS ONCE
Status: COMPLETED | OUTPATIENT
Start: 2021-08-25 | End: 2021-08-25

## 2021-08-25 RX ORDER — CEFAZOLIN SODIUM 2 G/100ML
2 INJECTION, SOLUTION INTRAVENOUS
Status: COMPLETED | OUTPATIENT
Start: 2021-08-25 | End: 2021-08-25

## 2021-08-25 RX ORDER — PROPOFOL 10 MG/ML
INJECTION, EMULSION INTRAVENOUS PRN
Status: DISCONTINUED | OUTPATIENT
Start: 2021-08-25 | End: 2021-08-25

## 2021-08-25 RX ORDER — NALOXONE HYDROCHLORIDE 0.4 MG/ML
0.4 INJECTION, SOLUTION INTRAMUSCULAR; INTRAVENOUS; SUBCUTANEOUS
Status: DISCONTINUED | OUTPATIENT
Start: 2021-08-25 | End: 2021-08-25 | Stop reason: HOSPADM

## 2021-08-25 RX ORDER — BUPIVACAINE HYDROCHLORIDE AND EPINEPHRINE 5; 5 MG/ML; UG/ML
INJECTION, SOLUTION PERINEURAL PRN
Status: DISCONTINUED | OUTPATIENT
Start: 2021-08-25 | End: 2021-08-25 | Stop reason: HOSPADM

## 2021-08-25 RX ORDER — CEFAZOLIN SODIUM 2 G/100ML
2 INJECTION, SOLUTION INTRAVENOUS SEE ADMIN INSTRUCTIONS
Status: DISCONTINUED | OUTPATIENT
Start: 2021-08-25 | End: 2021-08-25 | Stop reason: HOSPADM

## 2021-08-25 RX ORDER — AMOXICILLIN 250 MG
1-2 CAPSULE ORAL 2 TIMES DAILY
Qty: 30 TABLET | Refills: 0 | Status: SHIPPED | OUTPATIENT
Start: 2021-08-25 | End: 2022-04-28

## 2021-08-25 RX ORDER — ONDANSETRON 2 MG/ML
INJECTION INTRAMUSCULAR; INTRAVENOUS PRN
Status: DISCONTINUED | OUTPATIENT
Start: 2021-08-25 | End: 2021-08-25

## 2021-08-25 RX ORDER — IBUPROFEN 800 MG/1
800 TABLET, FILM COATED ORAL EVERY 6 HOURS PRN
Qty: 30 TABLET | Refills: 0 | Status: SHIPPED | OUTPATIENT
Start: 2021-08-25 | End: 2021-09-28

## 2021-08-25 RX ORDER — ONDANSETRON 2 MG/ML
4 INJECTION INTRAMUSCULAR; INTRAVENOUS EVERY 30 MIN PRN
Status: DISCONTINUED | OUTPATIENT
Start: 2021-08-25 | End: 2021-08-25 | Stop reason: HOSPADM

## 2021-08-25 RX ORDER — ACETAMINOPHEN 325 MG/1
975 TABLET ORAL ONCE
Status: COMPLETED | OUTPATIENT
Start: 2021-08-25 | End: 2021-08-25

## 2021-08-25 RX ORDER — NALOXONE HYDROCHLORIDE 0.4 MG/ML
0.2 INJECTION, SOLUTION INTRAMUSCULAR; INTRAVENOUS; SUBCUTANEOUS
Status: DISCONTINUED | OUTPATIENT
Start: 2021-08-25 | End: 2021-08-25 | Stop reason: HOSPADM

## 2021-08-25 RX ORDER — DEXAMETHASONE SODIUM PHOSPHATE 4 MG/ML
INJECTION, SOLUTION INTRA-ARTICULAR; INTRALESIONAL; INTRAMUSCULAR; INTRAVENOUS; SOFT TISSUE PRN
Status: DISCONTINUED | OUTPATIENT
Start: 2021-08-25 | End: 2021-08-25

## 2021-08-25 RX ORDER — SODIUM CHLORIDE, SODIUM LACTATE, POTASSIUM CHLORIDE, CALCIUM CHLORIDE 600; 310; 30; 20 MG/100ML; MG/100ML; MG/100ML; MG/100ML
INJECTION, SOLUTION INTRAVENOUS CONTINUOUS
Status: DISCONTINUED | OUTPATIENT
Start: 2021-08-25 | End: 2021-08-25 | Stop reason: HOSPADM

## 2021-08-25 RX ORDER — DIMENHYDRINATE 50 MG/ML
INJECTION, SOLUTION INTRAMUSCULAR; INTRAVENOUS PRN
Status: DISCONTINUED | OUTPATIENT
Start: 2021-08-25 | End: 2021-08-25

## 2021-08-25 RX ORDER — GABAPENTIN 300 MG/1
300 CAPSULE ORAL
Status: COMPLETED | OUTPATIENT
Start: 2021-08-25 | End: 2021-08-25

## 2021-08-25 RX ORDER — OXYCODONE HYDROCHLORIDE 5 MG/1
5-10 TABLET ORAL EVERY 4 HOURS PRN
Qty: 12 TABLET | Refills: 0 | Status: SHIPPED | OUTPATIENT
Start: 2021-08-25 | End: 2021-08-26 | Stop reason: SINTOL

## 2021-08-25 RX ORDER — LIDOCAINE HYDROCHLORIDE 10 MG/ML
INJECTION, SOLUTION INFILTRATION; PERINEURAL PRN
Status: DISCONTINUED | OUTPATIENT
Start: 2021-08-25 | End: 2021-08-25

## 2021-08-25 RX ORDER — KETOROLAC TROMETHAMINE 30 MG/ML
INJECTION, SOLUTION INTRAMUSCULAR; INTRAVENOUS PRN
Status: DISCONTINUED | OUTPATIENT
Start: 2021-08-25 | End: 2021-08-25

## 2021-08-25 RX ORDER — ACETAMINOPHEN 325 MG/1
975 TABLET ORAL ONCE
Status: DISCONTINUED | OUTPATIENT
Start: 2021-08-25 | End: 2021-08-25 | Stop reason: HOSPADM

## 2021-08-25 RX ORDER — HYDROMORPHONE HCL IN WATER/PF 6 MG/30 ML
0.2 PATIENT CONTROLLED ANALGESIA SYRINGE INTRAVENOUS EVERY 5 MIN PRN
Status: DISCONTINUED | OUTPATIENT
Start: 2021-08-25 | End: 2021-08-25 | Stop reason: HOSPADM

## 2021-08-25 RX ORDER — LIDOCAINE 40 MG/G
CREAM TOPICAL
Status: DISCONTINUED | OUTPATIENT
Start: 2021-08-25 | End: 2021-08-25 | Stop reason: HOSPADM

## 2021-08-25 RX ORDER — IBUPROFEN 400 MG/1
800 TABLET, FILM COATED ORAL ONCE
Status: DISCONTINUED | OUTPATIENT
Start: 2021-08-25 | End: 2021-08-25 | Stop reason: HOSPADM

## 2021-08-25 RX ORDER — HYDROXYZINE HYDROCHLORIDE 50 MG/1
50 TABLET, FILM COATED ORAL EVERY 6 HOURS PRN
Status: DISCONTINUED | OUTPATIENT
Start: 2021-08-25 | End: 2021-08-25 | Stop reason: HOSPADM

## 2021-08-25 RX ORDER — OXYCODONE HYDROCHLORIDE 5 MG/1
10 TABLET ORAL ONCE
Status: COMPLETED | OUTPATIENT
Start: 2021-08-25 | End: 2021-08-25

## 2021-08-25 RX ORDER — ONDANSETRON 4 MG/1
4 TABLET, ORALLY DISINTEGRATING ORAL EVERY 30 MIN PRN
Status: DISCONTINUED | OUTPATIENT
Start: 2021-08-25 | End: 2021-08-25 | Stop reason: HOSPADM

## 2021-08-25 RX ORDER — OXYCODONE HYDROCHLORIDE 5 MG/1
5 TABLET ORAL
Status: DISCONTINUED | OUTPATIENT
Start: 2021-08-25 | End: 2021-08-25 | Stop reason: HOSPADM

## 2021-08-25 RX ADMIN — CEFAZOLIN SODIUM 2 G: 2 INJECTION, SOLUTION INTRAVENOUS at 10:37

## 2021-08-25 RX ADMIN — DEXAMETHASONE SODIUM PHOSPHATE 8 MG: 4 INJECTION, SOLUTION INTRA-ARTICULAR; INTRALESIONAL; INTRAMUSCULAR; INTRAVENOUS; SOFT TISSUE at 10:48

## 2021-08-25 RX ADMIN — OXYCODONE HYDROCHLORIDE 10 MG: 5 TABLET ORAL at 13:51

## 2021-08-25 RX ADMIN — FENTANYL CITRATE 100 MCG: 50 INJECTION, SOLUTION INTRAMUSCULAR; INTRAVENOUS at 10:28

## 2021-08-25 RX ADMIN — MAGNESIUM SULFATE HEPTAHYDRATE 2 G: 40 INJECTION, SOLUTION INTRAVENOUS at 10:53

## 2021-08-25 RX ADMIN — DIMENHYDRINATE 25 MG: 50 INJECTION, SOLUTION INTRAMUSCULAR; INTRAVENOUS at 11:56

## 2021-08-25 RX ADMIN — LIDOCAINE HYDROCHLORIDE 50 MG: 10 INJECTION, SOLUTION INFILTRATION; PERINEURAL at 10:34

## 2021-08-25 RX ADMIN — PROPOFOL 200 MG: 10 INJECTION, EMULSION INTRAVENOUS at 10:35

## 2021-08-25 RX ADMIN — PROPOFOL 150 MCG/KG/MIN: 10 INJECTION, EMULSION INTRAVENOUS at 10:35

## 2021-08-25 RX ADMIN — HYDROXYZINE HYDROCHLORIDE 50 MG: 50 TABLET, FILM COATED ORAL at 13:07

## 2021-08-25 RX ADMIN — ROCURONIUM BROMIDE 50 MG: 10 INJECTION INTRAVENOUS at 10:35

## 2021-08-25 RX ADMIN — HYDROMORPHONE HYDROCHLORIDE 0.2 MG: 0.2 INJECTION, SOLUTION INTRAMUSCULAR; INTRAVENOUS; SUBCUTANEOUS at 13:07

## 2021-08-25 RX ADMIN — MIDAZOLAM 2 MG: 1 INJECTION INTRAMUSCULAR; INTRAVENOUS at 10:28

## 2021-08-25 RX ADMIN — GABAPENTIN 300 MG: 300 CAPSULE ORAL at 09:32

## 2021-08-25 RX ADMIN — SUGAMMADEX 150 MG: 100 INJECTION, SOLUTION INTRAVENOUS at 11:38

## 2021-08-25 RX ADMIN — SODIUM CHLORIDE, POTASSIUM CHLORIDE, SODIUM LACTATE AND CALCIUM CHLORIDE: 600; 310; 30; 20 INJECTION, SOLUTION INTRAVENOUS at 09:35

## 2021-08-25 RX ADMIN — ROCURONIUM BROMIDE 20 MG: 10 INJECTION INTRAVENOUS at 11:19

## 2021-08-25 RX ADMIN — KETOROLAC TROMETHAMINE 30 MG: 30 INJECTION, SOLUTION INTRAMUSCULAR at 12:04

## 2021-08-25 RX ADMIN — LIDOCAINE HYDROCHLORIDE 1 ML: 10 INJECTION, SOLUTION EPIDURAL; INFILTRATION; INTRACAUDAL; PERINEURAL at 09:34

## 2021-08-25 RX ADMIN — HYDROMORPHONE HYDROCHLORIDE 0.2 MG: 0.2 INJECTION, SOLUTION INTRAMUSCULAR; INTRAVENOUS; SUBCUTANEOUS at 12:12

## 2021-08-25 RX ADMIN — FENTANYL CITRATE 50 MCG: 50 INJECTION, SOLUTION INTRAMUSCULAR; INTRAVENOUS at 13:53

## 2021-08-25 RX ADMIN — ONDANSETRON 4 MG: 2 INJECTION INTRAMUSCULAR; INTRAVENOUS at 11:26

## 2021-08-25 RX ADMIN — FENTANYL CITRATE 50 MCG: 50 INJECTION, SOLUTION INTRAMUSCULAR; INTRAVENOUS at 12:35

## 2021-08-25 RX ADMIN — ACETAMINOPHEN 975 MG: 325 TABLET, FILM COATED ORAL at 09:33

## 2021-08-25 ASSESSMENT — MIFFLIN-ST. JEOR: SCORE: 1451.11

## 2021-08-25 NOTE — ANESTHESIA CARE TRANSFER NOTE
Patient: Lluvia Glaser    Procedure(s):  total laparoscopic hysterectomy, bilateral salpingectomy, ovaries stay in.    Diagnosis: Excessive bleeding in premenopausal period [N92.4]  Diagnosis Additional Information: No value filed.    Anesthesia Type:   General     Note:    Oropharynx: oropharynx clear of all foreign objects and spontaneously breathing  Level of Consciousness: drowsy  Oxygen Supplementation: face mask  Level of Supplemental Oxygen (L/min / FiO2): 6  Independent Airway: airway patency satisfactory and stable  Dentition: dentition unchanged  Vital Signs Stable: post-procedure vital signs reviewed and stable  Report to RN Given: handoff report given  Patient transferred to: PACU    Handoff Report: Identifed the Patient, Identified the Reponsible Provider, Reviewed the pertinent medical history, Discussed the surgical course, Reviewed Intra-OP anesthesia mangement and issues during anesthesia, Set expectations for post-procedure period and Allowed opportunity for questions and acknowledgement of understanding      Vitals:  Vitals Value Taken Time   /82 08/25/21 1200   Temp     Pulse 75 08/25/21 1204   Resp 21 08/25/21 1204   SpO2 100 % 08/25/21 1204   Vitals shown include unvalidated device data.    Electronically Signed By: LENA Quintero CRNA  August 25, 2021  12:04 PM

## 2021-08-25 NOTE — INTERVAL H&P NOTE
I have reviewed the surgical (or preoperative) H&P that is linked to this encounter, and examined the patient. There are no significant changes    Plan removal of uterus and leaving ovaries in.  Patient agrees with plan.     Liberty Dickerson M.D.

## 2021-08-25 NOTE — BRIEF OP NOTE
[facility]    Brief Operative Note    Pre-operative diagnosis: Excessive bleeding in premenopausal period [N92.4]  Post-operative diagnosis menorrhagia    Procedure: Procedure(s):  total laparoscopic hysterectomy, bilateral salpingectomy, ovaries stay in.  Surgeon: Surgeon(s) and Role:     * Liberty Dickerson MD - Primary     * Corrie Sarmiento MD - Assisting     * Bam Gonzales PA-C - Assisting  Anesthesia: General   Estimated blood loss: Less than 50 ml  Drains: None  Specimens:   ID Type Source Tests Collected by Time Destination   1 : Uterus With Cervix & Bilateral Fallopian Tubes Tissue Uterus, Cervix, Bilateral Fallopian Tubes SURGICAL PATHOLOGY EXAM Liberty Dickerson MD 8/25/2021 11:29 AM      Findings:   None.  Complications: None.  Implants: * No implants in log *

## 2021-08-25 NOTE — ANESTHESIA PROCEDURE NOTES
Airway       Patient location during procedure: OR       Procedure Start/Stop Times: 8/25/2021 10:37 AM  Staff -        CRNA: Elo Hanson APRN CRNA       Other Anesthesia Staff: Dixon Faustin       Performed By: CRNA and MARY  Consent for Airway        Urgency: elective  Indications and Patient Condition       Indications for airway management: savanah-procedural       Induction type:intravenous       Mask difficulty assessment: 1 - vent by mask    Final Airway Details       Final airway type: endotracheal airway       Successful airway: ETT - single  Endotracheal Airway Details        ETT size (mm): 7.0       Cuffed: yes       Successful intubation technique: video laryngoscopy       VL Blade Size: Ng 3       Grade View of Cords: 1       Adjucts: stylet       Position: Center       Measured from: lips       Secured at (cm): 22       Bite block used: None    Post intubation assessment        Placement verified by: capnometry, equal breath sounds and chest rise        Number of attempts at approach: 1       Number of other approaches attempted: 0       Secured with: silk tape       Ease of procedure: easy       Dentition: Intact and Unchanged

## 2021-08-25 NOTE — ANESTHESIA POSTPROCEDURE EVALUATION
Patient: Lluvia Glaser    Procedure(s):  total laparoscopic hysterectomy, bilateral salpingectomy, ovaries stay in.    Diagnosis:Excessive bleeding in premenopausal period [N92.4]  Diagnosis Additional Information: No value filed.    Anesthesia Type:  General    Note:  Disposition: Outpatient   Postop Pain Control: Uneventful            Sign Out: Well controlled pain   PONV: No   Neuro/Psych: Uneventful            Sign Out: Acceptable/Baseline neuro status   Airway/Respiratory: Uneventful            Sign Out: Acceptable/Baseline resp. status   CV/Hemodynamics: Uneventful            Sign Out: Acceptable CV status; No obvious hypovolemia; No obvious fluid overload   Other NRE: NONE   DID A NON-ROUTINE EVENT OCCUR? No           Last vitals:  Vitals Value Taken Time   /78 08/25/21 1300   Temp 36.7  C (98  F) 08/25/21 1200   Pulse 70 08/25/21 1311   Resp 11 08/25/21 1311   SpO2 100 % 08/25/21 1311   Vitals shown include unvalidated device data.    Electronically Signed By: LENA Null CRNA  August 25, 2021  3:49 PM

## 2021-08-25 NOTE — OP NOTE
DATE OF PROCEDURE:  2021       PREOPERATIVE DIAGNOSES:   1. Menorrhagia        POSTOPERATIVE DIAGNOSES:     1. Menorrhagia      PROCEDURE:  Total laparoscopic hysterectomy with bilateral salpingectomy.       SURGEON:  Liberty Dickerson MD       ASSISTANT:  Corrie Sarmiento MD        ANESTHESIA:  General endotracheal.       FRA SCORE:  1.       ESTIMATED BLOOD LOSS:  25 mL       I's and O's:  Please see anesthesia records.       COMPLICATIONS:  None.       FINDINGS:  normal 6-8 week sized uterus, previously transected fallopian tubes, normal ovaries, normal liver edge       INDICATIONS:  Lluvia Glaser is a 35 year old  with a history of painful heavy menses.  She has tried multiple hormonal therapies without much benefit.  She has no desire for future childbearing.  She desires definitive surgical therapy.  Patient understood risks and benefits including risks of bleeding, infection and damage to surrounding structures.  The patient consented to proceed.          DESCRIPTION OF PROCEDURE:  The patient was brought to the operating room and general anesthesia was administered without difficulty.  She was prepped and draped in the normal sterile fashion in the dorsal lithotomy position.  Robles catheter was placed in the bladder.  A bivalve speculum was placed in the vagina and the anterior lip of the cervix was grasped with a single-tooth tenaculum.  The cervix was progressively dilated.  The  uterine manipulator was introduced and the speculum and tenaculum were removed.       Attention was turned to the abdomen where the infraumbilical fold was infiltrated with 0.25% Marcaine with epinephrine.  A 5 mm incision was made inside the umbilicus.  While tenting the abdominal wall, the Veress needle was carefully introduced and intra-abdominal placement was confirmed by use of a water-filled syringe and a drop in intra-abdominal pressure with insufflation of CO2 gas.  The abdomen was  insufflated to 15 mmHg.  The Veress needle was removed and a 5 mm trocar was introduced.  Intra-abdominal placement was confirmed with the laparoscope.  There was no evidence of omental insufflation, bowel injury or major vascular injury.  Care was taken during the entire procedure to avoid injury to bowel, bladder, ureters and major vascular structures.  The patient was placed in Trendelenburg and the above findings were noted.  A 5 mm trocar was placed in the right and left lower quadrants under direct visualization with premedication of 0.25% Marcaine with epinephrine.  Attention was returned to the pelvis where the mesosalpinx was identified bilaterally sequentially desiccated and transected with the LigaSure device.  The utero-ovarian ligaments were identified bilaterally, desiccated and transected with the LigaSure device.  The round ligaments were identified bilaterally, desiccated and transected with the LigaSure device.  The peritoneal bladder flap was created with sequential desiccation and transection with the LigaSure.  The uterine arteries were skeletonized and desiccated and transected with the LigaSure device.  The remaining cardinal ligament complex was sequentially desiccated and transected with the LigaSure device.  Once the KOH cup could be readily palpated circumferential colpotomy was performed using monopolar desiccation.       Attention was turned to the vagina where the cervix was grasped with a single-tooth tenaculum.  The manipulator was removed.  A weighted speculum was placed in the vagina. The specimen was removed.  The uterus, cervix and bilateral fallopian tubes were all parts of the specimen were sent to pathology.  The vaginal cuff was grasped with Allis clamps and the vaginal cuff was reapproximated with 0 Vicryl in a running locked fashion and excellent hemostasis was noted.  This was performed in a vertical direction.  Sponge stick was placed in the vagina.  The remaining  instruments were removed.       Attention was returned to the abdomen which was reinsufflated and copious irrigation was performed of the pelvis and excellent hemostasis was noted.  All instruments were removed from the abdomen and the abdomen was desufflated.  The skin was closed with 4-0 Monocryl in a simple buried interrupted fashion.  Attention was returned to the vaginal region.  The Robles catheter and sponge stick were removed.       The patient tolerated the procedure well.  Sponge, lap and needle counts were correct x2.  I was present for the entire procedure.  The patient was taken to the recovery room in stable condition.   Adeline-operative antibiotics were given.  A surgical assistant was required for this surgery for her experience with retraction, achievement of hemostasis, and wound closure.           ALEXANDRE ANNE MD

## 2021-08-25 NOTE — DISCHARGE INSTRUCTIONS
Same Day Surgery Discharge Instructions  Special Precautions After Surgery - Adult    1. It is not unusual to feel lightheaded or faint, up to 24 hours after surgery or while taking pain medication.  If you have these symptoms; sit for a few minutes before standing and have someone assist you when getting up.  2. You should rest and relax for the next 24 hours and must have someone stay with you for at least 24 hours after your discharge.  3. DO NOT DRIVE any vehicle or operate mechanical equipment for 24 hours following the end of your surgery.  DO NOT DRIVE while taking narcotic pain medications that have been prescribed by your physician.  If you had a limb operated on, you must be able to use it fully to drive.  4. DO NOT drink alcoholic beverages for 24 hours following surgery or while taking prescription pain medication.  5. Drink clear liquids (apple juice, ginger ale, broth, 7-Up, etc.).  Progress to your regular diet as you feel able.  6. Any questions call your physician and do not make important decisions for 24 hours.  Break through Bleeding  As instructed per Surgeon or Nurse.  If you have any bleeding or drainage, contact your surgeon.    Post Op Infection  Be alert for signs of infection: redness, swelling, heat, drainage of pus, and/or elevated temperature.  Contact your surgeon if these occur.    Nausea   If post op nausea occurs, at first rest your stomach for a few hours by eating nothing solid and sipping only clear liquids.  Call your Surgeon if nausea does not resolve in 24 hours.   __________________________________________________________________________________________________________________________________  IMPORTANT NUMBERS:    [facility] Main Number:  [phone], [phone]  Pharmacy:  [phone]  Same Day Surgery:  [phone], Monday - Friday until 8:30 p.m.  Emergency Room:  [phone]      OB Clinic:  [phone]

## 2021-08-26 ENCOUNTER — TELEPHONE (OUTPATIENT)
Dept: OBGYN | Facility: CLINIC | Age: 36
End: 2021-08-26

## 2021-08-26 DIAGNOSIS — G89.18 ACUTE POST-OPERATIVE PAIN: Primary | ICD-10-CM

## 2021-08-26 RX ORDER — HYDROCODONE BITARTRATE AND ACETAMINOPHEN 5; 325 MG/1; MG/1
1 TABLET ORAL EVERY 4 HOURS PRN
Qty: 10 TABLET | Refills: 0 | Status: SHIPPED | OUTPATIENT
Start: 2021-08-26 | End: 2021-08-29

## 2021-08-26 RX ORDER — ONDANSETRON 4 MG/1
4 TABLET, ORALLY DISINTEGRATING ORAL EVERY 6 HOURS PRN
Qty: 10 TABLET | Refills: 0 | Status: SHIPPED | OUTPATIENT
Start: 2021-08-26 | End: 2021-09-09

## 2021-08-26 NOTE — TELEPHONE ENCOUNTER
"  Patient returning call to clinic.  Patient reports she was unable to go to the ER in [place] because they would not see her because of her insurance and told her they do not do \"pain management\" from a surgery out of state.    S-(situation): Surgery yesterday. Patient drove to [place]. Patient reports pain was controlled when using the Roxicodone but she developed allergy to it as she experienced itching and vomiting when taking it. Patient called nurse triage last evening at 1919 with allergic symptoms and Roxicodone was discontinued and Tylenol #3 given.    Patient reports taking plain Tylenol 650 mg at 0700 and Tylenol #3 at 0900 this morning. Patient reports Ibuprofen was taken at 11 am.     Patient poor historian when answering questions regarding pain medication and what she is taking when. Patient cannot remember and offers different answers with same question. Patient first stated she was not taking medication during the night and now states that she did take medication during the night and continued with regimen as above.     Patient unable to describe pain or quantify.    Patient - \" I need a stronger pain medication than the Tylenol #3. If I have to drive 5 1/2 hours back to [place] to get one, I will. I need Dr. Dickerson to call me back.\"    B-(background): 8/25/2021 total laparoscopic hysterectomy, bilateral salpingectomy, ovaries stay in.    A-(assessment): post op pain    R-(recommendations): Reassurance to patient. Reviewed with patient writer is trying to sort out what patient is taking and when so can advise on schedule for better pain control. When discussing pain control plan, patient said \" just have Dr. Dickerson call me\" and  hung up and call disconnected.     Katheryn Lane   [facility]  RN          "

## 2021-08-26 NOTE — TELEPHONE ENCOUNTER
Routed to ob triage RN.    Pt called very upset asking that Dr Dickerson call her directly?    Pt explains that she had surgery out of state recently and that her pain management is not working for her.    She says that she has used oxycontin which worked but caused itching.    She is currently using Tyl #3 with codeine and taking with ibuprofen but it is NO help for her pain.    Nevin Laboy RN

## 2021-08-26 NOTE — TELEPHONE ENCOUNTER
"Spoke with patient on the phone.    S-(situation): Procedure as below yesterday. Patient now in [place]. Patient reports this morning having pain a 7.5/10 in her abdomen. \" it feels like when I stand up, everything below my belly button is ripping out of me.\" patient last took Tylenol #3 1 tablet at 9 pm last night and Ibuprofen 800 mg at 0800. Patient reports allergic reaction to Roxicodone so that was discontinued. Patient denies fevers. Patient reports passing gas and having a bowel movement. Patient denies nausea or vomiting. Patient reports expected appetite. Patient denies bright red bleeding or abnormal discharge. Patient reports incisions \" look fine.\" patient using heat and ice alternating for comfort.    B-(background): 8/25/2021 total laparoscopic hysterectomy, bilateral salpingectomy, ovaries stay in.    A-(assessment): post operative pain    R-(recommendations): Reassurance provided. Continue to monitor pain. Work on pain management by taking Tylenol #3 1 tablet every 6 hours per prescription directions. Take it now and again in 6 hours. Patient may take one additional over the counter Tylenol with the Tylenol #3 Take Ibuprofen at 11 am and continue to take every 6 hours per prescription directions. Continue with ice and heat if helpful. Rest and take it easy. Do not over do it. Allow body time to heal. Follow postoperative guidelines. No heavy lifting or strenuous exercise for 6 weeks. Pelvic rest for 6 weeks.     If pain is not well controlled, or new symptoms arise or patient needs to be seen, further evaluation in the ER is advised.    Pt in agreement and reports understanding.    Patient will call back and update later today.    Katheryn Lane   [facility]  RN      "

## 2021-08-26 NOTE — TELEPHONE ENCOUNTER
2:48 PM: Attempted to call.  No answer.  VM left.   3:18 PM: Attempted to call.  No answer.  VM left  4:01 PM: Spoke with patient regarding pain medications.    Oxycodone made her itchy and nauseated, so she was prescribed T#3 by Dr. Howard overnight.  She is currently still in [place].  Pain is still not tolerable with ibuprofen and T#3.  She states she tried to go to the doctor, but they wouldn't see her due to insurance or to switch her meds.  I discussed that an ED cannot refuse to see/evaluate a patient.      In the meantime, I am willing to send new prescription for Norco since she says she tolerates that one ok.  I informed her that no refills will be granted without being seen/assessed by a physician either in clinic or ED.  I recommended she return to [place] so she has easier access to health care.      She states she is able to ambulate, void, and pass gas.  Denies fever or bleeding.     I advised her to dispose of her current prescriptions properly.  If the norco is not approved by her insurance, I recommended she utilize benadryl and zofran to counteract the side effects she was having with oxycodone.      Liberty Dickerson M.D.

## 2021-08-26 NOTE — TELEPHONE ENCOUNTER
"Reason for Call:  Other call back    Detailed comments: Pt calling - had surgery yesterday - hysterectomy.  Pain meds oxycodone -make her itchy and breaking out in hives.  Was given Tylenol #3 but this isn't helping \"feels like my intestines are ripping in half, and I am all the way in [place] as that is where I went to recover\"  States she is just in so much pain, \"if I stand up straight it feels like it is ripping everything apart\"    Phone Number Patient can be reached at: Home number on file [phone] (home)    Best Time:     Can we leave a detailed message on this number? YES    Call taken on 8/26/2021 at 9:05 AM by Amparo Isbell      "

## 2021-08-26 NOTE — TELEPHONE ENCOUNTER
"Patient calling back, got [facility].  She is wondering if she can get a different med or if she can talk to the \"ED\" because she can't afford to go to the ED out of state.  Advised patient that we cannot give narcotic pain meds over the phone.  Advised she contact her insurance company to discuss out of state coverage, if severe needs to present to ED in [place].    Virgie Turcios RN    "

## 2021-08-26 NOTE — TELEPHONE ENCOUNTER
"    Reason for Disposition    [1] Caller has URGENT medication question about med that PCP or specialist prescribed AND [2] triager unable to answer question    Protocols used: MEDICATION QUESTION CALL-A-    \"I had surgery this morning(see epic) and was given   oxyCODONE (ROXICODONE) 5 MG tablet 12 tablet 0 8/25/2021  --   Sig - Route: Take 1-2 tablets (5-10 mg) by mouth every 4 hours as needed for moderate to severe pain - Oral     20 minutes after I took it I started getting red and itchy all over. I took (2) Benadryl. I would like a different medication  I am now in [place], so I will need it sent to [facility] there in [place] and they will transfer it here.\"   Paged on call Dr. Howard through page op at 7:12 pm  \"I will send over RX for Tylenol #3 to [facility] in [place] since it is a narcotic.\"   Per patient: \"Send RX to [facility] [address] [address], [place] ([phone])  Notified pt   Call back if needed.  Marie Almeida RN [facility]      "

## 2021-08-27 LAB
PATH REPORT.COMMENTS IMP SPEC: NORMAL
PATH REPORT.COMMENTS IMP SPEC: NORMAL
PATH REPORT.FINAL DX SPEC: NORMAL
PATH REPORT.GROSS SPEC: NORMAL
PATH REPORT.MICROSCOPIC SPEC OTHER STN: NORMAL
PATH REPORT.RELEVANT HX SPEC: NORMAL
PHOTO IMAGE: NORMAL

## 2021-08-27 PROCEDURE — 88307 TISSUE EXAM BY PATHOLOGIST: CPT | Mod: 26 | Performed by: PATHOLOGY

## 2021-08-30 ENCOUNTER — ALLIED HEALTH/NURSE VISIT (OUTPATIENT)
Dept: BEHAVIORAL HEALTH | Facility: CLINIC | Age: 36
End: 2021-08-30
Payer: COMMERCIAL

## 2021-08-30 DIAGNOSIS — R69 DIAGNOSIS DEFERRED: Primary | ICD-10-CM

## 2021-08-30 NOTE — PROGRESS NOTES
Behavioral Health Home Services  [facility]: [place]        Social Work Care Navigator Note      Patient: Lluvia Glaser  Date: August 30, 2021  Preferred Name: Lluvia    Previous PHQ-9:   PHQ-9 SCORE 4/5/2021 4/19/2021 6/17/2021   PHQ-9 Total Score MyChart - - 12 (Moderate depression)   PHQ-9 Total Score 25 3 12     Previous WAYNE-7:   WAYNE-7 SCORE 4/5/2021 4/19/2021 6/17/2021   Total Score - - 10 (moderate anxiety)   Total Score 21 2 10     ULISES LEVEL:  No flowsheet data found.    Preferred Contact:  Need for : No  Preferred Contact: Cell      Type of Contact Today: Phone call (patient / identified key support person reached)      Data: (subjective / Objective):  Recent ED/IP Admission or Discharge?   None    Patient Goals:  Goal Areas: Health;Mental Health;Employment / Volunteer;Financial and Social Service Benefits;Transportation  Patient stated goals: Lluvia would like assistance with her physical and mental health for creating a balanced and healthy lifestyle. Lluvia would like assistance with budgeting, SSDI and community/social service assistance to aid with county benefits to increase her financial stability. Lluvia would like assistance with vehicle maintenance and repairs for reliable transportation to get to her appointments, run errands and get out into the community.        [facility] Core Service Provided:  Comprehensive Care Management: utilized the electronic medical record / patient registry to identify and support patient's health conditions / needs more effectively   Care Transitions: focused on the coordinated and seamless movement of patient between or within different levels of care or settings  Care Coordination: provided care management services/referrals necessary to ensure patient and their identified supports have access to medical, behavioral health, pharmacology and recovery support services.  Ensured that patient's care is integrated across all settings and services.   Individual  and Family Support: aimed to help clients reduce barriers to achieving goals, increase health literacy and knowledge about chronic condition(s), increase self-efficacy skills, and improve health outcomes  Referral to Community and Social Support Services: Provided patient with referrals (see plan section)  Assisted in scheduling an appointment to a referral / service (see plan section)  Coordinated / Confirmed patient's appointment time or referral and transportation plan  Followed-up with patient on whether or not they completed a referral    Current Stressors / Issues / Care Plan Objective Addressed Today:  [facility] and patient were able to meet today for Behavioral Health Home ([facility]) monthly check-in via telehealth visit. All required ROIs have been filed with HIM/patient chart.    1. Patient reports she just found out that she and her s.o. need to move. Patient reports this is problematic, as s.o. has criminal background that will impact their ability to find housing. Patient requesting housing resources emailed to her. [facility] emailed housing information and shelters in her area today.     2. Patient reports unemployment has ended. Patient reports she needs to call [facility] to update financial status and see if she can collect MFIB. Patient reports she continues to receive SNAP benefits. Patient reports she will be calling [facility] today to update. [facility] to discuss Diversionary Work Program (DWP) with patient at next visit.    3. Patient reports she would like to pursue SSDI. Patient reports needing resources for this. [facility] and patient discussed if she begins to receive cash benefits from the [facility] she would be able to get assistance applying for SSDI with Yannick Acosta. Patient requested [facility] email contact information today. [facility] emailed information today.      4. Patient reports her surgery went well. Patient reports she is tired and will not be able to work for the next 8 weeks. Patient reports she just returned from  her mom's yesterday, as she went there to recover after surgery. Patient reports she did have a job but did not start due to surgery and now she is not sure if she still has employment. Patient reports she will be calling to find out. CC to check back in with patient at next visit.    Intervention:  Motivational Interviewing: Expressed Empathy/Understanding, Supported Autonomy, Collaboration, Evocation, Permission to raise concern or advise, Open-ended questions, Reflections: simple and complex, Rolled with resistance: Emphasized patient autonomy, Simple reflection, Complex reflection, Amplified reflection (weaker or stronger meaning), Shifted topic to defuse resistance, Reframed sustain talk in the direction of change and Evoked patient agenda, Change talk (evoked) and Reframe   Target Behavior(s): Explored thoughts about taking an anti-depressant, Explored and resolved challenges related to taking anti-depressants as prescribed, Explored thoughts and readiness to participate in individual therapy, Explored and resolved challenges to attending appointments as scheduled and Explored current social supports and reinforced opportunities to increase engagement    Assessment: (Progress on Goals / Homework):  Patient would benefit from continued coordination in reaching their goals set for the Behavioral Health Home ([facility]) program. [facility] reviewed Health Action Plan goals and will continue to monitor progress and work with patient and their care team.      Plan: (Homework, other):  Patient was encouraged to continue to seek condition-related information and education.      Scheduled a Phone follow up appointment with JESSICA  in 2 weeks     Patient has set self-identified goals and will monitor progress until the next appointment on: 09/13/2021.         TRIXIE Moon [facility]  Behavioral Health Home ([facility])   [facility]  [phone]  August 30, 2021  1:31 PM

## 2021-09-01 ENCOUNTER — APPOINTMENT (OUTPATIENT)
Dept: CT IMAGING | Facility: CLINIC | Age: 36
End: 2021-09-01
Attending: EMERGENCY MEDICINE
Payer: COMMERCIAL

## 2021-09-01 ENCOUNTER — OFFICE VISIT (OUTPATIENT)
Dept: URGENT CARE | Facility: URGENT CARE | Age: 36
End: 2021-09-01
Payer: COMMERCIAL

## 2021-09-01 ENCOUNTER — HOSPITAL ENCOUNTER (EMERGENCY)
Facility: CLINIC | Age: 36
Discharge: HOME | End: 2021-09-01
Attending: EMERGENCY MEDICINE | Admitting: EMERGENCY MEDICINE
Payer: COMMERCIAL

## 2021-09-01 VITALS
RESPIRATION RATE: 18 BRPM | HEART RATE: 83 BPM | OXYGEN SATURATION: 97 % | BODY MASS INDEX: 29.02 KG/M2 | TEMPERATURE: 97 F | HEIGHT: 64 IN | WEIGHT: 170 LBS | DIASTOLIC BLOOD PRESSURE: 80 MMHG | SYSTOLIC BLOOD PRESSURE: 120 MMHG

## 2021-09-01 DIAGNOSIS — G89.18 ACUTE POST-OPERATIVE PAIN: ICD-10-CM

## 2021-09-01 DIAGNOSIS — G89.18 POST-OP PAIN: Primary | ICD-10-CM

## 2021-09-01 DIAGNOSIS — N20.0 KIDNEY STONE: ICD-10-CM

## 2021-09-01 LAB
ANION GAP SERPL CALCULATED.3IONS-SCNC: 5 MMOL/L (ref 3–14)
BASOPHILS # BLD AUTO: 0 10E3/UL (ref 0–0.2)
BASOPHILS NFR BLD AUTO: 0 %
BUN SERPL-MCNC: 14 MG/DL (ref 7–30)
CALCIUM SERPL-MCNC: 8.3 MG/DL (ref 8.5–10.1)
CHLORIDE BLD-SCNC: 105 MMOL/L (ref 94–109)
CO2 SERPL-SCNC: 28 MMOL/L (ref 20–32)
CREAT SERPL-MCNC: 0.95 MG/DL (ref 0.52–1.04)
EOSINOPHIL # BLD AUTO: 0.1 10E3/UL (ref 0–0.7)
EOSINOPHIL NFR BLD AUTO: 1 %
ERYTHROCYTE [DISTWIDTH] IN BLOOD BY AUTOMATED COUNT: 13 % (ref 10–15)
GFR SERPL CREATININE-BSD FRML MDRD: 78 ML/MIN/1.73M2
GLUCOSE BLD-MCNC: 109 MG/DL (ref 70–99)
HCT VFR BLD AUTO: 39 % (ref 35–47)
HGB BLD-MCNC: 13.9 G/DL (ref 11.7–15.7)
IMM GRANULOCYTES # BLD: 0 10E3/UL
IMM GRANULOCYTES NFR BLD: 0 %
LYMPHOCYTES # BLD AUTO: 2.1 10E3/UL (ref 0.8–5.3)
LYMPHOCYTES NFR BLD AUTO: 24 %
MCH RBC QN AUTO: 29.6 PG (ref 26.5–33)
MCHC RBC AUTO-ENTMCNC: 35.6 G/DL (ref 31.5–36.5)
MCV RBC AUTO: 83 FL (ref 78–100)
MONOCYTES # BLD AUTO: 0.7 10E3/UL (ref 0–1.3)
MONOCYTES NFR BLD AUTO: 8 %
NEUTROPHILS # BLD AUTO: 5.7 10E3/UL (ref 1.6–8.3)
NEUTROPHILS NFR BLD AUTO: 67 %
NRBC # BLD AUTO: 0 10E3/UL
NRBC BLD AUTO-RTO: 0 /100
PLATELET # BLD AUTO: 248 10E3/UL (ref 150–450)
POTASSIUM BLD-SCNC: 3.6 MMOL/L (ref 3.4–5.3)
RBC # BLD AUTO: 4.69 10E6/UL (ref 3.8–5.2)
SODIUM SERPL-SCNC: 138 MMOL/L (ref 133–144)
WBC # BLD AUTO: 8.7 10E3/UL (ref 4–11)

## 2021-09-01 PROCEDURE — 85025 COMPLETE CBC W/AUTO DIFF WBC: CPT | Performed by: EMERGENCY MEDICINE

## 2021-09-01 PROCEDURE — 82374 ASSAY BLOOD CARBON DIOXIDE: CPT | Performed by: EMERGENCY MEDICINE

## 2021-09-01 PROCEDURE — 99284 EMERGENCY DEPT VISIT MOD MDM: CPT | Performed by: EMERGENCY MEDICINE

## 2021-09-01 PROCEDURE — 99284 EMERGENCY DEPT VISIT MOD MDM: CPT | Mod: 25 | Performed by: EMERGENCY MEDICINE

## 2021-09-01 PROCEDURE — 36415 COLL VENOUS BLD VENIPUNCTURE: CPT | Performed by: EMERGENCY MEDICINE

## 2021-09-01 PROCEDURE — 74176 CT ABD & PELVIS W/O CONTRAST: CPT

## 2021-09-01 PROCEDURE — 99207 PR NO CHARGE LOS: CPT | Performed by: NURSE PRACTITIONER

## 2021-09-01 RX ORDER — OXYCODONE HYDROCHLORIDE 5 MG/1
5 TABLET ORAL EVERY 6 HOURS PRN
Qty: 10 TABLET | Refills: 0 | Status: SHIPPED | OUTPATIENT
Start: 2021-09-01 | End: 2021-09-03

## 2021-09-01 RX ORDER — ONDANSETRON 4 MG/1
4 TABLET, ORALLY DISINTEGRATING ORAL EVERY 8 HOURS PRN
Qty: 10 TABLET | Refills: 0 | Status: SHIPPED | OUTPATIENT
Start: 2021-09-01 | End: 2021-09-09

## 2021-09-01 RX ORDER — TAMSULOSIN HYDROCHLORIDE 0.4 MG/1
0.4 CAPSULE ORAL DAILY
Qty: 7 CAPSULE | Refills: 0 | Status: SHIPPED | OUTPATIENT
Start: 2021-09-01 | End: 2021-09-09

## 2021-09-01 ASSESSMENT — MIFFLIN-ST. JEOR: SCORE: 1451.11

## 2021-09-02 NOTE — ED NOTES
"Pt states she wants to go home. ED MD notified and orders to keep IV in for treatment of kidney stone. Explained POC to pt but she is upset that \"it's been 4 hours since my CT scan and no results\"   Explained she has a kidney stone and we will treat her for that but MD cannot see her in this moment.   Offered pain meds but pt states she now has to drive because her friend dropped off her car.  Offered toradol but pt declined.   Pt agrees to stay for treatment  "

## 2021-09-02 NOTE — ED NOTES
Pt had a hysterectomy last week, pt was lifting a box a felt a pop in her pelvis. Now having mid low abdominal pain. No bleeding or spotting

## 2021-09-02 NOTE — ED NOTES
"Pt on cot moving around, stating she wants her IV out, that it hurts, she wants \"this over with\" and wants to go home.  Pt states she is in 8/10 abd pain.   Pt states MD is ordering a CT scan.   Informed pt that the IV will be needed for the scan, and she accused me of being rude.   I asked if she would like me to speak to the MD about something for pain, and she agreed.   Pt bed covering and pillows straightened out, put repositioned in bed, and warm blanket offered.  PLAN:  Will speak with MD regarding CT Scan and medication for pain.  "

## 2021-09-02 NOTE — DISCHARGE INSTRUCTIONS
Return if symptoms worsen or new symptoms develop.  Follow-up with primary care physician next available.  Follow-up with ear gynecologist for review of imaging studies.  You have a kidney stone and you need to drink plenty of fluids take Zofran for nausea Percocet for pain and Flomax as directed for assistance with dilation of the ureter.  If increased pain fever chills or other symptoms please return for further evaluation and care.

## 2021-09-03 ENCOUNTER — MYC MEDICAL ADVICE (OUTPATIENT)
Dept: FAMILY MEDICINE | Facility: CLINIC | Age: 36
End: 2021-09-03

## 2021-09-03 ENCOUNTER — ALLIED HEALTH/NURSE VISIT (OUTPATIENT)
Dept: BEHAVIORAL HEALTH | Facility: CLINIC | Age: 36
End: 2021-09-03
Payer: COMMERCIAL

## 2021-09-03 DIAGNOSIS — R69 DIAGNOSIS DEFERRED: Primary | ICD-10-CM

## 2021-09-03 DIAGNOSIS — N13.2 HYDRONEPHROSIS WITH URINARY OBSTRUCTION DUE TO URETERAL CALCULUS: Primary | ICD-10-CM

## 2021-09-03 RX ORDER — OXYCODONE HYDROCHLORIDE 5 MG/1
5 TABLET ORAL EVERY 6 HOURS PRN
Qty: 10 TABLET | Refills: 0 | Status: SHIPPED | OUTPATIENT
Start: 2021-09-03 | End: 2021-09-09

## 2021-09-03 NOTE — TELEPHONE ENCOUNTER
Chart reviewed and appropriate. Risk factors include documented mental health and substance use history, but PDMP was completely appropriate, as is this indicated. 10 tabs sent.    Lluvia was seen today for medication refill.    Diagnoses and all orders for this visit:    Hydronephrosis with urinary obstruction due to ureteral calculus  -     oxyCODONE (ROXICODONE) 5 MG tablet; Take 1 tablet (5 mg) by mouth every 6 hours as needed for severe pain      Call her back prn.    Mami Ryan MD

## 2021-09-03 NOTE — PROGRESS NOTES
Behavioral Health Home Services  [facility]: [place]        Social Work Care Navigator Note      Patient: Lluvia Glaser  Date: September 3, 2021  Preferred Name: Lluvia    Previous PHQ-9:   PHQ-9 SCORE 4/5/2021 4/19/2021 6/17/2021   PHQ-9 Total Score MyChart - - 12 (Moderate depression)   PHQ-9 Total Score 25 3 12     Previous WAYNE-7:   WAYNE-7 SCORE 4/5/2021 4/19/2021 6/17/2021   Total Score - - 10 (moderate anxiety)   Total Score 21 2 10     ULISES LEVEL:  No flowsheet data found.    Preferred Contact:  Need for : No  Preferred Contact: Cell      Type of Contact Today: Phone call (patient / identified key support person reached)      Data: (subjective / Objective):  Patient Goals:  Goal Areas: Health;Mental Health;Employment / Volunteer;Financial and Social Service Benefits;Transportation  Patient stated goals: Lluvia would like assistance with her physical and mental health for creating a balanced and healthy lifestyle. Lluvia would like assistance with budgeting, SSDI and community/social service assistance to aid with [place] benefits to increase her financial stability. Lluvia would like assistance with vehicle maintenance and repairs for reliable transportation to get to her appointments, run errands and get out into the community.      Recent ED/IP Admission or Discharge?   [facility] ED Admission date: 09/01/2021    Current Stressors / Issues:      What were some the circumstances or situations that led up to the emergency room visit?  Per ED note on 09/01: Patient reports recent total abdominal laparoscopic total hysterectomy on 8/25, who presents emergency department complaining of left-sided abdominal pain.  Patient states she was doing well until this afternoon when she lifted a box and felt kind of pop in her left side of her pelvis.  She is now having mid left-sided to suprapubic abdominal pain.  She states the pain is an aching pain that will go away.  Worsened with activity.  She has  not had any bleeding or spotting denies any fevers or chills has not had any chest pain or shortness of breath.  She states her incision sites have been healing well she had not been having any significant problems prior to this.    Patient diagnosed with Kidney stones       What concerns do you still have regarding (reason for admission)?    Patient reports she is doing ok. Patient reports she has more pain in the am that gets better throughout the day. Patient reports she was advised to follow-up with OB/GYN but cannot get an appointment for several weeks. [facility] messaged provider today.    [facility] was able to make follow-up ED appt for patient next Thursday, Sept 9th with PCP.      What recommendations did the doctors and team make?    Patient was given pain meds and Flomax and advised to follow with OB/GYN. Patient denied need for urinary tract infection results in ED. ED provider recommended patient to get urine re-checked, follow up with provider. Patient advised to return to ED if there is a decrease in urine output, increased pain with fever/chills or other symptoms.     Were you able to schedule and/or attend recommended appointments? Yes    Did you  any new prescriptions? Yes      What types of health care support might better meet your needs?    Patient reports she has tried to get appt with provider but due to full schedules she has not been able to follow-up.       How confident do you feel about your ability to communicate these needs to your primary care team?      Patient reports she feels confident to communicate her needs to her primary care team.      What do you think would help you avoid another visit the emergency room or admission to the hospital in the next six months?    Follow-up with providers and follow treatment plan.    Intervention:  Motivational Interviewing: Expressed Empathy/Understanding, Supported Autonomy, Collaboration, Evocation, Permission to raise concern or advise,  Open-ended questions, Reflections: simple and complex, Rolled with resistance: Emphasized patient autonomy, Simple reflection, Complex reflection, Amplified reflection (weaker or stronger meaning), Shifted topic to defuse resistance, Reframed sustain talk in the direction of change and Evoked patient agenda, Change talk (evoked) and Reframe     Assessment: (Pt engagement & support needed for successful care transition):  Patient would benefit from continued coordination in reaching their goals set for the Behavioral Health Home ([facility]) program. [facility] reviewed Health Action Plan goals and will continue to monitor progress and work with patient and their care team.      Plan: (Homework, other):  Patient was encouraged to continue to seek condition-related information and education.      Scheduled a Phone follow up appointment with SW CC in 2 weeks     Patient has set self-identified goals and will monitor progress until the next appointment on: 09/13/2021.       TRIXIE Moon [facility]  Behavioral Health Home ([facility])   [facility]  [phone]  September 3, 2021  2:20 PM        Routed note to patient's primary care provider.

## 2021-09-03 NOTE — ED PROVIDER NOTES
History     Chief Complaint   Patient presents with     Post-op Problem     hysterectomy 8/25, has had pain since then, different meds ordered by Michael and Tuan. Yesterday lifted something heavy and has had more pain     HPI  Lluvia Glaser is a 35 year old female with past medical history significant for substance abuse, anxiety disorder with bipolar disorder and recent total abdominal laparoscopic total hysterectomy on 825 who presents emergency department complaining of left-sided abdominal pain.  Patient states she was doing well until this afternoon when she lifted a box and felt kind of pop in her left side of her pelvis.  She is now having mid left-sided to suprapubic abdominal pain.  She states the pain is an aching pain that will go away.  Worsened with activity.  She has not had any bleeding or spotting denies any fevers or chills has not had any chest pain or shortness of breath.  She states her incision sites have been healing well she had not been having any significant problems prior to this.  She has not been taking significant amount of pain medication at this time.  She denies any urinary symptoms and has not had any focal numbness weakness in extremity.  Currently rates her pain a 4 out of 10 worsened with activity.    Allergies:  Allergies   Allergen Reactions     Blue Dyes Hives     Demerol [Meperidine] Hives       Problem List:    Patient Active Problem List    Diagnosis Date Noted     Excessive bleeding in premenopausal period 08/05/2021     Priority: Medium     Added automatically from request for surgery [id number]       Substance abuse (H)      Priority: Medium     Meth.  Sober since 10/22/2019       Generalized anxiety disorder      Priority: Medium     Chronic eczema      Priority: Medium     Gastric ulcer      Priority: Medium     ADHD      Priority: Medium     Smoker      Priority: Medium     PABLO I (cervical intraepithelial neoplasia I) 12/24/2019     Priority: Medium      "8/4/06 NIL  10/11/10 ASCUS, neg HPV  12/24/19 Colpo d/t cervical lesions on exam, bx PABLO I  Above per Care Everywhere  7/22/21 NIL pap, neg HPV. Plan: colposcopy due to visible leukoplakia on exam, due bef 10/22/21   7/30/21 Pt notified           Past Medical History:    Past Medical History:   Diagnosis Date     ADHD      Bipolar 1 disorder (H)      Chronic eczema      PABLO I (cervical intraepithelial neoplasia I) 12/24/2019     Depressive disorder      Gastric ulcer      Generalized anxiety disorder      Smoker      Substance abuse (H)        Past Surgical History:    Past Surgical History:   Procedure Laterality Date     ESOPHAGOSCOPY, GASTROSCOPY, DUODENOSCOPY (EGD), COMBINED N/A 02/26/2020    Procedure: ESOPHAGOGASTRODUODENOSCOPY, WITH BIOPSY;  Surgeon: Tesfaye Temple DO;  Location: [facility]     LAPAROSCOPIC HYSTERECTOMY TOTAL N/A 8/25/2021    Procedure: total laparoscopic hysterectomy, bilateral salpingectomy, ovaries stay in.;  Surgeon: Liberty Dickerson MD;  Location: [facility]     TONSILLECTOMY       TUBAL LIGATION         Family History:    Family History   Problem Relation Age of Onset     Cancer Mother         \"in her stomach, maybe ovaries\"     Depression Mother      Anxiety Disorder Mother      Mental Illness Mother      Attention Deficit Disorder Son      Bipolar Disorder Son        Social History:  Marital Status:   [2]  Social History     Tobacco Use     Smoking status: Current Every Day Smoker     Packs/day: 0.50     Years: 10.00     Pack years: 5.00     Types: Cigarettes     Smokeless tobacco: Former User     Tobacco comment: 2-3 cigs daily   Vaping Use     Vaping Use: Never used   Substance Use Topics     Alcohol use: Not Currently     Drug use: Not Currently     Types: Methamphetamines     Comment: sober since 10/2019        Medications:    ondansetron (ZOFRAN-ODT) 4 MG ODT tab  oxyCODONE (ROXICODONE) 5 MG tablet  tamsulosin (FLOMAX) 0.4 MG capsule  acetaminophen (TYLENOL) 325 MG " "tablet  atomoxetine (STRATTERA) 25 MG capsule  benztropine (COGENTIN) 0.5 MG tablet  cariprazine (VRAYLAR) 1.5 MG CAPS capsule  clonazePAM (KLONOPIN) 2 MG tablet  ibuprofen (ADVIL/MOTRIN) 800 MG tablet  imipramine (TOFRANIL) 25 MG tablet  ondansetron (ZOFRAN-ODT) 4 MG ODT tab  propranolol (INDERAL) 40 MG tablet  senna-docusate (SENOKOT-S/PERICOLACE) 8.6-50 MG tablet  SUMAtriptan (IMITREX) 50 MG tablet          Review of Systems  All systems reviewed and other than pertinent positives negatives in HPI all other systems are negative.  Physical Exam   BP: 111/80  Pulse: 110  Temp: 97  F (36.1  C)  Resp: 18  Height: 162.6 cm (5' 4\")  Weight: 77.1 kg (170 lb) (stated)  SpO2: 100 %      Physical Exam  Vitals and nursing note reviewed.   Constitutional:       Appearance: Normal appearance. She is not ill-appearing, toxic-appearing or diaphoretic.      Comments: There is mild distress secondary to abdominal pain.   HENT:      Head: Normocephalic.      Nose: Nose normal.      Mouth/Throat:      Mouth: Mucous membranes are moist.      Pharynx: Oropharynx is clear.   Cardiovascular:      Rate and Rhythm: Normal rate and regular rhythm.      Pulses: Normal pulses.      Heart sounds: Normal heart sounds. No murmur heard.     Pulmonary:      Effort: Pulmonary effort is normal.      Breath sounds: Normal breath sounds. No wheezing, rhonchi or rales.   Chest:      Chest wall: No tenderness.   Abdominal:      General: Abdomen is flat.      Palpations: Abdomen is soft.      Comments: Nondistended, 3 laparoscopic incision sites with mild ecchymosis no erythema or drainage.  There is tenderness to palpation of the left mid to lower abdomen and suprapubic area.  Mild left flank pain is also noted.  No right-sided abdominal tenderness is noted.  Bowel sounds are positive.  No masses are palpated.   Musculoskeletal:         General: Normal range of motion.      Cervical back: Normal range of motion and neck supple.      Right lower leg: " No edema.      Left lower leg: No edema.      Comments: There is no calf tenderness.   Skin:     General: Skin is warm and dry.      Findings: No rash.   Neurological:      General: No focal deficit present.      Mental Status: She is alert and oriented to person, place, and time.      Motor: No weakness.      Coordination: Coordination normal.   Psychiatric:         Mood and Affect: Mood normal.         ED Course        Procedures              Critical Care time:  none               Labs Ordered and Resulted from Time of ED Arrival Up to the Time of Departure from the ED   BASIC METABOLIC PANEL - Abnormal; Notable for the following components:       Result Value    Calcium 8.3 (*)     Glucose 109 (*)     All other components within normal limits   CBC WITH PLATELETS & DIFFERENTIAL    Narrative:     The following orders were created for panel order CBC with platelets, differential.  Procedure                               Abnormality         Status                     ---------                               -----------         ------                     CBC with platelets and d...[[id number]]                      Final result                 Please view results for these tests on the individual orders.   CBC WITH PLATELETS AND DIFFERENTIAL       Medications - No data to display  Results for orders placed or performed during the hospital encounter of 09/01/21   CT Abdomen Pelvis w/o Contrast    Narrative    EXAM: CT ABDOMEN PELVIS W/O CONTRAST  LOCATION: [facility]  DATE/TIME: 9/1/2021 9:33 PM    INDICATION: Post hysterectomy pain.  COMPARISON: None.  TECHNIQUE: CT scan of the abdomen and pelvis was performed without IV contrast. Multiplanar reformats were obtained. Dose reduction techniques were used.  CONTRAST: None.    FINDINGS:   LOWER CHEST: Dependent atelectasis.    HEPATOBILIARY: Normal.    PANCREAS: Normal.    SPLEEN: Normal.    ADRENAL GLANDS: Normal.    KIDNEYS/BLADDER: 4 mm stone  distal left ureter with moderate left-sided hydronephrosis. There are multiple tiny stones which are nonobstructing throughout the right kidney ranging in size from 1-2 mm. 2 mm stone lower pole left kidney.    BOWEL: Normal.    LYMPH NODES: Normal.    VASCULATURE: Unremarkable.    PELVIC ORGANS: Hysterectomy with mild stranding in the pelvis. Trace fluid compatible with postsurgical change.    MUSCULOSKELETAL: Normal.      Impression    IMPRESSION:   1.  Recent hysterectomy.    2.  4 mm stone in the distal left ureter with moderate left-sided hydronephrosis. Additional small stones in the kidneys with multiple tiny stones in the right kidney.         Assessments & Plan (with Medical Decision Making) records were reviewed.  Labs were obtained.  Patient was given IV fluids.  Offered pain medication but patient is driving and did not want Toradol at this time.  CBC was without abnormality.  Basic metabolic panel without significant abnormality.  Due to her presentation a CT scan abdomen pelvis was obtained and revealed a 4 mm stone in the distal left ureter with moderate left hydronephrosis.  There is some stranding in the pelvis but no evidence of abscess or other significant hemorrhage or other abnormality.  I apologized to patient for the time between CT and see you but headed a critical patient present I advised that patient had not given a urine sample yet and I had not initially ordered and apologize for this but would like to check for urinary tract infection but patient did not want to wait for this we will give her pain medication nausea medication and Flomax and have her follow-up with her OB doctor tomorrow.  I did advise for patient to get her urine rechecked if decreased urine output pain fever or other symptoms present and to return the emergency department if any of the symptoms present she feels comfortable with this plan.     I have reviewed the nursing notes.    I have reviewed the findings,  diagnosis, plan and need for follow up with the patient.       Discharge Medication List as of 9/1/2021 11:04 PM      START taking these medications    Details   !! ondansetron (ZOFRAN-ODT) 4 MG ODT tab Take 1 tablet (4 mg) by mouth every 8 hours as needed for nausea, Disp-10 tablet, R-0, [facility]      oxyCODONE (ROXICODONE) 5 MG tablet Take 1 tablet (5 mg) by mouth every 6 hours as needed for severe pain, Disp-10 tablet, R-0, [facility]      tamsulosin (FLOMAX) 0.4 MG capsule Take 1 capsule (0.4 mg) by mouth daily, Disp-7 capsule, R-0, [facility]       !! - Potential duplicate medications found. Please discuss with provider.          Final diagnoses:   Kidney stone - l sided       9/1/2021   [facility] EMERGENCY DEPT     Jarad Agee MD  09/03/21 2690

## 2021-09-03 NOTE — TELEPHONE ENCOUNTER
Patient was seen in the ED on 9/1/21. She was given oxycodone 10 tablets. She only has 3 tablets left. Patient will call to follow up with her OB. Patient is requesting a small amount of pain pills to get her through the weekend. Patient is going to come to clinic to get a urine strainer. Script pended. Routed to provider and MD guerrero.  Loulou Astudillo RN

## 2021-09-03 NOTE — Clinical Note
Hi Dr. Anaya and Dr. Dickerson,    I was able to complete an ED follow-up with this patient today. She was advised to follow-up with OB/GYN by next Wed. But it looks like Dr. Dickerson's schedule is full. I was able to make appt with Dr. Anaya for ED follow-up.    Dr. Dickerson can you reach out to patient and let her know next steps for follow-up with her abdominal laparoscopic total hysterectomy?    Thank you,  TRIXIE Moon LGSW [facility] ([facility])   [facility]  [phone]  September 3, 2021  2:23 PM

## 2021-09-04 ENCOUNTER — HOSPITAL ENCOUNTER (EMERGENCY)
Facility: CLINIC | Age: 36
Discharge: HOME | End: 2021-09-04
Attending: NURSE PRACTITIONER | Admitting: NURSE PRACTITIONER
Payer: COMMERCIAL

## 2021-09-04 ENCOUNTER — APPOINTMENT (OUTPATIENT)
Dept: CT IMAGING | Facility: CLINIC | Age: 36
End: 2021-09-04
Attending: NURSE PRACTITIONER
Payer: COMMERCIAL

## 2021-09-04 VITALS
OXYGEN SATURATION: 98 % | TEMPERATURE: 98 F | SYSTOLIC BLOOD PRESSURE: 122 MMHG | BODY MASS INDEX: 29.18 KG/M2 | RESPIRATION RATE: 18 BRPM | WEIGHT: 170 LBS | DIASTOLIC BLOOD PRESSURE: 84 MMHG | HEART RATE: 92 BPM

## 2021-09-04 DIAGNOSIS — R07.9 CHEST PAIN: ICD-10-CM

## 2021-09-04 LAB
ANION GAP SERPL CALCULATED.3IONS-SCNC: 5 MMOL/L (ref 3–14)
BASOPHILS # BLD AUTO: 0 10E3/UL (ref 0–0.2)
BASOPHILS NFR BLD AUTO: 0 %
BUN SERPL-MCNC: 9 MG/DL (ref 7–30)
CALCIUM SERPL-MCNC: 7.9 MG/DL (ref 8.5–10.1)
CHLORIDE BLD-SCNC: 110 MMOL/L (ref 94–109)
CO2 SERPL-SCNC: 25 MMOL/L (ref 20–32)
CREAT SERPL-MCNC: 1.01 MG/DL (ref 0.52–1.04)
EOSINOPHIL # BLD AUTO: 0.2 10E3/UL (ref 0–0.7)
EOSINOPHIL NFR BLD AUTO: 3 %
ERYTHROCYTE [DISTWIDTH] IN BLOOD BY AUTOMATED COUNT: 13 % (ref 10–15)
GFR SERPL CREATININE-BSD FRML MDRD: 72 ML/MIN/1.73M2
GLUCOSE BLD-MCNC: 91 MG/DL (ref 70–99)
HCT VFR BLD AUTO: 33.1 % (ref 35–47)
HGB BLD-MCNC: 12 G/DL (ref 11.7–15.7)
HOLD SPECIMEN: NORMAL
HOLD SPECIMEN: NORMAL
IMM GRANULOCYTES # BLD: 0 10E3/UL
IMM GRANULOCYTES NFR BLD: 0 %
LYMPHOCYTES # BLD AUTO: 1.4 10E3/UL (ref 0.8–5.3)
LYMPHOCYTES NFR BLD AUTO: 26 %
MCH RBC QN AUTO: 29.9 PG (ref 26.5–33)
MCHC RBC AUTO-ENTMCNC: 36.3 G/DL (ref 31.5–36.5)
MCV RBC AUTO: 83 FL (ref 78–100)
MONOCYTES # BLD AUTO: 0.5 10E3/UL (ref 0–1.3)
MONOCYTES NFR BLD AUTO: 9 %
NEUTROPHILS # BLD AUTO: 3.4 10E3/UL (ref 1.6–8.3)
NEUTROPHILS NFR BLD AUTO: 62 %
NRBC # BLD AUTO: 0 10E3/UL
NRBC BLD AUTO-RTO: 0 /100
PLATELET # BLD AUTO: 213 10E3/UL (ref 150–450)
POTASSIUM BLD-SCNC: 4 MMOL/L (ref 3.4–5.3)
RBC # BLD AUTO: 4.01 10E6/UL (ref 3.8–5.2)
SODIUM SERPL-SCNC: 140 MMOL/L (ref 133–144)
TROPONIN I SERPL-MCNC: <0.015 UG/L (ref 0–0.04)
WBC # BLD AUTO: 5.5 10E3/UL (ref 4–11)

## 2021-09-04 PROCEDURE — 93010 ELECTROCARDIOGRAM REPORT: CPT | Performed by: NURSE PRACTITIONER

## 2021-09-04 PROCEDURE — 99285 EMERGENCY DEPT VISIT HI MDM: CPT | Mod: 25 | Performed by: NURSE PRACTITIONER

## 2021-09-04 PROCEDURE — 80048 BASIC METABOLIC PNL TOTAL CA: CPT | Performed by: NURSE PRACTITIONER

## 2021-09-04 PROCEDURE — 71275 CT ANGIOGRAPHY CHEST: CPT

## 2021-09-04 PROCEDURE — 84484 ASSAY OF TROPONIN QUANT: CPT | Performed by: NURSE PRACTITIONER

## 2021-09-04 PROCEDURE — 36415 COLL VENOUS BLD VENIPUNCTURE: CPT | Performed by: NURSE PRACTITIONER

## 2021-09-04 PROCEDURE — 85025 COMPLETE CBC W/AUTO DIFF WBC: CPT | Performed by: NURSE PRACTITIONER

## 2021-09-04 PROCEDURE — 93005 ELECTROCARDIOGRAM TRACING: CPT | Performed by: NURSE PRACTITIONER

## 2021-09-04 PROCEDURE — 250N000011 HC RX IP 250 OP 636: Performed by: NURSE PRACTITIONER

## 2021-09-04 PROCEDURE — 250N000009 HC RX 250: Performed by: NURSE PRACTITIONER

## 2021-09-04 RX ORDER — IOPAMIDOL 755 MG/ML
75 INJECTION, SOLUTION INTRAVASCULAR ONCE
Status: COMPLETED | OUTPATIENT
Start: 2021-09-04 | End: 2021-09-04

## 2021-09-04 RX ADMIN — IOPAMIDOL 75 ML: 755 INJECTION, SOLUTION INTRAVENOUS at 16:48

## 2021-09-04 RX ADMIN — SODIUM CHLORIDE 100 ML: 9 INJECTION, SOLUTION INTRAVENOUS at 16:49

## 2021-09-04 ASSESSMENT — ENCOUNTER SYMPTOMS
RHINORRHEA: 0
ARTHRALGIAS: 0
FEVER: 0
SINUS PRESSURE: 0
WHEEZING: 0
NUMBNESS: 0
HEADACHES: 0
WEAKNESS: 0
STRIDOR: 0
SORE THROAT: 0
MYALGIAS: 0
TROUBLE SWALLOWING: 0
PALPITATIONS: 0
JOINT SWELLING: 0
LIGHT-HEADEDNESS: 0
SHORTNESS OF BREATH: 1
VOMITING: 0
CHILLS: 0
NAUSEA: 0
DYSURIA: 0
DIZZINESS: 0
COUGH: 0
ABDOMINAL PAIN: 0
SINUS PAIN: 0
FATIGUE: 0

## 2021-09-04 NOTE — ED PROVIDER NOTES
History     Chief Complaint   Patient presents with     Chest Pain     HPI  Lluvia Glaser is a 35 year old female who presents to the emergency department for evaluation of chest pain. Pain is a sharp pain to the right lower anterior chest, nonradiating. Pain is worse with deep inspiration and when lying down. Accompanying shortness of breath. Patient with total hysterectomy 10 days ago and is concerned for PE. Denies fever, headache, dizziness, cough, abdominal pain, nausea, vomiting, diarrhea, dysuria, hematuria and rash. No numbness or tingling. Patient smokes 1/2 ppd, denies drug or alcohol use.     Allergies:  Allergies   Allergen Reactions     Blue Dyes Hives     Demerol [Meperidine] Hives       Problem List:    Patient Active Problem List    Diagnosis Date Noted     Excessive bleeding in premenopausal period 08/05/2021     Priority: Medium     Added automatically from request for surgery [id number]       Substance abuse (H)      Priority: Medium     Meth.  Sober since 10/22/2019       Generalized anxiety disorder      Priority: Medium     Chronic eczema      Priority: Medium     Gastric ulcer      Priority: Medium     ADHD      Priority: Medium     Smoker      Priority: Medium     PABLO I (cervical intraepithelial neoplasia I) 12/24/2019     Priority: Medium     8/4/06 NIL  10/11/10 ASCUS, neg HPV  12/24/19 Colpo d/t cervical lesions on exam, bx PABLO I  Above per Care Everywhere  7/22/21 NIL pap, neg HPV. Plan: colposcopy due to visible leukoplakia on exam, due bef 10/22/21   7/30/21 Pt notified           Past Medical History:    Past Medical History:   Diagnosis Date     ADHD      Bipolar 1 disorder (H)      Chronic eczema      PABLO I (cervical intraepithelial neoplasia I) 12/24/2019     Depressive disorder      Gastric ulcer      Generalized anxiety disorder      Smoker      Substance abuse (H)        Past Surgical History:    Past Surgical History:   Procedure Laterality Date     ESOPHAGOSCOPY,  "GASTROSCOPY, DUODENOSCOPY (EGD), COMBINED N/A 02/26/2020    Procedure: ESOPHAGOGASTRODUODENOSCOPY, WITH BIOPSY;  Surgeon: Tesfaye Temple DO;  Location: [facility]     LAPAROSCOPIC HYSTERECTOMY TOTAL N/A 8/25/2021    Procedure: total laparoscopic hysterectomy, bilateral salpingectomy, ovaries stay in.;  Surgeon: Liberty Dickerson MD;  Location: [facility]     TONSILLECTOMY       TUBAL LIGATION         Family History:    Family History   Problem Relation Age of Onset     Cancer Mother         \"in her stomach, maybe ovaries\"     Depression Mother      Anxiety Disorder Mother      Mental Illness Mother      Attention Deficit Disorder Son      Bipolar Disorder Son        Social History:  Marital Status:   [2]  Social History     Tobacco Use     Smoking status: Current Every Day Smoker     Packs/day: 0.50     Years: 10.00     Pack years: 5.00     Types: Cigarettes     Smokeless tobacco: Former User     Tobacco comment: 2-3 cigs daily   Vaping Use     Vaping Use: Never used   Substance Use Topics     Alcohol use: Not Currently     Drug use: Not Currently     Types: Methamphetamines     Comment: sober since 10/2019        Medications:    acetaminophen (TYLENOL) 325 MG tablet  atomoxetine (STRATTERA) 25 MG capsule  benztropine (COGENTIN) 0.5 MG tablet  cariprazine (VRAYLAR) 1.5 MG CAPS capsule  clonazePAM (KLONOPIN) 2 MG tablet  ibuprofen (ADVIL/MOTRIN) 800 MG tablet  imipramine (TOFRANIL) 25 MG tablet  ondansetron (ZOFRAN-ODT) 4 MG ODT tab  ondansetron (ZOFRAN-ODT) 4 MG ODT tab  oxyCODONE (ROXICODONE) 5 MG tablet  propranolol (INDERAL) 40 MG tablet  senna-docusate (SENOKOT-S/PERICOLACE) 8.6-50 MG tablet  SUMAtriptan (IMITREX) 50 MG tablet  tamsulosin (FLOMAX) 0.4 MG capsule          Review of Systems   Constitutional: Negative for chills, fatigue and fever.   HENT: Negative for congestion, ear discharge, ear pain, rhinorrhea, sinus pressure, sinus pain, sore throat and trouble swallowing.    Respiratory: Positive " for shortness of breath. Negative for cough, wheezing and stridor.    Cardiovascular: Positive for chest pain. Negative for palpitations and leg swelling.   Gastrointestinal: Negative for abdominal pain, nausea and vomiting.   Genitourinary: Negative for dysuria.   Musculoskeletal: Negative for arthralgias, joint swelling and myalgias.   Skin: Negative for rash.   Neurological: Negative for dizziness, weakness, light-headedness, numbness and headaches.   All other systems reviewed and are negative.      Physical Exam   BP: 121/81  Pulse: 97  Temp: 98.2  F (36.8  C)  Resp: 14  Weight: 77.1 kg (170 lb)  SpO2: 97 %      Physical Exam  Constitutional:       General: She is not in acute distress.     Appearance: She is well-developed. She is not diaphoretic.   HENT:      Head: Normocephalic.   Eyes:      Conjunctiva/sclera: Conjunctivae normal.      Pupils: Pupils are equal, round, and reactive to light.   Cardiovascular:      Rate and Rhythm: Normal rate and regular rhythm.      Pulses: Normal pulses.   Pulmonary:      Effort: Pulmonary effort is normal. No respiratory distress.      Breath sounds: Normal breath sounds and air entry. No decreased air movement. No decreased breath sounds, wheezing or rhonchi.   Abdominal:      General: There is no distension.      Palpations: Abdomen is soft.      Tenderness: There is no abdominal tenderness.   Musculoskeletal:         General: Normal range of motion.      Cervical back: Normal range of motion and neck supple.   Skin:     General: Skin is warm.      Capillary Refill: Capillary refill takes less than 2 seconds.      Comments: Well healing lap sites without erythema or edema   Neurological:      General: No focal deficit present.      Mental Status: She is alert and oriented to person, place, and time.   Psychiatric:         Mood and Affect: Mood normal.         ED Course        Procedures              EKG Interpretation:      Interpreted by LENA Jimenez CNP, Dr  Deon  Time reviewed: 1540  Symptoms at time of EKG: chest pain   Rhythm: normal sinus   Rate: normal  Axis: normal  Ectopy: none  Conduction: normal  ST Segments/ T Waves: No ST-T wave changes  Q Waves: none  Comparison to prior: No old EKG available    Clinical Impression: normal EKG    Results for orders placed or performed during the hospital encounter of 09/04/21 (from the past 24 hour(s))   CBC with platelets, differential    Narrative    The following orders were created for panel order CBC with platelets, differential.  Procedure                               Abnormality         Status                     ---------                               -----------         ------                     CBC with platelets and d...[[id number]]  Abnormal            Final result                 Please view results for these tests on the individual orders.   Basic metabolic panel   Result Value Ref Range    Sodium 140 133 - 144 mmol/L    Potassium 4.0 3.4 - 5.3 mmol/L    Chloride 110 (H) 94 - 109 mmol/L    Carbon Dioxide (CO2) 25 20 - 32 mmol/L    Anion Gap 5 3 - 14 mmol/L    Urea Nitrogen 9 7 - 30 mg/dL    Creatinine 1.01 0.52 - 1.04 mg/dL    Calcium 7.9 (L) 8.5 - 10.1 mg/dL    Glucose 91 70 - 99 mg/dL    GFR Estimate 72 >60 mL/min/1.73m2   Troponin I   Result Value Ref Range    Troponin I <0.015 0.000 - 0.045 ug/L   CBC with platelets and differential   Result Value Ref Range    WBC Count 5.5 4.0 - 11.0 10e3/uL    RBC Count 4.01 3.80 - 5.20 10e6/uL    Hemoglobin 12.0 11.7 - 15.7 g/dL    Hematocrit 33.1 (L) 35.0 - 47.0 %    MCV 83 78 - 100 fL    MCH 29.9 26.5 - 33.0 pg    MCHC 36.3 31.5 - 36.5 g/dL    RDW 13.0 10.0 - 15.0 %    Platelet Count 213 150 - 450 10e3/uL    % Neutrophils 62 %    % Lymphocytes 26 %    % Monocytes 9 %    % Eosinophils 3 %    % Basophils 0 %    % Immature Granulocytes 0 %    NRBCs per 100 WBC 0 <1 /100    Absolute Neutrophils 3.4 1.6 - 8.3 10e3/uL    Absolute Lymphocytes 1.4 0.8 - 5.3 10e3/uL     Absolute Monocytes 0.5 0.0 - 1.3 10e3/uL    Absolute Eosinophils 0.2 0.0 - 0.7 10e3/uL    Absolute Basophils 0.0 0.0 - 0.2 10e3/uL    Absolute Immature Granulocytes 0.0 <=0.0 10e3/uL    Absolute NRBCs 0.0 10e3/uL   [place] Draw    Narrative    The following orders were created for panel order [place] Draw.  Procedure                               Abnormality         Status                     ---------                               -----------         ------                     Extra Blue Top Tube[[id number]]                              Final result               Extra Red Top Tube[[id number]]                               Final result                 Please view results for these tests on the individual orders.   Extra Blue Top Tube   Result Value Ref Range    Hold Specimen JIC    Extra Red Top Tube   Result Value Ref Range    Hold Specimen JIC    CT Chest Pulmonary Embolism w Contrast    Narrative    CT CHEST PULMONARY EMBOLISM WITH CONTRAST 9/4/2021 4:49 PM    CLINICAL HISTORY: PE suspected, high prob.    TECHNIQUE: CT angiogram chest during arterial phase injection IV  contrast. 2D and 3D MIP reconstructions were performed by the CT  technologist. Dose reduction techniques were used.     CONTRAST: 75mL Isovue-370    COMPARISON: Chest X-rays dated 2/3/2020.    FINDINGS:  ANGIOGRAM CHEST: Pulmonary arteries are normal caliber and negative  for pulmonary emboli. Thoracic aorta is negative for dissection. No CT  evidence of right heart strain.    LUNGS AND PLEURA: Normal.    MEDIASTINUM/AXILLAE: Normal.    UPPER ABDOMEN: Normal.    MUSCULOSKELETAL: Minimal spondylotic spurring is seen in the thoracic  spine. No aggressive osseous lesions or acute osseous fractures.      Impression    IMPRESSION:  1.  Minimal degenerative changes of thoracic spine. Otherwise negative  CT chest.  2.  No evidence for pulmonary artery embolism or for aortic  dissection. No pneumonia or pneumothorax.       Medications   iopamidol  (ISOVUE-370) solution 75 mL (75 mLs Intravenous Given 9/4/21 1648)   sodium chloride 0.9 % bag 500mL for CT scan flush use (100 mLs Intravenous Given 9/4/21 1649)       Assessments & Plan (with Medical Decision Making)   Patient is a 35 year old female who presents to the emergency department for evaluation or right anterior chest pain since yesterday. Total hysterectomy 10 days ago. Patient appears uncomfortable on exam, no worrisome findings. Vital signs normal. EKG and troponin normal. CBC and BMP without acute abnormality. CT without evidence of PE. Incidental finding of minimal degenerative changes to thoracic spine. Unclear etiology of symptoms however unlikely cardiac or pulmonary in origin given reassuring workup. At this point will plan to discharge home, OTC medications as needed and appropriate and follow up with PCP if symptoms persist. Return to the department with new or worsening symptoms. Patient is comfortable with discharge and discharged in good condition.     I have reviewed the nursing notes.    I have reviewed the findings, diagnosis, plan and need for follow up with the patient.  New Prescriptions    No medications on file       Final diagnoses:   Chest pain       9/4/2021   [facility] EMERGENCY DEPT     Agustina Chapman, APRN CNP  09/04/21 0290

## 2021-09-04 NOTE — ED NOTES
"Patient reports she had laparoscopic hysterectomy ~ 1 week ago. ~ 3 days of right sided chest pain and SOB with activity. Painful to take deep breaths. Has been up around moving since surgery. Patient would like to be evaluated for blood clots. No hx of blot clots for her but she \"read on line about them and her friend and mother both have had one\". No calf pain. Denies cough, fever or chills.   "

## 2021-09-06 DIAGNOSIS — G43.829 MENSTRUAL MIGRAINE WITHOUT STATUS MIGRAINOSUS, NOT INTRACTABLE: ICD-10-CM

## 2021-09-07 ENCOUNTER — TELEPHONE (OUTPATIENT)
Dept: BEHAVIORAL HEALTH | Facility: CLINIC | Age: 36
End: 2021-09-07

## 2021-09-07 NOTE — TELEPHONE ENCOUNTER
Behavioral Health Home Services  [facility]: [place]        Social Work Care Navigator Note      Patient: Lluvia Glaser  Date: September 7, 2021  Preferred Name: Lluvia    Previous PHQ-9:   PHQ-9 SCORE 4/5/2021 4/19/2021 6/17/2021   PHQ-9 Total Score MyChart - - 12 (Moderate depression)   PHQ-9 Total Score 25 3 12     Previous WAYNE-7:   WAYNE-7 SCORE 4/5/2021 4/19/2021 6/17/2021   Total Score - - 10 (moderate anxiety)   Total Score 21 2 10     ULISES LEVEL:  No flowsheet data found.    Preferred Contact:  Need for : No  Preferred Contact: Cell      Type of Contact Today: Phone call (not reached/unavailable)      Data: (subjective / Objective):  Attempted to reach patient for Behavioral Health Home ([facility]) ED Follow-up, but was unsuccessful, left message and sent patient Specific Mediahart message.  Plan to attempt again.     TRIXIE Moon [facility]  [facility] [place] ([facility])   [facility]  [phone]  September 7, 2021  2:19 PM            Next 5 appointments (look out 90 days)    Sep 09, 2021  1:00 PM  SHORT with Susan Anaya MD  [facility] ([facility] ) [address] [address]  [phone]

## 2021-09-09 ENCOUNTER — OFFICE VISIT (OUTPATIENT)
Dept: FAMILY MEDICINE | Facility: CLINIC | Age: 36
End: 2021-09-09
Payer: COMMERCIAL

## 2021-09-09 VITALS
RESPIRATION RATE: 16 BRPM | DIASTOLIC BLOOD PRESSURE: 70 MMHG | WEIGHT: 177 LBS | SYSTOLIC BLOOD PRESSURE: 108 MMHG | OXYGEN SATURATION: 98 % | HEART RATE: 97 BPM | BODY MASS INDEX: 30.22 KG/M2 | HEIGHT: 64 IN | TEMPERATURE: 98.8 F

## 2021-09-09 DIAGNOSIS — R30.0 DYSURIA: ICD-10-CM

## 2021-09-09 DIAGNOSIS — R10.11 RUQ ABDOMINAL PAIN: ICD-10-CM

## 2021-09-09 DIAGNOSIS — N13.2 HYDRONEPHROSIS WITH URINARY OBSTRUCTION DUE TO URETERAL CALCULUS: ICD-10-CM

## 2021-09-09 DIAGNOSIS — N39.0 URINARY TRACT INFECTION, ACUTE: Primary | ICD-10-CM

## 2021-09-09 LAB
ALBUMIN UR-MCNC: NEGATIVE MG/DL
APPEARANCE UR: ABNORMAL
BACTERIA #/AREA URNS HPF: ABNORMAL /HPF
BILIRUB UR QL STRIP: NEGATIVE
COLOR UR AUTO: YELLOW
GLUCOSE UR STRIP-MCNC: NEGATIVE MG/DL
HGB UR QL STRIP: NEGATIVE
KETONES UR STRIP-MCNC: ABNORMAL MG/DL
LEUKOCYTE ESTERASE UR QL STRIP: ABNORMAL
NITRATE UR QL: POSITIVE
PH UR STRIP: 7 [PH] (ref 5–7)
RBC #/AREA URNS AUTO: ABNORMAL /HPF
SP GR UR STRIP: 1.02 (ref 1–1.03)
SQUAMOUS #/AREA URNS AUTO: ABNORMAL /LPF
UROBILINOGEN UR STRIP-ACNC: 0.2 E.U./DL
WBC #/AREA URNS AUTO: ABNORMAL /HPF

## 2021-09-09 PROCEDURE — 81001 URINALYSIS AUTO W/SCOPE: CPT | Performed by: FAMILY MEDICINE

## 2021-09-09 PROCEDURE — 99214 OFFICE O/P EST MOD 30 MIN: CPT | Performed by: FAMILY MEDICINE

## 2021-09-09 PROCEDURE — 87086 URINE CULTURE/COLONY COUNT: CPT | Performed by: FAMILY MEDICINE

## 2021-09-09 PROCEDURE — 87186 SC STD MICRODIL/AGAR DIL: CPT | Performed by: FAMILY MEDICINE

## 2021-09-09 RX ORDER — SUMATRIPTAN 50 MG/1
50 TABLET, FILM COATED ORAL
Qty: 18 TABLET | Refills: 3 | Status: SHIPPED | OUTPATIENT
Start: 2021-09-09 | End: 2022-04-28

## 2021-09-09 RX ORDER — CEPHALEXIN 500 MG/1
500 CAPSULE ORAL 2 TIMES DAILY
Qty: 14 CAPSULE | Refills: 0 | Status: SHIPPED | OUTPATIENT
Start: 2021-09-09 | End: 2021-09-16

## 2021-09-09 ASSESSMENT — MIFFLIN-ST. JEOR: SCORE: 1482.87

## 2021-09-09 ASSESSMENT — PAIN SCALES - GENERAL: PAINLEVEL: EXTREME PAIN (8)

## 2021-09-09 NOTE — PATIENT INSTRUCTIONS
Our Clinic hours are:  Mondays    7:20 am - 7 pm  Tues -  Fri  7:20 am - 5 pm    Clinic Phone: [phone]    The clinic lab opens at 7:30 am Mon - Fri and appointments are required.    [facility]. [phone]  Monday  8 am - 7pm  Tues - Fri 8 am - 5:30 pm

## 2021-09-09 NOTE — TELEPHONE ENCOUNTER
Routing refill request to provider for review/approval because:  Serotonin Agonists Failed      David Cortez RN

## 2021-09-09 NOTE — PROGRESS NOTES
Assessment & Plan     Urinary tract infection, acute   culture pending  - cephALEXin (KEFLEX) 500 MG capsule; Take 1 capsule (500 mg) by mouth 2 times daily for 7 days    Hydronephrosis with urinary obstruction due to ureteral calculus   I suspect she has passed the left distal ureter stone as pain is improved    - UA with Microscopic reflex to Culture - lab collect; Future  - UA with Microscopic reflex to Culture - lab collect  - Urine Microscopic Exam  - Urine Culture    RUQ abdominal pain   further evaluation with ultrasound, if negative consider HIDA scan  - US Abdomen Limited; Future    Dysuria     - UA with Microscopic reflex to Culture - lab collect; Future  - UA with Microscopic reflex to Culture - lab collect  - Urine Microscopic Exam  - Urine Culture                 No follow-ups on file.    Susan Anaya MD  [facility]    Denisse Teixeira is a 35 year old who presents for the following health issues     HPI     ED/UC Followup:  Facility:  [facility] Emergency Dept  Date of visit: 9/1/21  Reason for visit: Kidney stone  HPI  Lluvia Glaser is a 35 year old female with past medical history significant for substance abuse, anxiety disorder with bipolar disorder and recent total abdominal laparoscopic total hysterectomy on 825 who presents emergency department complaining of left-sided abdominal pain.  Patient states she was doing well until this afternoon when she lifted a box and felt kind of pop in her left side of her pelvis.  She is now having mid left-sided to suprapubic abdominal pain.  She states the pain is an aching pain that will go away.  Worsened with activity.  She has not had any bleeding or spotting denies any fevers or chills has not had any chest pain or shortness of breath.  She states her incision sites have been healing well she had not been having any significant problems prior to this.  She has not been taking significant amount  "of pain medication at this time.  She denies any urinary symptoms and has not had any focal numbness weakness in extremity.  Currently rates her pain a 4 out of 10 worsened with activity.  Current Status: Patient rates pain at a 8/10 when she tries to urinate. Patient is out of medication and rates pain at a 8/10. Patient has no zofran or flomax left.       ED/UC Followup:  Facility:  [facility] Emergency Dept  Date of visit: 9/4/21  Reason for visit: chest pain   HPI  Lluvia KASIA Glaser is a 35 year old female who presents to the emergency department for evaluation of chest pain. Pain is a sharp pain to the right lower anterior chest, nonradiating. Pain is worse with deep inspiration and when lying down. Accompanying shortness of breath. Patient with total hysterectomy 10 days ago and is concerned for PE. Denies fever, headache, dizziness, cough, abdominal pain, nausea, vomiting, diarrhea, dysuria, hematuria and rash. No numbness or tingling. Patient smokes 1/2 ppd, denies drug or alcohol use.   Current Status: Patient has no current chest pain. Patient feels like it is her gallbladder     1.  Visit 9/1 - distal left ureter stone and mild/mod hydronephrosis - this pain seems to have resolved  2.  Visit 9/4 - lower chest pleuritic pain - PE ruled out, she is concerned that it's her gallbladder and that she gets pain after eating and at night when lying down.       She is having more pain with urination in the past few days.     Review of Systems   Constitutional, HEENT, cardiovascular, pulmonary, gi and gu systems are negative, except as otherwise noted.      Objective    /70   Pulse 97   Temp 98.8  F (37.1  C) (Tympanic)   Resp 16   Ht 1.626 m (5' 4\")   Wt 80.3 kg (177 lb)   SpO2 98%   BMI 30.38 kg/m    Body mass index is 30.38 kg/m .  Physical Exam   GENERAL: healthy, alert and no distress  NECK: no adenopathy, no asymmetry, masses, or scars and thyroid normal to palpation  RESP: " lungs clear to auscultation - no rales, rhonchi or wheezes  CV: regular rate and rhythm, normal S1 S2, no S3 or S4, no murmur, click or rub, no peripheral edema and peripheral pulses strong  ABDOMEN: tenderness epigastric and RUQ, bowel sounds normal and no CVAT  MS: no gross musculoskeletal defects noted, no edema    CT scans from the ER and labs reviewed.

## 2021-09-12 LAB — BACTERIA UR CULT: ABNORMAL

## 2021-09-13 ENCOUNTER — ALLIED HEALTH/NURSE VISIT (OUTPATIENT)
Dept: BEHAVIORAL HEALTH | Facility: CLINIC | Age: 36
End: 2021-09-13
Payer: COMMERCIAL

## 2021-09-13 DIAGNOSIS — R69 DIAGNOSIS DEFERRED: Primary | ICD-10-CM

## 2021-09-13 NOTE — PROGRESS NOTES
Behavioral Health Home Services  [facility]: [place]        Social Work Care Navigator Note      Patient: Lluvia Glaser  Date: September 13, 2021  Preferred Name: Lluvia    Previous PHQ-9:   PHQ-9 SCORE 4/5/2021 4/19/2021 6/17/2021   PHQ-9 Total Score MyChart - - 12 (Moderate depression)   PHQ-9 Total Score 25 3 12     Previous WAYNE-7:   WAYNE-7 SCORE 4/5/2021 4/19/2021 6/17/2021   Total Score - - 10 (moderate anxiety)   Total Score 21 2 10     ULISES LEVEL:  No flowsheet data found.    Preferred Contact:  Need for : No  Preferred Contact: Cell      Type of Contact Today: Phone call (patient / identified key support person reached)      Data: (subjective / Objective):  Recent ED/IP Admission or Discharge?   None    Patient Goals:  Goal Areas: Health;Mental Health;Employment / Volunteer;Financial and Social Service Benefits;Transportation  Patient stated goals: Lluvia would like assistance with her physical and mental health for creating a balanced and healthy lifestyle. Lluvia would like assistance with budgeting, SSDI and community/social service assistance to aid with [email or web address] benefits to increase her financial stability. Lluvia would like assistance with vehicle maintenance and repairs for reliable transportation to get to her appointments, run errands and get out into the community.        [facility] Core Service Provided:  Comprehensive Care Management: utilized the electronic medical record / patient registry to identify and support patient's health conditions / needs more effectively   Care Transitions: focused on the coordinated and seamless movement of patient between or within different levels of care or settings  Care Coordination: provided care management services/referrals necessary to ensure patient and their identified supports have access to medical, behavioral health, pharmacology and recovery support services.  Ensured that patient's care is integrated across all settings and services.    Individual and Family Support: aimed to help clients reduce barriers to achieving goals, increase health literacy and knowledge about chronic condition(s), increase self-efficacy skills, and improve health outcomes  Referral to Community and Social Support Services: Provided patient with referrals (see plan section)  Assisted in scheduling an appointment to a referral / service (see plan section)  Coordinated / Confirmed patient's appointment time or referral and transportation plan  Followed-up with patient on whether or not they completed a referral    Current Stressors / Issues / Care Plan Objective Addressed Today:  SWCC and patient were able to meet today for Behavioral Health Home ([facility]) monthly check-in via telehealth visit. All required ROIs have been filed with HIM/patient chart.    1. Patient reports medications working well and mood has been stable. Patient reports she is taking medications as directed and has not missed any doses in the past 7-days.    2. Patient reports she and her spouse continue to look for housing. Patient reports she will be mailing out [facility] forms today or tomorrow to see if she qualifies for benefits. [facility] and patient to monitor, as she may qualify for additional housing support with this.    3. Patient reports pain is better from her surgery but needs to reschedule abdominal ultra sound for gallbladder. [facility] was able to reschedule appt for next week to help patient match her schedule availability.    4. Patient will not be looking for employment at this time, as her physical limitations are making this difficult for her right now. [facility] to monitor and follow up with resources, such as the DWP program.     5. Patient awaiting response from the [facility] for cash or SNAP benefits. Patient would like to pursue SSDI benefits once [facility] has responded, as she may qualify for assistance with SSDI from [facility].         Intervention:  Motivational Interviewing: Expressed  Empathy/Understanding, Supported Autonomy, Collaboration, Evocation, Permission to raise concern or advise, Open-ended questions, Reflections: simple and complex, Rolled with resistance: Emphasized patient autonomy, Simple reflection, Complex reflection, Amplified reflection (weaker or stronger meaning), Shifted topic to defuse resistance, Reframed sustain talk in the direction of change and Evoked patient agenda, Change talk (evoked) and Reframe   Target Behavior(s): Explored thoughts about taking an anti-depressant, Explored and resolved challenges related to taking anti-depressants as prescribed, Explored thoughts and readiness to participate in individual therapy, Explored and resolved challenges to attending appointments as scheduled and Explored current social supports and reinforced opportunities to increase engagement    Assessment: (Progress on Goals / Homework):  Patient would benefit from continued coordination in reaching their goals set for the Behavioral Health Home ([facility]) program. [facility] reviewed Health Action Plan goals and will continue to monitor progress and work with patient and their care team.      Plan: (Homework, other):  Patient was encouraged to continue to seek condition-related information and education.      Scheduled a Phone follow up appointment with JESSICA TRINIDAD in 2 weeks     Patient has set self-identified goals and will monitor progress until the next appointment on: 09/27/2021.        TRIXIE Moon [facility]  Behavioral Health Home ([facility])   [facility]  [phone]  September 13, 2021

## 2021-09-14 NOTE — RESULT ENCOUNTER NOTE
Call to pt to notify of below.  Unable to reach.  Left message for pt to call back   Needs post op appointment scheduled.    Katheryn Lane   Ob/Gyn Clinic  RN

## 2021-09-15 ENCOUNTER — TELEPHONE (OUTPATIENT)
Dept: OBGYN | Facility: CLINIC | Age: 36
End: 2021-09-15

## 2021-09-15 NOTE — LETTER
[facility]   [address] [place] [address]  [phone]    September 15, 2021      Fax letter to Attn:  Haley  Fax:  [phone],        RE:Lluvia Glaser            [address]  [place] [address]            To Whom It May Concern:      Lluvia Glaser ( 1985) is a patient here at [facility].  She has been off from work due to a surgery. She will need to remain off from work up for 6 week post op recovery.  We will reassess at her post op appt on 21.      Sincerely,      Liberty Dickerson MD

## 2021-09-15 NOTE — LETTER
[facility]   [address] [place] [address]  [phone]    2021        Fax letter to Attn:  Haley  Fax:  [phone]     RE:Lluvia Glaser                                                                                                                        [address]         [place] [address]          To Whom It May Concern:      Lluvia Glaser ( 1985) is a patient here at [facility].  She has been off from work due to a surgery and post-op recovery.  She had surgery on 21 and there is an expected 6 week postop restriction.  If she feels able to return to work before 6 weeks, then that is allowable.  If she needs beyond the 6 weeks, we will need to assess her at the 6 week postop check to see how much longer would be appropriate (this would be atypical)      Sincerely,      Liberty Dickerson MD

## 2021-09-15 NOTE — TELEPHONE ENCOUNTER
Letter typed, given to Dr. Dickerson to sign then will fax -pt informed it will not be done until tomorrow.    -Amparo Isbell  Clinic Station

## 2021-09-15 NOTE — TELEPHONE ENCOUNTER
Reason for Call:  Other     Detailed comments: Pt states she needs a letter sent to her  stating that she still can't work asap.    Please fax letter to Attn:  Haley  Fax:  [phone],    phone:  [phone]    Please notify pt when complete      Phone Number Patient can be reached at: Home number on file [phone] (home)    Best Time: yes    Can we leave a detailed message on this number? YES    Call taken on 9/15/2021 at 2:30 PM by Marc Garvin

## 2021-09-15 NOTE — LETTER
[facility]   [address] [place] [address]  [phone]    2021        Fax letter to Attn:  Haley  Fax:  [phone]     RE:Lluvia Glaser                                                                                                                        [address]         [place] [address]          To Whom It May Concern:      Lluvia Glaser ( 1985) is a patient here at [facility].  She has been off from work due to a surgery and post-op recovery.  She had surgery on 21 and there is an expected 6 week postop restriction.  If she feels able to return to work before 6 weeks, then that is allowable.  If she needs beyond the 6 weeks, we will need to assess her at the 6 week postop check to see how much longer would be appropriate.      Sincerely,      Liberty Dickerson MD

## 2021-09-22 DIAGNOSIS — F31.12 BIPOLAR 1 DISORDER WITH MODERATE MANIA (H): ICD-10-CM

## 2021-09-22 DIAGNOSIS — F41.1 GENERALIZED ANXIETY DISORDER: ICD-10-CM

## 2021-09-22 DIAGNOSIS — F90.2 ATTENTION DEFICIT HYPERACTIVITY DISORDER (ADHD), COMBINED TYPE: ICD-10-CM

## 2021-09-23 ENCOUNTER — OFFICE VISIT (OUTPATIENT)
Dept: OBGYN | Facility: CLINIC | Age: 36
End: 2021-09-23
Payer: COMMERCIAL

## 2021-09-23 ENCOUNTER — TELEPHONE (OUTPATIENT)
Dept: FAMILY MEDICINE | Facility: CLINIC | Age: 36
End: 2021-09-23

## 2021-09-23 VITALS
WEIGHT: 171 LBS | RESPIRATION RATE: 12 BRPM | HEART RATE: 89 BPM | SYSTOLIC BLOOD PRESSURE: 115 MMHG | BODY MASS INDEX: 29.19 KG/M2 | HEIGHT: 64 IN | TEMPERATURE: 97.5 F | DIASTOLIC BLOOD PRESSURE: 79 MMHG

## 2021-09-23 DIAGNOSIS — N20.0 NEPHROLITHIASIS: Primary | ICD-10-CM

## 2021-09-23 DIAGNOSIS — N13.2 HYDRONEPHROSIS WITH URINARY OBSTRUCTION DUE TO URETERAL CALCULUS: Primary | ICD-10-CM

## 2021-09-23 PROBLEM — N92.4 EXCESSIVE BLEEDING IN PREMENOPAUSAL PERIOD: Status: RESOLVED | Noted: 2021-08-05 | Resolved: 2021-09-23

## 2021-09-23 PROCEDURE — 99212 OFFICE O/P EST SF 10 MIN: CPT | Mod: 24 | Performed by: OBSTETRICS & GYNECOLOGY

## 2021-09-23 RX ORDER — PROPRANOLOL HYDROCHLORIDE 40 MG/1
40 TABLET ORAL 2 TIMES DAILY
Qty: 60 TABLET | Refills: 0 | Status: SHIPPED | OUTPATIENT
Start: 2021-09-23 | End: 2021-10-21

## 2021-09-23 RX ORDER — ATOMOXETINE 25 MG/1
25 CAPSULE ORAL DAILY
Qty: 30 CAPSULE | Refills: 0 | Status: SHIPPED | OUTPATIENT
Start: 2021-09-23 | End: 2022-01-21

## 2021-09-23 RX ORDER — IMIPRAMINE HCL 25 MG
25 TABLET ORAL 2 TIMES DAILY
Qty: 60 TABLET | Refills: 0 | Status: SHIPPED | OUTPATIENT
Start: 2021-09-23 | End: 2021-10-21

## 2021-09-23 RX ORDER — CLONAZEPAM 2 MG/1
TABLET ORAL
Qty: 45 TABLET | Refills: 0 | Status: SHIPPED | OUTPATIENT
Start: 2021-09-23 | End: 2021-11-10

## 2021-09-23 ASSESSMENT — PAIN SCALES - GENERAL: PAINLEVEL: WORST PAIN (10)

## 2021-09-23 ASSESSMENT — MIFFLIN-ST. JEOR: SCORE: 1455.65

## 2021-09-23 NOTE — NURSING NOTE
"Initial /79 (BP Location: Left arm, Patient Position: Sitting, Cuff Size: Adult Large)   Pulse 89   Resp 12   Ht 1.626 m (5' 4\")   Wt 77.6 kg (171 lb)   BMI 29.35 kg/m   Estimated body mass index is 29.35 kg/m  as calculated from the following:    Height as of this encounter: 1.626 m (5' 4\").    Weight as of this encounter: 77.6 kg (171 lb). .        "

## 2021-09-23 NOTE — PROGRESS NOTES
"Lluvia is a 35 year old 4 weeks s/p total laparoscopic hysterectomy who was diagnosed with kidney stones and possible gallbladder disease.  She had some spontaneous improvement in her left sided pain when she thought she passed a stone, but now the pain is back.  ED advised her to follow up with [facility]ne clinic for further pain management.    ROS: Ten point review of systems was reviewed and negative except the above.    No changes to PMH/PSH    PE: /79 (BP Location: Left arm, Patient Position: Sitting, Cuff Size: Adult Large)   Pulse 89   Resp 12   Ht 1.626 m (5' 4\")   Wt 77.6 kg (171 lb)   BMI 29.35 kg/m      General Appearance:  healthy, alert, active, no distress  HEENT: NCAT  Abdomen: Soft, nontender.  Normal bowel sounds.  No masses  Pelvic exam: deferred to her 6 week postop check    A/P 35 year old here for     ICD-10-CM    1. Nephrolithiasis  N20.0         1. Her pain is unrelated to her surgery and she appears well in that regard.  Discussed that her kidney stone management should be with her PCP.  I spoke with Dr. Anaya's office to get her an order for her CT scan so they can reassess her kidney stones.  She will try to arrange her gallbladder and CT scan assessments on the same day.     Will assess surgical recovery in 2 more weeks.     Liberty Dickerson M.D.      10 minutes spent on the date of the encounter doing chart review, patient visit, documentation and discussion with other provider(s)       "

## 2021-09-23 NOTE — TELEPHONE ENCOUNTER
Date of Last Office Visit: 04/29/2021  Date of Next Office Visit: 09/27/2021  No shows since last visit: 1 on 06/08/2021  Cancellations since last visit: 1 on 05/03/2021 by provider    Medication requested: atomoxetine (STRATTERA) 25 MG  Date last ordered: 08/18/2021 Qty: 30 Refills: 0    Medication requested: cariprazine (VRAYLAR) 1.5 MG  Date last ordered: 08/18/2021 Qty: 30 Refills: 0    Medication requested: clonazePAM (KLONOPIN) 2 MG  Date last ordered: 08/18/2021 Qty: 45 Refills: 0    Medication requested: imipramine (TOFRANIL) 25 MG  Date last ordered: 08/18/2021 Qty: 60 Refills: 0      Medication requested: propranolol (INDERAL) 40 MG  Date last ordered: 08/18/2021 Qty: 60 Refills: 0    [place] of [place] ?: yes  Medication last filled date: 08/18/2021 Qty filled: 45  Other controlled substance on [place] ?: yes  If yes, is this a new medication?: yes  If yes, name of medication: Oxycodone 5mg and date filled: see below:  Fill Date  ID  Written  Sold  Drug  Qty  Days  Prescriber  Rx #  Pharmacy  Refill  Daily Dose *  Pymt Type    09/03/2021  3  09/03/2021 09/03/2021  Oxycodone Hcl 5 Mg Tablet   10.00  3  Keith Ryan  [id number]  Jessica ([id number])  0/0  25.00 MME  Other  [place]  09/02/2021  1  09/01/2021 09/02/2021  Oxycodone Hcl 5 Mg Tablet   10.00  1  Da Mounika  [id number]  [place] ([id number])  0/0  75.00 MME  [place]  [place]  08/25/2021  1  08/25/2021 08/25/2021  Oxycodone Hcl 5 Mg Tablet   12.00  2  Ju Bec  [id number]  [place] ([id number])  0/0  45.00 MME  Medicaid     Lapse in medication adherence greater than 5 days?: no - at 5  If yes, call patient and gather details: n/a  Medication refill request verified as identical to current order?: yes  Result of Last DAM, VPA, Li+ Level, CBC, or Carbamazepine Level (at or since last visit): N/A    Last visit treatment plan:     1.  Continue atomoxetine 25 mg daily     2.  Discontinue quetiapine     3.  Add benztropine 0.5 mg twice daily as needed for movements     4.  Continue Vraylar 1.5 mgdaily    5.   Continue clonazepam 1 - 2  mg twice daily as needed for anxiety-45 tabs filled     6.  Continue imipramine 25 mg twice daily    7.  Continue propranolol 40 mg twice daily    8.  Continue behavior health home care services with [facility]      []Medication refilled per  Medication Refill in Ambulatory Care  policy.  [x]Medication unable to be refilled by RN due to criteria not met as indicated below:    []Eligibility - not seen in the last year   []Supervision - no future appointment   []Compliance - no shows, cancellations or lapse in therapy   []Verification - order discrepancy   [x]Controlled medication   [x]Medication not included in policy   []90-day supply request   []Other

## 2021-09-23 NOTE — TELEPHONE ENCOUNTER
Dr. Dickerson is asking if you will order the CT abdomen/pelvis for her kidney stones so she can schedule both of her imaging exams together.  Please advise.    Virgie Turcios RN

## 2021-09-26 ENCOUNTER — HEALTH MAINTENANCE LETTER (OUTPATIENT)
Age: 36
End: 2021-09-26

## 2021-09-27 ENCOUNTER — HOSPITAL ENCOUNTER (EMERGENCY)
Facility: CLINIC | Age: 36
Discharge: HOME | End: 2021-09-27
Admitting: EMERGENCY MEDICINE
Payer: COMMERCIAL

## 2021-09-27 ENCOUNTER — TELEPHONE (OUTPATIENT)
Dept: FAMILY MEDICINE | Facility: CLINIC | Age: 36
End: 2021-09-27

## 2021-09-27 ENCOUNTER — VIRTUAL VISIT (OUTPATIENT)
Dept: BEHAVIORAL HEALTH | Facility: CLINIC | Age: 36
End: 2021-09-27
Payer: COMMERCIAL

## 2021-09-27 ENCOUNTER — VIRTUAL VISIT (OUTPATIENT)
Dept: PSYCHIATRY | Facility: CLINIC | Age: 36
End: 2021-09-27
Payer: COMMERCIAL

## 2021-09-27 VITALS
HEIGHT: 64 IN | BODY MASS INDEX: 29.02 KG/M2 | RESPIRATION RATE: 18 BRPM | OXYGEN SATURATION: 99 % | HEART RATE: 104 BPM | DIASTOLIC BLOOD PRESSURE: 91 MMHG | SYSTOLIC BLOOD PRESSURE: 134 MMHG | TEMPERATURE: 97.8 F | WEIGHT: 170 LBS

## 2021-09-27 DIAGNOSIS — R69 DIAGNOSIS DEFERRED: Primary | ICD-10-CM

## 2021-09-27 DIAGNOSIS — Z53.9 NO SHOW: Primary | ICD-10-CM

## 2021-09-27 LAB
ALBUMIN UR-MCNC: NEGATIVE MG/DL
APPEARANCE UR: ABNORMAL
BACTERIA #/AREA URNS HPF: ABNORMAL /HPF
BILIRUB UR QL STRIP: NEGATIVE
COLOR UR AUTO: YELLOW
GLUCOSE UR STRIP-MCNC: NEGATIVE MG/DL
HGB UR QL STRIP: NEGATIVE
KETONES UR STRIP-MCNC: NEGATIVE MG/DL
LEUKOCYTE ESTERASE UR QL STRIP: NEGATIVE
MUCOUS THREADS #/AREA URNS LPF: PRESENT /LPF
NITRATE UR QL: NEGATIVE
PH UR STRIP: 6 [PH] (ref 5–7)
RBC URINE: 2 /HPF
SP GR UR STRIP: 1.02 (ref 1–1.03)
SQUAMOUS EPITHELIAL: 3 /HPF
UROBILINOGEN UR STRIP-MCNC: NORMAL MG/DL
WBC URINE: 7 /HPF

## 2021-09-27 PROCEDURE — 81001 URINALYSIS AUTO W/SCOPE: CPT | Performed by: FAMILY MEDICINE

## 2021-09-27 PROCEDURE — 999N000104 HC STATISTIC NO CHARGE: Performed by: EMERGENCY MEDICINE

## 2021-09-27 ASSESSMENT — MIFFLIN-ST. JEOR: SCORE: 1451.11

## 2021-09-27 NOTE — PROGRESS NOTES
Behavioral Health Home Services  [facility]: [place]        Social Work Care Navigator Note      Patient: Lluvia Glaser  Date: September 27, 2021  Preferred Name: Lluvia    Previous PHQ-9:   PHQ-9 SCORE 4/5/2021 4/19/2021 6/17/2021   PHQ-9 Total Score MyChart - - 12 (Moderate depression)   PHQ-9 Total Score 25 3 12     Previous WAYNE-7:   WAYNE-7 SCORE 4/5/2021 4/19/2021 6/17/2021   Total Score - - 10 (moderate anxiety)   Total Score 21 2 10     ULISES LEVEL:  No flowsheet data found.    Preferred Contact:  Need for : No  Preferred Contact: Cell      Type of Contact Today: Phone call (patient / identified key support person reached)      Data: (subjective / Objective):  Recent ED/IP Admission or Discharge?   None    Patient Goals:  Goal Areas: Health;Mental Health;Employment / Volunteer;Financial and Social Service Benefits;Transportation  Patient stated goals: Lluvia would like assistance with her physical and mental health for creating a balanced and healthy lifestyle. Lluvia would like assistance with budgeting, SSDI and community/social service assistance to aid with [employer] benefits to increase her financial stability. Lluvia would like assistance with vehicle maintenance and repairs for reliable transportation to get to her appointments, run errands and get out into the community.        [facility] Core Service Provided:  Comprehensive Care Management: utilized the electronic medical record / patient registry to identify and support patient's health conditions / needs more effectively   Care Transitions: focused on the coordinated and seamless movement of patient between or within different levels of care or settings  Care Coordination: provided care management services/referrals necessary to ensure patient and their identified supports have access to medical, behavioral health, pharmacology and recovery support services.  Ensured that patient's care is integrated across all settings and services.    Individual and Family Support: aimed to help clients reduce barriers to achieving goals, increase health literacy and knowledge about chronic condition(s), increase self-efficacy skills, and improve health outcomes  Referral to Community and Social Support Services: Provided patient with referrals (see plan section)  Assisted in scheduling an appointment to a referral / service (see plan section)  Coordinated / Confirmed patient's appointment time or referral and transportation plan  Followed-up with patient on whether or not they completed a referral    Current Stressors / Issues / Care Plan Objective Addressed Today:  SWCC and patient were able to meet today for Behavioral Health Home ([facility]) monthly check-in via telehealth visit. All required ROIs have been filed with HIM/patient chart.    1. Patient reports she and her spouse continue to look for housing. Patient reports she and her s.o. are not getting along and often feels pressured to have sexual intercourse with her partner. Patient reports she recently had surgery and should not be having intercourse. Patient reports she feels safe but needs to find housing. SWCC and patient discussed options today. Patient reports she is meeting with her  today to discuss options for housing as well. [facility] resources for women's crisis hotlines and shelter programs in her area today. Patient reports appreciation for this.     Resources emailed:  [facility]  [address]  [place], [place] - [address]  [phone]    URL:  https://www.familypathways.org/24-hour-crisis-xyfhqcy-753-282-safe-7233/  [facility] offers a 24 hr crisis line, emergency shelter, support groups and legal advocacy for women and families. Serving [place] campus.    [place] [facility]  Domestic Abuse, Sexual Assault and Human Trafficking Crisis Line - CALL [phone]  TEXT  [phone]      2. Patient reports she believes she has an infection from her surgery. Patient and [facility] discussed options today. Patient reports she would like to go to ED. [facility] messaged providers with update today.    Patient reports she has been waiting for CT scheduling to campos her. [facility] messaged provider today to help with scheduling.    3. Patient reports increased stressors due to physical health with finances and housing. Patient reports she is feeling overwhelmed by this. Patient reports she is feeling safe with herself and is taking medications as directed. Patient has upcoming appt with provider today. Patient reports she would like additional mental health support and would like appt with Behavioral Health Clinician. [facility] was able to set up appt for patient today.     4. Patient will not be looking for employment at this time, as her physical limitations are making this difficult for her right now. [facility] to monitor and follow up with resources, such as the DWP program.     5. Patient awaiting response from the [facility] for cash or SNAP benefits. Patient would like to pursue SSDI benefits once [facility] has responded, as she may qualify for assistance with SSDI from [facility].         Intervention:  Motivational Interviewing: Expressed Empathy/Understanding, Supported Autonomy, Collaboration, Evocation, Permission to raise concern or advise, Open-ended questions, Reflections: simple and complex, Rolled with resistance: Emphasized patient autonomy, Simple reflection, Complex reflection, Amplified reflection (weaker or stronger meaning), Shifted topic to defuse resistance, Reframed sustain talk in the direction of change and Evoked patient agenda, Change talk (evoked) and Reframe   Target Behavior(s): Explored thoughts about taking an anti-depressant, Explored and resolved challenges related to taking anti-depressants as prescribed, Explored thoughts and readiness to participate in individual therapy,  Explored and resolved challenges to attending appointments as scheduled and Explored current social supports and reinforced opportunities to increase engagement    Assessment: (Progress on Goals / Homework):  Patient would benefit from continued coordination in reaching their goals set for the Behavioral Health Home ([facility]) program. SWCC reviewed Health Action Plan goals and will continue to monitor progress and work with patient and their care team.      Plan: (Homework, other):  Patient was encouraged to continue to seek condition-related information and education.      Scheduled a Phone follow up appointment with SW  in 1 week     Patient has set self-identified goals and will monitor progress until the next appointment on: 10/04/2021.         TRIXIE Moon [facility]  Behavioral Health Home ([facility])   [facility]  [phone]  September 27, 2021  11:32 AM

## 2021-09-27 NOTE — Clinical Note
Hi Dr. Anaya & Keeley Mina - I know you have appt with patient this afternoon. I did confirm with the patient she would be able to attend your appt today.    Dr. Anaya - I know you placed order for CT but scheduling has not called her. Can you have someone from your team reach out to the patient today? Sounds like patient may have an infection and will be going to ED today.    I was able to schedule her with Behavioral Health Clinician Deepika Thomas for additional mental health support, as she is going through a tough time right now.     Thank you,  TRIXIE Moon [facility]  Behavioral Health Home ([facility])   [facility]  [phone]  September 27, 2021  11:34 AM

## 2021-09-27 NOTE — TELEPHONE ENCOUNTER
CT scan ordered last week, nobody from diagnostics reached out to patient.  Can someone call her and give her the number to schedule -[phone].    Susan Anaya M.D.

## 2021-09-27 NOTE — Clinical Note
Hi Deepika,    Patient going through a tough time right now and would like some additional mental health supports. I was able to schedule her with you for next week.    Take care,  Minnie

## 2021-09-28 ENCOUNTER — HOSPITAL ENCOUNTER (EMERGENCY)
Facility: CLINIC | Age: 36
Discharge: HOME | End: 2021-09-28
Attending: FAMILY MEDICINE | Admitting: FAMILY MEDICINE
Payer: COMMERCIAL

## 2021-09-28 ENCOUNTER — APPOINTMENT (OUTPATIENT)
Dept: CT IMAGING | Facility: CLINIC | Age: 36
End: 2021-09-28
Attending: FAMILY MEDICINE
Payer: COMMERCIAL

## 2021-09-28 ENCOUNTER — TELEPHONE (OUTPATIENT)
Dept: BEHAVIORAL HEALTH | Facility: CLINIC | Age: 36
End: 2021-09-28

## 2021-09-28 ENCOUNTER — OFFICE VISIT (OUTPATIENT)
Dept: OBGYN | Facility: CLINIC | Age: 36
End: 2021-09-28
Payer: COMMERCIAL

## 2021-09-28 VITALS
HEIGHT: 64 IN | SYSTOLIC BLOOD PRESSURE: 129 MMHG | BODY MASS INDEX: 28.85 KG/M2 | DIASTOLIC BLOOD PRESSURE: 88 MMHG | TEMPERATURE: 97.8 F | HEART RATE: 99 BPM | WEIGHT: 169 LBS | RESPIRATION RATE: 18 BRPM

## 2021-09-28 VITALS
WEIGHT: 170 LBS | OXYGEN SATURATION: 100 % | DIASTOLIC BLOOD PRESSURE: 92 MMHG | HEIGHT: 64 IN | SYSTOLIC BLOOD PRESSURE: 140 MMHG | BODY MASS INDEX: 29.02 KG/M2 | RESPIRATION RATE: 18 BRPM | TEMPERATURE: 97.7 F | HEART RATE: 93 BPM

## 2021-09-28 DIAGNOSIS — R10.9 LEFT FLANK PAIN: ICD-10-CM

## 2021-09-28 DIAGNOSIS — N83.202 LEFT OVARIAN CYST: ICD-10-CM

## 2021-09-28 DIAGNOSIS — Z09 POSTOP CHECK: Primary | ICD-10-CM

## 2021-09-28 LAB
ANION GAP SERPL CALCULATED.3IONS-SCNC: 6 MMOL/L (ref 3–14)
BASOPHILS # BLD AUTO: 0 10E3/UL (ref 0–0.2)
BASOPHILS NFR BLD AUTO: 0 %
BUN SERPL-MCNC: 11 MG/DL (ref 7–30)
CALCIUM SERPL-MCNC: 8.2 MG/DL (ref 8.5–10.1)
CHLORIDE BLD-SCNC: 109 MMOL/L (ref 94–109)
CO2 SERPL-SCNC: 22 MMOL/L (ref 20–32)
CREAT SERPL-MCNC: 0.72 MG/DL (ref 0.52–1.04)
EOSINOPHIL # BLD AUTO: 0.2 10E3/UL (ref 0–0.7)
EOSINOPHIL NFR BLD AUTO: 2 %
ERYTHROCYTE [DISTWIDTH] IN BLOOD BY AUTOMATED COUNT: 12.9 % (ref 10–15)
GFR SERPL CREATININE-BSD FRML MDRD: >90 ML/MIN/1.73M2
GLUCOSE BLD-MCNC: 102 MG/DL (ref 70–99)
HCT VFR BLD AUTO: 34.7 % (ref 35–47)
HGB BLD-MCNC: 12.5 G/DL (ref 11.7–15.7)
IMM GRANULOCYTES # BLD: 0 10E3/UL
IMM GRANULOCYTES NFR BLD: 0 %
LYMPHOCYTES # BLD AUTO: 1.7 10E3/UL (ref 0.8–5.3)
LYMPHOCYTES NFR BLD AUTO: 20 %
MCH RBC QN AUTO: 29.9 PG (ref 26.5–33)
MCHC RBC AUTO-ENTMCNC: 36 G/DL (ref 31.5–36.5)
MCV RBC AUTO: 83 FL (ref 78–100)
MONOCYTES # BLD AUTO: 0.7 10E3/UL (ref 0–1.3)
MONOCYTES NFR BLD AUTO: 8 %
NEUTROPHILS # BLD AUTO: 5.8 10E3/UL (ref 1.6–8.3)
NEUTROPHILS NFR BLD AUTO: 70 %
NRBC # BLD AUTO: 0 10E3/UL
NRBC BLD AUTO-RTO: 0 /100
PLATELET # BLD AUTO: 185 10E3/UL (ref 150–450)
POTASSIUM BLD-SCNC: 3.7 MMOL/L (ref 3.4–5.3)
RBC # BLD AUTO: 4.18 10E6/UL (ref 3.8–5.2)
SODIUM SERPL-SCNC: 137 MMOL/L (ref 133–144)
WBC # BLD AUTO: 8.4 10E3/UL (ref 4–11)

## 2021-09-28 PROCEDURE — 74176 CT ABD & PELVIS W/O CONTRAST: CPT

## 2021-09-28 PROCEDURE — 99024 POSTOP FOLLOW-UP VISIT: CPT | Performed by: OBSTETRICS & GYNECOLOGY

## 2021-09-28 PROCEDURE — 96361 HYDRATE IV INFUSION ADD-ON: CPT | Performed by: FAMILY MEDICINE

## 2021-09-28 PROCEDURE — 258N000003 HC RX IP 258 OP 636: Performed by: FAMILY MEDICINE

## 2021-09-28 PROCEDURE — 85025 COMPLETE CBC W/AUTO DIFF WBC: CPT | Performed by: FAMILY MEDICINE

## 2021-09-28 PROCEDURE — 99285 EMERGENCY DEPT VISIT HI MDM: CPT | Mod: 25 | Performed by: FAMILY MEDICINE

## 2021-09-28 PROCEDURE — 250N000011 HC RX IP 250 OP 636: Performed by: FAMILY MEDICINE

## 2021-09-28 PROCEDURE — 36415 COLL VENOUS BLD VENIPUNCTURE: CPT | Performed by: FAMILY MEDICINE

## 2021-09-28 PROCEDURE — 99284 EMERGENCY DEPT VISIT MOD MDM: CPT | Performed by: FAMILY MEDICINE

## 2021-09-28 PROCEDURE — 96374 THER/PROPH/DIAG INJ IV PUSH: CPT | Performed by: FAMILY MEDICINE

## 2021-09-28 PROCEDURE — 80048 BASIC METABOLIC PNL TOTAL CA: CPT | Performed by: FAMILY MEDICINE

## 2021-09-28 RX ORDER — KETOROLAC TROMETHAMINE 15 MG/ML
15 INJECTION, SOLUTION INTRAMUSCULAR; INTRAVENOUS ONCE
Status: COMPLETED | OUTPATIENT
Start: 2021-09-28 | End: 2021-09-28

## 2021-09-28 RX ORDER — IBUPROFEN 800 MG/1
800 TABLET, FILM COATED ORAL EVERY 8 HOURS PRN
Qty: 21 TABLET | Refills: 0 | Status: SHIPPED | OUTPATIENT
Start: 2021-09-28 | End: 2021-09-29

## 2021-09-28 RX ADMIN — SODIUM CHLORIDE 1000 ML: 9 INJECTION, SOLUTION INTRAVENOUS at 16:39

## 2021-09-28 RX ADMIN — KETOROLAC TROMETHAMINE 15 MG: 15 INJECTION, SOLUTION INTRAMUSCULAR; INTRAVENOUS at 16:39

## 2021-09-28 ASSESSMENT — MIFFLIN-ST. JEOR
SCORE: 1451.11
SCORE: 1446.58

## 2021-09-28 NOTE — TELEPHONE ENCOUNTER
Behavioral Health Home Services  [facility]: [place]        Social Work Care Navigator Note      Patient: Lluvia Glaser  Date: September 28, 2021  Preferred Name: Lluvia    Previous PHQ-9:   PHQ-9 SCORE 4/5/2021 4/19/2021 6/17/2021   PHQ-9 Total Score MyChart - - 12 (Moderate depression)   PHQ-9 Total Score 25 3 12     Previous WAYNE-7:   WAYNE-7 SCORE 4/5/2021 4/19/2021 6/17/2021   Total Score - - 10 (moderate anxiety)   Total Score 21 2 10     ULISES LEVEL:  No flowsheet data found.    Preferred Contact:  Need for : No  Preferred Contact: Cell      Type of Contact Today: Phone call (not reached/unavailable)      Data: (subjective / Objective):  Attempted to reach patient for Behavioral Health Home ([facility]) ED check-in, but was unsuccessful, left message.  Plan to attempt again.      SWCC following up today from ED visit, it looks like patient left before being seen at ED yesterday and missed psychiatry appt. [facility] was unable to connect with patient today. Patient and CC have appt scheduled on 10/04/2021. Patient has Behavioral Health Clinician therapy appt scheduled for 10/06/2021 for additional mental health support. [facility] updated providers.    TRIXIE Moon [facility]  Behavioral Health Home ([facility])   [facility]  [phone]  September 28, 2021  10:19 AM            Next 5 appointments (look out 90 days)    Oct 06, 2021  2:00 PM  [facility]  [facility] ([facility] ) [address] [place] [id number]  [phone]

## 2021-09-28 NOTE — PROGRESS NOTES
"Lluvia is a 35 year old  here for follow up of flank pain and \"greenish blood out of my vagina\".  Patient is now 5 weeks postop from total laparoscopic hysterectomy.  Her CT scan isn't scheduled until Friday and she doesn't think she can manage the level of pain she is in until then.      ROS: Ten point review of systems was reviewed and negative except the above.    PMH: Her past medical, surgical, and obstetric histories were reviewed and are documented in their appropriate chart areas.    ALL/Meds: Her medication and allergy histories were reviewed and are documented in their appropriate chart areas.    Social History     Tobacco Use     Smoking status: Current Every Day Smoker     Packs/day: 0.50     Years: 10.00     Pack years: 5.00     Types: Cigarettes     Smokeless tobacco: Former User     Tobacco comment: 2-3 cigs daily   Vaping Use     Vaping Use: Never used   Substance Use Topics     Alcohol use: Not Currently     Drug use: Not Currently     Types: Methamphetamines     Comment: sober since 10/2019      FH: Her family history was reviewed and documented in its appropriate chart area.     PE: /88 (BP Location: Right arm, Patient Position: Chair, Cuff Size: Adult Regular)   Pulse 99   Temp 97.8  F (36.6  C) (Tympanic)   Resp 18   Ht 1.626 m (5' 4\")   Wt 76.7 kg (169 lb)   LMP 2021   Breastfeeding No   BMI 29.01 kg/m      General Appearance:  Crying and upset  HEENT: NCAT  Left CVAT  Abdomen: Soft, nontender.  Normal bowel sounds.  No masses  Pelvic exam: normal vagina and vulva, post hysterectomy status, vaginal cuff well healed.  Moderate amount of beige/yellow discharge.  Cuff intact with suture still present.  No blood or green discharge seen.      A/P 35 year old  here for     ICD-10-CM    1. Postop check  Z09    2. Left flank pain  R10.9         1. Her postop exam is benign except for her left flank pain.  I discussed with ED her concerns about pain management and need " for evaluation with CT sooner rather than later.  I recommended to patient that she be seen in ED since that can arrange for both pain relief and CT scan faster than I could up here.  Patient instructed to go to ED and informed that they are aware of her.     Liberty Dickerson M.D.

## 2021-09-28 NOTE — ED PROVIDER NOTES
"  HPI   The patient is a 35-year-old female presenting with left flank pain.  She reports having nephrolithiasis diagnosed on a CT scan \"about 3 weeks ago.\"  On 9/1 there is a visit to the ED showing a distal ureteral stone.  The patient describes intermittent episodes of pain since that time.  She was to the ED yesterday but was not seen because of the wait time.  The urine analysis was resulted and showed only 7 white blood cells and no RBCs.  She presented to the OB clinic today for follow up.  She had a total hysterectomy done at the end of August, 2021.  She reported \"green discharge with blood\" to Dr. Dickerson.  Examination performed and no bloody discharge identified, see that note for details.  She was sent to the ED for further evaluation.    The patient reports ongoing pain in the left low back that radiates toward the left lower quadrant and groin region.  The pain is sharp and intermittent.  She rates the pain is severe.  She has associated nausea occasionally, none currently.  No vomiting.  The pain is worsened with urination.  No burning with urination at the urethra.  No increased urgency or frequency.  No diarrhea or constipation.  No hematochezia or melena.  No trauma or injury.  No fever.        Allergies:  Allergies   Allergen Reactions     Blue Dyes Hives     Demerol [Meperidine] Hives     Problem List:    Patient Active Problem List    Diagnosis Date Noted     Substance abuse (H)      Priority: Medium     Meth.  Sober since 10/22/2019       Generalized anxiety disorder      Priority: Medium     Chronic eczema      Priority: Medium     Gastric ulcer      Priority: Medium     ADHD      Priority: Medium     Smoker      Priority: Medium     PABLO I (cervical intraepithelial neoplasia I) 12/24/2019     Priority: Medium     8/4/06 NIL  10/11/10 ASCUS, neg HPV  12/24/19 Colpo d/t cervical lesions on exam, bx PABLO I  Above per Care Everywhere  7/22/21 NIL pap, neg HPV. Plan: colposcopy due to visible " "leukoplakia on exam, due bef 10/22/21   7/30/21 Pt notified   8/25/21 hysterectomy. Benign pathology.  9/23/21 post op scheduled        Past Medical History:    Past Medical History:   Diagnosis Date     ADHD      Bipolar 1 disorder (H)      Chronic eczema      PABLO I (cervical intraepithelial neoplasia I) 12/24/2019     Depressive disorder      Excessive bleeding in premenopausal period 08/05/2021     Gastric ulcer      Generalized anxiety disorder      Smoker      Substance abuse (H)      Past Surgical History:    Past Surgical History:   Procedure Laterality Date     ESOPHAGOSCOPY, GASTROSCOPY, DUODENOSCOPY (EGD), COMBINED N/A 02/26/2020    Procedure: ESOPHAGOGASTRODUODENOSCOPY, WITH BIOPSY;  Surgeon: Tesfaye Temple DO;  Location: [facility]     LAPAROSCOPIC HYSTERECTOMY TOTAL N/A 8/25/2021    Procedure: total laparoscopic hysterectomy, bilateral salpingectomy, ovaries stay in.;  Surgeon: Liberty Dickerson MD;  Location: [facility]     TONSILLECTOMY       TUBAL LIGATION       Family History:    Family History   Problem Relation Age of Onset     Cancer Mother         \"in her stomach, maybe ovaries\"     Depression Mother      Anxiety Disorder Mother      Mental Illness Mother      Attention Deficit Disorder Son      Bipolar Disorder Son      Social History:  Marital Status:   [2]  Social History     Tobacco Use     Smoking status: Current Every Day Smoker     Packs/day: 0.50     Years: 10.00     Pack years: 5.00     Types: Cigarettes     Smokeless tobacco: Former User     Tobacco comment: 2-3 cigs daily   Vaping Use     Vaping Use: Never used   Substance Use Topics     Alcohol use: Not Currently     Drug use: Not Currently     Types: Methamphetamines     Comment: sober since 10/2019      Medications:    atomoxetine (STRATTERA) 25 MG capsule  benztropine (COGENTIN) 0.5 MG tablet  cariprazine (VRAYLAR) 1.5 MG CAPS capsule  clonazePAM (KLONOPIN) 2 MG tablet  ibuprofen (ADVIL/MOTRIN) 800 MG tablet  imipramine " "(TOFRANIL) 25 MG tablet  propranolol (INDERAL) 40 MG tablet  senna-docusate (SENOKOT-S/PERICOLACE) 8.6-50 MG tablet  SUMAtriptan (IMITREX) 50 MG tablet      Review of Systems   All other systems reviewed and are negative.      PE   BP: 122/87  Pulse: 103  Temp: 97.7  F (36.5  C)  Resp: 18  Height: 162.6 cm (5' 4\")  Weight: 77.1 kg (170 lb)  SpO2: 99 %  Physical Exam  Vitals reviewed.   Constitutional:       Appearance: She is well-developed.      Comments: Obvious discomfort.  Answering questions well.   HENT:      Head: Normocephalic and atraumatic.      Right Ear: External ear normal.      Left Ear: External ear normal.      Nose: Nose normal.      Mouth/Throat:      Mouth: Mucous membranes are moist.      Pharynx: Oropharynx is clear.   Eyes:      Extraocular Movements: Extraocular movements intact.      Conjunctiva/sclera: Conjunctivae normal.      Pupils: Pupils are equal, round, and reactive to light.   Cardiovascular:      Rate and Rhythm: Normal rate and regular rhythm.   Pulmonary:      Effort: Pulmonary effort is normal.   Abdominal:      Comments: Uncomfortable with deep palpation in the left lower quadrant.  Soft throughout.  No distention.   Musculoskeletal:         General: Normal range of motion.      Cervical back: Normal range of motion.   Skin:     General: Skin is warm and dry.   Neurological:      Mental Status: She is alert and oriented to person, place, and time.   Psychiatric:         Behavior: Behavior normal.         ED COURSE and MDM   1608.  The patient has left lower quadrant and left low back pain that is worsened with urination.  Known history of distal ureteral stone on 9/1.  Repeat CT scan at this time.  Toradol and Zofran ordered.  Fluid bolus.    1811.  CT imaging unremarkable for ureteral stone.  No evidence for hydronephrosis.  There are stones within the kidney.  There is a left sided ovarian cyst.  No pelvic fluid described.  No evidence for complicating infection or abscess.  " Blood work unremarkable.  Urine analysis yesterday unremarkable.  She is without urinary symptoms.  Her pain is improved with medication given.  Continue with ibuprofen/Tylenol at home.  She has an appointment for an outpatient ultrasound of her pelvis 3 days from now.  She does not want the pelvic ultrasound performed today even though I am recommending it be done to rule out ovarian torsion.  The patient understands the risks of leaving without imaging.    LABS  Labs Ordered and Resulted from Time of ED Arrival Up to the Time of Departure from the ED   BASIC METABOLIC PANEL - Abnormal; Notable for the following components:       Result Value    Calcium 8.2 (*)     Glucose 102 (*)     All other components within normal limits   CBC WITH PLATELETS AND DIFFERENTIAL - Abnormal; Notable for the following components:    Hematocrit 34.7 (*)     All other components within normal limits   CBC WITH PLATELETS & DIFFERENTIAL    Narrative:     The following orders were created for panel order CBC with platelets, differential.  Procedure                               Abnormality         Status                     ---------                               -----------         ------                     CBC with platelets and d...[[id number]]  Abnormal            Final result                 Please view results for these tests on the individual orders.       IMAGING  Images reviewed by me.  Radiology report also reviewed.  CT Abdomen Pelvis w/o Contrast   Final Result   IMPRESSION:    1.  There are several small nonobstructing stones in both kidneys,   measuring 0.3 cm or smaller, not significantly changed since the   previous exam.   2.  No ureteral calculi or evidence for urinary obstruction.   3.  There is a 3 cm left ovarian cystic lesion, new since the previous   exam. Pelvic ultrasound may be helpful for further characterization.      ANGELICA CRUZ MD            SYSTEM ID:  [id number]          Procedures    Medications    0.9% sodium chloride BOLUS (0 mLs Intravenous Stopped 9/28/21 1736)   ketorolac (TORADOL) injection 15 mg (15 mg Intravenous Given 9/28/21 1639)         IMPRESSION       ICD-10-CM    1. Left flank pain  R10.9    2. Left ovarian cyst  N83.202             Medication List      Modified    ibuprofen 800 MG tablet  Commonly known as: ADVIL/MOTRIN  800 mg, Oral, EVERY 8 HOURS PRN  What changed:     when to take this    reasons to take this                          Mauricio Evans MD  09/28/21 7712

## 2021-09-28 NOTE — NURSING NOTE
"Initial /88 (BP Location: Right arm, Patient Position: Chair, Cuff Size: Adult Regular)   Pulse 99   Temp 97.8  F (36.6  C) (Tympanic)   Resp 18   Ht 1.626 m (5' 4\")   Wt 76.7 kg (169 lb)   LMP 07/23/2021   Breastfeeding No   BMI 29.01 kg/m   Estimated body mass index is 29.01 kg/m  as calculated from the following:    Height as of this encounter: 1.626 m (5' 4\").    Weight as of this encounter: 76.7 kg (169 lb). .      "

## 2021-09-28 NOTE — Clinical Note
I saw her today and tried to have her go to ED.  I called ED and they only had 2 patients waiting and patient informed of this.  My staff went to make sure that she made it there and could not find her.  I did not prescribe narcotics.  Liberty Dickerson M.D.

## 2021-09-28 NOTE — DISCHARGE INSTRUCTIONS
Return to the Emergency Room if the following occurs:     Severely worsened pain, fever greater than 101, fainting, vomiting and dehydration, or for any concern at anytime.    Or, follow-up with the following provider as we discussed:     Follow-up for your pelvic ultrasound as scheduled on Friday, 3 days from now.  You probably do not need to get the CT scan performed as it was just done today.  Then, follow-up with Dr. Dickerson or your primary physician for further discussion.    Medications discussed:    Ibuprofen / tylenol alternating every three hours for comfort, as needed.    If you received pain-relieving or sedating medication during your time in the ER, avoid alcohol, driving automobiles, or working with machinery.  Also, a responsible adult must stay with you.        Call the Nurse Advice Line at [phone] or [phone] for any concern at anytime.

## 2021-09-29 ENCOUNTER — TELEPHONE (OUTPATIENT)
Dept: BEHAVIORAL HEALTH | Facility: CLINIC | Age: 36
End: 2021-09-29

## 2021-09-29 ENCOUNTER — NURSE TRIAGE (OUTPATIENT)
Dept: NURSING | Facility: CLINIC | Age: 36
End: 2021-09-29

## 2021-09-29 ENCOUNTER — VIRTUAL VISIT (OUTPATIENT)
Dept: BEHAVIORAL HEALTH | Facility: CLINIC | Age: 36
End: 2021-09-29
Payer: COMMERCIAL

## 2021-09-29 DIAGNOSIS — R69 DIAGNOSIS DEFERRED: Primary | ICD-10-CM

## 2021-09-29 DIAGNOSIS — R10.9 FLANK PAIN: Primary | ICD-10-CM

## 2021-09-29 RX ORDER — IBUPROFEN 800 MG/1
800 TABLET, FILM COATED ORAL EVERY 8 HOURS PRN
Qty: 21 TABLET | Refills: 0 | Status: SHIPPED | OUTPATIENT
Start: 2021-09-29 | End: 2022-02-14

## 2021-09-29 NOTE — TELEPHONE ENCOUNTER
"Seen in ER last night and was to be given RX Ibu 800mg but it is not at pharmacy. It was to be called to [facility] in [place].   Per chart review: order was \"local print\". Order transmitted to pharmacy @ 6:58pm. Patient intends to pick it up in the am.     Reason for Disposition    [1] Prescription prescribed recently is not at pharmacy AND [2] triager has access to patient's EMR AND [3] prescription is recorded in the EMR    Additional Information    Negative: Drug overdose and triager unable to answer question    Negative: Caller requesting information unrelated to medicine    Negative: Caller requesting a prescription for Strep throat and has a positive culture result    Negative: Rash while taking a medication or within 3 days of stopping it    Negative: Immunization reaction suspected    Negative: [1] Asthma and [2] having symptoms of asthma (cough, wheezing, etc.)    Negative: [1] Influenza symptoms AND [2] anti-viral med prescription request, such as Tamiflu    Negative: [1] Symptom of illness (e.g., headache, abdominal pain, earache, vomiting) AND [2] more than mild    Negative: MORE THAN A DOUBLE DOSE of a prescription or over-the-counter (OTC) drug    Negative: [1] DOUBLE DOSE (an extra dose or lesser amount) of over-the-counter (OTC) drug AND [2] any symptoms (e.g., dizziness, nausea, pain, sleepiness)    Negative: [1] DOUBLE DOSE (an extra dose or lesser amount) of prescription drug AND [2] any symptoms (e.g., dizziness, nausea, pain, sleepiness)    Negative: Took another person's prescription drug    Negative: [1] DOUBLE DOSE (an extra dose or lesser amount) of prescription drug AND [2] NO symptoms (Exception: a double dose of antibiotics)    Negative: Diabetes drug error or overdose (e.g., took wrong type of insulin or took extra dose)    Negative: [1] Request for URGENT new prescription or refill of \"essential\" medication (i.e., likelihood of harm to patient if not taken) AND [2] triager unable " to fill per unit policy    Negative: [1] Prescription not at pharmacy AND [2] was prescribed by PCP recently    Negative: [1] Pharmacy calling with prescription questions AND [2] triager unable to answer question    Negative: [1] Caller has URGENT medication question about med that PCP or specialist prescribed AND [2] triager unable to answer question    Negative: [1] Caller has NON-URGENT medication question about med that PCP prescribed AND [2] triager unable to answer question    Negative: [1] Caller requesting a NON-URGENT new prescription or refill AND [2] triager unable to refill per unit policy    Negative: [1] Caller has medication question about med not prescribed by PCP AND [2] triager unable to answer question (e.g., compatibility with other med, storage)    Negative: Caller requesting a CONTROLLED substance prescription refill (e.g., narcotics, ADHD medicines)    Negative: Caller wants to use a complementary or alternative medicine    Protocols used: MEDICATION QUESTION CALL-A-

## 2021-09-29 NOTE — PROGRESS NOTES
"Behavioral Health Home Services  [facility] Clinic: [place]        Social Work Care Navigator Note      Patient: Lluvia Glaser  Date: September 29, 2021  Preferred Name: Lluvia    Previous PHQ-9:   PHQ-9 SCORE 4/5/2021 4/19/2021 6/17/2021   PHQ-9 Total Score MyChart - - 12 (Moderate depression)   PHQ-9 Total Score 25 3 12     Previous WAYNE-7:   WAYNE-7 SCORE 4/5/2021 4/19/2021 6/17/2021   Total Score - - 10 (moderate anxiety)   Total Score 21 2 10     ULISES LEVEL:  No flowsheet data found.    Preferred Contact:  Need for : No  Preferred Contact: Cell      Type of Contact Today: Phone call (patient / identified key support person reached)      Data: (subjective / Objective):  Patient Goals:  Goal Areas: Health;Mental Health;Employment / Volunteer;Financial and Social Service Benefits;Transportation  Patient stated goals: Lluvia would like assistance with her physical and mental health for creating a balanced and healthy lifestyle. Lluvia would like assistance with budgeting, SSDI and community/social service assistance to aid with [employer] benefits to increase her financial stability. Lluvia would like assistance with vehicle maintenance and repairs for reliable transportation to get to her appointments, run errands and get out into the community.      Recent ED/IP Admission or Discharge?   [facility] ED Admission date: 09/28/2021    Current Stressors / Issues:      What were some the circumstances or situations that led up to the emergency room visit?  The patient has left lower quadrant and left low back pain that is worsened with urination.  Known history of distal ureteral stone on 9/1.    What concerns do you still have regarding (reason for admission)?    Patient reports she feels \"something is wrong.\" Patient reports she did not have this pain prior to her surgery. Patient reports continued symptoms. Patient reports she believes ED thinks she is drug seeking and not completing full assessment. Patient " reports she has been sober since Oct 2019 and is monitored by [facility] office at this time.  [facility] encouraged patient to reach out to [facility]ocate at [phone].    Patient feels like she might need to go to another ED to get another opinion. Patient reports due to urgent care closing in [place] the wait time at ED has increased dramatically and is another barrier to care. [facility] encouraged patient to seek medical assistance or call 911 if pain intensifies or increases. [facility] provided patient with support/empathy, coping strategies and motivational interviewing today.      What recommendations did the doctors and team make?   Pelvic ultrasound - patient denied need today. Patient recommended continued pain management with ibuprofen/Tylenol at home.    Were you able to schedule and/or attend recommended appointments? NA    Did you  any new prescriptions? NA      What types of health care support might better meet your needs?    Patient reports easier access to medical support. Patient reports ED wait time is a barrier to care and no close urgent care available.      How confident do you feel about your ability to communicate these needs to your primary care team?      Patient reports she feels confident to communicate her needs but feels unheard at this time. [facility] encouraged patient to reach out to [facility] at [phone] for additional support/resources, [employer] message sent with contact information.      What do you think would help you avoid another visit the emergency room or admission to the hospital in the next six months?    Patient reports decrease in symptoms, ability to meet with her providers and keep appointments. [facility] was able to re-schedule psychiatry appt today for patient.     Intervention:  Motivational Interviewing: Expressed Empathy/Understanding, Supported Autonomy, Collaboration, Evocation, Permission to raise concern or advise, Open-ended  questions, Reflections: simple and complex, Rolled with resistance: Emphasized patient autonomy, Simple reflection, Complex reflection, Amplified reflection (weaker or stronger meaning), Shifted topic to defuse resistance, Reframed sustain talk in the direction of change and Evoked patient agenda, Change talk (evoked) and Reframe     Assessment: (Pt engagement & support needed for successful care transition):  Patient would benefit from continued coordination in reaching their goals set for the Behavioral Health Home ([facility]) program. [facility] reviewed Health Action Plan goals and will continue to monitor progress and work with patient and their care team.      Plan: (Homework, other):  Patient was encouraged to continue to seek condition-related information and education. Patient reports she received email with [facility] and will be calling them today for additional support/resources, as she feels trapped in unhealthy relationship and living situation. Patient reports financial stressors, housing concerns and tumultuous relationship is causing increase in mental health symptoms/triggers. Patient reports she feels safe but needs to make changes.     Scheduled a Phone follow up appointment with [facility] in 1 week     Patient has set self-identified goals and will monitor progress until the next appointment on: 10/04/2021.       TRIXIE Moon [facility]  Behavioral Health Home ([facility])   [facility]  [phone]  September 29, 2021  3:31 PM        Routed note to patient's primary care provider.            Next 5 appointments (look out 90 days)    Oct 06, 2021  2:00 PM  [facility]  [facility] Mental Health & Addiction [facility] ([facility] ) [address] [address]  [phone]

## 2021-09-29 NOTE — TELEPHONE ENCOUNTER
Hemanth Dickerson,    Can we discontinue paps for this pt? Hysterectomy done 8/25/21 and pathology was benign. No history of PABLO 2 or greater found in epic.     8/4/06 NIL  10/11/10 ASCUS, neg HPV  12/24/19 Colpo d/t cervical lesions on exam, bx PABLO I  Above per Care Everywhere  7/22/21 NIL pap, neg HPV. Plan: colposcopy due to visible leukoplakia on exam, due bef 10/22/21   8/25/21 hysterectomy. Benign pathology.  9/23/21 post op scheduled    Thanks!    Mami Lambert, RN  Pap Tracking

## 2021-09-30 ENCOUNTER — MYC MEDICAL ADVICE (OUTPATIENT)
Dept: FAMILY MEDICINE | Facility: CLINIC | Age: 36
End: 2021-09-30

## 2021-09-30 DIAGNOSIS — N83.202 CYST OF LEFT OVARY: Primary | ICD-10-CM

## 2021-09-30 PROBLEM — N87.0 CIN I (CERVICAL INTRAEPITHELIAL NEOPLASIA I): Status: RESOLVED | Noted: 2019-12-24 | Resolved: 2021-09-30

## 2021-09-30 NOTE — TELEPHONE ENCOUNTER
Spoke to patient, she is speaking very rapidly, says she is in a lot of pain on her left side of her lower back, reports stabbing vaginal pain that happens when she walks, she says when she walks she has pain all over and it requires her to rest, and patient reports pain with end of urination. Patient is taking the Ibuprofen 800 mg tablet as needed and this takes the edge off pain. Patient has kidney stones in kidney but they are not in the ureters or in bladder per CT on 9-28-21.     Patient is reporting pain now at 6-7/10, she is denying nausea now, she says she has pea sized light purple areas all over her body that started yesterday. Told patient will speak to PCP.    Huddled with Dr. Anaya who is aware of the patient's situation and does not think the patient should be seen in ER, rather should have clinic appointment to evaluate. PCP says patient can add additional 1000 mg Tylenol in addition to ibuprofen, try heat or ice.    Patient is called back, she is advised per PCP, patient is advised to call the [facility] first thing in the morning for the available same day appointment with Ophelia Patino, patient agrees with this plan, she says heat and cold do not help and can not afford the Tylenol.    GENARO Mata

## 2021-09-30 NOTE — TELEPHONE ENCOUNTER
See second my chart from today, patient will schedule tomorrow with same day in [place], if the patient can not get in ther, she is ok'd to put in same day on Monday with Dr. Anaya per PCP.    GENARO Mata

## 2021-10-01 ENCOUNTER — TELEPHONE (OUTPATIENT)
Dept: FAMILY MEDICINE | Facility: CLINIC | Age: 36
End: 2021-10-01

## 2021-10-01 ENCOUNTER — MYC MEDICAL ADVICE (OUTPATIENT)
Dept: FAMILY MEDICINE | Facility: CLINIC | Age: 36
End: 2021-10-01

## 2021-10-01 NOTE — TELEPHONE ENCOUNTER
Reason for Call:  Other appointment    Detailed comments: PT calling and said Dr. Anaya said there was a 9:40am slot for her to be seen. I could not find any open spots at [place] or  for her. She is wanting to get in to be seen either today or  Monday with Dr. Anaya. She didn't specify when I asked what the appt was for, just said she might have leukemia.    Phone Number Patient can be reached at: Cell number on file:    Telephone Information:   Mobile [phone]       Best Time: any    Can we leave a detailed message on this number? YES    Call taken on 10/1/2021 at 7:20 AM by Vanesa Landry

## 2021-10-04 ENCOUNTER — MYC MEDICAL ADVICE (OUTPATIENT)
Dept: FAMILY MEDICINE | Facility: CLINIC | Age: 36
End: 2021-10-04

## 2021-10-04 ENCOUNTER — VIRTUAL VISIT (OUTPATIENT)
Dept: BEHAVIORAL HEALTH | Facility: CLINIC | Age: 36
End: 2021-10-04
Payer: COMMERCIAL

## 2021-10-04 ENCOUNTER — TELEPHONE (OUTPATIENT)
Dept: FAMILY MEDICINE | Facility: CLINIC | Age: 36
End: 2021-10-04

## 2021-10-04 ENCOUNTER — HOSPITAL ENCOUNTER (OUTPATIENT)
Dept: ULTRASOUND IMAGING | Facility: CLINIC | Age: 36
Discharge: HOME | End: 2021-10-04
Attending: OBSTETRICS & GYNECOLOGY | Admitting: OBSTETRICS & GYNECOLOGY
Payer: COMMERCIAL

## 2021-10-04 DIAGNOSIS — N83.202 CYST OF LEFT OVARY: ICD-10-CM

## 2021-10-04 DIAGNOSIS — R69 DIAGNOSIS DEFERRED: Primary | ICD-10-CM

## 2021-10-04 PROCEDURE — 76830 TRANSVAGINAL US NON-OB: CPT

## 2021-10-04 NOTE — Clinical Note
"Hi Dr. Anaya, Dr. Dickerson and Deepika,    I was able to talk to this patient today. Please see attached.    Dr. Anaya - patient would like someone from your office to reach out to try to schedule another appt with her. Patient admits to struggling with anxiety and avoidance about \"something being wrong with her.\"  Can you try reaching out to schedule her again?    Deepika - Patient has appt with you on Wed.    Dr. Dickerson - hopefully patient will follow up with pelvic scan and has appt with you in Nov.    Thank you,  TRIXIE Moon LGSW Behavior[facility] [place] ([facility])   [facility]  [phone]  October 4, 2021  2:26 PM      "

## 2021-10-04 NOTE — TELEPHONE ENCOUNTER
Patient missed apt with me today.   asks that we reach out to patient to schedule follow up.     In my mind this is non-urgent if she is unable to make a follow up appointment that was a work in.     Next available is okay.    Susan Anaya M.D.

## 2021-10-04 NOTE — PROGRESS NOTES
Behavioral Health Home Services  [facility] Clinic: [place]        Social Work Care Navigator Note      Patient: Lluvia Glaser  Date: October 4, 2021  Preferred Name: Lluvia    Previous PHQ-9:   PHQ-9 SCORE 4/5/2021 4/19/2021 6/17/2021   PHQ-9 Total Score MyChart - - 12 (Moderate depression)   PHQ-9 Total Score 25 3 12     Previous WAYNE-7:   WAYNE-7 SCORE 4/5/2021 4/19/2021 6/17/2021   Total Score - - 10 (moderate anxiety)   Total Score 21 2 10     ULISES LEVEL:  No flowsheet data found.    Preferred Contact:  Need for : No  Preferred Contact: Cell      Type of Contact Today: Phone call (patient / identified key support person reached)      Data: (subjective / Objective):  Recent ED/IP Admission or Discharge?   None    Patient Goals:  Goal Areas: Health;Mental Health;Employment / Volunteer;Financial and Social Service Benefits;Transportation  Patient stated goals: Lluvia would like assistance with her physical and mental health for creating a balanced and healthy lifestyle. Lluvia would like assistance with budgeting, SSDI and community/social service assistance to aid with county benefits to increase her financial stability. Lluvia would like assistance with vehicle maintenance and repairs for reliable transportation to get to her appointments, run errands and get out into the community.        [facility] Core Service Provided:  Comprehensive Care Management: utilized the electronic medical record / patient registry to identify and support patient's health conditions / needs more effectively   Care Transitions: focused on the coordinated and seamless movement of patient between or within different levels of care or settings  Care Coordination: provided care management services/referrals necessary to ensure patient and their identified supports have access to medical, behavioral health, pharmacology and recovery support services.  Ensured that patient's care is integrated across all settings and services.   Individual  "and Family Support: aimed to help clients reduce barriers to achieving goals, increase health literacy and knowledge about chronic condition(s), increase self-efficacy skills, and improve health outcomes  Referral to Community and Social Support Services: Provided patient with referrals (see plan section)  Assisted in scheduling an appointment to a referral / service (see plan section)  Coordinated / Confirmed patient's appointment time or referral and transportation plan  Followed-up with patient on whether or not they completed a referral    Current Stressors / Issues / Care Plan Objective Addressed Today:  SWCC and patient were able to meet today for Behavioral Health Home ([facility]) monthly check-in via telehealth visit. All required ROIs have been filed with HIM/patient chart.    1. Patient reports she missed her PCP appt today. Patient reports high anxiety and worry with what might be \"wrong with her.\" Patient reports with her recent blood work, \"I am worried I have leukemia.\" Patient and SWCC discussed stress/avoidance over her physical health and mental health symptoms preventing her from following through with appts. Patient reports appreciation for understanding of this. Patient and SWCC discussed the importance of keeping her appts and following up with her physical health to improve pain symptom relief of both medical and mental health symptoms.    Patient reports she missed her last OB/GYN appt and it has been rescheduled for Nov.     SWCC and patient discussed making sure she attended today's scan and follow-up with providers. Patient in agreement and will be attending appt today. Patient report she would like to re-schedule appt with her PCP to follow-up with her blood work. [facility] messaged provider and group today.     Patient has appt on Wed. to see Behavioral Health Clinician therapist for additional short-term therapeutic support for mental health symptom management.    2. Patient reports her grandmother " is not doing well and she will be leaving on Thursday to see her mother in [place] and support her. Patient reports she is excited to see her children.     Intervention:  Motivational Interviewing: Expressed Empathy/Understanding, Supported Autonomy, Collaboration, Evocation, Permission to raise concern or advise, Open-ended questions, Reflections: simple and complex, Rolled with resistance: Emphasized patient autonomy, Simple reflection, Complex reflection, Amplified reflection (weaker or stronger meaning), Shifted topic to defuse resistance, Reframed sustain talk in the direction of change and Evoked patient agenda, Change talk (evoked) and Reframe   Target Behavior(s): Explored thoughts about taking an anti-depressant, Explored and resolved challenges related to taking anti-depressants as prescribed, Explored thoughts and readiness to participate in individual therapy, Explored and resolved challenges to attending appointments as scheduled, Explored current social supports and reinforced opportunities to increase engagement and Explored thoughts about attending and keeping appts to manage physical and mental health symptoms.    Assessment: (Progress on Goals / Homework):  Patient would benefit from continued coordination in reaching their goals set for the Behavioral Health Home ([facility]) program. SWCC reviewed Health Action Plan goals and will continue to monitor progress and work with patient and their care team.      Plan: (Homework, other):  Patient was encouraged to continue to seek condition-related information and education.      Scheduled a Phone follow up appointment with JESSICA TRINIDAD in 1 week     Patient has set self-identified goals and will monitor progress until the next appointment on: 10/11/2021.         TRIXIE Moon [facility]  Behavioral Health Home ([facility])   [facility]  [phone]  October 4, 2021  2:24 PM                  Next 5 appointments (look out 90 days)    Oct  06, 2021  2:00 PM  Telephone Visit with DUSTIN Cortés  [facility] Mental Health & Addiction [facility] [place] [facility] ([facility] [place] ) [address] [id number] [phone]

## 2021-10-05 NOTE — TELEPHONE ENCOUNTER
SAGE to call clinic/RN to schedule an appt. Sciona msg was also sent.  Will close this encounter.  KPaWendi

## 2021-10-11 ENCOUNTER — TELEPHONE (OUTPATIENT)
Dept: BEHAVIORAL HEALTH | Facility: CLINIC | Age: 36
End: 2021-10-11

## 2021-10-11 NOTE — TELEPHONE ENCOUNTER
Behavioral Health Home Services  [facility] Clinic: [place]        Social Work Care Navigator Note      Patient: Lluvia Glaser  Date: October 11, 2021  Preferred Name: Lluvia    Previous PHQ-9:   PHQ-9 SCORE 4/5/2021 4/19/2021 6/17/2021   PHQ-9 Total Score MyChart - - 12 (Moderate depression)   PHQ-9 Total Score 25 3 12     Previous WAYNE-7:   WAYNE-7 SCORE 4/5/2021 4/19/2021 6/17/2021   Total Score - - 10 (moderate anxiety)   Total Score 21 2 10     ULISES LEVEL:  No flowsheet data found.    Preferred Contact:  Need for : No  Preferred Contact: Cell      Type of Contact Today: Phone call (not reached/unavailable)      Data: (subjective / Objective):  Attempted to reach patient for [facility] [place] ([facility]) Health Action Plan update, but was unsuccessful, left message.  Plan to attempt again.      Minnie Denney LICSW [facility] [place] ([facility])   [facility]  [phone]  October 11, 2021  3:47 PM

## 2021-10-20 DIAGNOSIS — F31.12 BIPOLAR 1 DISORDER WITH MODERATE MANIA (H): ICD-10-CM

## 2021-10-20 DIAGNOSIS — F41.1 GENERALIZED ANXIETY DISORDER: ICD-10-CM

## 2021-10-20 NOTE — TELEPHONE ENCOUNTER
Routing refill request to provider for review/approval because:  Routing to Christine Mancini for review as this provider signed all 3 scripts.    GENARO Mata

## 2021-10-21 RX ORDER — PROPRANOLOL HYDROCHLORIDE 40 MG/1
40 TABLET ORAL 2 TIMES DAILY
Qty: 60 TABLET | Refills: 0 | Status: SHIPPED | OUTPATIENT
Start: 2021-10-21 | End: 2022-01-21

## 2021-10-21 RX ORDER — IMIPRAMINE HCL 25 MG
25 TABLET ORAL 2 TIMES DAILY
Qty: 60 TABLET | Refills: 0 | Status: SHIPPED | OUTPATIENT
Start: 2021-10-21 | End: 2022-01-21

## 2021-11-10 DIAGNOSIS — F41.1 GENERALIZED ANXIETY DISORDER: ICD-10-CM

## 2021-11-10 RX ORDER — CLONAZEPAM 2 MG/1
TABLET ORAL
Qty: 45 TABLET | Refills: 0 | Status: SHIPPED | OUTPATIENT
Start: 2021-11-10 | End: 2022-01-21

## 2021-11-10 NOTE — TELEPHONE ENCOUNTER
Reason for Call:  Medication or medication refill:    Do you use a [facility]?  Name of the pharmacy and phone number for the current request:     pharm: [facility]     Name of the medication requested:  refill for clonazePAM (KLONOPIN) 2 MG tablet    Other request: pt is out of med and needs it filled    Can we leave a detailed message on this number? YES    Phone number patient can be reached at: Home number on file [phone] (home)    Best Time: asap    Call taken on 11/10/2021 at 11:01 AM by Marnie Sullivan

## 2021-11-10 NOTE — TELEPHONE ENCOUNTER
Date of Last Office Visit: 4/5/2021  Date of Next Office Visit: 11/26/2021  No shows since last visit: 2  Cancellations since last visit: 1    Medication requested: Clonazepam 2 mg tablet Date last ordered: 9/23/2021 Qty: 45 Refills: 0     Review of [place] ?: yes  Medication last filled date: 9/29/2021 Qty filled: 45  Other controlled substance on [place] ?: yes  If yes, is this a new medication?: no  If yes, name of medication: no and date filled: no    Lapse in medication adherence greater than 5 days?: no  If yes, call patient and gather details: no-see encounter attached.  Medication refill request verified as identical to current order?: yes  Result of Last DAM, VPA, Li+ Level, CBC, or Carbamazepine Level (at or since last visit): N/A    Last visit treatment plan:   Treatment Plan:             1.  Decrease atomoxetine to 25 mg daily    2.  Continue quetiapine 200 mg at bedtime    3.  Discontinue Latuda     4.  Restart Vraylar 1.5 mgdaily    5.  Continue clonazepam 1 mg twice daily as needed for anxiety    6.  Continue imipramine 25 mg twice daily    7.  Continue propranolol 40 mg twice daily       Continue all other medications as reviewed per electronic medical record today.     Safety plan reviewed. To the Emergency Department as needed or call after hours crisis line at [phone] or [phone]. [place] Crisis Text Line. Text [place] to 831860 or Suicide LifeLine Chat: suicidepreventionlifeline.org/chat/    To schedule individual or family therapy, call [facility] at [phone].    Schedule an appointment with me on April 19 or sooner as needed. Call [facility] at [phone] to schedule.    Follow up with primary care provider as planned or for acute medical concerns.    Call the psychiatric nurse line with medication questions or concerns at [phone].    MyChart may be used to communicate with your provider, but this is not intended to be used for  emergencies      []Medication refilled per  Medication Refill in Ambulatory Care  policy.  [x]Medication unable to be refilled by RN due to criteria not met as indicated below:    []Eligibility - not seen in the last year   []Supervision - no future appointment   [x]Compliance - no shows, cancellations or lapse in therapy   []Verification - order discrepancy   []Controlled medication   [x]Medication not included in policy   []90-day supply request   []Other

## 2021-11-11 ENCOUNTER — VIRTUAL VISIT (OUTPATIENT)
Dept: BEHAVIORAL HEALTH | Facility: CLINIC | Age: 36
End: 2021-11-11
Payer: COMMERCIAL

## 2021-11-11 DIAGNOSIS — R69 DIAGNOSIS DEFERRED: Primary | ICD-10-CM

## 2021-11-11 ASSESSMENT — ANXIETY QUESTIONNAIRES
3. WORRYING TOO MUCH ABOUT DIFFERENT THINGS: NOT AT ALL
6. BECOMING EASILY ANNOYED OR IRRITABLE: SEVERAL DAYS
GAD7 TOTAL SCORE: 6
1. FEELING NERVOUS, ANXIOUS, OR ON EDGE: NOT AT ALL
2. NOT BEING ABLE TO STOP OR CONTROL WORRYING: NOT AT ALL
5. BEING SO RESTLESS THAT IT IS HARD TO SIT STILL: MORE THAN HALF THE DAYS
IF YOU CHECKED OFF ANY PROBLEMS ON THIS QUESTIONNAIRE, HOW DIFFICULT HAVE THESE PROBLEMS MADE IT FOR YOU TO DO YOUR WORK, TAKE CARE OF THINGS AT HOME, OR GET ALONG WITH OTHER PEOPLE: SOMEWHAT DIFFICULT
7. FEELING AFRAID AS IF SOMETHING AWFUL MIGHT HAPPEN: NOT AT ALL
4. TROUBLE RELAXING: NEARLY EVERY DAY

## 2021-11-11 NOTE — LETTER
"Behavioral Health [place] ([facility]): Health Action Plan  [facility] Clinic: [place]    Well and Beyond      Name: Lluvia KASIA Glaser  Preferred Name: Lluvia  : 1985  MRN: [id number]    2021    [facility]  [address] [place] [address]    Dear Lluvia,    I have enclosed the Health Action Plan (HAP) that we completed together that includes your goals for Behavioral Health Home ([facility]) Services. As you continue being enrolled in Behavioral Health Home ([facility]) services, I wanted to give you some information so you know what to expect moving forward.    What to Expect on a Monthly Basis: It is a requirement that I make contact with you on a monthly basis (by phone or meeting with you in clinic) to check in on how things are going and if you need any assistance. Please feel free to reach out to your [facility] team between these contacts if you need assistance or have any questions.    What to Expect every Six Months: Every six months, it is a requirement that we complete a Health Action Plan (HAP) review. During this in-clinic appointment, we will monitor our progress towards existing goals and set new goals for the next 6 month time period.    What kinds of support can [facility] offer now that I am enrolled?   - Housing Coordination  - Transportation Resources  - Financial Resources  - Coordination with the [facility] for Benefits (MA, SNAP benefits, etc)  - Disability Eligibility and Benefits  - Employment and Education Coordination  - Disability Related Information and Education Resources  - Referrals for mental health services, chemical dependency assessment/treatment, etc     If you or someone you know is experiencing a mental health crisis and you need help, the following crisis hotlines are available to help.    If you are in immediate danger, call 911.  Suicide Prevention Lifeline: 4-000-325-TALK (9369)  Crisis Text Line Service: Text \"[place]\" to 013911.    [facility] Emergency Department - Behavioral Emergency Center  [address], [place] [address]  [phone] [facility] [employer] [facility] Crisis Response Services (Adults & Children)  [phone]   [facility] - Acute Psychiatric Services (APS)  Assessment & Referral: [phone]  Suicide Hotline: 391.230.6337 [facility] Crisis Team  [phone]   [facility] Adult Mental Health Services   [phone]  Referrals: [phone]  Crisis: [phone] [facility] - Emergency Department  [address] [place] [address]  [phone]   [place] [facility]  0-930-DvupKwq (1-561.213.7277) [facility] Mental Health and Resource Crisis Line  [phone]   [facility] - Community Outreach for Psychiatric Emergencies  [phone] [facility] Crisis Services - [facility] Adult Mental Health Services - Crisis Services (24/7)  Information & Referrals: [phone]  Crisis Line: [phone]   [facility] Emergency Center (24/7)  [phone] TDD Crisis Help Line Serving [facility]  [phone]  or [facility]  to 501757    [facility] and Human [facility]  Mental Health  [address] [place] [address]  [phone]  [address] [place] [address]  [phone]  web: co.[facility].mn.  Mental-Health    [facility]  Mental Health  [address] [place] [address]  [phone]  web: [facility] Family [facility]  Family Services  [address], [place] [address]  [phone]  web: [facility] Family [facility]   Family Services  [address] [place] [address]  [phone]  web: [email or web address]/adult mental health    [facility] and Human Services Department  [address], [place]  [address]  [phone]  [address] [place] [address]  [phone]  Toll Free: [phone]  web: shay[place].[facility] and human services     Please let me know if you have any questions or if there is anything that we can assist with. I can be reached by phone, Quirehart message, or by email. I look forward to continuing to work with you!    Sincerely,          Minnie Denney, [facility]  Behavioral Health Home ([facility])   [phone]  [email or web address]

## 2021-11-11 NOTE — LETTER
Behavioral Health Home ([facility]): Health Action Plan  [facility] Clinic: [place]    Well and Beyond      Name: Lluvia Glaser  Preferred Name: Lluvia  : 1985  MRN: [id number]      My Goals  Goal Areas: Health; Mental Health; Employment / Volunteer; Financial and Social Service Benefits    Patient stated goals: Lluvia would like assistance with her physical and mental health for creating a balanced and healthy lifestyle. Lluvia would like assistance with budgeting, SSDI and community/social service assistance to aid with county benefits to increase her financial stability.      Strengths related to each goal: Lluvia identified strengths: Sobriety of two years, supportive family and significant other.     Services and Supports Needed: The [facility] Team will provide monthly contact with patient in order to monitor progress towards goals.    Activities / Actions of Team to support goal(s): [facility] team will locate appropriate resources that align with patient's goals and promote health and wellness.    Activities / Actions of Patient / Parent / Guardian to support goal(s): It is a requirement that patient's primary care physician is through the [facility] system and that they are on Medical Assistance/Medicaid. If either of these were to change or if patient needs any type of assistance, they are to reach out to their Behavioral Health Home ([facility]) team.        Recommended Referral  Tobacco cessation referrals made?: No  Mental Health / Chemical Dependency Referrals: Yes  Substance Use Referrals: Not Applicable  Mental Health Referrals: MH Services: [facility], State mental health facility, Community MH Provider; [facility]; Psychiatry  Community Health Referrals: [facility] Financial Services; [facility] ; Other (see comments)        My Team Members and Their Contact Information  Patient Care Team       Relationship Specialty Notifications Start End    Susan Anaya MD PCP - General Family Medicine Admissions 2/3/21     Phone: [phone] Pager:  [phone] Fax: [phone]        [address] [facility] [address]    Minnie Denney LSW  Clinic Admissions 6/16/20     [facility] [phone]    Keeley Mancini NP Nurse Practitioner Nurse Practitioner Admissions 6/17/20     Psychiatry Provider    Phone: [phone] Fax: [phone]         [address] [place] [address]    Keeley Mancini NP Assigned Behavioral Health Provider   10/23/20     Phone: [phone] Fax: [phone]         [address] [place] [address]    Susan Anaya MD Assigned PCP   12/20/20     Phone: [phone] Pager: [phone] Fax: [phone]        [address] [facility] [address]    Liberty Dickerson MD Assigned OBGYN Provider   8/8/21     Phone: [phone] Pager: [phone] Fax: [phone]        [address] [facility] [address]          My Wellness Plan  Safety Concerns: None Reported / Observed  Recommendations / Plan for safety concerns: A safety and risk management plan has not been developed at this time, however patient was encouraged to call [facility] / 911 should there be a change in any of these risk factors.  Crisis Plan (emergencies / when urgent support needed): Patient states she feels comfortable contacting her mother Shaneka and/or calling the National Suicide Prevention Lifeline at 530-995-1388 or 911 in a crisis or emergency situation.          Lluvia Glaser co-developed the Health Action Plan with the [facility] Team and received a copy of this document.  Date Health Action Plan Completed/Updated: 11/11/21

## 2021-11-11 NOTE — Clinical Note
Hello,    I was able to meet with this patient today and update her wellness goals for the Behavioral Health Home ([facility]) program. Please see attached.    Thank you,  Minnie Denney [facility]  Behavioral Health Home ([facility])   [facility]  [phone]  November 11, 2021  3:29 PM

## 2021-11-11 NOTE — PROGRESS NOTES
Behavioral Health [place] [facility]  [facility] Clinic: [place]        Social Work Care Navigator Note      Patient: Lluvia Glaser  Date: November 11, 2021  Preferred Name: Lluvia    Previous PHQ-9:   PHQ-9 SCORE 4/5/2021 4/19/2021 6/17/2021   PHQ-9 Total Score MyChart - - 12 (Moderate depression)   PHQ-9 Total Score 25 3 12     Previous WAYNE-7:   WAYNE-7 SCORE 4/5/2021 4/19/2021 6/17/2021   Total Score - - 10 (moderate anxiety)   Total Score 21 2 10     ULISES LEVEL:  No flowsheet data found.    Preferred Contact:  Need for : No  Preferred Contact: Cell      Type of Contact Today: Phone call (patient / identified key support person reached)      Data: (subjective / Objective):    Patient came in to complete the comprehensive wellness assessment for Behavioral Health Home Services.  See [facility] Flowsheets for details on the assessment.  See [facility], Behavioral Health Home for a copy of the patient's care plan.    Patient completed PHQ-9 and WAYNE-7 assessments today.      Patient identified Goal Areas:  Health;Mental Health;Employment / Volunteer;Financial and Social Service Benefits;Transportation.     Patient stated goals:  Lluvia would like assistance with her physical and mental health for creating a balanced and healthy lifestyle. Lluvia would like assistance with budgeting, SSDI and community/social service assistance to aid with county benefits to increase her financial stability.       Lluvia would like assistance with her physical and mental health for creating a balanced and healthy lifestyle. Patient would like continued support to manage her physical and mental health issues that can impact her daily living. Patient is currently working with her primary care clinic and psychiatrist to help manage physical and mental health symptoms. Patient appreciates the extra case management and supportive counseling from Behavioral Health Home ([facility]) [facility]. Patient and [facility] will work together to make sure patients mental  health and physical health needs are being met by meeting at least once a month to check in with physical & mental health symptoms or concerns for additional support.     Patient has been able to establish care in the last six months with a primary care provider. Patient continues to need to schedule appt w/PCP for follow-up with full physical and PAP, per PCP note in December that patient would like to schedule with OB/GYN to have IUD placement. Patient reports she would like to move forward with this and would appreciate [facility] assistance to help remind her to do this. Update - patient has been able to follow-up with OB/GYN and recently completed hysterectomy. Patient will continue to follow-up with OB/GYN as needed. Patient will continue to meet with PCP for her physical health wellness goals.     Patient reports she has been meeting with her psychiatrist regularly to manage her moods and medications. Patient reports she would like to take her medications more consistently. Patient reports she has been out of medication refills for the past two weeks. Patient reports she did contact her providers and pharmacy and will be picking up her refills in the next few days. CC and patient discussed the importance of taking medications consistently and as directed to help manage mental health symptoms. [facility] encouraged patient to call her [place] program for a free once a month pharmacy ride.      Patient reports she stopped seeing her long term therapist in the past year. Patient reports she would like to be able to access Behavioral Health Clinician provider for drop in sessions as needed. [facility] will check with patient at each visit to address if patient feels like she needs additional mental health supports, patient denies need today. Patient reports she feels like her mood is fairly stable and feels supported by psychiatric prescriber and Behavioral Health Home ([facility]) .     Patient reports she has  been sober for almost two years now and is happy about this. Patient reports other things that impact her mood is the loss of custody of her three children, now with her mother, as of the end of Sept. 2020.  Patient reports she is learning to cope with this and visits her children often. [facility] and patient will continue to discuss other supportive services that may be helpful to patient such as [place] Choice Assessment or [facility], patient has declined these services in the past. Patient reports she has a complicated relationship with her significant other. Patient has been given information by Behavioral Health Clinician and [facility] for additional women's support groups, crisis centers and shelters.      Patient reports she does not currently have a dentist but would like a provider list. [facility] emailed provider list today. Update patient reports she does have provider list but does need to schedule appt. Patient requesting to keep this as goal for next six months. Update - patient reports she has dental appt Dec. 2nd.     Lluvia would like assistance with budgeting, SSDI and community/social service assistance to aid with [facility] benefits to increase her financial stability. Patient states her mental and physical health make it extremely difficult to hold a job and is interested in applying for SSDI. [facility] emailed patient resources and forms today. Patient reports an increase in mental health symptoms due to her current financial situation and stressors. Patient states she continues to work with the [facility] to determine benefits. [facility] to follow-up with patient about this and provide additional resources if/when needed or requested. [facility] will continue to provide education, resources and care coordination.      Update - patient has made no progress with this goal. Patient reports she would like [facility] to send her out SSDI information via email again. Patient reports she would like to apply for SSDI benefits in the next 6-months. [facility]  emailed Adult SSDI Starter Kit and SSDI advocacy agency list today.     Patient reports she and her s.o. continue to live in a hotel together. Patient's s.o. does work.  Patient reports she is not receiving any county benefits at this time and does not qualify for the [facility] program for employment options. Patient reports she needs to call her county worker again to get disability form for her providers to fill out. Patient reports she will be doing this in the next few days.     Lluvia would like assistance with vehicle maintenance and repairs for reliable transportation to get to her appointments, run errands and get out into the community. Patient continues to report her vehicle can be unreliable. Patient reports she continues to struggle with transportation and does have transportation list from [facility]. [facility] reminded patient today that she can use [employer] transportation for free medical, dental, mental health or pharmacy transportation needs. [facility] will continue to support patient with this goal. Update - Goal on hold. patient continues to report loss of license and continues to struggle with transportation. Patient reports she does know how to access [employer] transportation for free medical, dental, mental health or pharmacy transportation needs. Patient reports she would like to place this goal on hold, as she has other goals this 6-months she would like to focus on. Patient reports she may want to place this as a goal in the future. [facility] to monitor.     Patient identified strengths: Sobriety of two years, supportive family and significant other.      Overall, in the past 6-months the patient has completed several goals and is making progress on additional wellness goals. [facility] will continue to provide support to assist patient in attaining wellness goals.    Minnie Denney [facility]  Behavioral Health Home ([facility])   [facility]  [phone]  November 11, 2021  3:28  PM    ADDENDUM:  Health Action Plan approved by Behavioral Health Clinician 11/12/2021. [facility] sent patient a copy of approved HAP in the mail. Next Health Action Plan review due in May of 2022.    BRANDEN Moon  11/12/2021  10:01 AM

## 2021-11-12 ASSESSMENT — ANXIETY QUESTIONNAIRES: GAD7 TOTAL SCORE: 6

## 2021-11-12 ASSESSMENT — PATIENT HEALTH QUESTIONNAIRE - PHQ9: SUM OF ALL RESPONSES TO PHQ QUESTIONS 1-9: 11

## 2021-12-02 ENCOUNTER — TELEPHONE (OUTPATIENT)
Dept: BEHAVIORAL HEALTH | Facility: CLINIC | Age: 36
End: 2021-12-02
Payer: COMMERCIAL

## 2021-12-02 NOTE — TELEPHONE ENCOUNTER
Behavioral Health Home Services  [facility] Clinic: [place]        Social Work Care Navigator Note      Patient: Lluvia Glaser  Date: December 2, 2021  Preferred Name: Lluvia    Previous PHQ-9:   PHQ-9 SCORE 4/19/2021 6/17/2021 11/11/2021   PHQ-9 Total Score MyChart - 12 (Moderate depression) -   PHQ-9 Total Score 3 12 11     Previous WAYNE-7:   WAYNE-7 SCORE 4/19/2021 6/17/2021 11/11/2021   Total Score - 10 (moderate anxiety) -   Total Score 2 10 6     ULISES LEVEL:  No flowsheet data found.    Preferred Contact:  Need for : No  Preferred Contact: Cell      Type of Contact Today: Phone call (not reached/unavailable)      Data: (subjective / Objective):  Attempted to reach patient for Behavioral Health [place] ([facility]) monthly check-in, but was unsuccessful. left message and sent patient message through [employer].  Plan to attempt again.      Minnie Denney LICSW Behavioral Health Home ([facility])   [facility]  [phone]  December 2, 2021  1:09 PM          Next 5 appointments (look out 90 days)    Dec 23, 2021  3:00 PM  (Arrive by 2:40 PM)  Office Visit with Susan Anaya MD  [facility] ([facility] ) [address] [address]  [phone]

## 2021-12-20 ENCOUNTER — TELEPHONE (OUTPATIENT)
Dept: BEHAVIORAL HEALTH | Facility: CLINIC | Age: 36
End: 2021-12-20
Payer: COMMERCIAL

## 2021-12-20 NOTE — TELEPHONE ENCOUNTER
Behavioral Health Home Services  [facility] Clinic: [place]        Social Work Care Navigator Note      Patient: Lluvia Glaser  Date: December 20, 2021  Preferred Name: Lluvia    Previous PHQ-9:   PHQ-9 SCORE 4/19/2021 6/17/2021 11/11/2021   PHQ-9 Total Score MyChart - 12 (Moderate depression) -   PHQ-9 Total Score 3 12 11     Previous WAYNE-7:   WAYNE-7 SCORE 4/19/2021 6/17/2021 11/11/2021   Total Score - 10 (moderate anxiety) -   Total Score 2 10 6     ULISES LEVEL:  No flowsheet data found.    Preferred Contact:  Need for : No  Preferred Contact: Cell      Type of Contact Today: Phone call (not reached/unavailable)      Data: (subjective / Objective):  Attempted to reach patient for Behavioral Health [place] ([facility]) monthly check-in, but was unsuccessful, left message and sent patient [employer] message.  Plan to attempt again.      Minnie Denney LICSW Behavioral Health Home ([facility])   [facility] [place]  [phone]  December 20, 2021  2:39 PM            Next 5 appointments (look out 90 days)    Dec 23, 2021  3:00 PM  (Arrive by 2:40 PM)  Provider Visit with Susan Anaya MD  [facility] ([facility] ) [address] [address]  [phone]

## 2022-01-03 ENCOUNTER — TELEPHONE (OUTPATIENT)
Dept: BEHAVIORAL HEALTH | Facility: CLINIC | Age: 37
End: 2022-01-03
Payer: COMMERCIAL

## 2022-01-03 NOTE — TELEPHONE ENCOUNTER
Behavioral Health Home Services  [facility] Clinic: [place]        Social Work Care Navigator Note      Patient: Lluvia Glaser  Date: January 3, 2022  Preferred Name: Lluvia    Previous PHQ-9:   PHQ-9 SCORE 4/19/2021 6/17/2021 11/11/2021   PHQ-9 Total Score MyChart - 12 (Moderate depression) -   PHQ-9 Total Score 3 12 11     Previous WAYNE-7:   WAYNE-7 SCORE 4/19/2021 6/17/2021 11/11/2021   Total Score - 10 (moderate anxiety) -   Total Score 2 10 6     ULISES LEVEL:  No flowsheet data found.    Preferred Contact:  Need for : No  Preferred Contact: Cell      Type of Contact Today: Phone call (not reached/unavailable)      Data: (subjective / Objective):  Attempted to reach patient for Behavioral Health [place] ([facility]) monthly check-in, but was unsuccessful, left message and mailed letter.  Plan to attempt again.     Minnie Denney LICSW Behavioral Health [place] ([facility])   [facility]  [phone]  January 3, 2022  10:46 AM

## 2022-01-03 NOTE — LETTER
Behavioral Health Home ([facility]): Health Action Plan  [facility] Clinic: [place]    Well and Beyond      Name: Lluvia Glaser  Preferred Name: Lluvia  : 1985  MRN: [id number]    January 3, 2022    Dear Lluvia Glaser,     I am writing to inform you that I tried contacting you today for your Behavioral Health Home ([facility]) monthly check-in due in November. The check-in is required to be completed every month as you continue to be enrolled in the Behavioral Health Home ([facility]) program.     Please call or message me as soon as your receive this letter so we can get an appointment scheduled.    If we are not able to complete your monthly check-in by the end of the month, you will be discharged from Behavioral Health Home ([facility]) effective 2022.     If you have any questions about your Behavioral Health Home ([facility]) monthly check-in, Behavioral Health Home ([facility]) services, or if you feel you no longer was to be enrolled in the Behavioral Health Home ([facility]) program, please feel free to contact me by phone or by email. My contact information is listed below. I look forward to hearing from you!     Thank you,        Minnie Denney, LICSW Behavioral Health Home ([facility])   [phone]  [email or web address]

## 2022-01-28 ENCOUNTER — TELEPHONE (OUTPATIENT)
Dept: BEHAVIORAL HEALTH | Facility: CLINIC | Age: 37
End: 2022-01-28
Payer: COMMERCIAL

## 2022-01-28 NOTE — TELEPHONE ENCOUNTER
Behavioral Health Home Services  No data recorded      Social Work Care Navigator Note      Patient: Lluvia Glaser  Date: January 28, 2022  Preferred Name: Lluvia    Previous PHQ-9:   PHQ-9 SCORE 4/19/2021 6/17/2021 11/11/2021   PHQ-9 Total Score MyChart - 12 (Moderate depression) -   PHQ-9 Total Score 3 12 11     Previous WAYNE-7:   WAYNE-7 SCORE 4/19/2021 6/17/2021 11/11/2021   Total Score - 10 (moderate anxiety) -   Total Score 2 10 6     ULISES LEVEL:  No flowsheet data found.    Preferred Contact:  No data recorded    Type of Contact Today: Phone call (patient / identified key support person reached)      Data: (subjective / Objective):  Attempted to reach patient for Behavioral Health Home ([facility]) monthly check-in, but was unsuccessful, left message and discharge letter mailed.      Social Work Care Coordinator discharged patient from Behavioral Health Home ([facility]) services effective, Jan. 31st, due to lack of engagement with programming. Social Work Care Coordinator able to re-enroll pt at anytime if interested in the future. [facility] updated patient's care team.    Minnie Denney [facility]  Behavioral Health Home ([facility])   [facility]  [phone]  January 28, 2022  1:54 PM

## 2022-02-10 ENCOUNTER — TELEPHONE (OUTPATIENT)
Dept: BEHAVIORAL HEALTH | Facility: CLINIC | Age: 37
End: 2022-02-10
Payer: COMMERCIAL

## 2022-02-10 ENCOUNTER — ALLIED HEALTH/NURSE VISIT (OUTPATIENT)
Dept: FAMILY MEDICINE | Facility: CLINIC | Age: 37
End: 2022-02-10
Payer: COMMERCIAL

## 2022-02-10 ENCOUNTER — TELEPHONE (OUTPATIENT)
Dept: FAMILY MEDICINE | Facility: CLINIC | Age: 37
End: 2022-02-10

## 2022-02-10 DIAGNOSIS — F41.9 ANXIETY: Primary | ICD-10-CM

## 2022-02-10 PROCEDURE — 99207 PR NO CHARGE NURSE ONLY: CPT

## 2022-02-10 NOTE — TELEPHONE ENCOUNTER
"Routing to Provider to review - FYI patient walk-in BP today at 3:45pm    Lluvia Glaser is a 36 year old year old patient who comes in today for a Blood Pressure check because of BP was 124/120 at the dental office yesterday 2/9/22  Vital Signs as repeated by /94 MS: 104 O2: 99%     Patient seemed anxious, talking fast almost jittery like. Patient also stated that she has felt her heart racing at times over the past month. RN advised that if she has chest pain or SOB, or other off symptoms that are worse than the \"usually fluttering\" she has been feeling to go to the ED or call the nurses at the clinic for further advisement.    Current complaints: Strong family history of high blood pressures.  Disposition:  Appointment with her PCP on 2/14/22 at 2:20pm.     Anabelle Chou RN BSN  "

## 2022-02-10 NOTE — TELEPHONE ENCOUNTER
[facility] received message from patient requesting follow-up call. Patient has been discharged [facility] program due to lack of engagement over the past several months.     [facility] called patient today. Patient reports she would be interested in re-enrolling in [facility] programming. [facility] was able to schedule Diagnostic Assessment update, per requirements of [facility] ([facility] programming, with Behavioral Health Clinician Ilene Zambrano next week. [facility] to meet with patient to re-enroll once Diagnostic Assessment update has been completed.    Patient requesting assistance to schedule appt with psychiatric provider Keeley Mancini NP. [facility] messaged scheduling team to reach out to patient to schedule appt.    Minnie Denney LICSW [facility]   [facility]  [phone]  February 10, 2022  2:48 PM

## 2022-02-10 NOTE — PROGRESS NOTES
"Lluvia Glaser is a 36 year old year old patient who comes in today for a Blood Pressure check because of BP was 124/120 at the dental office yesterday 2/9/22  Vital Signs as repeated by /94 [place]: 104 O2: 99%    Patient seemed anxious, talking fast almost jittery like. Patient also stated that she has felt her heart racing at times over the past month. RN advised that if she has chest pain or SOB, or other off symptoms that are worse than the \"usually fluttering\" she has been feeling to go to the ED or call the nurses at the clinic for further advisement.    Current complaints: Strong family history of high blood pressures.  Disposition:  Appointment with her PCP on 2/14/22 at 2:20pm.    Anabelle Chou RN BSN      "

## 2022-02-11 ENCOUNTER — TRANSCRIBE ORDERS (OUTPATIENT)
Dept: OTHER | Age: 37
End: 2022-02-11
Payer: COMMERCIAL

## 2022-02-14 ENCOUNTER — OFFICE VISIT (OUTPATIENT)
Dept: FAMILY MEDICINE | Facility: CLINIC | Age: 37
End: 2022-02-14
Payer: COMMERCIAL

## 2022-02-14 VITALS
HEART RATE: 108 BPM | WEIGHT: 170 LBS | SYSTOLIC BLOOD PRESSURE: 124 MMHG | TEMPERATURE: 98 F | RESPIRATION RATE: 18 BRPM | DIASTOLIC BLOOD PRESSURE: 100 MMHG | BODY MASS INDEX: 29.02 KG/M2 | HEIGHT: 64 IN | OXYGEN SATURATION: 97 %

## 2022-02-14 DIAGNOSIS — Z28.21 COVID-19 VACCINE DOSE DECLINED: ICD-10-CM

## 2022-02-14 DIAGNOSIS — R03.0 ELEVATED BP WITHOUT DIAGNOSIS OF HYPERTENSION: ICD-10-CM

## 2022-02-14 DIAGNOSIS — F31.12 BIPOLAR 1 DISORDER WITH MODERATE MANIA (H): ICD-10-CM

## 2022-02-14 DIAGNOSIS — F15.21 METHAMPHETAMINE USE DISORDER, MODERATE, IN EARLY REMISSION, IN CONTROLLED ENVIRONMENT, DEPENDENCE (H): Primary | ICD-10-CM

## 2022-02-14 PROCEDURE — 99214 OFFICE O/P EST MOD 30 MIN: CPT | Performed by: FAMILY MEDICINE

## 2022-02-14 ASSESSMENT — PAIN SCALES - GENERAL: PAINLEVEL: NO PAIN (0)

## 2022-02-14 ASSESSMENT — MIFFLIN-ST. JEOR: SCORE: 1446.11

## 2022-02-14 NOTE — PROGRESS NOTES
"  Assessment & Plan     Elevated BP without diagnosis of hypertension  BP Readings from Last 6 Encounters:   02/14/22 (!) 124/100   09/28/21 (!) 140/92   09/28/21 129/88   09/27/21 (!) 134/91   09/23/21 115/79   09/09/21 108/70     Will have her start to monitor at home, get ambulatory blood pressure monitor for confirmation  Discussed cutting caffeine, nicotine, salt  - Miscellaneous Order for DME - ONLY FOR DME  - 24 Hour Blood Pressure Monitor - Adult; Future    COVID-19 vaccine dose declined   risk discussed    Bipolar 1 disorder with moderate britt (H)  Followed by Psych NP    Methamphetamine use disorder, moderate, in early remission, in controlled environment, dependence (H)  In remission  Denies any use               BMI:   Estimated body mass index is 29.18 kg/m  as calculated from the following:    Height as of this encounter: 1.626 m (5' 4\").    Weight as of this encounter: 77.1 kg (170 lb).           No follow-ups on file.    Susan Anaya MD  [facility]    Densise Teixeira is a 36 year old who presents for the following health issues     History of Present Illness       Hypertension: She presents for follow up of hypertension.  She does not check blood pressure  regularly outside of the clinic. Outside blood pressures have been over 140/90. She does not follow a low salt diet.     She eats 0-1 servings of fruits and vegetables daily.She consumes 8 sweetened beverage(s) daily.She exercises with enough effort to increase her heart rate 10 to 19 minutes per day.  She exercises with enough effort to increase her heart rate 4 days per week.      BP Readings from Last 6 Encounters:   02/14/22 (!) 124/100   09/28/21 (!) 140/92   09/28/21 129/88   09/27/21 (!) 134/91   09/23/21 115/79   09/09/21 108/70         Blood pressure quite high at the dentist/TMJ specialist        Review of Systems   Constitutional, HEENT, cardiovascular, pulmonary, gi and gu systems are negative, except " "as otherwise noted.      Objective    BP (!) 124/100   Pulse 108   Temp 98  F (36.7  C) (Tympanic)   Resp 18   Ht 1.626 m (5' 4\")   Wt 77.1 kg (170 lb)   LMP 07/23/2021   SpO2 97%   Breastfeeding No   BMI 29.18 kg/m    Body mass index is 29.18 kg/m .  Physical Exam   GENERAL: healthy, alert and no distress  NECK: no adenopathy, no asymmetry, masses, or scars and thyroid normal to palpation  RESP: lungs clear to auscultation - no rales, rhonchi or wheezes  CV: regular rate and rhythm, normal S1 S2, no S3 or S4, no murmur, click or rub, no peripheral edema and peripheral pulses strong  ABDOMEN: soft, nontender, no hepatosplenomegaly, no masses and bowel sounds normal  MS: no gross musculoskeletal defects noted, no edema                "

## 2022-02-14 NOTE — PATIENT INSTRUCTIONS
Our Clinic hours are:  Mondays    7:20 am - 7 pm  Tues -  Fri  7:20 am - 5 pm    Clinic Phone: [phone]    The clinic lab opens at 7:30 am Mon - Fri and appointments are required.    [facility]. [phone]  Monday  8 am - 7pm  Tues - Fri 8 am - 5:30 pm

## 2022-02-16 ENCOUNTER — HOSPITAL ENCOUNTER (OUTPATIENT)
Dept: PHYSICAL THERAPY | Facility: CLINIC | Age: 37
Setting detail: THERAPIES SERIES
End: 2022-02-16
Payer: COMMERCIAL

## 2022-02-16 PROCEDURE — 97035 APP MDLTY 1+ULTRASOUND EA 15: CPT | Mod: GP | Performed by: PHYSICAL THERAPIST

## 2022-02-16 PROCEDURE — 97162 PT EVAL MOD COMPLEX 30 MIN: CPT | Mod: GP | Performed by: PHYSICAL THERAPIST

## 2022-02-16 PROCEDURE — 97110 THERAPEUTIC EXERCISES: CPT | Mod: GP | Performed by: PHYSICAL THERAPIST

## 2022-02-16 NOTE — PROGRESS NOTES
[facility]    OUTPATIENT PHYSICAL THERAPY ORTHOPEDIC EVALUATION  PLAN OF TREATMENT FOR OUTPATIENT REHABILITATION  (COMPLETE FOR INITIAL CLAIMS ONLY)  Patient's Last Name, First Name, M.I.  YOB: 1985  Lluvia Casas    Provider s Name:  [facility]   Medical Record No.  [id number]   Start of Care Date:  02/16/22   Onset Date:  02/02/22   Type:     _X__PT   ___OT   ___SLP Medical Diagnosis:  TMJ/Arthralgia     PT Diagnosis:  Locked Close R TMJ, MF Pain and HA's,    Visits from SOC:  1      _________________________________________________________________________________  Plan of Treatment/Functional Goals:     Develop HEP, MT, Jt mobs, S&S, STM, H/O's given.      US, Kinesiotape   Goals  Goal Identifier: 1  Goal Description: STG: Pt will be able to chew tougher food 5/10 on TMJ scale.   Target Date: 03/25/22    Goal Identifier: 2  Goal Description: STG: Pt will be able to Open wide enough to bite into a sandwich 5/10 on TMJ scale.   Target Date: 03/25/22    Goal Identifier: 3  Goal Description: STG: Pt will be able to eat soft food 2/10 on TMJ scale.   Target Date: 03/25/22    Goal Identifier: 4  Goal Description: LTG: Pt will be independent w/ HEP and self cares to manage TMJ pain, HA's.   Target Date: 04/01/22    Therapy Frequency:  2 times/Week  Predicted Duration of Therapy Intervention:  up to 6 weeks, up to 12 visits.     Carmen Lomeli, PT, MTC                  I CERTIFY THE NEED FOR THESE SERVICES FURNISHED UNDER        THIS PLAN OF TREATMENT AND WHILE UNDER MY CARE .       Physician Signature               Date    X_____________________________________________________           Certification Date From:  02/16/22   Certification Date To:  04/01/22    Referring Provider:  Shea Barillas    Initial Assessment        See Epic Evaluation Start of Care  Date: 02/16/22

## 2022-02-16 NOTE — PROGRESS NOTES
TMJ Ortho Eval 02/16/22 1300   General Information   Type of Visit Initial OP Ortho PT Evaluation   Start of Care Date 02/16/22   Referring Physician Shea Barillas   Patient/Family Goals Statement Migraine when eating, Opening, Chewing, Yawning    Orders Evaluate and Treat   Orders Comment 1-2x/week for 6-12 2 wks. R Closed Lock, MF Pain   Date of Order 02/09/22   Certification Required? Yes  ([employer]  - Auth's)   Medical Diagnosis TMJ/Arthralgia   Surgical/Medical history reviewed   (High BP Depr, Current smoker )   Precautions/Limitations no known precautions/limitations   Special Instructions Naproxen,    General Information Comments Copy of specific order in chart.    Body Part(s)   Body Part(s) TMJ   Presentation and Etiology   Pertinent history of current problem (include personal factors and/or comorbidities that impact the POC) Feb 2 had tooth pulled and jaw locked open and the dentist forced it shut, Now it is locked closed.    Impairments A. Pain;D. Decreased ROM;E. Decreased flexibility;F. Decreased strength and endurance;M. Locking or catching;N. Headaches   Functional Limitations Other  (EAting and Oral functions. )   Symptom Location P R jaw area. HA's located R temple and into Forehead.    How/Where did it occur Other  (w/ manipulation at dentist.)   Onset date of current episode/exacerbation 02/02/22   Chronicity New   Pain rating (0-10 point scale)   (2-10/10 )   Pain quality A. Sharp   Frequency of pain/symptoms C. With activity   Pain/symptoms are: Worse in the morning   Pain/symptoms exacerbated by M. Other   Pain exacerbation comment Eating, Yawning    Pain/symptoms eased by C. Rest   Progression of symptoms since onset: Unchanged  (Jaw still locked. )   Prior Level of Function   Functional Level Prior Comment Independent w/ADL's   Current Level of Function   Current Community Support Family/friend caregiver   Fall Risk Screen   Fall screen completed by PT   Have you fallen 2 or more  times in the past year? No   Have you fallen and had an injury in the past year? No   Timed Up and Go score (seconds) Walks quickly into dept, no balance issues.   Is patient a fall risk? No   System Outcome Measures   Outcome Measures   (Jaw functional scale scanned into epic. )   Functional Scales   Functional Scales OPTIMAL   Optimal (1=able to do without difficulty, 2=able to do with little difficulty, 3=able to do with moderate difficulty, 4=able to do with much difficulty, 5=unable to do, 9=NA) Activity 1;Activity 2;Activity 3;Difficulty Score   Activity 1 comment Chewing tough food or bread 10/10    Activity 2 comment Opening wide to bite an sandwich 10/10    Activity 3 comment Eating soft food 6/10.    Difficult score comment Jaw funcitonal Scale.    TMJ Objective Findings   Integumentary  Swelling R masseter area.    Posture Head forward, Rounded upper thoracic back and shoulders.    Joint noises Yes without pain   Joint lock Closed   Pain Chewing;Yawning;Opening;Brushing, flossing   Associated symptoms Earache;Cheek pain   Headache Cervical or muscle contraction;Migraine   Habits Bruxism;Unilateral chewing;Clenching;Caffeine   Tongue position TATU - Taught again today.    Difficulty swallowing No   Muscal Ridging Yes   Tongue Scalloping No   Accelerated Tooth Wear Yes   Intraoral mouth appliance Other   Appliance wear Night only   Bite/Occlusion Bite feels off   Major dental work Yes   Tired or painful jaw muscles Yes   Opening click Yes   Closing click No   Crepitus No   Subluxation Yes   Passive testing - Distraction   (R Hypomobile. )   Lateral Collateral Ligament Tender R   TMJ ROM Mandible Opening;Mandible Protrusion;Left Laterotrusion;Right Laterotrusion;Deviation with Opening   Palpation Lateral capsule;Posterior capsule;Superior capsule  (R SCM Clavicular head, Tight R Scalenes, Med Pterygoid, Temp)   Accessory Motion Rocabado (0-9)   Appliance comment Getting appliance in the future when jaw is  able to open.    Dental work comment Had bottom posterior tooth pulled on 2/2/22.    Jaw muscle pain comment On R   Opening click comment 15mm   Subluxation comment After dental work, and then it was forced to close.    Mandible Opening 23   Mandible Protrusion 2   Left Laterotrusion 3   Right Laterotrusion 13    Deviation with Opening R at end range    Lateral capsule R Tender    Posterior capsule R Tender    Superior Capsule R Tender    Rocabado Comments 8/9    Planned Therapy Interventions   Planned Therapy Interventions Comment Develop HEP, MT, Jt mobs, S&S, STM, H/O's given.    Planned Modality Interventions   Planned Modality Interventions Comments US, Kinesiotape    Clinical Impression   Criteria for Skilled Therapeutic Interventions Met yes, treatment indicated   PT Diagnosis Locked Close R TMJ, MF Pain and HA's,    Influenced by the following impairments Pain, Decreased ROM, HA's, Locking or catching.    Functional limitations due to impairments Oral functions, Yawning, Eating, Prolonged talking.    Clinical Presentation Evolving/Changing   Clinical Presentation Rationale TMJ Functional Scale, TMJ ROM, Rocabado score, Palpation, Hx of High BP, Depr and current smoker.    Clinical Decision Making (Complexity) Moderate complexity   Therapy Frequency 2 times/Week   Predicted Duration of Therapy Intervention (days/wks) up to 6 weeks, up to 12 visits.    Risk & Benefits of therapy have been explained Yes   Patient, Family & other staff in agreement with plan of care Yes   Clinical Impression Comments Locked Close R TMJ, MF Pain and HA's,    Education Assessment   Preferred Learning Style Listening;Demonstration;Pictures/video   Barriers to Learning No barriers   ORTHO GOALS   PT Ortho Eval Goals 1;2;3;4   Ortho Goal 1   Goal Identifier 1   Goal Description STG: Pt will be able to chew tougher food 5/10 on TMJ scale.    Target Date 03/25/22   Ortho Goal 2   Goal Identifier 2   Goal Description STG: Pt will be  able to Open wide enough to bite into a sandwich 5/10 on TMJ scale.    Target Date 03/25/22   Ortho Goal 3   Goal Identifier 3   Goal Description STG: Pt will be able to eat soft food 2/10 on TMJ scale.    Target Date 03/25/22   Ortho Goal 4   Goal Identifier 4   Goal Description LTG: Pt will be independent w/ HEP and self cares to manage TMJ pain, HA's.    Target Date 04/01/22   Total Evaluation Time   PT Eval, Moderate Complexity Minutes (22743) 35   Therapy Certification   Certification date from 02/16/22   Certification date to 04/01/22   Medical Diagnosis TMJ/Arthralgia   Carmen Lomeli, PT, MTC (#[id number])  [facility]           [phone]  Fax          [phone]  Appt #      [phone]

## 2022-03-07 ENCOUNTER — VIRTUAL VISIT (OUTPATIENT)
Dept: BEHAVIORAL HEALTH | Facility: CLINIC | Age: 37
End: 2022-03-07
Payer: COMMERCIAL

## 2022-03-07 DIAGNOSIS — F41.0 PANIC DISORDER WITHOUT AGORAPHOBIA: ICD-10-CM

## 2022-03-07 DIAGNOSIS — F43.10 PTSD (POST-TRAUMATIC STRESS DISORDER): ICD-10-CM

## 2022-03-07 DIAGNOSIS — F31.12 BIPOLAR 1 DISORDER WITH MODERATE MANIA (H): Primary | ICD-10-CM

## 2022-03-07 DIAGNOSIS — F41.1 GENERALIZED ANXIETY DISORDER: ICD-10-CM

## 2022-03-07 PROCEDURE — 90791 PSYCH DIAGNOSTIC EVALUATION: CPT | Mod: 95 | Performed by: MARRIAGE & FAMILY THERAPIST

## 2022-03-07 ASSESSMENT — COLUMBIA-SUICIDE SEVERITY RATING SCALE - C-SSRS
2. HAVE YOU ACTUALLY HAD ANY THOUGHTS OF KILLING YOURSELF?: NO
ATTEMPT LIFETIME: NO
TOTAL  NUMBER OF INTERRUPTED ATTEMPTS LIFETIME: NO
1. HAVE YOU WISHED YOU WERE DEAD OR WISHED YOU COULD GO TO SLEEP AND NOT WAKE UP?: NO
6. HAVE YOU EVER DONE ANYTHING, STARTED TO DO ANYTHING, OR PREPARED TO DO ANYTHING TO END YOUR LIFE?: NO
TOTAL  NUMBER OF ABORTED OR SELF INTERRUPTED ATTEMPTS LIFETIME: NO

## 2022-03-07 ASSESSMENT — PATIENT HEALTH QUESTIONNAIRE - PHQ9
SUM OF ALL RESPONSES TO PHQ QUESTIONS 1-9: 20
SUM OF ALL RESPONSES TO PHQ QUESTIONS 1-9: 20

## 2022-03-07 NOTE — Clinical Note
Hello- I was able to meet with Lluvia and complete a DA today. She is out of medication. I gave her the number to call for refills and to get an apt set-up with Keeley. I also placed an order for long-term therapy. Let me know if anything else is needed.   Minnie- She is ready to be enrolled in [facility] again.  Ilene

## 2022-03-07 NOTE — PROGRESS NOTES
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 addressed by TBD.     3. Initial Treatment will focus on:    Depressed Mood - increase coping skills  Anxiety - increase coping skills  Relational Problems related to: Conflict or difficulties with partner/spouse.     4. Resources/Service Plan:    services are not indicated.   Modifications to assist communication are not indicated.   Additional disability accommodations are not indicated.      5. Collaboration:   Collaboration / coordination of treatment will be initiated with the following  support professionals: psychiatry and [facility].      6.  Referrals:   The following referral(s) will be initiated: Outpatient [facility]. Next Scheduled Appointment: TBD.     A Release of Information has been obtained for the following: n/a.    7. MIGUEL:    MIGUEL:  Discussed the general effects of drugs and alcohol on health and well-being. Provider gave patient printed information about the effects of chemical use on their health and well being. Recommendations:  Stay sober .     8. Records:   These were reviewed at time of assessment.   Information in this assessment was obtained from the medical record and  provided by patient who is a fair historian.    Patient will have open access to their mental health  medical record.        Provider Name/ Credentials:  Ilene Zambrano LM  March 7, 2022

## 2022-03-08 ASSESSMENT — PATIENT HEALTH QUESTIONNAIRE - PHQ9: SUM OF ALL RESPONSES TO PHQ QUESTIONS 1-9: 20

## 2022-03-14 ENCOUNTER — VIRTUAL VISIT (OUTPATIENT)
Dept: BEHAVIORAL HEALTH | Facility: CLINIC | Age: 37
End: 2022-03-14
Payer: COMMERCIAL

## 2022-03-14 DIAGNOSIS — R69 DIAGNOSIS DEFERRED: Primary | ICD-10-CM

## 2022-03-14 NOTE — Clinical Note
Hemanth Anaya,    I was able to meet with this patient and re-enroll her into the [facility] [place] ([facility] program. I have her Health & Wellness Assessment schedule for this Thursday. After this is completed I will continue to meet with her at least once a month to help her with additional support and resources.    Thank you,  Minnie Denney LICSW [facility] [place] [facility])   [facility]  [phone]  March 14, 2022  2:47 PM

## 2022-03-14 NOTE — PROGRESS NOTES
Behavioral Health Home Services  [facility]: [place]        Social Work Care Navigator Note      Patient: Lluvia Glaser  Date: March 14, 2022  Preferred Name: Lluvia    Previous PHQ-9:   PHQ-9 SCORE 6/17/2021 11/11/2021 3/7/2022   PHQ-9 Total Score MyChart 12 (Moderate depression) - 20 (Severe depression)   PHQ-9 Total Score 12 11 20     Previous WAYNE-7:   WAYNE-7 SCORE 4/19/2021 6/17/2021 11/11/2021   Total Score - 10 (moderate anxiety) -   Total Score 2 10 6     UILSES LEVEL:  No flowsheet data found.    Preferred Contact:  Need for : No  Preferred Contact: Cell      Type of Contact Today: Phone call (patient / identified key support person reached)      Data: (subjective / Objective):    [facility] Introduction:  Hi my name is BRANDEN Moon from your (name) primary care clinic.     I work closely with your primary care provider, Susan Anaya.     If it's ok I'd like to talk about some new services available to you, at no out of pocket cost to you.      Before we get started can you verify your insurance for me? [employer] [place]    What social work or case management services do you receive? (If so, are you receiving ACT or TCM?).  None    Getting to Know You - Whole Person Care:  This new service is called Behavioral Health Home services, which is designed to support you as a whole person beyond just your medical needs.      I'm here to be a central point of contact for your healthcare needs and to help with:    Housing    Transportation    Financial resources    Comprehensive Health needs (appointment help, medication costs, etc.)    Employment    Education    Health Insurance applications    And connecting with social supports or community resources    Out of the things I mentioned what would you find helpful?  Financial Resources, Comprehensive Health Needs, connecting with social supports or community resources.    Enrollment and Brief Needs Assessment  Access and Ability to use transportation?  Yes  Type of Transportation: Patient states she has her own vehicle    Patient reports she is currently living with roommate and best friend. Patient reports she is currently unemployed and finances are obtained through [facility] [employer]. Patient reports finances are a stressor for her.    Patient reports she is currently under probation/parole and has  Iveth. [facility] added contact information to patient care teams. Patient reports she has three children that live in [place] with her mother.     Patient reports hx of CD and has completed CD program in the past in [place], [place]. Patient reports she has not been to detox. Patient reports she is currently sober with no CD concerns at this time.    Diagnostic Assessment completed on 03/07/2022 - SADQI Ortiz    Patient response to [facility] Service offering:   Patient enrolled in [facility] today - patient provided verbal authorization via telehealth visit for enrollment in the Behavioral Health Home ([facility]) program and for electronic communication Release of Information. [facility] faxed enrollment forms to [facility] at [phone]. [facility] emailed enrollment and Release of Information forms to HIM. [facility] information added to care teams. [facility] sent patient [facility] ([facility]) Welcome Letter and brochure to patient today.    Patient previously enrolled with [facility] [place] ([facility]) services 06/16/2020 to 01/28/2022.     Minnie Denney [facility] Hospital[facility]ESS[facility]  [facility] ([facility])   [facility]  [phone]  March 14, 2022  2:45 PM

## 2022-03-16 ENCOUNTER — OFFICE VISIT (OUTPATIENT)
Dept: FAMILY MEDICINE | Facility: CLINIC | Age: 37
End: 2022-03-16
Payer: COMMERCIAL

## 2022-03-16 VITALS
SYSTOLIC BLOOD PRESSURE: 134 MMHG | TEMPERATURE: 98.1 F | DIASTOLIC BLOOD PRESSURE: 88 MMHG | RESPIRATION RATE: 14 BRPM | HEART RATE: 84 BPM | OXYGEN SATURATION: 98 %

## 2022-03-16 DIAGNOSIS — R00.2 PALPITATIONS: Primary | ICD-10-CM

## 2022-03-16 DIAGNOSIS — R03.0 ELEVATED BP WITHOUT DIAGNOSIS OF HYPERTENSION: ICD-10-CM

## 2022-03-16 PROCEDURE — 99214 OFFICE O/P EST MOD 30 MIN: CPT | Performed by: FAMILY MEDICINE

## 2022-03-16 ASSESSMENT — PAIN SCALES - GENERAL: PAINLEVEL: NO PAIN (0)

## 2022-03-16 NOTE — PROGRESS NOTES
"  Assessment & Plan     Palpitations  R/o arrhythmias   - Leadless EKG Monitor 3 to 7 Days; Future    Elevated BP without diagnosis of hypertension  Needs the 24 hour ambulatory monitor done  Patient should also check blood pressure at home more frequently and send me values    Still not convinced she has sustained hypertension  Discussed role tobacco will place with raising her blood pressure also               BMI:   Estimated body mass index is 29.18 kg/m  as calculated from the following:    Height as of 2/14/22: 1.626 m (5' 4\").    Weight as of 2/14/22: 77.1 kg (170 lb).           No follow-ups on file.    Susan Anaya MD  [facility]    Denisse Teixeira is a 36 year old who presents for the following health issues:    History of Present Illness       Hypertension: She presents for follow up of hypertension.  She does check blood pressure  regularly outside of the clinic. Outside blood pressures have been over 140/90. She does not follow a low salt diet.     She eats 0-1 servings of fruits and vegetables daily.She consumes 5 sweetened beverage(s) daily.She exercises with enough effort to increase her heart rate 20 to 29 minutes per day.  She exercises with enough effort to increase her heart rate 7 days per week.      Has cut out caffeine.  Has cut down on tobacco.    No alcohol or substance use.     BP Readings from Last 6 Encounters:   03/16/22 134/88   02/14/22 (!) 124/100   09/28/21 (!) 140/92   09/28/21 129/88   09/27/21 (!) 134/91   09/23/21 115/79     180/94 a few days ago.   Had some chest pain and felt like she was going to pass out x 20 minutes until she went to lay down.  This was scary to her.   Constantly has a central chest pain.     Will have palpitations, heart will feel like it's racing or beating out of her chest- that will last until she rests.    Review of Systems   Constitutional, HEENT, cardiovascular, pulmonary, gi and gu systems are negative, except as " otherwise noted.      Objective    /88   Pulse 84   Temp 98.1  F (36.7  C) (Tympanic)   Resp 14   LMP 07/23/2021   SpO2 98%   There is no height or weight on file to calculate BMI.  Physical Exam   GENERAL: healthy, alert and no distress    Blood pressure checked on her home machine and manually here in clinic.  Maybe running just slightly higher on her home machine

## 2022-03-17 ENCOUNTER — VIRTUAL VISIT (OUTPATIENT)
Dept: BEHAVIORAL HEALTH | Facility: CLINIC | Age: 37
End: 2022-03-17
Payer: COMMERCIAL

## 2022-03-17 DIAGNOSIS — R69 DIAGNOSIS DEFERRED: Primary | ICD-10-CM

## 2022-03-17 ASSESSMENT — ANXIETY QUESTIONNAIRES
2. NOT BEING ABLE TO STOP OR CONTROL WORRYING: NEARLY EVERY DAY
7. FEELING AFRAID AS IF SOMETHING AWFUL MIGHT HAPPEN: SEVERAL DAYS
4. TROUBLE RELAXING: NEARLY EVERY DAY
1. FEELING NERVOUS, ANXIOUS, OR ON EDGE: SEVERAL DAYS
GAD7 TOTAL SCORE: 15
IF YOU CHECKED OFF ANY PROBLEMS ON THIS QUESTIONNAIRE, HOW DIFFICULT HAVE THESE PROBLEMS MADE IT FOR YOU TO DO YOUR WORK, TAKE CARE OF THINGS AT HOME, OR GET ALONG WITH OTHER PEOPLE: VERY DIFFICULT
3. WORRYING TOO MUCH ABOUT DIFFERENT THINGS: SEVERAL DAYS
6. BECOMING EASILY ANNOYED OR IRRITABLE: NEARLY EVERY DAY
5. BEING SO RESTLESS THAT IT IS HARD TO SIT STILL: NEARLY EVERY DAY

## 2022-03-17 NOTE — LETTER
Behavioral Health Home ([facility]): Health Action Plan  [facility]: [place]    [employer] [facility]      Name: Lluvia Glaser  Preferred Name: Lluvia  : 1985  MRN: [id number]      My Goals  Goal Areas: Health; Mental Health; Employment / Volunteer; Financial and Social Service Benefits    Patient stated goals: Patient would like assistance with her physical and mental health for creating a balanced and healthy lifestyle. Patient would like assistance with budgeting, SSDI and community/social service assistance to aid with [facility] benefits to increase her financial stability. Patient would like assistance with 's license re-instatement for ability to safely transport herself to her job, appointments, run errands and get out into the community.    Strengths related to each goal: Lluvia identified strengths: Sobriety of two years and supportive family.     Services and Supports Needed: The [facility] Team will provide monthly contact with patient in order to monitor progress towards goals.    Activities / Actions of Team to support goal(s): [facility] team will locate appropriate resources that align with patient's goals and promote health and wellness.    Activities / Actions of Patient / Parent / Guardian to support goal(s): It is a requirement that patient's primary care physician is through the [facility] system and that they are on Medical Assistance/Medicaid. If either of these were to change or if patient needs any type of assistance, they are to reach out to their Behavioral Health Home ([facility]) team.        Recommended Referral  Tobacco cessation referrals: No  Mental Health / Chemical Dependency Referrals: Yes  Substance Use Referrals: Not Applicable  Mental Health Referrals: MH Services: [facility], [facility], Community MH Provider; [facility]; Psychiatry  Community Health Referrals: [facility] Financial Services; [facility] ; Other (see comments)          My Team Members and Their Contact Information  Patient Care Team        Relationship Specialty Notifications Start End    Susan Anaya MD PCP - General Family Medicine Admissions 2/3/21     Phone: [phone] Pager: [phone] Fax: [phone]        [address] [facility] [address]    Keeley Mancini NP Nurse Practitioner Nurse Practitioner Admissions 6/17/20     Psychiatry Provider    Phone: [phone] Fax: [phone]         [address] [place] [address]    Keeley Mancini NP Assigned Behavioral Health Provider   10/23/20     Phone: [phone] Fax: [phone]         [address] [place] [address]    Susan Anaya MD Assigned PCP   12/20/20     Phone: [phone] Pager: [phone] Fax: [phone]        [address] [facility] [address]    Liberty Dickerson MD Assigned OBGYN Provider   8/8/21     Phone: [phone] Pager: [phone] Fax: [phone] [address] [facility] [address] [address]    [facility]/Probation CM Other (see comments)   3/14/22     Phone: [phone]         Minnie Denney LSW  Clinic Admissions 3/14/22     [facility] [phone]          My Wellness Plan  Safety Concerns: None Reported / Observed  Recommendations / Plan for safety concerns: A safety and risk management plan has not been developed at this time, however patient was encouraged to call [facility] / 911 should there be a change in any of these risk factors.  Crisis Plan (emergencies / when urgent support needed): Patient states she feels comfortable contacting her mother Shaneka and/or calling the National Suicide Prevention Lifeline at 184-331-7155 or 911 in a crisis or emergency situation.          Lluvia Glaser co-developed the Health Action Plan with the [facility] Team and received a copy of this document.  Date Health Action Plan Completed/Updated: 03/17/22
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
## 2022-03-17 NOTE — Clinical Note
Hello,    Patient recently re-enrolled in the Behavioral Health Home ([facility]) program. I was able to complete her Health & Wellness Assessment and goals for the next 6-months. I will continue to meet with her at least monthly to provide additional mental health support and case management services. Please see attached note.    Thank you,  Minnie Denney [facility]  [facility] [place] ([facility])   [facility]  [phone]  March 24, 2022  2:28 PM

## 2022-03-18 ASSESSMENT — ANXIETY QUESTIONNAIRES: GAD7 TOTAL SCORE: 15

## 2022-03-30 ENCOUNTER — HOSPITAL ENCOUNTER (OUTPATIENT)
Dept: CARDIOLOGY | Facility: CLINIC | Age: 37
Discharge: HOME | End: 2022-03-30
Attending: FAMILY MEDICINE | Admitting: FAMILY MEDICINE
Payer: COMMERCIAL

## 2022-03-30 DIAGNOSIS — R00.2 PALPITATIONS: ICD-10-CM

## 2022-03-30 PROCEDURE — 93242 EXT ECG>48HR<7D RECORDING: CPT

## 2022-03-30 PROCEDURE — 93244 EXT ECG>48HR<7D REV&INTERPJ: CPT | Performed by: INTERNAL MEDICINE

## 2022-04-13 ENCOUNTER — VIRTUAL VISIT (OUTPATIENT)
Dept: BEHAVIORAL HEALTH | Facility: CLINIC | Age: 37
End: 2022-04-13

## 2022-04-13 ENCOUNTER — LAB (OUTPATIENT)
Dept: FAMILY MEDICINE | Facility: CLINIC | Age: 37
End: 2022-04-13
Attending: FAMILY MEDICINE
Payer: COMMERCIAL

## 2022-04-13 ENCOUNTER — TELEPHONE (OUTPATIENT)
Dept: BEHAVIORAL HEALTH | Facility: CLINIC | Age: 37
End: 2022-04-13

## 2022-04-13 DIAGNOSIS — R69 DIAGNOSIS DEFERRED: Primary | ICD-10-CM

## 2022-04-13 DIAGNOSIS — Z20.822 ENCOUNTER FOR LABORATORY TESTING FOR COVID-19 VIRUS: ICD-10-CM

## 2022-04-13 PROCEDURE — U0003 INFECTIOUS AGENT DETECTION BY NUCLEIC ACID (DNA OR RNA); SEVERE ACUTE RESPIRATORY SYNDROME CORONAVIRUS 2 (SARS-COV-2) (CORONAVIRUS DISEASE [COVID-19]), AMPLIFIED PROBE TECHNIQUE, MAKING USE OF HIGH THROUGHPUT TECHNOLOGIES AS DESCRIBED BY CMS-2020-01-R: HCPCS

## 2022-04-13 PROCEDURE — U0005 INFEC AGEN DETEC AMPLI PROBE: HCPCS

## 2022-04-13 NOTE — PROGRESS NOTES
Behavioral Health Home Services  [facility]: [place]        Social Work Care Navigator Note      Patient: Lluvia Glaser  Date: April 13, 2022  Preferred Name: Lluvia    Previous PHQ-9:   PHQ-9 SCORE 6/17/2021 11/11/2021 3/7/2022   PHQ-9 Total Score MyChart 12 (Moderate depression) - 20 (Severe depression)   PHQ-9 Total Score 12 11 20     Previous WAYNE-7:   WAYNE-7 SCORE 6/17/2021 11/11/2021 3/17/2022   Total Score 10 (moderate anxiety) - -   Total Score 10 6 15     ULISES LEVEL:  No flowsheet data found.    Preferred Contact:  Need for : No  Preferred Contact: Cell      Type of Contact Today: Phone call (patient / identified key support person reached)      Data: (subjective / Objective):  Recent ED/IP Admission or Discharge?   None    Patient Goals:  Goal Areas: Health; Mental Health; Employment / Volunteer; Financial and Social Service Benefits; Transportation  Patient stated goals: Patient would like assistance with her physical and mental health for creating a balanced and healthy lifestyle. Patient would like assistance with budgeting, SSDI and community/social service assistance to aid with [employer] benefits to increase her financial stability. Patient would like assistance with 's license re-instatement for ability to safely transport herself to her job, appointments, run errands and get out into the community.        [facility] Core Service Provided:  Comprehensive Care Management: utilized the electronic medical record / patient registry to identify and support patient's health conditions / needs more effectively   Care Transitions: focused on the coordinated and seamless movement of patient between or within different levels of care or settings  Care Coordination: provided care management services/referrals necessary to ensure patient and their identified supports have access to medical, behavioral health, pharmacology and recovery support services.  Ensured that patient's care is integrated  across all settings and services.   Individual and Family Support: aimed to help clients reduce barriers to achieving goals, increase health literacy and knowledge about chronic condition(s), increase self-efficacy skills, and improve health outcomes  Referral to Community and Social Support Services: Provided patient with referrals (see plan section)  Assisted in scheduling an appointment to a referral / service (see plan section)  Coordinated / Confirmed patient's appointment time or referral and transportation plan  Followed-up with patient on whether or not they completed a referral    Current Stressors / Issues / Care Plan Objective Addressed Today:  SWCC and patient were able to meet today for Behavioral Health Home ([facility]) monthly check-in via telehealth visit. All required ROIs have been filed with HIM/patient chart.    SWCC called patient today to re-schedule cancelled appt on 04/07 due to patient's new work schedule. Patient reports she has still not been able to get her medications and cannot get in to see her PCP until 05/09. Patient reports her mood has been up and down like a roller coaster. Patient reports feeling safe with herself, with no suicidal thoughts or ideation,  but her mother is concerned.     SWCC advised patient to call clinic to see if she could get same day appt but patient thought this would be difficult with her new work schedule. SWCC advised patient she if symptoms worsen or increase to go to urgent care, ED or call 911.     CC messaged provider with update today.    Intervention:  Motivational Interviewing: Expressed Empathy/Understanding, Supported Autonomy, Collaboration, Evocation, Permission to raise concern or advise, Open-ended questions, Reflections: simple and complex, Rolled with resistance: Emphasized patient autonomy, Simple reflection, Complex reflection, Amplified reflection (weaker or stronger meaning), Shifted topic to defuse resistance, Reframed sustain talk in the  direction of change and Evoked patient agenda, Change talk (evoked) and Reframe   Target Behavior(s): Explored thoughts about taking an anti-depressant, Explored and resolved challenges related to taking anti-depressants as prescribed, Explored and resolved challenges to attending appointments as scheduled and Explored current social supports and reinforced opportunities to increase engagement    Assessment: (Progress on Goals / Homework):  Patient would benefit from continued coordination in reaching their goals set for the Behavioral Health Home ([facility]) program. SWCC reviewed Health Action Plan goals and will continue to monitor progress and work with patient and their care team.      Plan: (Homework, other):  Patient was encouraged to continue to seek condition-related information and education.      Scheduled a Phone follow up appointment with SW  in 1 week     Patient has set self-identified goals and will monitor progress until the next appointment on: 04/19/2022.        Minnie Denney [facility]  Behavioral Health Home ([facility])   [facility]  [phone]  April 13, 2022  5:53 PM                  Next 5 appointments (look out 90 days)    May 09, 2022  8:00 AM  (Arrive by 7:40 AM)  Provider Visit with Susan Anaya MD  [facility] ([facility] [place] ) [address] [facility] [place] [facility] [id number] [phone]

## 2022-04-13 NOTE — Clinical Note
Wilfredo Anaya,  I was able to complete a quick check-in with patient today. She was able to make appt with you but not until May. Patient is reporting up and down mood swings but feeling safe with herself. Patient reports her mother is concerned. Are you or someone from your team able to reach out to her? Patient reports she works until 3pm.  Thank you, Minnie Denney LICSW Behavioral Health Home ([facility])  [facility] [phone] April 13, 2022 5:55 PM

## 2022-04-14 ENCOUNTER — TELEPHONE (OUTPATIENT)
Dept: FAMILY MEDICINE | Facility: CLINIC | Age: 37
End: 2022-04-14
Payer: COMMERCIAL

## 2022-04-14 LAB — SARS-COV-2 RNA RESP QL NAA+PROBE: NEGATIVE

## 2022-04-14 NOTE — TELEPHONE ENCOUNTER
Message received from Behavioral Health .  patient needs an apt sooner than she is scheduled 5/9/22.      Can we see about working her into a hospital follow up slot or same day in the next week or two?    Susan Anaya M.D.

## 2022-04-19 ENCOUNTER — VIRTUAL VISIT (OUTPATIENT)
Dept: BEHAVIORAL HEALTH | Facility: CLINIC | Age: 37
End: 2022-04-19
Payer: COMMERCIAL

## 2022-04-19 ENCOUNTER — MYC MEDICAL ADVICE (OUTPATIENT)
Dept: FAMILY MEDICINE | Facility: CLINIC | Age: 37
End: 2022-04-19

## 2022-04-19 DIAGNOSIS — R69 DIAGNOSIS DEFERRED: Primary | ICD-10-CM

## 2022-04-19 NOTE — PROGRESS NOTES
Behavioral Health Home Services  [facility]: [place]        Social Work Care Navigator Note      Patient: Lluvia Glaser  Date: April 19, 2022  Preferred Name: Lluvia    Previous PHQ-9:   PHQ-9 SCORE 6/17/2021 11/11/2021 3/7/2022   PHQ-9 Total Score MyChart 12 (Moderate depression) - 20 (Severe depression)   PHQ-9 Total Score 12 11 20     Previous WAYNE-7:   WAYNE-7 SCORE 6/17/2021 11/11/2021 3/17/2022   Total Score 10 (moderate anxiety) - -   Total Score 10 6 15     ULISES LEVEL:  No flowsheet data found.    Preferred Contact:  Need for : No  Preferred Contact: Cell      Type of Contact Today: Phone call (patient / identified key support person reached)      Data: (subjective / Objective):  Recent ED/IP Admission or Discharge?   None    Patient Goals:  Goal Areas: Health; Mental Health; Employment / Volunteer; Financial and Social Service Benefits; Transportation  Patient stated goals: Patient would like assistance with her physical and mental health for creating a balanced and healthy lifestyle. Patient would like assistance with budgeting, SSDI and community/social service assistance to aid with [employer] benefits to increase her financial stability. Patient would like assistance with 's license re-instatement for ability to safely transport herself to her job, appointments, run errands and get out into the community.        [facility] Core Service Provided:  Comprehensive Care Management: utilized the electronic medical record / patient registry to identify and support patient's health conditions / needs more effectively   Care Transitions: focused on the coordinated and seamless movement of patient between or within different levels of care or settings  Care Coordination: provided care management services/referrals necessary to ensure patient and their identified supports have access to medical, behavioral health, pharmacology and recovery support services.  Ensured that patient's care is integrated  across all settings and services.   Individual and Family Support: aimed to help clients reduce barriers to achieving goals, increase health literacy and knowledge about chronic condition(s), increase self-efficacy skills, and improve health outcomes  Referral to Community and Social Support Services: Provided patient with referrals (see plan section)  Assisted in scheduling an appointment to a referral / service (see plan section)  Coordinated / Confirmed patient's appointment time or referral and transportation plan  Followed-up with patient on whether or not they completed a referral    Current Stressors / Issues / Care Plan Objective Addressed Today:  SWCC and patient were able to meet today for Behavioral Health Home ([facility]) monthly check-in via telehealth visit. All required ROIs have been filed with HIM/patient chart.    1. Patient reports her mood over the past week and half has been better. Patient reports she tends to have more symptoms when she is alone. Patient reports she does live with roommate and visits her mom often.    2. Patient reports she likes her new job and has a long drive. Patient reports she completes probation in Sept and will be moving to [place] to be close to her family.    3. Patient reports court date on the 28th in [place].    4. Patient reports her PCP would like her to come in sooner than May 9th but patient reports she is unable due to work. Patient reports she is wondering if there is something she can be doing in the interim before her appointment to manage her health and symptoms. [facility] messaged provider today with update.    Intervention:  Motivational Interviewing: Expressed Empathy/Understanding, Supported Autonomy, Collaboration, Evocation, Permission to raise concern or advise, Open-ended questions, Reflections: simple and complex, Rolled with resistance: Emphasized patient autonomy, Simple reflection, Complex reflection, Amplified reflection (weaker or stronger meaning),  Shifted topic to defuse resistance, Reframed sustain talk in the direction of change and Evoked patient agenda, Change talk (evoked) and Reframe   Target Behavior(s): Explored thoughts about taking an anti-depressant, Explored and resolved challenges related to taking anti-depressants as prescribed, Explored and resolved challenges to attending appointments as scheduled and Explored current social supports and reinforced opportunities to increase engagement    Assessment: (Progress on Goals / Homework):  Patient would benefit from continued coordination in reaching their goals set for the Behavioral Health Home ([facility]) program. SWCC reviewed Health Action Plan goals and will continue to monitor progress and work with patient and their care team.      Plan: (Homework, other):  Patient was encouraged to continue to seek condition-related information and education.      Scheduled a Phone follow up appointment with JESSICA TRINIDAD in 3 weeks     Patient has set self-identified goals and will monitor progress until the next appointment on: 05/12/2022.         Minnie Denney [facility]  Behavioral Health [place] ([facility])   [facility]  [phone]  April 19, 2022  3:54 PM                  Next 5 appointments (look out 90 days)    May 09, 2022  8:00 AM  (Arrive by 7:40 AM)  Provider Visit with Susan Anaya MD  [facility] ([facility] [place] ) [address] [facility] [address] [phone]

## 2022-04-19 NOTE — TELEPHONE ENCOUNTER
Sent [email or web address] message with information in it. If not read by tomorrow send letter.     Anabelle Chou RN BSN PHN

## 2022-04-19 NOTE — Clinical Note
Wilfredo Anaya,  I was able to talk to this patient today. Patient reports her PCP would like her to come in sooner than May 9th but patient reports she is unable due to work. Patient reports she is wondering if there is something she can be doing in the interim before her appointment to manage her health and symptoms. Can you reach out to her? She is available for phone calls after 3:30pm.  Thank you, Minnie Denney LICSW Behavioral Health Home ([facility])  [facility] [phone] April 19, 2022 3:53 PM

## 2022-04-22 ENCOUNTER — TELEPHONE (OUTPATIENT)
Dept: BEHAVIORAL HEALTH | Facility: CLINIC | Age: 37
End: 2022-04-22
Payer: COMMERCIAL

## 2022-04-22 NOTE — TELEPHONE ENCOUNTER
Behavioral Health Home Services  [facility]: [place]        Social Work Care Navigator Note      Patient: Lluvia Glaser  Date: April 22, 2022  Preferred Name: Lluvia    Previous PHQ-9:   PHQ-9 SCORE 6/17/2021 11/11/2021 3/7/2022   PHQ-9 Total Score MyChart 12 (Moderate depression) - 20 (Severe depression)   PHQ-9 Total Score 12 11 20     Previous WAYNE-7:   WAYNE-7 SCORE 6/17/2021 11/11/2021 3/17/2022   Total Score 10 (moderate anxiety) - -   Total Score 10 6 15     ULISES LEVEL:  No flowsheet data found.    Preferred Contact:  Need for : No  Preferred Contact: Cell      Type of Contact Today: Phone call (not reached/unavailable)      Data: (subjective / Objective):  Attempted to reach patient for update on provider appt, but was unsuccessful, left message and sent patient MyChart message.  Plan to attempt again.      Minnie Denney LICSW Behavioral Health Home ([facility])   [facility]  [phone]  April 22, 2022  8:42 AM            Next 5 appointments (look out 90 days)    May 09, 2022  8:00 AM  (Arrive by 7:40 AM)  Provider Visit with Susan Anaya MD  [facility] ([facility] ) [address] [id number] [phone]

## 2022-04-27 ASSESSMENT — PATIENT HEALTH QUESTIONNAIRE - PHQ9
10. IF YOU CHECKED OFF ANY PROBLEMS, HOW DIFFICULT HAVE THESE PROBLEMS MADE IT FOR YOU TO DO YOUR WORK, TAKE CARE OF THINGS AT HOME, OR GET ALONG WITH OTHER PEOPLE: VERY DIFFICULT
SUM OF ALL RESPONSES TO PHQ QUESTIONS 1-9: 14
SUM OF ALL RESPONSES TO PHQ QUESTIONS 1-9: 14

## 2022-04-28 ENCOUNTER — OFFICE VISIT (OUTPATIENT)
Dept: FAMILY MEDICINE | Facility: CLINIC | Age: 37
End: 2022-04-28
Payer: COMMERCIAL

## 2022-04-28 ENCOUNTER — TELEPHONE (OUTPATIENT)
Dept: FAMILY MEDICINE | Facility: CLINIC | Age: 37
End: 2022-04-28

## 2022-04-28 VITALS
BODY MASS INDEX: 28.51 KG/M2 | SYSTOLIC BLOOD PRESSURE: 126 MMHG | RESPIRATION RATE: 16 BRPM | WEIGHT: 167 LBS | TEMPERATURE: 98.1 F | HEART RATE: 90 BPM | DIASTOLIC BLOOD PRESSURE: 84 MMHG | HEIGHT: 64 IN | OXYGEN SATURATION: 99 %

## 2022-04-28 DIAGNOSIS — F41.1 GENERALIZED ANXIETY DISORDER: ICD-10-CM

## 2022-04-28 DIAGNOSIS — G43.829 MENSTRUAL MIGRAINE WITHOUT STATUS MIGRAINOSUS, NOT INTRACTABLE: ICD-10-CM

## 2022-04-28 DIAGNOSIS — F31.12 BIPOLAR 1 DISORDER WITH MODERATE MANIA (H): ICD-10-CM

## 2022-04-28 DIAGNOSIS — R25.9 ABNORMAL INVOLUNTARY MOVEMENT: ICD-10-CM

## 2022-04-28 DIAGNOSIS — F90.2 ATTENTION DEFICIT HYPERACTIVITY DISORDER (ADHD), COMBINED TYPE: ICD-10-CM

## 2022-04-28 DIAGNOSIS — N92.4 EXCESSIVE BLEEDING IN PREMENOPAUSAL PERIOD: ICD-10-CM

## 2022-04-28 PROCEDURE — 99214 OFFICE O/P EST MOD 30 MIN: CPT | Performed by: FAMILY MEDICINE

## 2022-04-28 RX ORDER — AMOXICILLIN 250 MG
1-2 CAPSULE ORAL 2 TIMES DAILY
Qty: 60 TABLET | Refills: 11 | Status: SHIPPED | OUTPATIENT
Start: 2022-04-28 | End: 2023-09-27

## 2022-04-28 RX ORDER — PROPRANOLOL HYDROCHLORIDE 40 MG/1
40 TABLET ORAL 2 TIMES DAILY
Qty: 60 TABLET | Refills: 1 | Status: SHIPPED | OUTPATIENT
Start: 2022-04-28 | End: 2023-09-27

## 2022-04-28 RX ORDER — CLONAZEPAM 2 MG/1
TABLET ORAL
Qty: 45 TABLET | Refills: 0 | Status: CANCELLED | OUTPATIENT
Start: 2022-04-28

## 2022-04-28 RX ORDER — ATOMOXETINE 25 MG/1
25 CAPSULE ORAL DAILY
Qty: 30 CAPSULE | Refills: 1 | Status: SHIPPED | OUTPATIENT
Start: 2022-04-28 | End: 2023-09-27

## 2022-04-28 RX ORDER — IMIPRAMINE HCL 25 MG
25 TABLET ORAL 2 TIMES DAILY
Qty: 60 TABLET | Refills: 1 | Status: SHIPPED | OUTPATIENT
Start: 2022-04-28 | End: 2023-09-27

## 2022-04-28 RX ORDER — BENZTROPINE MESYLATE 0.5 MG/1
0.5 TABLET ORAL 2 TIMES DAILY PRN
Qty: 60 TABLET | Refills: 1 | Status: SHIPPED | OUTPATIENT
Start: 2022-04-28 | End: 2023-09-27

## 2022-04-28 RX ORDER — SUMATRIPTAN 50 MG/1
50 TABLET, FILM COATED ORAL
Qty: 18 TABLET | Refills: 3 | Status: SHIPPED | OUTPATIENT
Start: 2022-04-28 | End: 2023-09-27

## 2022-04-28 RX ORDER — CLONAZEPAM 2 MG/1
TABLET ORAL
Qty: 45 TABLET | Refills: 0 | Status: SHIPPED | OUTPATIENT
Start: 2022-04-28 | End: 2023-09-27

## 2022-04-28 ASSESSMENT — PATIENT HEALTH QUESTIONNAIRE - PHQ9
SUM OF ALL RESPONSES TO PHQ QUESTIONS 1-9: 14
10. IF YOU CHECKED OFF ANY PROBLEMS, HOW DIFFICULT HAVE THESE PROBLEMS MADE IT FOR YOU TO DO YOUR WORK, TAKE CARE OF THINGS AT HOME, OR GET ALONG WITH OTHER PEOPLE: VERY DIFFICULT
5. POOR APPETITE OR OVEREATING: NEARLY EVERY DAY

## 2022-04-28 ASSESSMENT — PAIN SCALES - GENERAL: PAINLEVEL: NO PAIN (0)

## 2022-04-28 ASSESSMENT — ANXIETY QUESTIONNAIRES
5. BEING SO RESTLESS THAT IT IS HARD TO SIT STILL: NEARLY EVERY DAY
1. FEELING NERVOUS, ANXIOUS, OR ON EDGE: NOT AT ALL
GAD7 TOTAL SCORE: 9
7. FEELING AFRAID AS IF SOMETHING AWFUL MIGHT HAPPEN: SEVERAL DAYS
2. NOT BEING ABLE TO STOP OR CONTROL WORRYING: SEVERAL DAYS
3. WORRYING TOO MUCH ABOUT DIFFERENT THINGS: SEVERAL DAYS
6. BECOMING EASILY ANNOYED OR IRRITABLE: NOT AT ALL
IF YOU CHECKED OFF ANY PROBLEMS ON THIS QUESTIONNAIRE, HOW DIFFICULT HAVE THESE PROBLEMS MADE IT FOR YOU TO DO YOUR WORK, TAKE CARE OF THINGS AT HOME, OR GET ALONG WITH OTHER PEOPLE: VERY DIFFICULT

## 2022-04-28 NOTE — PROGRESS NOTES
Assessment & Plan     Attention deficit hyperactivity disorder (ADHD), combined type   Abnormal involuntary movement  - atomoxetine (STRATTERA) 25 MG capsule; Take 1 capsule (25 mg) by mouth daily  - Adult Mental Health  Referral; Future  - benztropine (COGENTIN) 0.5 MG tablet; Take 1 tablet (0.5 mg) by mouth 2 times daily as needed for movement disorders    Bipolar 1 disorder with moderate britt (H)     - cariprazine (VRAYLAR) 1.5 MG CAPS capsule; Take 1 capsule (1.5 mg) by mouth daily  - Adult Mental [facility]  Referral; Future    Generalized anxiety disorder     - imipramine (TOFRANIL) 25 MG tablet; Take 1 tablet (25 mg) by mouth 2 times daily  - propranolol (INDERAL) 40 MG tablet; Take 1 tablet (40 mg) by mouth 2 times daily  [facility]; [facility]  - clonazePAM (KLONOPIN) 2 MG tablet; Take 1/2 to 1 tablet  twice daily.  Refill 28 days after last fill    I provided Lluvia with 2 months of refills of her psychiatric medications to give her time to find a long term psychiatrist.  She has seen Keeley Mancini in the past but did no-show several appointments.  She understands and verbalizes that she needs to be consistent with medications and also keeping appointments.  I am NOT comfortable with her medications on the long term as they are outside of my scope of practice in primary care.  I only filled 1 month of the clonazepam, again discussing abuse/addition potential with her.        Excessive bleeding in premenopausal period     - senna-docusate (SENOKOT-S/PERICOLACE) 8.6-50 MG tablet; Take 1-2 tablets by mouth 2 times daily    Menstrual migraine without status migrainosus, not intractable   encouraged regular use the propranolol as well as this should help decrease the headache frequency and severity  - SUMAtriptan (IMITREX) 50 MG tablet; Take 1 tablet (50 mg) by mouth at onset of headache for migraine May repeat in 2 hours. Max 4 tablets/24 hours.             BMI:  "  Estimated body mass index is 28.67 kg/m  as calculated from the following:    Height as of this encounter: 1.626 m (5' 4\").    Weight as of this encounter: 75.8 kg (167 lb).       Depression Screening Follow Up    PHQ 4/27/2022   PHQ-9 Total Score 14   Q9: Thoughts of better off dead/self-harm past 2 weeks Not at all         Follow Up Actions Taken           No follow-ups on file.    Susan Anaya MD  [facility]    Denisse Teixeira is a 36 year old who presents for the following health issues   Chief Complaint   Patient presents with     Anxiety     Would like provider to prescribed medication as she has been out 2 months.      Patient Request for Note/Letter     Needs a work note for today and tomorrow.      Irregular Heart Beat     Patient would like to discuss Heart monitor results.         Patient was led to believe that I wanted her to \"follow up sooner\" on her holter results - this was not true as it showed only PAC/PVCs which are benign, we discussed that.    She has been out of her psych medications for at least 2-3 months.  We discussed that she needs to have a psychiatrist or psych NP who follows her long term for her mental health conditions, see A/P above.     History of Present Illness       Mental Health Follow-up:                    Today's PHQ-9         PHQ-9 Total Score: 14  PHQ-9 Q9 Thoughts of better off dead/self-harm past 2 weeks :   (P) Not at all    How difficult have these problems made it for you to do your work, take care of things at home, or get along with other people: Very difficult        Vascular Disease:  She presents for follow up of vascular disease.  She never takes nitroglycerin. She is not taking daily aspirin.    She eats 0-1 servings of fruits and vegetables daily.She consumes 4 sweetened beverage(s) daily.She exercises with enough effort to increase her heart rate 10 to 19 minutes per day.  She exercises with enough effort to increase her " heart rate 7 days per week.   She is taking medications regularly.       Depression and Anxiety Follow-Up    How are you doing with your depression since your last visit? Worsened due to not having medication    How are you doing with your anxiety since your last visit?  Worsened due to no medication    Are you having other symptoms that might be associated with depression or anxiety? Yes:  palpitations.     Have you had a significant life event? No     Do you have any concerns with your use of alcohol or other drugs? No    Social History     Tobacco Use     Smoking status: Current Every Day Smoker     Packs/day: 0.50     Years: 10.00     Pack years: 5.00     Types: Cigarettes     Smokeless tobacco: Former User     Tobacco comment: 2-3 cigs daily   Vaping Use     Vaping Use: Never used   Substance Use Topics     Alcohol use: Not Currently     Drug use: Not Currently     Types: Methamphetamines     Comment: sober since 10/2019     PHQ 11/11/2021 3/7/2022 4/27/2022   PHQ-9 Total Score 11 20 14   Q9: Thoughts of better off dead/self-harm past 2 weeks Not at all Not at all Not at all     WAYNE-7 SCORE 11/11/2021 3/17/2022 4/28/2022   Total Score - - -   Total Score 6 15 9     Last PHQ-9 4/27/2022   1.  Little interest or pleasure in doing things 2   2.  Feeling down, depressed, or hopeless 1   3.  Trouble falling or staying asleep, or sleeping too much 1   4.  Feeling tired or having little energy 1   5.  Poor appetite or overeating 1   6.  Feeling bad about yourself 2   7.  Trouble concentrating 3   8.  Moving slowly or restless 3   Q9: Thoughts of better off dead/self-harm past 2 weeks 0   PHQ-9 Total Score 14   Difficulty at work, home, or with people -     WAYNE-7  4/28/2022   1. Feeling nervous, anxious, or on edge 0   2. Not being able to stop or control worrying 1   3. Worrying too much about different things 1   4. Trouble relaxing 3   5. Being so restless that it is hard to sit still 3   6. Becoming easily annoyed  "or irritable 0   7. Feeling afraid, as if something awful might happen 1   WAYNE-7 Total Score 9   If you checked any problems, how difficult have they made it for you to do your work, take care of things at home, or get along with other people? Very difficult       Suicide Assessment Five-step Evaluation and Treatment (SAFE-T)        Review of Systems   Constitutional, HEENT, cardiovascular, pulmonary, gi and gu systems are negative, except as otherwise noted.      Objective    /84   Pulse 90   Temp 98.1  F (36.7  C) (Tympanic)   Resp 16   Ht 1.626 m (5' 4\")   Wt 75.8 kg (167 lb)   LMP 07/23/2021   SpO2 99%   Breastfeeding No   BMI 28.67 kg/m    Body mass index is 28.67 kg/m .  Physical Exam   GENERAL: healthy, alert and no distress  NECK: no adenopathy, no asymmetry, masses, or scars and thyroid normal to palpation  RESP: lungs clear to auscultation - no rales, rhonchi or wheezes  CV: regular rate and rhythm, normal S1 S2, no S3 or S4, no murmur, click or rub, no peripheral edema and peripheral pulses strong  ABDOMEN: soft, nontender, no hepatosplenomegaly, no masses and bowel sounds normal  MS: no gross musculoskeletal defects noted, no edema  PSYCH: mentation appears normal, affect normal/bright                "

## 2022-04-28 NOTE — LETTER
[facility]  [address]  [place] [address]  Phone: [phone]    April 28, 2022        Lluvia Glaser  [address]  [place] [address]          To whom it may concern:    RE: Lluvia Glaser    Patient was seen and treated today at our clinic and missed work.  Please excuse her 4/28/2022 and 4/29/2022 from work.    Please contact me for questions or concerns.      Sincerely,        Susan Anaya MD

## 2022-04-28 NOTE — TELEPHONE ENCOUNTER
Can try a prior authorization.  Medication originally prescribed by Psych NP and I am providing a prescription to bridge her to a visit with Psychiatrist.  If not covered she will have to go without this until she can see Psych.    Susan Anaya M.D.

## 2022-04-28 NOTE — PATIENT INSTRUCTIONS
Our Clinic hours are:  Mondays    7:20 am - 7 pm  Tues -  Fri  7:20 am - 5 pm    Clinic Phone: [phone]    The clinic lab opens at 7:30 am Mon - Fri and appointments are required.    [facility]. [phone]  Monday  8 am - 7pm  Tues - Fri 8 am - 5:30 pm

## 2022-04-28 NOTE — TELEPHONE ENCOUNTER
Prior Authorization Retail Medication Request    Medication/Dose: Cariprazine (VRAYLAR) 1.5 MG CAPS capsule  ICD code (if different than what is on RX):    Previously Tried and Failed:  Seroquel; zyrexa; latuda;   Rationale:  Medication originally prescribed by Psych NP and I am providing a prescription to bridge her to a visit with Psychiatrist. Patient has used since 12/2020    Insurance Name:  Not provided  Insurance ID:  Not provided  CMM Key: [id number]      Pharmacy Information (if different than what is on RX)  Name:    Phone:

## 2022-04-28 NOTE — TELEPHONE ENCOUNTER
Per fax received from [facility]   - Cariprazine (VRAYLAR) 1.5 MG CAPS capsule is not covered by patient's Insurance Company  Dr. Anaya - Please choose:  1.  Change medication that is not covered to a different medication and send new prescription to patient's pharmacy?  2.  Patient will need to pay for the non-covered medication out-of-pocket?   3.  Try for Prior Authorization with Insurance Company to get medication covered?        Key# [id number]

## 2022-04-29 ASSESSMENT — ANXIETY QUESTIONNAIRES: GAD7 TOTAL SCORE: 9

## 2022-05-03 NOTE — TELEPHONE ENCOUNTER
PA Initiation    Medication: Cariprazine (VRAYLAR) 1.5 MG CAPS capsule - INITIATED  Insurance Company: [employer] [place] - Phone [phone] Fax [phone]  Pharmacy Filling the Rx: [facility] [place] - [address] [address]  Filling Pharmacy Phone: [phone]  Start Date: 5/3/2022

## 2022-05-04 NOTE — TELEPHONE ENCOUNTER
Prior Authorization Approval    Authorization Effective Date: 2/5/2022  Authorization Expiration Date: 5/3/2023  Medication: Cariprazine (VRAYLAR) 1.5 MG CAPS capsule - APPROVED  Approved Dose/Quantity: 30 FOR 30 DAYS   Insurance Company: [employer] [place] - Phone [phone] Fax [phone]  Which Pharmacy is filling the prescription (Not needed for infusion/clinic administered): [facility] - [facility] [address] [facility]  Pharmacy Notified: Yes  Patient Notified: Yes Pharmacy will notify patient once order is ready.

## 2022-05-10 ENCOUNTER — TELEPHONE (OUTPATIENT)
Dept: BEHAVIORAL HEALTH | Facility: CLINIC | Age: 37
End: 2022-05-10
Payer: COMMERCIAL

## 2022-05-10 NOTE — TELEPHONE ENCOUNTER
[facility]al [facility] Services  [facility]: [place]        Social Work Care Navigator Note      Patient: Lluvia Glaser  Date: May 10, 2022  Preferred Name: Lluvia    Previous PHQ-9:   PHQ-9 SCORE 11/11/2021 3/7/2022 4/27/2022   PHQ-9 Total Score MyChart - 20 (Severe depression) 14 (Moderate depression)   PHQ-9 Total Score 11 20 14     Previous WAYNE-7:   WAYNE-7 SCORE 11/11/2021 3/17/2022 4/28/2022   Total Score - - -   Total Score 6 15 9     ULISES LEVEL:  No flowsheet data found.    Preferred Contact:  Need for : No  Preferred Contact: Cell      Type of Contact Today: Phone call (not reached/unavailable)      Data: (subjective / Objective):  Attempted to reach patient for [facility] monthly check-in, but was unsuccessful, left message and sent patient [employer] message.  Plan to attempt again.      [facility] informed patient that current [facility] will be leaving [place] and new [facility] CC, Susan Patrick has been assigned to her case.    Minnie Denney LICSW [facility] ([facility])   [facility]  [phone]  May 10, 2022  3:47 PM

## 2022-05-25 ENCOUNTER — TELEPHONE (OUTPATIENT)
Dept: BEHAVIORAL HEALTH | Facility: CLINIC | Age: 37
End: 2022-05-25
Payer: COMMERCIAL

## 2022-05-25 NOTE — LETTER
[facility] ([facility]): Health Action Plan  [facility]: [place]    [employer]      Wilfredo Teixeira,     I tried calling you today for your [facility] monthly check-in. I was able to leave you a message.     I am moving to a new position outside of [place] and the [facility] program and will no longer be the  assigned to you. Starting June 1st your new [facility]  will be Susan Patrick.     Susan's Contact Information:     Susan Patrick [facility]   Behavioral Health Home    [facility]   [address] [facility]  [place] [address]   [email or web address]   Office: [phone]  Fax: [phone]   Gender Pronouns: she/her   Employed by [facility] [employer] [facility]          Please call Susan Patrick to schedule your [facility] June monthly check-in, as required by the [facility] program.     If you have any questions about your [facility] monthly check-in, [facility] services, or if you feel you no longer was to be enrolled in the [facility] program, please feel free to contact me by phone or by email. My contact information is listed below. I look forward to hearing from you!     I have enjoyed working with you.     Sincerely,          Minnie Denney [facility]  [facility]   [facility]  [phone]

## 2022-05-25 NOTE — TELEPHONE ENCOUNTER
Behavioral Health Home Services  [facility]: [place]        Social Work Care Navigator Note      Patient: Lluvia Glaser  Date: May 25, 2022  Preferred Name: Lluvia    Previous PHQ-9:   PHQ-9 SCORE 11/11/2021 3/7/2022 4/27/2022   PHQ-9 Total Score MyChart - 20 (Severe depression) 14 (Moderate depression)   PHQ-9 Total Score 11 20 14     Previous WAYNE-7:   WAYNE-7 SCORE 11/11/2021 3/17/2022 4/28/2022   Total Score - - -   Total Score 6 15 9     ULISES LEVEL:  No flowsheet data found.    Preferred Contact:  Need for : No  Preferred Contact: Cell      Type of Contact Today: Phone call (not reached/unavailable)      Data: (subjective / Objective):  Attempted to reach patient for [facility] Monthly Check-In, but was unsuccessful, left message and mailed reminder letter to schedule an appt with new [facility] Susan ALCARAZ.  Plan to attempt again.      Minnie Denney [facility]  [facility])   [facility]  [phone]  May 25, 2022  1:24 PM

## 2022-06-03 NOTE — PROGRESS NOTES
Discharge Note -Physical Therapy    NAME:  Lluvia Glaser  MRN:   [id number]    S:    Pt did not follow up for therapy as recommended.    O:  Objective information is not available as pt has not returned for therapy.  Initial evaluation note will serve as final entry.    A:   Pt did not return for further treatment.    Status of goals is unknown due to lack of followup by patient.    P:  Discharge from PT this date.    Thank you for this referral,    Elo Capellan, PT,   #[id number]  [facility]  [phone]

## 2022-07-14 ENCOUNTER — TELEPHONE (OUTPATIENT)
Dept: FAMILY MEDICINE | Facility: CLINIC | Age: 37
End: 2022-07-14

## 2022-07-14 NOTE — TELEPHONE ENCOUNTER
Patient wants to be seen because she is worried about the thing in the back of her throat (mouth) (described as a bump) is cancer. This has been going on for 3 weeks and has not gone away. She denies, chest tightness, chest congestion and SOB. RN made her an appointment with her PCP for tomorrow at 10:40am.    Anabelle Chou, WAYNEN, RN, PHN

## 2022-07-14 NOTE — TELEPHONE ENCOUNTER
Reason for call:  Patient reporting a symptom    Symptom or request: Pt feels like she has to keep clearing her throat and there's something in it x 3 weeks.   She wants an appt ASAP to check for lung cancer?  No SOB or difficulty breathing.  Please call patient and advise if this can wait for clinic appt or should she go to ?        Duration (how long have symptoms been present): 3 weeks    Have you been treated for this before? No    Additional comments:     Phone Number patient can be reached at:  Home number on file [phone] (home)    Best Time:  any    Can we leave a detailed message on this number:  YES    Call taken on 7/14/2022 at 2:44 PM by Elisa Cade

## 2022-08-28 ENCOUNTER — HEALTH MAINTENANCE LETTER (OUTPATIENT)
Age: 37
End: 2022-08-28

## 2022-10-04 NOTE — PATIENT INSTRUCTIONS
1.  Continue quetiapine 200 mg at bedtime    2.  Continue clonazepam 1 mg twice daily    3.  Increase imipramine to 25 mg twice daily    4.  Continue support through Narcotics Anonymous      Continue all other medications as reviewed per electronic medical record today.     Safety plan reviewed. To the Emergency Department as needed or call after hours crisis line at [phone] or [phone]. [place] Crisis Text Line. Text [place] to 135196 or Suicide LifeLine Chat: [employer]P [employer].org/chat/    To schedule individual or family therapy, call [facility] at [phone].    Schedule an appointment with me in 6 weeks or sooner as needed. Call [facility] at [phone] to schedule.    Follow up with primary care provider as planned or for acute medical concerns.    Call the psychiatric nurse line with medication questions or concerns at [phone].    [employer] may be used to communicate with your provider, but this is not intended to be used for emergencies.    Crisis Resources:    National Suicide Prevention Lifeline: 543.257.9535 (TTY: 485.350.7949). Call anytime for help.  (www.suicidepreventionlifeline.org)  National [place] on Mental Illness (www.sabina.org): 536.167.8099 or 853-073-0155.   Mental Health Association (www.mentalhealth.org): 689.747.7928 or 743-691-9129.  [place] Crisis Text Line: Text [place] to 046702  Suicide LifeLine Chat: suicideOneRoofline.org/chat       83 y.o. M, h/o GBM s/p craniotomy 7/28/22, s/p 6/15 fractions of RT at [facility] with Dr. Plunkett admitted to [facility] with worsening weakness and dark stools.    HCP is daughter Brandon.  Per chart notes her father is very depressed about diagnosis and attempts at GOC discussion have not resulted in clear wishes to make decisions.     If patient/family prefer to resume RT, please transfer patient from [facility] to [facility] and we will resume RT sessions- 9 more to go. Nutrition Follow Up Note  Patient seen for: 1st malnutrition follow up     Chart reviewed, events noted. Patient is a 83y old  Male who presents with a chief complaint of GIB, weakness. Tajik-speaking, with PMH Glioblastoma (diagnosed 7/18/22, s/p R stereo biopsy, TIMOTHY x2, R crani for tumor debulking 7/28/22, on temozolomide (last dose 4 days ago)), HLD, steroid-induced diabetes, h/o HIT during recent admission, and R popliteal DVT (found 9/21/22, AC discontinued due to thrombocytopenia) presenting with worsening LE weakness x 5 days i/s/o dark stools x 1-2 days and AC use. Admitted to medicine for further workup.     Source: [] Patient       [x] EMR        [] RN        [] Family at bedside       [] Other:    -If unable to interview patient: [] Trach/Vent/BiPAP  [] Disoriented/confused/inappropriate to interview    Diet Order:   Diet, Regular:   Consistent Carbohydrate {Evening Snack} (CSTCHOSN)  DASH/TLC {Sodium & Cholesterol Restricted} (DASH)  Supplement Feeding Modality:  Oral  Glucerna Shake Cans or Servings Per Day:  1       Frequency:  Daily (10-01-22)    - Is current order appropriate/adequate? [X] Yes  []  No:     - PO intake :   [] >75%  Adequate    [X] 50-75%  Fair       [] <50%  Poor    - Nutrition-related concerns:      - Was exposed to COVID on 9/30, now COVID Positive as of 10/3 as per care coordination note      -  as per internal medicine 10/3, "GD/colonoscopy performed yesterday; large amount of food in stomach c/f gastroparesis."      GI:  Last BM on 10/2/22  Bowel Regimen? [] Yes   [x] No      Weights: Daily Weight  77.3kg/170.1 pounds (09-28).   Wt history per chart: 167.8 lbs (09-29, [facility] 171 lbs (07-22, stated).  Drug Dosing Weight  Weight (kg): 68 (30 Sep 2022 16:10)  BMI (kg/m2): 24.9 (30 Sep 2022 16:10)    Nutritionally Pertinent MEDICATIONS  (STANDING):  ascorbic acid  atorvastatin  dexAMETHasone     Tablet  dextrose 5%.  dextrose 5%.  dextrose 50% Injectable  dextrose 50% Injectable  dextrose 50% Injectable  fluconAZOLE IVPB  fluconAZOLE IVPB  glucagon  Injectable  insulin lispro (ADMELOG) corrective regimen sliding scale  multivitamin  pantoprazole    Tablet    Pertinent Labs: 10-03 @ 06:49: Na 140, BUN 23, Cr 0.82, <H>, K+ 4.2, Phos 2.3<L>, Mg 1.9, Alk Phos 84, ALT/SGPT 46<H>, AST/SGOT 21, HbA1c --    A1C with Estimated Average Glucose Result: 9.3 % (09-27-22 @ 13:14)  A1C with Estimated Average Glucose Result: 7.0 % (07-24-22 @ 06:23)    Finger Sticks:  POCT Blood Glucose.: 264 mg/dL (10-03 @ 11:54)  POCT Blood Glucose.: 255 mg/dL (10-03 @ 08:05)  POCT Blood Glucose.: 257 mg/dL (10-02 @ 21:43)  POCT Blood Glucose.: 270 mg/dL (10-02 @ 17:21)      Skin per nursing documentation:  coccyx stage 2 Pressure Injury   Edema: + 2 edema to right & left ankle     Estimated Needs:   [X] no change since previous assessment  [] recalculated:     Previous Nutrition Diagnosis:   1. Severe chronic malnutrition  2. Increased Nutrient Needs  Nutrition Diagnosis is: [X] ongoing  [] resolved [] not applicable     New Nutrition Diagnosis: [] Not applicable    Nutrition Care Plan:  [X] In Progress- addressed with diet order, PO encouragement and nutritional supplements   [] Achieved  [] Not applicable    Nutrition Interventions:     Education Provided:       [] Yes:  [X] No:        Recommendations:    1) Recommend continue DASH/TLC Consistent Carbohydrate diet with Glucerna 1x/day when medically feasible.   2) Continue multivitamin and Vitamin C, as long as no medical contraindications, to promote wound healing.  3) Monitor PO intake, GI tolerance, skin integrity, labs, weight, and bowel movement regularity.   4) Honor food preferences as feasible. Assist with meals PRN and gently encourage PO intake.        Monitoring and Evaluation:   Continue to monitor nutritional intake, tolerance to diet prescription, weights, labs, skin integrity      RD remains available upon request and will follow up per protocol  Liliana Chinchilla, MS,RDN,CDN Pager #[phone] GI Brief Note:    Patient s/p EGD/Colonoscopy yesterday with finding and recs as noted in chart. No further GI work up from our perspective.   Patient will need repeat EGD/Colonoscopy within this year as OP. Please refer to EGD/Colonoscopy report for further rec.  -  Please provide patient with Gastroenterology Clinic number to confirm/arrange appointment; [phone] (Faculty Practice at [address] [facility]) or [phone] ([facility] at [address] [place] [address]) or [phone] ([facility] at [address] [facility]).       Yonathan Grier MD  Gastroenterology/Hepatology Fellow  1st option: [phone] (text or call), ONLY available from 7:00 am to 5:00 pm.   **Contact on-call GI fellow via answering service ([phone]) from 5pm-7am AND on weekends/holidays**  2nd option: Available via [employer] Teams  3rd option: Pager: [phone] Interventional Radiology    -- U/S reviewed with IR attending Dr. Hernandez  -- Can plan for IVC filter placement today under local anesthesia  -- Place IR procedure order under Dr. Hernandez  -- make sure patient has COVID test within 5 days Patient requires 9 remaining radiation treatments on a Monday-Friday schedule to begin as soon as reasonably possible. These treatments will take place at the [address] [place] [address] location.     Neurosurgery   Pager [phone]

## 2022-12-13 ENCOUNTER — TELEPHONE (OUTPATIENT)
Dept: BEHAVIORAL HEALTH | Facility: CLINIC | Age: 37
End: 2022-12-13

## 2022-12-13 NOTE — TELEPHONE ENCOUNTER
CC called patient to discuss continued enrollment in [facility] services.  No answer.  Left message.    Susan Patrick [facility]  [facility]al Health Home ([facility])   [facility]  [phone]

## 2022-12-21 ENCOUNTER — TELEPHONE (OUTPATIENT)
Dept: BEHAVIORAL HEALTH | Facility: CLINIC | Age: 37
End: 2022-12-21

## 2022-12-21 NOTE — TELEPHONE ENCOUNTER
[facility] called patient again to follow up regarding continued enrollment in [facility].  No answer. Left message.    Susan Patrick [facility]  [facility]al Health Home ([facility])   [facility]  [phone]

## 2023-01-14 ENCOUNTER — HEALTH MAINTENANCE LETTER (OUTPATIENT)
Age: 38
End: 2023-01-14

## 2023-03-14 ENCOUNTER — HOSPITAL ENCOUNTER (EMERGENCY)
Facility: CLINIC | Age: 38
Discharge: HOME | End: 2023-03-14
Attending: PHYSICIAN ASSISTANT | Admitting: PHYSICIAN ASSISTANT
Payer: COMMERCIAL

## 2023-03-14 VITALS — DIASTOLIC BLOOD PRESSURE: 104 MMHG | SYSTOLIC BLOOD PRESSURE: 155 MMHG | HEART RATE: 98 BPM | OXYGEN SATURATION: 100 %

## 2023-03-14 DIAGNOSIS — R51.9 SCALP PAIN: ICD-10-CM

## 2023-03-14 PROCEDURE — 99213 OFFICE O/P EST LOW 20 MIN: CPT | Performed by: PHYSICIAN ASSISTANT

## 2023-03-14 PROCEDURE — G0463 HOSPITAL OUTPT CLINIC VISIT: HCPCS | Performed by: PHYSICIAN ASSISTANT

## 2023-03-14 RX ORDER — IBUPROFEN 800 MG/1
800 TABLET, FILM COATED ORAL EVERY 8 HOURS PRN
Qty: 20 TABLET | Refills: 0 | Status: SHIPPED | OUTPATIENT
Start: 2023-03-14 | End: 2023-09-27

## 2023-03-14 ASSESSMENT — ENCOUNTER SYMPTOMS
CONSTITUTIONAL NEGATIVE: 1
HEADACHES: 1

## 2023-03-14 NOTE — ED PROVIDER NOTES
History     Chief Complaint   Patient presents with     Neck Pain     HPI  Lluvia Glaser is a 37 year old female who presents with complaints of left-sided scalp pain for the past 3 days.  Patient denies preceding injury or trauma to the area.  She states she has a cyst to the left side at the base of her skull that has been present for quite some time.  She has developed adjacent swelling that extends along the left side of her scalp over the past 3 days.  This is tender to the touch.  No overlying skin changes or rash.  Denies any other associated symptoms.  Patient is starting to develop a headache.  Denies fevers, chills, cough, sore throat, sinus pressure, nasal congestion, nausea, vomiting, diarrhea, abdominal pain, chest pain, shortness of breath, or extremity numbness/tingling/weakness.      Allergies:  Allergies   Allergen Reactions     Blue Dyes Hives     Demerol [Meperidine] Hives       Problem List:    Patient Active Problem List    Diagnosis Date Noted     Methamphetamine use disorder, moderate, in early remission, in controlled environment, dependence (H) 02/14/2022     Priority: Medium     Bipolar 1 disorder with moderate britt (H) 02/14/2022     Priority: Medium     Substance abuse (H)      Priority: Medium     Meth.  Sober since 10/22/2019       Generalized anxiety disorder      Priority: Medium     Chronic eczema      Priority: Medium     Gastric ulcer      Priority: Medium     ADHD      Priority: Medium     Smoker      Priority: Medium        Past Medical History:    Past Medical History:   Diagnosis Date     ADHD      Bipolar 1 disorder (H)      Chronic eczema      PABLO I (cervical intraepithelial neoplasia I) 12/24/2019     Depressive disorder      Excessive bleeding in premenopausal period 08/05/2021     Gastric ulcer      Generalized anxiety disorder      Smoker      Substance abuse (H)        Past Surgical History:    Past Surgical History:   Procedure Laterality Date     ABDOMEN  "SURGERY       CHOLECYSTECTOMY       ESOPHAGOSCOPY, GASTROSCOPY, DUODENOSCOPY (EGD), COMBINED N/A 02/26/2020    Procedure: ESOPHAGOGASTRODUODENOSCOPY, WITH BIOPSY;  Surgeon: Tesfaye Temple DO;  Location: [facility]     LAPAROSCOPIC HYSTERECTOMY TOTAL N/A 8/25/2021    Procedure: total laparoscopic hysterectomy, bilateral salpingectomy, ovaries stay in.;  Surgeon: Liberty Dickerson MD;  Location: [facility]     TONSILLECTOMY       TUBAL LIGATION         Family History:    Family History   Problem Relation Age of Onset     Cancer Mother         \"in her stomach, maybe ovaries\"     Depression Mother      Anxiety Disorder Mother      Mental Illness Mother      Substance Abuse Mother      Colon Cancer Maternal Grandmother      Diabetes Paternal Grandmother      Attention Deficit Disorder Son      Bipolar Disorder Son        Social History:  Marital Status:   [2]  Social History     Tobacco Use     Smoking status: Every Day     Packs/day: 0.50     Years: 10.00     Pack years: 5.00     Types: Cigarettes     Smokeless tobacco: Former     Tobacco comments:     2-3 cigs daily   Vaping Use     Vaping Use: Never used   Substance Use Topics     Alcohol use: Not Currently     Drug use: Not Currently     Types: Methamphetamines     Comment: sober since 10/2019        Medications:    ibuprofen (ADVIL/MOTRIN) 800 MG tablet  atomoxetine (STRATTERA) 25 MG capsule  benztropine (COGENTIN) 0.5 MG tablet  cariprazine (VRAYLAR) 1.5 MG CAPS capsule  clonazePAM (KLONOPIN) 2 MG tablet  imipramine (TOFRANIL) 25 MG tablet  Naproxen Sodium (ALEVE PO)  propranolol (INDERAL) 40 MG tablet  senna-docusate (SENOKOT-S/PERICOLACE) 8.6-50 MG tablet  SUMAtriptan (IMITREX) 50 MG tablet          Review of Systems   Constitutional: Negative.    HENT:        Scalp pain   Neurological: Positive for headaches.   All other systems reviewed and are negative.      Physical Exam   BP: (!) 155/104  Pulse: 98  SpO2: 100 %      Physical Exam  Constitutional:     "   General: She is not in acute distress.     Appearance: Normal appearance. She is well-developed. She is not ill-appearing, toxic-appearing or diaphoretic.   HENT:      Head: Normocephalic and atraumatic.        Comments: Tenderness to palpation along left side of temporal and occipital scalp.  Palpable cyst along the inferior left posterior aspect of her scalp along the base of the skull.  There is no rash to the scalp or overlying skin changes or erythema.  No vesicles.     Right Ear: Tympanic membrane, ear canal and external ear normal.      Left Ear: Tympanic membrane, ear canal and external ear normal.      Nose: Nose normal. No rhinorrhea.      Mouth/Throat:      Pharynx: No oropharyngeal exudate or posterior oropharyngeal erythema.   Eyes:      Conjunctiva/sclera: Conjunctivae normal.      Pupils: Pupils are equal, round, and reactive to light.   Neck:      Meningeal: Brudzinski's sign and Kernig's sign absent.   Cardiovascular:      Rate and Rhythm: Normal rate and regular rhythm.      Heart sounds: Normal heart sounds.   Pulmonary:      Effort: Pulmonary effort is normal. No respiratory distress.      Breath sounds: Normal breath sounds. No stridor. No wheezing.   Musculoskeletal:      Cervical back: Full passive range of motion without pain, normal range of motion and neck supple. No edema or rigidity. No pain with movement, spinous process tenderness or muscular tenderness. Normal range of motion.   Lymphadenopathy:      Cervical: No cervical adenopathy.   Skin:     General: Skin is warm and dry.   Neurological:      General: No focal deficit present.      Mental Status: She is alert and oriented to person, place, and time.      Cranial Nerves: No cranial nerve deficit.      Sensory: Sensation is intact.      Motor: Motor function is intact.      Coordination: Coordination is intact.      Gait: Gait is intact.   Psychiatric:         Behavior: Behavior is cooperative.         ED Course                  Procedures    No results found for this or any previous visit (from the past 24 hour(s)).    Medications - No data to display    Assessments & Plan (with Medical Decision Making)     Pt is a 37 year old female who presents with complaints of left-sided scalp pain for the past 3 days.  Patient denies preceding injury or trauma to the area.  She states she has a cyst to the left side at the base of her skull that has been present for quite some time.  She has developed adjacent swelling that extends along the left side of her scalp over the past 3 days.  This is tender to the touch.  No overlying skin changes or rash.  Denies any other associated symptoms.     Pt is afebrile on arrival.  Exam as above.  Unclear what is causing patient's scalp pain.  Does not appear consistent with shingles or cellulitis at this time.  Recommended continued monitoring and symptomatic treatments at home.  Return precautions were reviewed.  Hand-outs were provided.    Patient was instructed to follow-up with PCP for continued care and management.  She is to return to the ED for persistent and/or worsening symptoms.  Patient expressed understanding of the diagnosis and plan and was discharged home in good condition.    I have reviewed the nursing notes.    I have reviewed the findings, diagnosis, plan and need for follow up with the patient.    Discharge Medication List as of 3/14/2023  4:40 PM      START taking these medications    Details   ibuprofen (ADVIL/MOTRIN) 800 MG tablet Take 1 tablet (800 mg) by mouth every 8 hours as needed for moderate pain (4-6), Disp-20 tablet, R-0, E-Prescribe             Final diagnoses:   Scalp pain       3/14/2023   [facility] EMERGENCY DEPT      Disclaimer:  This note consists of symbols derived from keyboarding, dictation and/or voice recognition software.  As a result, there may be errors in the script that have gone undetected.  Please consider this when interpreting information  found in this chart.     Carol Ann Dela Cruz PA-C  03/14/23 2110       Carol Ann Dela Cruz PA-C  03/14/23 2113

## 2023-03-14 NOTE — LETTER
March 14, 2023      To Whom It May Concern:      Lluvia Glaser was seen in our Emergency Department today, 03/14/23.  Please excuse from work today and tomorrow.  Thank you.      Sincerely,        Carol Ann Dela Cruz PA-C

## 2023-03-14 NOTE — LETTER
March 14, 2023      To Whom It May Concern:      Lluvia Glaser was seen in our Emergency Department today, 03/14/23.  Please excuse from work today.  Thank you.      Sincerely,        Carol Ann Dela Cruz PA-C

## 2023-04-28 ENCOUNTER — TELEPHONE (OUTPATIENT)
Dept: FAMILY MEDICINE | Facility: CLINIC | Age: 38
End: 2023-04-28
Payer: COMMERCIAL

## 2023-05-08 ENCOUNTER — TELEPHONE (OUTPATIENT)
Dept: NURSING | Facility: CLINIC | Age: 38
End: 2023-05-08
Payer: COMMERCIAL

## 2023-05-08 NOTE — TELEPHONE ENCOUNTER
Reason for Call:  Appointment Request    Patient requesting this type of appt:  Abdominal     Requested provider: Susan Anaya    Reason patient unable to be scheduled: Not within requested timeframe    When does patient want to be seen/preferred time: Same day    Comments: abdominal pain    Could we send this information to you in [facility] or would you prefer to receive a phone call?:   Patient would prefer a phone call   Okay to leave a detailed message?: Yes at Cell number on file:    Telephone Information:   Mobile [phone]       Call taken on 5/8/2023 at 11:57 AM by Trini Watson

## 2023-05-08 NOTE — TELEPHONE ENCOUNTER
Called patient to offer to schedule appointment that was open in [place] and she informed me that she no longer could schedule an appointment today. Patient will call to schedule an appointment and was informed that same day appointments are not widely available and to try to schedule a few days in advance. Patient informed to go to urgent care if she feels symptoms need to be addressed today.     Patient expressed understanding.     Farida Yan RN on 5/8/2023 at 12:08 PM

## 2023-09-24 ENCOUNTER — HEALTH MAINTENANCE LETTER (OUTPATIENT)
Age: 38
End: 2023-09-24

## 2023-09-27 ENCOUNTER — OFFICE VISIT (OUTPATIENT)
Dept: FAMILY MEDICINE | Facility: CLINIC | Age: 38
End: 2023-09-27
Payer: COMMERCIAL

## 2023-09-27 VITALS
TEMPERATURE: 98 F | HEART RATE: 92 BPM | BODY MASS INDEX: 28.85 KG/M2 | HEIGHT: 64 IN | SYSTOLIC BLOOD PRESSURE: 124 MMHG | WEIGHT: 169 LBS | OXYGEN SATURATION: 100 % | DIASTOLIC BLOOD PRESSURE: 88 MMHG | RESPIRATION RATE: 16 BRPM

## 2023-09-27 DIAGNOSIS — R10.9 BILATERAL FLANK PAIN: ICD-10-CM

## 2023-09-27 DIAGNOSIS — N64.4 PAIN OF BOTH BREASTS: ICD-10-CM

## 2023-09-27 DIAGNOSIS — N20.0 NEPHROLITHIASIS: Primary | ICD-10-CM

## 2023-09-27 LAB
ALBUMIN UR-MCNC: NEGATIVE MG/DL
ANION GAP SERPL CALCULATED.3IONS-SCNC: 10 MMOL/L (ref 7–15)
APPEARANCE UR: CLEAR
BACTERIA #/AREA URNS HPF: ABNORMAL /HPF
BILIRUB UR QL STRIP: NEGATIVE
BUN SERPL-MCNC: 14.1 MG/DL (ref 6–20)
CALCIUM SERPL-MCNC: 9.1 MG/DL (ref 8.6–10)
CHLORIDE SERPL-SCNC: 104 MMOL/L (ref 98–107)
COLOR UR AUTO: YELLOW
CREAT SERPL-MCNC: 0.92 MG/DL (ref 0.51–0.95)
DEPRECATED HCO3 PLAS-SCNC: 24 MMOL/L (ref 22–29)
EGFRCR SERPLBLD CKD-EPI 2021: 82 ML/MIN/1.73M2
GLUCOSE SERPL-MCNC: 93 MG/DL (ref 70–99)
GLUCOSE UR STRIP-MCNC: NEGATIVE MG/DL
HGB UR QL STRIP: ABNORMAL
KETONES UR STRIP-MCNC: NEGATIVE MG/DL
LEUKOCYTE ESTERASE UR QL STRIP: NEGATIVE
MUCOUS THREADS #/AREA URNS LPF: PRESENT /LPF
NITRATE UR QL: NEGATIVE
PH UR STRIP: 6 [PH] (ref 5–7)
POTASSIUM SERPL-SCNC: 4.2 MMOL/L (ref 3.4–5.3)
RBC #/AREA URNS AUTO: ABNORMAL /HPF
SODIUM SERPL-SCNC: 138 MMOL/L (ref 135–145)
SP GR UR STRIP: 1.02 (ref 1–1.03)
SQUAMOUS #/AREA URNS AUTO: ABNORMAL /LPF
UROBILINOGEN UR STRIP-ACNC: 0.2 E.U./DL
WBC #/AREA URNS AUTO: ABNORMAL /HPF

## 2023-09-27 PROCEDURE — 36415 COLL VENOUS BLD VENIPUNCTURE: CPT | Performed by: FAMILY MEDICINE

## 2023-09-27 PROCEDURE — 81001 URINALYSIS AUTO W/SCOPE: CPT | Performed by: FAMILY MEDICINE

## 2023-09-27 PROCEDURE — 80048 BASIC METABOLIC PNL TOTAL CA: CPT | Performed by: FAMILY MEDICINE

## 2023-09-27 PROCEDURE — 99214 OFFICE O/P EST MOD 30 MIN: CPT | Performed by: FAMILY MEDICINE

## 2023-09-27 ASSESSMENT — PAIN SCALES - GENERAL: PAINLEVEL: MILD PAIN (2)

## 2023-09-27 ASSESSMENT — ENCOUNTER SYMPTOMS
FEVER: 0
DYSURIA: 0
ACTIVITY CHANGE: 0
NAUSEA: 0
CONSTIPATION: 0
FATIGUE: 0
CHILLS: 0
ABDOMINAL PAIN: 0
VOMITING: 0
FREQUENCY: 0
UNEXPECTED WEIGHT CHANGE: 0
DIARRHEA: 0
FLANK PAIN: 1

## 2023-09-27 ASSESSMENT — PATIENT HEALTH QUESTIONNAIRE - PHQ9
10. IF YOU CHECKED OFF ANY PROBLEMS, HOW DIFFICULT HAVE THESE PROBLEMS MADE IT FOR YOU TO DO YOUR WORK, TAKE CARE OF THINGS AT HOME, OR GET ALONG WITH OTHER PEOPLE: EXTREMELY DIFFICULT
SUM OF ALL RESPONSES TO PHQ QUESTIONS 1-9: 19
SUM OF ALL RESPONSES TO PHQ QUESTIONS 1-9: 19

## 2023-09-27 NOTE — NURSING NOTE
"Chief Complaint   Patient presents with    Pain    Breast Pain       Initial /88   Pulse 92   Temp 98  F (36.7  C) (Tympanic)   Resp 16   Ht 1.626 m (5' 4\")   Wt 76.7 kg (169 lb)   LMP 07/18/2021 (Approximate)   SpO2 100%   BMI 29.01 kg/m   Estimated body mass index is 29.01 kg/m  as calculated from the following:    Height as of this encounter: 1.626 m (5' 4\").    Weight as of this encounter: 76.7 kg (169 lb).    Patient presents to the clinic using No DME    Is there anyone who you would like to be able to receive your results? No  If yes have patient fill out LACEY      "

## 2023-09-27 NOTE — COMMUNITY RESOURCES LIST (ENGLISH)
09/27/2023   [facility] - Outpatient Clinics  N/A  For additional resource needs, please contact your health insurance member services or your primary care team.  Phone: [phone]   Email: N/A   Address: [address] [place] [id number]   Hours: N/A        Food and Nutrition       Food pantry  1  [facility] Food Pantry Distance: 3.03 miles      In-Person   [address] [place] [address]  Language: English  Hours: Mon 9:00 AM - 4:00 PM  Fees: Free   Phone: (621) 932-9767 Website: https://www.MaulSoup.ncyclo/pages/category/Nonprofit-Organization/[facility]-Food-Pantry-[place]-120902528337398/     2  [facility] - Social & Family Services - Emergency Food Shelf Distance: 24.8 miles      [facility]   [address] [place] [address]  Language: English  Hours: Mon - Fri 8:00 AM - 4:00 PM  Fees: Free   Phone: (583) 543-1505 Website: http://www.Recurve.gov/social-family-services/     SNAP application assistance  3  [facility] - Human Services Department Distance: 0.15 miles      In-Person, Phone/Virtual   [address] [address]  Language: English  Hours: Mon - Fri 8:00 AM - 4:00 PM  Fees: Free   Phone: (169) 460-5731 Email: irond@[facility].org Website: http://www.co.[place].gov/localgov_departments_details.asp?ptjyct=443&kifxp=198          Transportation       Free or low-cost transportation  4  [facility] - Transit Distance: 24.04 miles      In-Person   [address] [place] [address]  Language: English  Hours: Mon - Sun 7:00 AM - 2:00 AM  Fees: Free   Phone: (852) 313-7318 Website: http://www.Recurve.gov/transit/          Important Numbers & Websites       [address].org  Poison Control   (503) 655-7734 Mnpoison.org  Suicide and Crisis Lifeline   989 988[facility] Hospit[facility].org  Childhelp [place] Child Abuse Hotline   686.804.5865 Childhelphotline.org  [place] Sexual Assault Hotline    (968) 972-7854 (HOPE) Trakn.org  National Runaway Safeline   (107) 152-6749 (RUNAWAY) [employer].org  Pregnancy & Postpartum Support [place]   Call/text 389-028-1899 Ppsupportmn.org  Substance Abuse National Helpline ([facility]   933-591-HELP (8315) Findtreatment.gov  Emergency Services   910

## 2023-09-27 NOTE — COMMUNITY RESOURCES LIST (ENGLISH)
09/27/2023   [facility]  N/A  For questions about this resource list or additional care needs, please contact your primary care clinic or care manager.  Phone: [phone]   Email: N/A   Address: [address] [place] [address]   Hours: N/A        Food and Nutrition       Food pantry  1  [facility] Food Pantry Distance: 3.03 miles      In-Person   [address] [place] [address]  Language: English  Hours: Mon 9:00 AM - 4:00 PM  Fees: Free   Phone: (775) 530-1077 Website: https://www.ROBLOX.Arkadium/pages/category/Nonprofit-Organization/[facility]-Food-Pantry-[place]-House-093009931486647/     2  [facility] - Social & Family Services - Emergency Food Shelf Distance: 24.8 miles      Pick   [address] [place] [address]  Language: English  Hours: Mon - Fri 8:00 AM - 4:00 PM  Fees: Free   Phone: (885) 399-3531 Website: http://www.TOK.[facility].gov/social-family-services/     SNAP application assistance  3  [facility] - Human Services Department Distance: 0.15 miles      In-Person, Phone/Virtual   [address] [place] [address]  Language: English  Hours: Mon - Fri 8:00 AM - 4:00 PM  Fees: Free   Phone: (461) 184-7282 Email: irond@[facility].org Website: http://www.co.[place].wi.gov/localgov_departments_details.asp?nazgig=858&fqqnv=082          Transportation       Free or low-cost transportation  4  [facility] - Transit Distance: 24.04 miles      In-Person   [address] [place] [address]  Language: English  Hours: Mon - Sun 7:00 AM - 2:00 AM  Fees: Free   Phone: (400) 722-9963 Website: http://www.[facility].SocialBro/transit/          Important Numbers & Websites       Emergency Services   911  [facility] Services   311  Poison Control   (799) 429-9354  Suicide Prevention Lifeline   (611) 832-3141 (TALK)  Child Abuse Hotline   (242) 791-7685 (4-A-Child)  Sexual Assault Hotline   (304) 437-4681 (HOPE)  National  Runaway Safeline   (674) 380-6601 (RUNAWAY)  All-Options Talkline   (597) 784-8276  Substance Abuse Referral   (866) 615-6207 (HELP)

## 2023-09-27 NOTE — PROGRESS NOTES
Assessment & Plan     Lluvia was seen today for pain and breast pain.    Diagnoses and all orders for this visit:    Nephrolithiasis  Bilateral flank pain  -     Basic metabolic panel  (Ca, Cl, CO2, Creat, Gluc, K, Na, BUN); Future  -     UA Macroscopic with reflex to Microscopic and Culture - Lab Collect; Future  -     CT Abdomen w/o Contrast; Future    History of kidney stones from 2021 CT. Worsening bilateral flank pain, worse on left. Labs and imaging as above. Currently minimally symptomatic, no signs of systemic infection.    Breast pain, bilateral - worse on left  -     diclofenac (VOLTAREN) 1 % topical gel; Apply 4 g topically 4 times daily for 7 days    Reassured patient there is no concern for malignancy at the moment. Apply Voltaren gel for 7 days. Try looser bras. If pain does not improve within seven days, order US. Patient in agreement with plan.               MD KASIA [facility]    Subjective   Lluvia is a 37 year old, presenting for the following health issues:  Pain and Breast Pain        9/27/2023     8:24 AM   Additional Questions   Roomed by Deanne PEDROZA   Accompanied by self       History of Present Illness       Back Pain:  She presents for follow up of back pain. Patient's back pain is a recurring problem.  Location of back pain:  Left lower back, left middle of back and left side of waist  Description of back pain: dull ache and sharp  Back pain spreads: nowhere    Since patient first noticed back pain, pain is: gradually worsening  Does back pain interfere with her job:  Yes       Reason for visit:  Kidney pain and left breast pain  Symptom onset:  More than a month  Symptom intensity:  Severe  Symptom progression:  Worsening  Had these symptoms before:  Yes  Has tried/received treatment for these symptoms:  Yes  Previous treatment was successful:  No  What makes it worse:  When i wake up cause i haven't peed all night  What makes it better:  No    She eats  "0-1 servings of fruits and vegetables daily.She consumes 8 sweetened beverage(s) daily.She exercises with enough effort to increase her heart rate 30 to 60 minutes per day.  She exercises with enough effort to increase her heart rate 3 or less days per week.     Lower back pain, worse on left, most painful at night and in the morning when she has to urinate. Pain has been intermittent, now worsening for around a year. Denies hematuria. History of nephrolithiasis in CT 2 years ago.    Breast pain worse when she takes her bra off or moves around. Left, starting a week ago. No rashes. No family history of breast cancer. Breast not significantly heavy. Worse at end of the day.  Patient with history of hysterectomy.     Not taking any medications.    Review of Systems   Constitutional:  Negative for activity change, chills, fatigue, fever and unexpected weight change.   Gastrointestinal:  Negative for abdominal pain, constipation, diarrhea, nausea and vomiting.   Genitourinary:  Positive for flank pain. Negative for dysuria, frequency and urgency.   Musculoskeletal:         Pain at left breast            Objective    /88   Pulse 92   Temp 98  F (36.7  C) (Tympanic)   Resp 16   Ht 1.626 m (5' 4\")   Wt 76.7 kg (169 lb)   LMP 07/18/2021 (Approximate)   SpO2 100%   BMI 29.01 kg/m    Body mass index is 29.01 kg/m .  Physical Exam  Vitals and nursing note reviewed.   Cardiovascular:      Rate and Rhythm: Normal rate.   Pulmonary:      Effort: Pulmonary effort is normal.   Chest:      Chest wall: No mass, lacerations, deformity, swelling, crepitus or edema.   Breasts:     Right: Tenderness (base of breast) present. No swelling, bleeding, inverted nipple, mass, nipple discharge or skin change.      Left: Tenderness (base of breast) present. No swelling, bleeding, inverted nipple, mass, nipple discharge or skin change.   Abdominal:      Palpations: Abdomen is soft.      Tenderness: There is no abdominal tenderness. " There is left CVA tenderness. There is no right CVA tenderness.   Musculoskeletal:      Comments: Back with no obvious deformity   Lymphadenopathy:      Upper Body:      Right upper body: No supraclavicular, axillary or pectoral adenopathy.      Left upper body: No supraclavicular, axillary or pectoral adenopathy.   Neurological:      Mental Status: She is alert.

## 2023-09-28 ENCOUNTER — ANCILLARY ORDERS (OUTPATIENT)
Dept: FAMILY MEDICINE | Facility: CLINIC | Age: 38
End: 2023-09-28

## 2023-09-28 DIAGNOSIS — N20.0 NEPHROLITHIASIS: Primary | ICD-10-CM

## 2023-09-28 DIAGNOSIS — N64.4 PAIN OF BOTH BREASTS: ICD-10-CM

## 2023-09-28 DIAGNOSIS — R10.9 BILATERAL FLANK PAIN: ICD-10-CM

## 2023-09-29 ENCOUNTER — HOSPITAL ENCOUNTER (OUTPATIENT)
Dept: CT IMAGING | Facility: CLINIC | Age: 38
Discharge: HOME | End: 2023-09-29
Attending: FAMILY MEDICINE | Admitting: FAMILY MEDICINE
Payer: COMMERCIAL

## 2023-09-29 ENCOUNTER — TELEPHONE (OUTPATIENT)
Dept: FAMILY MEDICINE | Facility: CLINIC | Age: 38
End: 2023-09-29
Payer: COMMERCIAL

## 2023-09-29 DIAGNOSIS — R10.9 BILATERAL FLANK PAIN: ICD-10-CM

## 2023-09-29 DIAGNOSIS — N64.4 PAIN OF BOTH BREASTS: Primary | ICD-10-CM

## 2023-09-29 DIAGNOSIS — N20.0 NEPHROLITHIASIS: ICD-10-CM

## 2023-09-29 PROCEDURE — 74176 CT ABD & PELVIS W/O CONTRAST: CPT

## 2023-09-29 NOTE — TELEPHONE ENCOUNTER
"Pt calls & states she saw Dr Brown 9/27 & she ordered kidney US. Also spoke about breast pain & if pain worsens to call & have breast US ordered.  Pt states breasts \"a lot more tender today\". Has pain with wearing bra. Pt requesting US of breast.  Pt coming into [place] this morning to have kidney US at 0945.  Pt wants breast US ordered today while she is here.  Told pt this may not happen today. She is aware of this.    Routing to provider for review.    Daisha Patrick RN    "

## 2023-10-02 ENCOUNTER — ANCILLARY ORDERS (OUTPATIENT)
Dept: FAMILY MEDICINE | Facility: CLINIC | Age: 38
End: 2023-10-02

## 2023-10-02 DIAGNOSIS — N64.4 PAIN OF BOTH BREASTS: Primary | ICD-10-CM

## 2023-10-09 ENCOUNTER — VIRTUAL VISIT (OUTPATIENT)
Dept: FAMILY MEDICINE | Facility: CLINIC | Age: 38
End: 2023-10-09
Payer: COMMERCIAL

## 2023-10-09 DIAGNOSIS — G43.829 MENSTRUAL MIGRAINE WITHOUT STATUS MIGRAINOSUS, NOT INTRACTABLE: Primary | ICD-10-CM

## 2023-10-09 PROCEDURE — 99441 PR PHYSICIAN TELEPHONE EVALUATION 5-10 MIN: CPT | Mod: 93 | Performed by: FAMILY MEDICINE

## 2023-10-09 RX ORDER — PROPRANOLOL HCL 60 MG
60 CAPSULE, EXTENDED RELEASE 24HR ORAL DAILY
Qty: 90 CAPSULE | Refills: 0 | Status: SHIPPED | OUTPATIENT
Start: 2023-10-09 | End: 2024-01-02

## 2023-10-09 RX ORDER — SUMATRIPTAN 50 MG/1
50 TABLET, FILM COATED ORAL
Qty: 18 TABLET | Refills: 3 | Status: SHIPPED | OUTPATIENT
Start: 2023-10-09 | End: 2024-05-06

## 2023-10-09 NOTE — PROGRESS NOTES
"Lluvia is a 37 year old who is being evaluated via a billable telephone visit.      What phone number would you like to be contacted at? [phone]  How would you like to obtain your AVS? Alen    Distant Location (provider location):  On-site    Assessment & Plan     Menstrual migraine without status migrainosus, not intractable  Mainly just needing refills of imitrex and propranolol as ran out of meds which is why headaches worsened.  Both have worked well in general.  Aware to take sumatriptan as abortive.     Reviewed on lowest dose of propranolol and not having bradycardia, sedation, exercise intolerance or wheezing.  More recent bps on borderline elevated side so will increase to 60mg/day.     Refilled imitrex/sumatriptan.     Recommend follow up with primary provider for yearly physical overdue now.                BMI:   Estimated body mass index is 29.01 kg/m  as calculated from the following:    Height as of 9/27/23: 1.626 m (5' 4\").    Weight as of 9/27/23: 76.7 kg (169 lb).           Joel Daniel Wegener, MD  [facility]    Subjective   Lluvia is a 37 year old, presenting for the following health issues:  No chief complaint on file.        9/27/2023     8:24 AM   Additional Questions   Roomed by Deanne PEDROZA   Accompanied by self       HPI     Migraine     Since your last clinic visit, how have your headaches changed?  Worsened    How often are you getting headaches or migraines? Everyday lasting all day long      Are you able to do normal daily activities when you have a migraine? No    Are you taking rescue/relief medications? (Select all that apply) ibuprofen (Advil, Motrin)    How helpful is your rescue/relief medication?  I get no relief    Are you taking any medications to prevent migraines? (Select all that apply)  No    In the past 4 weeks, how often have you gone to urgent care or the emergency room because of your headaches?  0            Review of Systems         Objective  " "  Vitals - Patient Reported  Height (Patient Reported): 163.8 cm (5' 4.5\")        Physical Exam   healthy, alert and no distress  PSYCH: Alert and oriented times 3; coherent speech, normal   rate and volume, able to articulate logical thoughts, able   to abstract reason, no tangential thoughts, no hallucinations   or delusions  Her affect is normal  RESP: No cough, no audible wheezing, able to talk in full sentences  Remainder of exam unable to be completed due to telephone visits                Phone call duration: 5 minutes      "

## 2023-12-29 DIAGNOSIS — G43.109 MIGRAINE WITH AURA, NOT INTRACTABLE, WITHOUT STATUS MIGRAINOSUS: ICD-10-CM

## 2024-01-02 RX ORDER — PROPRANOLOL HCL 60 MG
60 CAPSULE, EXTENDED RELEASE 24HR ORAL DAILY
Qty: 90 CAPSULE | Refills: 0 | Status: SHIPPED | OUTPATIENT
Start: 2024-01-02

## 2024-05-06 ENCOUNTER — VIRTUAL VISIT (OUTPATIENT)
Dept: FAMILY MEDICINE | Facility: CLINIC | Age: 39
End: 2024-05-06
Payer: MEDICAID

## 2024-05-06 DIAGNOSIS — R03.0 ELEVATED BP WITHOUT DIAGNOSIS OF HYPERTENSION: ICD-10-CM

## 2024-05-06 DIAGNOSIS — G43.109 MIGRAINE WITH AURA, NOT INTRACTABLE, WITHOUT STATUS MIGRAINOSUS: Primary | ICD-10-CM

## 2024-05-06 DIAGNOSIS — R06.83 SNORING: ICD-10-CM

## 2024-05-06 PROCEDURE — 99214 OFFICE O/P EST MOD 30 MIN: CPT | Mod: 93 | Performed by: FAMILY MEDICINE

## 2024-05-06 RX ORDER — SUMATRIPTAN 20 MG/1
1 SPRAY NASAL PRN
Qty: 1 EACH | Refills: 3 | Status: SHIPPED | OUTPATIENT
Start: 2024-05-06

## 2024-05-06 RX ORDER — PROPRANOLOL HYDROCHLORIDE 80 MG/1
80 CAPSULE, EXTENDED RELEASE ORAL DAILY
Qty: 90 CAPSULE | Refills: 1 | Status: SHIPPED | OUTPATIENT
Start: 2024-05-06

## 2024-05-06 ASSESSMENT — ENCOUNTER SYMPTOMS: HEADACHES: 1

## 2024-05-06 NOTE — PROGRESS NOTES
"Lluvia is a 38 year old who is being evaluated via a billable video visit.    What phone number would you like to be contacted at? [phone]  How would you like to obtain your AVS? MyChart    Assessment & Plan     Migraine with aura, not intractable, without status migrainosus  Plan to slightly increase proranolol to 80 mg-hoping will helpw ith headaches and somewhat elevated BP. Referral to neuro headache clinic  - propranolol ER (INDERAL LA) 80 MG 24 hr capsule  Dispense: 90 capsule; Refill: 1  - SUMAtriptan (IMITREX) 20 MG/ACT nasal spray  Dispense: 1 each; Refill: 3  - Adult Neurology  Referral    Snoring  ? Of possible sleep apnea  - Adult Sleep Eval & Management  Referral    Elevated BP without diagnosis of hypertension  Recommend checking daily at home-follow up in office in 3-4 weeks for recheck and next steps  - Home Blood Pressure Monitor Order for DME - ONLY FOR DME    Subjective   Lluvia is a 38 year old, presenting for the following health issues:  Headache        5/6/2024    11:22 AM   Additional Questions   Roomed by Amy LAWRENCE MA   Accompanied by Self     History of Present Illness       Headaches:   Since the patient's last clinic visit, headaches are: worsened  The patient is getting headaches:  Daily  She is not able to do normal daily activities when she has a migraine.  The patient is taking the following rescue/relief medications:  Ibuprofen (Advil, Motrin)   Patient states \"I get no relief\" from the rescue/relief medications.   The patient is taking the following medications to prevent migraines:  No medications to prevent migraines  In the past 4 weeks, the patient has gone to an Urgent Care or Emergency Room 0 times times due to headaches.    She eats 0-1 servings of fruits and vegetables daily.She consumes 5 sweetened beverage(s) daily.She exercises with enough effort to increase her heart rate 9 or less minutes per day.  She exercises with enough effort to increase her " heart rate 7 days per week.   She is taking medications regularly.     Has been out of meds for the last 6 weeks due to insurance lapse. Long time migraine with limited relief-ok on daily propranolol. Imitrex does not always help.    Elevated TWILA without htn-was supposed to monitor her BP at home but lost BP cuff    Snores, will wake up with headaches-not being woken up from them.    Objective           Vitals:  No vitals were obtained today due to virtual visit.    Physical Exam   Able to speak in full sentences      telephone-Visit Details    Type of service:  telephone Visit   Visit duration 15 minutes

## 2024-05-08 ENCOUNTER — TELEPHONE (OUTPATIENT)
Dept: FAMILY MEDICINE | Facility: CLINIC | Age: 39
End: 2024-05-08
Payer: MEDICAID

## 2024-05-08 NOTE — TELEPHONE ENCOUNTER
Per fax received from [facility]   - SUMAtriptan (IMITREX) 20 MG/ACT nasal spray  is not covered by patient's Insurance Company  Dr. Galvez - Please choose:  1.  Change medication that is not covered to a different medication and send new prescription to patient's pharmacy?  2.  Patient will need to pay for the non-covered medication out-of-pocket?   3.  Try for Prior Authorization with Insurance Company to get medication covered?       Key# [id number]

## 2024-05-10 ENCOUNTER — MYC MEDICAL ADVICE (OUTPATIENT)
Dept: FAMILY MEDICINE | Facility: CLINIC | Age: 39
End: 2024-05-10
Payer: MEDICAID

## 2024-05-10 DIAGNOSIS — G43.109 MIGRAINE WITH AURA, NOT INTRACTABLE, WITHOUT STATUS MIGRAINOSUS: Primary | ICD-10-CM

## 2024-05-13 RX ORDER — RIZATRIPTAN BENZOATE 5 MG/1
5 TABLET ORAL
Qty: 18 TABLET | Refills: 1 | Status: SHIPPED | OUTPATIENT
Start: 2024-05-13 | End: 2024-07-12

## 2024-05-13 NOTE — TELEPHONE ENCOUNTER
Pt called in and reports the tablets do not work, nasal spray is not covered, is there something else she can try. Pt has not contacted her insurance. She has not tried any other medications in the past. Reports cash pay is not an option for her.  Meagan Ruby RN on 5/13/2024 at 3:12 PM

## 2024-05-13 NOTE — TELEPHONE ENCOUNTER
I haven't seen patient since 2022, Dr. Galvez saw last week for Virtual visit.    Can try Maxalt (a different triptan) to see if this works better for her than imitrex (sumitriptan).     No pharmacy noted.  Medication pended.    Susan Anaya M.D.

## 2024-07-10 DIAGNOSIS — G43.109 MIGRAINE WITH AURA, NOT INTRACTABLE, WITHOUT STATUS MIGRAINOSUS: ICD-10-CM

## 2024-07-12 RX ORDER — RIZATRIPTAN BENZOATE 5 MG/1
5 TABLET ORAL
Qty: 18 TABLET | Refills: 1 | Status: SHIPPED | OUTPATIENT
Start: 2024-07-12

## 2024-07-12 NOTE — TELEPHONE ENCOUNTER
Requested Prescriptions   Pending Prescriptions Disp Refills    rizatriptan (MAXALT) 5 MG tablet [Pharmacy Med Name: rizatriptan 5 mg tablet] 18 tablet 1     Sig: Take 1 tablet (5 mg) by mouth at onset of headache for migraine May repeat in 2 hours. Max 6 tablets/24 hours.       Serotonin Agonists Failed - 7/10/2024  4:31 PM        Failed - Serotonin Agonist request needs review.     Please review patient's record. If patient has had 8 or more treatments in the past month, please forward to provider.          Passed - Blood pressure under 140/90 in past 12 months     BP Readings from Last 3 Encounters:   09/27/23 124/88   03/14/23 (!) 155/104   04/28/22 126/84       No data recorded            Passed - Recent (12 mo) or future (30 days) visit within the authorizing provider's specialty     The patient must have completed an in-person or virtual visit within the past 12 months or has a future visit scheduled within the next 90 days with the authorizing provider s specialty.  Urgent care and e-visits do not quality as an office visit for this protocol.          Passed - Medication is active on med list        Passed - Medication indicated for associated diagnosis     Medication is associated with one or more of the following diagnoses:     Cluster headache   Headache   Migraine          Passed - Patient is age 18 or older        Passed - No active pregnancy on record        Passed - No positive pregnancy test in past 12 months

## 2024-10-20 NOTE — NURSING NOTE
"Chief Complaint   Patient presents with     Musculoskeletal Problem     Left wrist.  was pulling self up last night and heard 3 pops in wrist.  Some bruising and discoloration, weakness of hand.  Has tried wrist brace and no help.         Initial /64 (BP Location: Right arm, Cuff Size: Adult Regular)  Pulse 88  Temp 98.4  F (36.9  C) (Tympanic)  Resp 14  Wt 151 lb (68.5 kg)  BMI 25.92 kg/m2 Estimated body mass index is 25.92 kg/(m^2) as calculated from the following:    Height as of 10/17/18: 5' 4\" (1.626 m).    Weight as of this encounter: 151 lb (68.5 kg).    Patient presents to the clinic using No DME    Health Maintenance that is potentially due pending provider review:  NONE    n/a    Is there anyone who you would like to be able to receive your results? Not Applicable  If yes have patient fill out LACEY  Cory Bailey M.A.        " PAST SURGICAL HISTORY:  H/O bilateral breast reduction surgery 2015    H/O exploratory laparotomy 2014    History of cholecystectomy laparoscopic-2002

## 2024-11-17 ENCOUNTER — HEALTH MAINTENANCE LETTER (OUTPATIENT)
Age: 39
End: 2024-11-17

## 2025-06-22 ENCOUNTER — OFFICE VISIT (OUTPATIENT)
Dept: URGENT CARE | Facility: URGENT CARE | Age: 40
End: 2025-06-22
Payer: MEDICAID

## 2025-06-22 VITALS
SYSTOLIC BLOOD PRESSURE: 143 MMHG | TEMPERATURE: 98 F | OXYGEN SATURATION: 98 % | BODY MASS INDEX: 25.06 KG/M2 | RESPIRATION RATE: 20 BRPM | WEIGHT: 146 LBS | HEART RATE: 97 BPM | DIASTOLIC BLOOD PRESSURE: 101 MMHG

## 2025-06-22 DIAGNOSIS — R19.5 ABNORMAL FECES: Primary | ICD-10-CM

## 2025-06-22 PROCEDURE — 3077F SYST BP >= 140 MM HG: CPT | Performed by: NURSE PRACTITIONER

## 2025-06-22 PROCEDURE — 3080F DIAST BP >= 90 MM HG: CPT | Performed by: NURSE PRACTITIONER

## 2025-06-22 PROCEDURE — 99213 OFFICE O/P EST LOW 20 MIN: CPT | Performed by: NURSE PRACTITIONER

## 2025-06-22 NOTE — PROGRESS NOTES
Urgent Care Clinic Visit    Chief Complaint   Patient presents with    Urgent Care    Bowel Problems     Patient reports having one episode today of seeing a worm in her bowel states she has been losing weight for a couple of months, and having anal itching            6/22/2025     4:32 PM   Additional Questions   Roomed by MONA Youngblood   Accompanied by Self

## 2025-06-22 NOTE — PROGRESS NOTES
SUBJECTIVE:  Lluvia Glaser is a 39 year old female who presents with concerns of worms in stool.  Past Medical History:   Diagnosis Date    ADHD     Bipolar 1 disorder (H)     Chronic eczema     PABLO I (cervical intraepithelial neoplasia I) 12/24/2019 8/4/06 NIL 10/11/10 ASCUS, neg HPV 12/24/19 Colpo d/t cervical lesions on exam, bx PABLO I 7/22/21 NIL pap, neg HPV. Plan: await provider    Depressive disorder     Excessive bleeding in premenopausal period 08/05/2021    s/p hyst    Gastric ulcer     Generalized anxiety disorder     Smoker     Substance abuse (H)        Social History     Tobacco Use    Smoking status: Every Day     Current packs/day: 0.50     Average packs/day: 0.5 packs/day for 10.0 years (5.0 ttl pk-yrs)     Types: Cigarettes    Smokeless tobacco: Former    Tobacco comments:     2-3 cigs daily   Vaping Use    Vaping status: Former   Substance Use Topics    Alcohol use: Not Currently    Drug use: Not Currently     Types: Methamphetamines     Comment: sober since 10/2019       REVIEW OF SYSTEMS  ROS:   Review of systems negative except as stated above.        OBJECTIVE:    LMP 07/18/2021 (Approximate)    GENERAL: no acute distress  EYES: EOMI,  PERRL, conjunctiva clear  RESP: lungs clear to auscultation - no rales, rhonchi or wheezes  SKIN: no suspicious lesions or rashes   CV: regular rates and rhythm, normal    ASSESSMENT:    ICD-10-CM    1. Abnormal feces  R19.5 Ova and Parasite Exam Routine     Ova and Parasite Exam Routine            PLAN:  Will obtain stool dual samples  LENA Chirinos CNP

## 2025-06-23 PROCEDURE — 87209 SMEAR COMPLEX STAIN: CPT | Performed by: NURSE PRACTITIONER

## 2025-06-23 PROCEDURE — 87177 OVA AND PARASITES SMEARS: CPT | Performed by: NURSE PRACTITIONER

## 2025-06-24 LAB
O+P STL MICRO: NEGATIVE
SPECIMEN TYPE: NORMAL

## 2025-07-16 ENCOUNTER — OFFICE VISIT (OUTPATIENT)
Dept: FAMILY MEDICINE | Facility: CLINIC | Age: 40
End: 2025-07-16
Payer: COMMERCIAL

## 2025-07-16 VITALS
RESPIRATION RATE: 16 BRPM | TEMPERATURE: 98 F | OXYGEN SATURATION: 100 % | DIASTOLIC BLOOD PRESSURE: 84 MMHG | HEIGHT: 64 IN | BODY MASS INDEX: 24.92 KG/M2 | HEART RATE: 94 BPM | SYSTOLIC BLOOD PRESSURE: 128 MMHG | WEIGHT: 146 LBS

## 2025-07-16 DIAGNOSIS — J02.9 SORE THROAT: Primary | ICD-10-CM

## 2025-07-16 LAB
DEPRECATED S PYO AG THROAT QL EIA: NEGATIVE
S PYO DNA THROAT QL NAA+PROBE: NOT DETECTED

## 2025-07-16 PROCEDURE — 99213 OFFICE O/P EST LOW 20 MIN: CPT | Performed by: NURSE PRACTITIONER

## 2025-07-16 PROCEDURE — 87651 STREP A DNA AMP PROBE: CPT | Performed by: NURSE PRACTITIONER

## 2025-07-16 ASSESSMENT — PAIN SCALES - GENERAL: PAINLEVEL_OUTOF10: MODERATE PAIN (4)

## 2025-07-16 NOTE — PROGRESS NOTES
"  Assessment & Plan     Sore throat  No acute distress.  With symptoms strep testing was completed today which was negative, PCR pending.  Recommend pushing fluids.  Alternate with Tylenol and Profen as needed for pain/fever. Symptomatic care and follow up discussed  - Streptococcus A Rapid Screen w/Reflex to PCR - Clinic Collect  - Group A Streptococcus PCR Throat Swab      Subjective   Lluvia is a 39 year old, presenting for the following health issues:  Throat Pain        7/16/2025    12:45 PM   Additional Questions   Roomed by Diana GUTIERREZ   Accompanied by self         7/16/2025    12:45 PM   Patient Reported Additional Medications   Patient reports taking the following new medications no new meds     HPI      Acute Illness  Acute illness concerns: sore throat   Onset/Duration: about 2 days   Symptoms:  Fever: No  Chills/Sweats: No  Headache (location?): No  Sinus Pressure: YES  Conjunctivitis:  No  Ear Pain: no  Rhinorrhea: No  Congestion: No  Sore Throat: YES  Cough: YES-non-productive  Wheeze: No  Decreased Appetite: No  Nausea: No  Vomiting: No  Diarrhea: No  Dysuria/Freq.: No  Dysuria or Hematuria: No  Fatigue/Achiness: No  Sick/Strep Exposure: unsure  Therapies tried and outcome: no         Review of Systems  Constitutional, HEENT, cardiovascular, pulmonary, gi and gu systems are negative, except as otherwise noted.      Objective    /84   Pulse 94   Temp 98  F (36.7  C) (Tympanic)   Resp 16   Ht 1.626 m (5' 4\")   Wt 66.2 kg (146 lb)   LMP 07/18/2021 (Approximate)   SpO2 100%   BMI 25.06 kg/m    Body mass index is 25.06 kg/m .  Physical Exam   GENERAL: alert and no distress  HENT: ear canals and TM's normal, nose and mouth without ulcers or lesions  NECK: no adenopathy, no asymmetry, masses, or scars  RESP: lungs clear to auscultation - no rales, rhonchi or wheezes  CV: regular rate and rhythm, normal S1 S2, no S3 or S4, no murmur, click or rub, no peripheral edema  PSYCH: mentation appears " normal, affect normal/bright    Results for orders placed or performed in visit on 07/16/25   Streptococcus A Rapid Screen w/Reflex to PCR - Clinic Collect     Status: Normal    Specimen: Throat; Swab   Result Value Ref Range    Group A Strep antigen Negative Negative           Signed Electronically by: LENA Morataya CNP

## 2025-07-22 ENCOUNTER — MYC MEDICAL ADVICE (OUTPATIENT)
Dept: FAMILY MEDICINE | Facility: CLINIC | Age: 40
End: 2025-07-22

## 2025-07-22 ENCOUNTER — MYC REFILL (OUTPATIENT)
Dept: FAMILY MEDICINE | Facility: CLINIC | Age: 40
End: 2025-07-22

## 2025-07-22 DIAGNOSIS — G43.109 MIGRAINE WITH AURA, NOT INTRACTABLE, WITHOUT STATUS MIGRAINOSUS: ICD-10-CM

## 2025-07-22 RX ORDER — SUMATRIPTAN 20 MG/1
1 SPRAY NASAL PRN
Qty: 1 EACH | Refills: 0 | Status: CANCELLED | OUTPATIENT
Start: 2025-07-22

## 2025-07-22 RX ORDER — PROPRANOLOL HYDROCHLORIDE 60 MG/1
60 CAPSULE, EXTENDED RELEASE ORAL DAILY
Qty: 90 CAPSULE | Refills: 0 | Status: CANCELLED | OUTPATIENT
Start: 2025-07-22

## 2025-07-22 RX ORDER — RIZATRIPTAN BENZOATE 5 MG/1
5 TABLET ORAL
Qty: 18 TABLET | Refills: 0 | Status: CANCELLED | OUTPATIENT
Start: 2025-07-22

## 2025-07-22 RX ORDER — SUMATRIPTAN 20 MG/1
1 SPRAY NASAL PRN
Qty: 1 EACH | Refills: 0 | Status: SHIPPED | OUTPATIENT
Start: 2025-07-22 | End: 2025-07-22

## 2025-07-22 RX ORDER — RIZATRIPTAN BENZOATE 5 MG/1
5 TABLET ORAL
Qty: 18 TABLET | Refills: 0 | Status: SHIPPED | OUTPATIENT
Start: 2025-07-22 | End: 2025-07-22

## 2025-07-22 RX ORDER — PROPRANOLOL HYDROCHLORIDE 80 MG/1
80 CAPSULE, EXTENDED RELEASE ORAL DAILY
Qty: 30 CAPSULE | Refills: 0 | Status: SHIPPED | OUTPATIENT
Start: 2025-07-22 | End: 2025-07-22

## 2025-07-22 RX ORDER — PROPRANOLOL HYDROCHLORIDE 80 MG/1
80 CAPSULE, EXTENDED RELEASE ORAL DAILY
Qty: 30 CAPSULE | Refills: 0 | Status: CANCELLED | OUTPATIENT
Start: 2025-07-22

## 2025-07-22 NOTE — TELEPHONE ENCOUNTER
Patient requesting to have these prescriptions transferred to [facility].     LILIBETH Bennett

## 2025-07-23 ENCOUNTER — MYC MEDICAL ADVICE (OUTPATIENT)
Dept: FAMILY MEDICINE | Facility: CLINIC | Age: 40
End: 2025-07-23

## 2025-07-23 ENCOUNTER — E-VISIT (OUTPATIENT)
Dept: FAMILY MEDICINE | Facility: CLINIC | Age: 40
End: 2025-07-23
Payer: COMMERCIAL

## 2025-07-23 DIAGNOSIS — G43.911 INTRACTABLE MIGRAINE WITH STATUS MIGRAINOSUS, UNSPECIFIED MIGRAINE TYPE: Primary | ICD-10-CM

## 2025-07-23 DIAGNOSIS — R69 DIAGNOSIS UNKNOWN: Primary | ICD-10-CM

## 2025-07-23 PROCEDURE — 99207 PR NON-BILLABLE SERV PER CHARTING: CPT

## 2025-07-23 PROCEDURE — 99207 PR NON-BILLABLE SERV PER CHARTING: CPT | Performed by: FAMILY MEDICINE

## 2025-07-23 RX ORDER — SUMATRIPTAN 20 MG/1
1 SPRAY NASAL PRN
Qty: 1 EACH | Refills: 0 | Status: SHIPPED | OUTPATIENT
Start: 2025-07-23

## 2025-07-23 RX ORDER — PROPRANOLOL HYDROCHLORIDE 80 MG/1
80 CAPSULE, EXTENDED RELEASE ORAL DAILY
Qty: 30 CAPSULE | Refills: 0 | Status: SHIPPED | OUTPATIENT
Start: 2025-07-23

## 2025-07-23 RX ORDER — RIZATRIPTAN BENZOATE 5 MG/1
5 TABLET ORAL
Qty: 18 TABLET | Refills: 0 | Status: SHIPPED | OUTPATIENT
Start: 2025-07-23

## 2025-07-23 NOTE — TELEPHONE ENCOUNTER
See pt's response, this is already being addressed in a separate encounter.     Safia Farah RN  [facility]

## 2025-07-23 NOTE — PATIENT INSTRUCTIONS
Dear Lluvia,    We are sorry you are not feeling well. Based on the responses you provided, it is recommended that you be seen in-person in clinic so we can better evaluate your symptoms. Please schedule this visit in [facility]. You will have a Schedule Now button in [facility] to help with scheduling this appointment. Otherwise, you can call [phone] to schedule an appointment.     You will not be charged for this eVisit. Thank you for trusting us with your care.     Beatrice Galvez, DO    Thank you for choosing us for your care. I think an in-clinic or virtual visit would be the best next step based on your symptoms. Please schedule a clinic appointment; you won t be charged for this eVisit.      You can schedule an appointment by clicking here in [facility], or call [phone].

## 2025-07-23 NOTE — TELEPHONE ENCOUNTER
Clinic RN: Please investigate patient's chart or contact patient if the information cannot be found because patient should have run out of this medication on 04/2024. Confirm patient is taking this medication as prescribed. Document findings and route refill encounter to provider for approval or denial.  Looks like patient may have transferred care to [facility]?     Yadiel Pozo RN on 7/23/2025 at 8:34 AM

## (undated) DEVICE — ANTIFOG SOLUTION W/FOAM PAD 31142527

## (undated) DEVICE — ADH SKIN CLOSURE PREMIERPRO EXOFIN 1.0ML 3470

## (undated) DEVICE — GLOVE PROTEXIS BLUE W/NEU-THERA 6.0  2D73EB60

## (undated) DEVICE — CATH TRAY FOLEY SURESTEP 16FR W/URINE MTR STATLK LF A303416A

## (undated) DEVICE — NDL INSUFFLATION 13GA 120MM C2201

## (undated) DEVICE — SOL NACL 0.9% IRRIG 3000ML BAG 07972-08

## (undated) DEVICE — PACK LAPAROTOMY CUSTOM LAKES

## (undated) DEVICE — ESU HOLSTER PLASTIC DISP E2400

## (undated) DEVICE — SU MONOCRYL 4-0 PS-2 18" UND Y496G

## (undated) DEVICE — FILTER LAPROSHIELD SMOKE EVAC LSF1

## (undated) DEVICE — ESU LIGASURE LAPAROSCOPIC BLUNT TIP SEALER 5MMX37CM LF1837

## (undated) DEVICE — SUCTION TIP YANKAUER STR K87

## (undated) DEVICE — SOL WATER IRRIG 1000ML BOTTLE 07139-09

## (undated) DEVICE — SYR 10ML LL W/O NDL

## (undated) DEVICE — SU VICRYL 0 CT-1 36" J946H

## (undated) DEVICE — PREP SKIN SCRUB TRAY 4461A

## (undated) DEVICE — SYR 50ML LL W/O NDL 309653

## (undated) DEVICE — PACK LAPAROSCOPY/PELVISCOPY STD

## (undated) DEVICE — SUCTION IRR STRYKERFLOW II W/TIP 250-070-520

## (undated) DEVICE — DRSG TEGADERM 2 3/8X2 3/4" 1624W

## (undated) DEVICE — GLOVE PROTEXIS MICRO 5.5  2D73PM55

## (undated) DEVICE — Device

## (undated) DEVICE — SOL NACL 0.9% IRRIG 1000ML BOTTLE 07138-09

## (undated) DEVICE — BLADE KNIFE SURG 11 371111

## (undated) DEVICE — ENDO TROCAR FIRST ENTRY KII FIOS ADV FIX 05X100MM CFF03

## (undated) DEVICE — STOCKING SLEEVE COMPRESSION CALF MED

## (undated) DEVICE — DRSG GAUZE 2X2" 8042

## (undated) DEVICE — DRAPE POUCH INSTRUMENT 3 POCKET 1018L

## (undated) DEVICE — TUBING SUCTION 12"X1/4" N612

## (undated) DEVICE — TUBING C02 INSUFFLATION W/FILTER

## (undated) DEVICE — ENDO TROCAR SLEEVE KII ADV FIXATION 05X100MM CFS02

## (undated) DEVICE — ESU CORD MONOPOLAR 10'  E0510

## (undated) DEVICE — PAD PERI INDIV WRAP 11" 2022

## (undated) DEVICE — DECANTER VIAL 2006S

## (undated) DEVICE — GOWN LG DISP 9515

## (undated) RX ORDER — BUPIVACAINE HYDROCHLORIDE AND EPINEPHRINE 5; 5 MG/ML; UG/ML
INJECTION, SOLUTION EPIDURAL; INTRACAUDAL; PERINEURAL
Status: DISPENSED
Start: 2021-08-25

## (undated) RX ORDER — PHENAZOPYRIDINE HYDROCHLORIDE 200 MG/1
TABLET, FILM COATED ORAL
Status: DISPENSED
Start: 2021-08-25

## (undated) RX ORDER — HYDROXYZINE HYDROCHLORIDE 50 MG/1
TABLET, FILM COATED ORAL
Status: DISPENSED
Start: 2021-08-25

## (undated) RX ORDER — OXYCODONE HYDROCHLORIDE 5 MG/1
TABLET ORAL
Status: DISPENSED
Start: 2021-08-25

## (undated) RX ORDER — ONDANSETRON 2 MG/ML
INJECTION INTRAMUSCULAR; INTRAVENOUS
Status: DISPENSED
Start: 2020-02-26

## (undated) RX ORDER — DEXAMETHASONE SODIUM PHOSPHATE 4 MG/ML
INJECTION, SOLUTION INTRA-ARTICULAR; INTRALESIONAL; INTRAMUSCULAR; INTRAVENOUS; SOFT TISSUE
Status: DISPENSED
Start: 2021-08-25

## (undated) RX ORDER — FENTANYL CITRATE 50 UG/ML
INJECTION, SOLUTION INTRAMUSCULAR; INTRAVENOUS
Status: DISPENSED
Start: 2021-08-25

## (undated) RX ORDER — MAGNESIUM SULFATE HEPTAHYDRATE 40 MG/ML
INJECTION, SOLUTION INTRAVENOUS
Status: DISPENSED
Start: 2021-08-25

## (undated) RX ORDER — PROPOFOL 10 MG/ML
INJECTION, EMULSION INTRAVENOUS
Status: DISPENSED
Start: 2021-08-25

## (undated) RX ORDER — LIDOCAINE HYDROCHLORIDE 10 MG/ML
INJECTION, SOLUTION EPIDURAL; INFILTRATION; INTRACAUDAL; PERINEURAL
Status: DISPENSED
Start: 2020-02-26

## (undated) RX ORDER — DIMENHYDRINATE 50 MG/ML
INJECTION, SOLUTION INTRAMUSCULAR; INTRAVENOUS
Status: DISPENSED
Start: 2021-08-25

## (undated) RX ORDER — HYDROMORPHONE HCL IN WATER/PF 6 MG/30 ML
PATIENT CONTROLLED ANALGESIA SYRINGE INTRAVENOUS
Status: DISPENSED
Start: 2021-08-25

## (undated) RX ORDER — GLYCOPYRROLATE 0.2 MG/ML
INJECTION, SOLUTION INTRAMUSCULAR; INTRAVENOUS
Status: DISPENSED
Start: 2020-02-26

## (undated) RX ORDER — ACETAMINOPHEN 325 MG/1
TABLET ORAL
Status: DISPENSED
Start: 2021-08-25

## (undated) RX ORDER — LIDOCAINE HYDROCHLORIDE 10 MG/ML
INJECTION, SOLUTION INFILTRATION; PERINEURAL
Status: DISPENSED
Start: 2021-08-25

## (undated) RX ORDER — ONDANSETRON 2 MG/ML
INJECTION INTRAMUSCULAR; INTRAVENOUS
Status: DISPENSED
Start: 2021-08-25

## (undated) RX ORDER — GABAPENTIN 300 MG/1
CAPSULE ORAL
Status: DISPENSED
Start: 2021-08-25

## (undated) RX ORDER — PROPOFOL 10 MG/ML
INJECTION, EMULSION INTRAVENOUS
Status: DISPENSED
Start: 2020-02-26

## (undated) RX ORDER — CEFAZOLIN SODIUM 2 G/100ML
INJECTION, SOLUTION INTRAVENOUS
Status: DISPENSED
Start: 2021-08-25

## (undated) RX ORDER — LIDOCAINE HYDROCHLORIDE 10 MG/ML
INJECTION, SOLUTION EPIDURAL; INFILTRATION; INTRACAUDAL; PERINEURAL
Status: DISPENSED
Start: 2021-08-25